# Patient Record
Sex: FEMALE | Race: WHITE | NOT HISPANIC OR LATINO | Employment: UNEMPLOYED | URBAN - METROPOLITAN AREA
[De-identification: names, ages, dates, MRNs, and addresses within clinical notes are randomized per-mention and may not be internally consistent; named-entity substitution may affect disease eponyms.]

---

## 2017-03-06 ENCOUNTER — OFFICE VISIT (OUTPATIENT)
Dept: CARDIOLOGY | Facility: CLINIC | Age: 52
End: 2017-03-06

## 2017-03-06 VITALS
HEART RATE: 54 BPM | WEIGHT: 225 LBS | HEIGHT: 66 IN | BODY MASS INDEX: 36.16 KG/M2 | SYSTOLIC BLOOD PRESSURE: 136 MMHG | DIASTOLIC BLOOD PRESSURE: 80 MMHG

## 2017-03-06 DIAGNOSIS — I25.10 CORONARY ARTERY DISEASE INVOLVING NATIVE CORONARY ARTERY OF NATIVE HEART WITHOUT ANGINA PECTORIS: ICD-10-CM

## 2017-03-06 DIAGNOSIS — I25.2 OLD MI (MYOCARDIAL INFARCTION): Primary | ICD-10-CM

## 2017-03-06 PROCEDURE — 99213 OFFICE O/P EST LOW 20 MIN: CPT | Performed by: INTERNAL MEDICINE

## 2017-03-06 RX ORDER — NITROGLYCERIN 0.4 MG/1
0.4 TABLET SUBLINGUAL
Qty: 35 TABLET | Refills: 6 | Status: SHIPPED | OUTPATIENT
Start: 2017-03-06 | End: 2022-05-24 | Stop reason: SDUPTHER

## 2017-03-06 RX ORDER — METOPROLOL SUCCINATE 50 MG/1
50 TABLET, EXTENDED RELEASE ORAL DAILY
Qty: 90 TABLET | Refills: 3 | Status: SHIPPED | OUTPATIENT
Start: 2017-03-06 | End: 2019-09-17

## 2017-03-06 RX ORDER — ROSUVASTATIN CALCIUM 10 MG/1
10 TABLET, COATED ORAL DAILY
Qty: 90 TABLET | Refills: 3 | Status: SHIPPED | OUTPATIENT
Start: 2017-03-06 | End: 2018-05-03 | Stop reason: SDUPTHER

## 2017-03-06 RX ORDER — GABAPENTIN 300 MG/1
300 CAPSULE ORAL 3 TIMES DAILY
COMMUNITY
End: 2017-10-19

## 2017-03-06 RX ORDER — CLOPIDOGREL BISULFATE 75 MG/1
75 TABLET ORAL DAILY
Qty: 90 TABLET | Refills: 3 | Status: SHIPPED | OUTPATIENT
Start: 2017-03-06 | End: 2018-05-03 | Stop reason: SDUPTHER

## 2017-03-15 PROCEDURE — 93000 ELECTROCARDIOGRAM COMPLETE: CPT | Performed by: INTERNAL MEDICINE

## 2017-03-15 NOTE — PROGRESS NOTES
Subjective:     Encounter Date:03/06/2017      Patient ID: Claudette Gramajo is a 51 y.o. female.    Chief Complaint: CAD, old MI    History of Present Illness     Dear Dr. Cho,     I had the pleasure of seeing  the patient in cardiac followup today.  As you well know, he is a william 51-year-old woman with a history of premature coronary artery disease.   She had an acute anterior myocardial infarction treated with stenting.  Her left ventricle  function returned to normal.  In 2014 she had a ramus branch lesion that was also treated with stenting for angina.   She has done well since then without any recurrent cardiac events.       Since I have last seen her she has been trying to get into shape, but she has had problems due to her orthopaedic issues.   She had a knee replacement in [place] many years ago that did not heal well.  She is looking at the other knee being replaced soon by Dr. Irvin.       She has also been treated for arthritis.  Her rheumatologist  gave her Neurontin, which she did not like very much.  She denies any symptoms of angina or heart failure.           Review of Systems   All other systems reviewed and are negative.        ECG 12 Lead  Date/Time: 3/15/2017 10:06 AM  Performed by: MONIQUE BARGER  Authorized by: MONIQUE BARGER   Comparison: compared with previous ECG   Similar to previous ECG  Rhythm: sinus rhythm  BPM: 54  Clinical impression: low voltage  Comments: [id number]               Objective:     Physical Exam   Constitutional: She is oriented to person, place, and time. She appears well-developed and well-nourished.   HENT:   Head: Normocephalic and atraumatic.   Neck: Normal range of motion. Neck supple.   Cardiovascular: Normal rate, regular rhythm and normal heart sounds.    Pulmonary/Chest: Effort normal and breath sounds normal.   Abdominal: Soft. Bowel sounds are normal.   Musculoskeletal: Normal range of motion.   Neurological: She is alert and oriented to person, place, and  time.   Skin: Skin is warm and dry.   Psychiatric: She has a normal mood and affect. Her behavior is normal. Thought content normal.   Vitals reviewed.      Lab Review:       Assessment:          Diagnosis Plan   1. Old MI (myocardial infarction)     2. Coronary artery disease involving native coronary artery of native heart without angina pectoris            Plan:        It was a pleasure to see the patient in cardiac followup today.  She is doing well from the cardiac standpoint without complaints of angina or heart failure.  Her cardiac function is good.  Her fitness level is a little below average, but I suspect this is primarily due to her orthopaedic issues. I see no problems with her proceeding with her knee surgery.  She can hold her antiplatelet therapy for five days before surgery if required.  She is to restart them post procedure, and she should ambulate as early as possible to reduce risk of venous thromboembolism.   She will see me again in six months, or sooner if symptoms warrant.       Coronary Artery Disease  Assessment  • The patient has no angina    Plan  • Lifestyle modifications discussed include adhering to a heart healthy diet, avoidance of tobacco products, maintenance of a healthy weight, medication compliance, regular exercise and regular monitoring of cholesterol and blood pressure    Subjective - Objective  • There is a history of past MI  • There has been a previous stent procedure using DONNELL  • Current antiplatelet therapy includes aspirin 81 mg and clopidogrel 75 mg

## 2017-10-19 ENCOUNTER — OFFICE VISIT (OUTPATIENT)
Dept: CARDIOLOGY | Facility: CLINIC | Age: 52
End: 2017-10-19

## 2017-10-19 VITALS
HEIGHT: 66 IN | HEART RATE: 58 BPM | WEIGHT: 218 LBS | SYSTOLIC BLOOD PRESSURE: 126 MMHG | DIASTOLIC BLOOD PRESSURE: 80 MMHG | OXYGEN SATURATION: 98 % | BODY MASS INDEX: 35.03 KG/M2

## 2017-10-19 DIAGNOSIS — I25.10 ARTERIOSCLEROSIS OF CORONARY ARTERY: Primary | ICD-10-CM

## 2017-10-19 DIAGNOSIS — R07.9 CHEST PAIN, UNSPECIFIED TYPE: ICD-10-CM

## 2017-10-19 DIAGNOSIS — Z95.5 HISTORY OF CORONARY ARTERY STENT PLACEMENT: ICD-10-CM

## 2017-10-19 PROBLEM — S83.006A DISLOCATION, PATELLA CLOSED: Status: ACTIVE | Noted: 2017-05-30

## 2017-10-19 PROCEDURE — 99214 OFFICE O/P EST MOD 30 MIN: CPT | Performed by: PHYSICIAN ASSISTANT

## 2017-10-19 PROCEDURE — 93000 ELECTROCARDIOGRAM COMPLETE: CPT | Performed by: PHYSICIAN ASSISTANT

## 2017-10-19 RX ORDER — ERGOCALCIFEROL 1.25 MG/1
50000 CAPSULE ORAL
COMMUNITY
Start: 2017-08-29 | End: 2022-02-16

## 2017-10-19 NOTE — PROGRESS NOTES
Date of Office Visit: 10/19/2017  Encounter Provider: ERIC Stone  Place of Service: [facility] CARDIOLOGY  Patient Name: Claudette Gramajo  :1965    Chief Complaint   Patient presents with   • Coronary Artery Disease     6 month follow up   :     HPI: Claudette Gramajo is a 52 y.o. female , new to me, who presents today for follow-up.  Old records have been obtained and reviewed by me.  She is a patient of Dr. Ely's with a past medical history significant for coronary artery disease.  She had an acute anterior MI that was treated with stenting, and her LV function returned to normal.  Then in  she underwent stent placement to the ramus branch for angina.  She was last in our office to see Dr. Ely on 3/6/2017.  At that visit, she was doing well from a cardiac standpoint.  She was having some orthopedic issues.  She was thinking about pursuing further surgery on her knee.  Dr. Ely felt that it was safe for her to proceed with knee replacement surgery.  He recommended that she hold her antiplatelet therapy for 5 days before her surgery if required, and then to restart them after the procedure.  Recommendations were for her to follow-up in 6 months or sooner if needed.  She is here today for 6 month follow-up.   Over the past 6 months she's been doing fairly well.  However for the past couple of months she started having chest pain.  She describes it as sometimes sharp and sometimes tight on the left side of her chest.  At times it radiates to her upper right and left arm.  It is associated with shortness of breath and sometimes nausea, as well as diaphoresis.  The episodes usually happen at rest.  They last for about 5 minutes and then go away on their own.  She does not exert herself much because of her multiple orthopedic issues.  She is able to do housework, and she does not have the pain when she is moving around the house.    Past Medical History:   Diagnosis Date   •  CAD (coronary artery disease)    • Chest pain    • Edema    • Myocardial infarct    • Palpitations    • Unstable angina        Past Surgical History:   Procedure Laterality Date   • CARDIAC CATHETERIZATION     • CORONARY ANGIOPLASTY     • CORONARY STENT PLACEMENT     • VENOUS THROMBECTOMY         Social History     Social History   • Marital status:      Spouse name: N/A   • Number of children: N/A   • Years of education: N/A     Occupational History   • Not on file.     Social History Main Topics   • Smoking status: Former Smoker   • Smokeless tobacco: Never Used      Comment: caffeine use   • Alcohol use No   • Drug use: No   • Sexual activity: Defer     Other Topics Concern   • Not on file     Social History Narrative       Family History   Problem Relation Age of Onset   • Suicidality Mother    • Heart attack Father    • Heart disease Father    • No Known Problems Maternal Grandmother    • No Known Problems Maternal Grandfather    • No Known Problems Paternal Grandmother    • No Known Problems Paternal Grandfather        Review of Systems   Constitution: Positive for diaphoresis. Negative for chills, fever, malaise/fatigue, weight gain and weight loss.   HENT: Negative for ear pain, hearing loss, nosebleeds and sore throat.    Eyes: Negative for double vision, pain and visual disturbance.   Cardiovascular: Positive for chest pain. Negative for dyspnea on exertion, irregular heartbeat, leg swelling, near-syncope, orthopnea, palpitations, paroxysmal nocturnal dyspnea and syncope.   Respiratory: Positive for shortness of breath. Negative for cough, sleep disturbances due to breathing, snoring and wheezing.    Endocrine: Negative for cold intolerance, heat intolerance and polyuria.   Skin: Negative for itching and rash.   Musculoskeletal: Negative for joint pain, joint swelling and myalgias.   Gastrointestinal: Negative for abdominal pain, diarrhea, melena, nausea and vomiting.   Genitourinary: Negative for  "frequency, hematuria and hesitancy.   Neurological: Negative for excessive daytime sleepiness, headaches, light-headedness, numbness, paresthesias and seizures.   Psychiatric/Behavioral: Negative for altered mental status and depression.   Allergic/Immunologic: Negative.    All other systems reviewed and are negative.      No Known Allergies      Current Outpatient Prescriptions:   •  aspirin 81 MG tablet, Take 81 mg by mouth Daily., Disp: , Rfl:   •  clopidogrel (PLAVIX) 75 MG tablet, Take 1 tablet by mouth Daily., Disp: 90 tablet, Rfl: 3  •  Empagliflozin (JARDIANCE) 10 MG tablet, Take 1 capsule by mouth daily., Disp: , Rfl:   •  fexofenadine-pseudoephedrine (ALLEGRA-D 24) 180-240 MG per 24 hr tablet, Take 1 tablet by mouth daily., Disp: , Rfl:   •  metFORMIN (GLUCOPHAGE) 500 MG tablet, Take 2,000 mg by mouth Daily With Breakfast., Disp: , Rfl:   •  metoprolol succinate XL (TOPROL-XL) 50 MG 24 hr tablet, Take 1 tablet by mouth Daily., Disp: 90 tablet, Rfl: 3  •  nitroglycerin (NITROSTAT) 0.4 MG SL tablet, Place 1 tablet under the tongue Every 5 (Five) Minutes As Needed for chest pain., Disp: 35 tablet, Rfl: 6  •  rosuvastatin (CRESTOR) 10 MG tablet, Take 1 tablet by mouth Daily., Disp: 90 tablet, Rfl: 3  •  vitamin D (ERGOCALCIFEROL) 81808 units capsule capsule, Take 50,000 Units by mouth Every 7 (Seven) Days., Disp: , Rfl:      Objective:     Vitals:    10/19/17 1454 10/19/17 1506   BP: 120/72 126/80   BP Location: Right arm Left arm   Pulse: 58    SpO2: 98%    Weight: 218 lb (98.9 kg)    Height: 66\" (167.6 cm)      Body mass index is 35.19 kg/(m^2).    PHYSICAL EXAM:    Physical Exam   Constitutional: She is oriented to person, place, and time. She appears well-developed and well-nourished. No distress.   HENT:   Head: Normocephalic and atraumatic.   Eyes: Pupils are equal, round, and reactive to light.   Neck: No JVD present. No thyromegaly present.   Cardiovascular: Normal rate, regular rhythm, normal heart " sounds and intact distal pulses.    No murmur heard.  Pulmonary/Chest: Effort normal and breath sounds normal. No respiratory distress.   Abdominal: Soft. Bowel sounds are normal. She exhibits no distension. There is no splenomegaly or hepatomegaly. There is no tenderness.   Musculoskeletal: Normal range of motion. She exhibits no edema.   Neurological: She is alert and oriented to person, place, and time.   Skin: Skin is warm. She is diaphoretic. No erythema.   Psychiatric: She has a normal mood and affect. Her behavior is normal. Judgment normal.         ECG 12 Lead  Date/Time: 10/19/2017 3:14 PM  Performed by: ALYSHA HECTOR.  Authorized by: ALYSHA HECTOR.   Comparison: compared with previous ECG from 3/6/2017  Similar to previous ECG  Rhythm: sinus rhythm  BPM: 58  Q waves: V1, V2 and V3  Clinical impression: abnormal ECG  Comments: Indication: Coronary artery disease.              Assessment:       Diagnosis Plan   1. Arteriosclerosis of coronary artery  ECG 12 Lead    Stress Test With Myocardial Perfusion One Day   2. History of coronary artery stent placement  ECG 12 Lead    Stress Test With Myocardial Perfusion One Day   3. Chest pain, unspecified type  Stress Test With Myocardial Perfusion One Day     Orders Placed This Encounter   Procedures   • Stress Test With Myocardial Perfusion One Day     Standing Status:   Future     Standing Expiration Date:   10/19/2018     Order Specific Question:   Rest/stress, rest only or stress only?     Answer:   Rest/Stress     Order Specific Question:   What stress agent will be used?     Answer:   Regadenoson (Lexiscan)     Order Specific Question:   Difficulty walking criteria?     Answer:   Musculoskeletal (hips, knees, feet, back, amputee)     Order Specific Question:   Reason for exam?     Answer:   Chest Pain     Order Specific Question:   Reason for exam?     Answer:   Known Coronary Artery Disease   • ECG 12 Lead     This order was created via procedure  documentation          Plan:       1.  Coronary Artery Disease  Subjective - Objective  • There is a history of past MI  • There has been a previous stent procedure using DONNELL  • Current antiplatelet therapy includes aspirin 81 mg and clopidogrel 75 mg  • This is a patient with diabetes, hyperlipidemia, and premature coronary disease.  Her last cardiac catheterization was in 2014.  She still had some mild to moderate disease in most of her coronary arteries.  Now she is having this kind of atypical chest pain.  There are some qualities of it that are concerning for many, and may be suspicious of new coronary disease.  Because of this, I am recommending a myocardial perfusion stress test.  She has significant issues with her knees, and will not be able to walk on a treadmill.  Further recommendations will be made pending the results of her testing.  She will follow-up with Dr. Ely in 6 months or sooner if needed.      As always, it has been a pleasure to participate in your patient's care.      Sincerely,         Mayelin Garnett PA-C

## 2017-11-06 ENCOUNTER — HOSPITAL ENCOUNTER (OUTPATIENT)
Dept: CARDIOLOGY | Facility: HOSPITAL | Age: 52
Discharge: HOME | End: 2017-11-06
Admitting: PHYSICIAN ASSISTANT

## 2017-11-06 ENCOUNTER — TELEPHONE (OUTPATIENT)
Dept: CARDIOLOGY | Facility: CLINIC | Age: 52
End: 2017-11-06

## 2017-11-06 DIAGNOSIS — Z95.5 HISTORY OF CORONARY ARTERY STENT PLACEMENT: ICD-10-CM

## 2017-11-06 DIAGNOSIS — I25.10 ARTERIOSCLEROSIS OF CORONARY ARTERY: ICD-10-CM

## 2017-11-06 DIAGNOSIS — R07.9 CHEST PAIN, UNSPECIFIED TYPE: ICD-10-CM

## 2017-11-06 LAB
BH CV NUCLEAR PRIOR STUDY: 2
BH CV STRESS BP STAGE 1: NORMAL
BH CV STRESS COMMENTS STAGE 1: NORMAL
BH CV STRESS DOSE REGADENOSON STAGE 1: 0.4
BH CV STRESS DURATION MIN STAGE 1: 0
BH CV STRESS DURATION SEC STAGE 1: 15
BH CV STRESS HR STAGE 1: 95
BH CV STRESS PROTOCOL 1: NORMAL
BH CV STRESS RECOVERY BP: NORMAL MMHG
BH CV STRESS RECOVERY HR: 60 BPM
BH CV STRESS STAGE 1: 1
LV EF NUC BP: 65 %
MAXIMAL PREDICTED HEART RATE: 168 BPM
PERCENT MAX PREDICTED HR: 56.55 %
STRESS BASELINE BP: NORMAL MMHG
STRESS BASELINE HR: 55 BPM
STRESS PERCENT HR: 67 %
STRESS POST EXERCISE DUR SEC: 15 SEC
STRESS POST PEAK BP: NORMAL MMHG
STRESS POST PEAK HR: 95 BPM
STRESS TARGET HR: 143 BPM

## 2017-11-06 PROCEDURE — 93016 CV STRESS TEST SUPVJ ONLY: CPT | Performed by: INTERNAL MEDICINE

## 2017-11-06 PROCEDURE — 93017 CV STRESS TEST TRACING ONLY: CPT

## 2017-11-06 PROCEDURE — 25010000002 REGADENOSON 0.4 MG/5ML SOLUTION: Performed by: PHYSICIAN ASSISTANT

## 2017-11-06 PROCEDURE — 78452 HT MUSCLE IMAGE SPECT MULT: CPT | Performed by: INTERNAL MEDICINE

## 2017-11-06 PROCEDURE — A9502 TC99M TETROFOSMIN: HCPCS | Performed by: PHYSICIAN ASSISTANT

## 2017-11-06 PROCEDURE — 25010000002 AMINOPHYLLINE PER 250 MG: Performed by: PHYSICIAN ASSISTANT

## 2017-11-06 PROCEDURE — 0 TECHNETIUM TETROFOSMIN KIT: Performed by: PHYSICIAN ASSISTANT

## 2017-11-06 PROCEDURE — 78452 HT MUSCLE IMAGE SPECT MULT: CPT

## 2017-11-06 PROCEDURE — 93018 CV STRESS TEST I&R ONLY: CPT | Performed by: INTERNAL MEDICINE

## 2017-11-06 RX ORDER — AMINOPHYLLINE DIHYDRATE 25 MG/ML
125 INJECTION, SOLUTION INTRAVENOUS ONCE
Status: COMPLETED | OUTPATIENT
Start: 2017-11-06 | End: 2017-11-06

## 2017-11-06 RX ADMIN — AMINOPHYLLINE 125 MG: 25 INJECTION, SOLUTION INTRAVENOUS at 12:39

## 2017-11-06 RX ADMIN — REGADENOSON 0.4 MG: 0.08 INJECTION, SOLUTION INTRAVENOUS at 12:39

## 2017-11-06 RX ADMIN — TETROFOSMIN 1 DOSE: 1.38 INJECTION, POWDER, LYOPHILIZED, FOR SOLUTION INTRAVENOUS at 11:35

## 2017-11-06 RX ADMIN — TETROFOSMIN 1 DOSE: 1.38 INJECTION, POWDER, LYOPHILIZED, FOR SOLUTION INTRAVENOUS at 12:39

## 2017-11-06 NOTE — TELEPHONE ENCOUNTER
I left a message asking her to call me with the results of her normal myocardial perfusion stress test.

## 2017-11-07 ENCOUNTER — TELEPHONE (OUTPATIENT)
Dept: CARDIOLOGY | Facility: CLINIC | Age: 52
End: 2017-11-07

## 2017-11-07 NOTE — TELEPHONE ENCOUNTER
The patient called and would like to know the results of her test. She can be reached at [phone]

## 2018-05-03 RX ORDER — ROSUVASTATIN CALCIUM 10 MG/1
10 TABLET, COATED ORAL DAILY
Qty: 90 TABLET | Refills: 3 | Status: SHIPPED | OUTPATIENT
Start: 2018-05-03 | End: 2019-09-17 | Stop reason: SDUPTHER

## 2018-05-03 RX ORDER — CLOPIDOGREL BISULFATE 75 MG/1
75 TABLET ORAL DAILY
Qty: 90 TABLET | Refills: 3 | Status: SHIPPED | OUTPATIENT
Start: 2018-05-03 | End: 2019-09-17 | Stop reason: SDUPTHER

## 2018-05-14 ENCOUNTER — OFFICE VISIT (OUTPATIENT)
Dept: CARDIOLOGY | Facility: CLINIC | Age: 53
End: 2018-05-14

## 2018-05-14 VITALS
BODY MASS INDEX: 33.9 KG/M2 | SYSTOLIC BLOOD PRESSURE: 112 MMHG | WEIGHT: 216 LBS | DIASTOLIC BLOOD PRESSURE: 74 MMHG | HEART RATE: 87 BPM | HEIGHT: 67 IN

## 2018-05-14 DIAGNOSIS — I25.10 CORONARY ARTERY DISEASE INVOLVING NATIVE CORONARY ARTERY OF NATIVE HEART WITHOUT ANGINA PECTORIS: Primary | ICD-10-CM

## 2018-05-14 DIAGNOSIS — I25.2 OLD MI (MYOCARDIAL INFARCTION): ICD-10-CM

## 2018-05-14 PROCEDURE — 99213 OFFICE O/P EST LOW 20 MIN: CPT | Performed by: INTERNAL MEDICINE

## 2018-05-14 PROCEDURE — 93000 ELECTROCARDIOGRAM COMPLETE: CPT | Performed by: INTERNAL MEDICINE

## 2018-05-14 RX ORDER — DULOXETIN HYDROCHLORIDE 20 MG/1
60 CAPSULE, DELAYED RELEASE ORAL DAILY
COMMUNITY
End: 2020-09-18 | Stop reason: ALTCHOICE

## 2018-05-14 NOTE — PROGRESS NOTES
Subjective:     Encounter Date:05/14/2018      Patient ID: Claudette Gramajo is a 52 y.o. female.    Chief Complaint: CAD, old MI    History of Present Illness    Dear Dr. Hernandez:    I had the pleasure to see the patient in cardiac follow-up today.  As you well know, she is a william, 52-year-old woman with history of coronary artery disease status post acute anterior myocardial infarction.  She has had stenting of her left anterior descending as well as ramus.    She saw my physician assistant six months ago, at which point she ordered a stress test.  The results were normal, both in left ventricular function and myocardial perfusion.    Despite this, the patient has multiple complaints.  She says that she does not sleep well.  She says that she is always stressed.  She says that she periodically will get some sensations of fullness in her jaw that radiates to both hands.  She gets a sensation of fullness in her wrists.  She looks quite anxious when she describes this, although she says that she does not feel anxious at that time.     Her  thinks that overall she is doing great.  He wishes that she could be a little more active.        Review of Systems   All other systems reviewed and are negative.        ECG 12 Lead  Date/Time: 5/14/2018 3:24 PM  Performed by: MONIQUE BARGER  Authorized by: MONIQUE BARGER   Comparison: compared with previous ECG   Similar to previous ECG  Rhythm: sinus rhythm  BPM: 87  Other findings: PRWP  Clinical impression: low voltage               Objective:     Physical Exam   Constitutional: She is oriented to person, place, and time. She appears well-developed and well-nourished.   HENT:   Head: Normocephalic and atraumatic.   Neck: Normal range of motion. Neck supple.   Cardiovascular: Normal rate, regular rhythm and normal heart sounds.    Pulmonary/Chest: Effort normal and breath sounds normal.   Abdominal: Soft. Bowel sounds are normal.   Musculoskeletal: Normal range of motion.    Neurological: She is alert and oriented to person, place, and time.   Skin: Skin is warm and dry.   Psychiatric: She has a normal mood and affect. Her behavior is normal. Thought content normal.   Vitals reviewed.      Lab Review:       Assessment:          Diagnosis Plan   1. Coronary artery disease involving native coronary artery of native heart without angina pectoris     2. Old MI (myocardial infarction)            Plan:       It was a pleasure to see your patient in cardiac follow-up today.  She is doing great from the cardiac standpoint.  Despite her myocardial infarction and coronary intervention, her nuclear perfusion stress test remains normal.  I have encouraged her to focus on lifestyle at this time.  She will exercise on a regular basis and try to reduce stress in her life.  I have reviewed her blood tests, which seem to be quite reasonable.  Hopefully, with some lifestyle modification, she can increase her enjoyment of life and reduce her overall cardiac risk.  She will see me again in six months.      Coronary Artery Disease  Assessment  • The patient has no angina    Plan  • Lifestyle modifications discussed include adhering to a heart healthy diet, avoidance of tobacco products, maintenance of a healthy weight, medication compliance, regular exercise and regular monitoring of cholesterol and blood pressure    Subjective - Objective  • There is a history of past MI  • There has been a previous stent procedure using DONNELL  • Current antiplatelet therapy includes aspirin 81 mg and clopidogrel 75 mg

## 2018-11-19 ENCOUNTER — OFFICE VISIT (OUTPATIENT)
Dept: CARDIOLOGY | Facility: CLINIC | Age: 53
End: 2018-11-19

## 2018-11-19 VITALS
HEIGHT: 67 IN | WEIGHT: 222 LBS | HEART RATE: 67 BPM | SYSTOLIC BLOOD PRESSURE: 142 MMHG | BODY MASS INDEX: 34.84 KG/M2 | DIASTOLIC BLOOD PRESSURE: 80 MMHG

## 2018-11-19 DIAGNOSIS — I25.10 CORONARY ARTERY DISEASE INVOLVING NATIVE CORONARY ARTERY OF NATIVE HEART WITHOUT ANGINA PECTORIS: Primary | ICD-10-CM

## 2018-11-19 DIAGNOSIS — I25.2 OLD MI (MYOCARDIAL INFARCTION): ICD-10-CM

## 2018-11-19 PROCEDURE — 99213 OFFICE O/P EST LOW 20 MIN: CPT | Performed by: INTERNAL MEDICINE

## 2018-11-19 PROCEDURE — 93000 ELECTROCARDIOGRAM COMPLETE: CPT | Performed by: INTERNAL MEDICINE

## 2018-11-19 NOTE — PROGRESS NOTES
Subjective:     Encounter Date:11/19/2018      Patient ID: Claudette Gramajo is a 53 y.o. female.    Chief Complaint: CAD, old MI    History of Present Illness    She is a william 53-year-old woman with history of coronary artery disease status post acute anterior myocardial infarction. She has had stenting of her LAD and ramus. She had a stress test last year that was normal.    She comes in today for 6-month followup. Since I have last seen her, she reports doing very well. She gets some physical limitation when she overexerts. She has very bad knees and is contemplating a very complex knee surgery in the future. She is tired all the time. She seems to have her cardiac symptoms under control.         Review of Systems   All other systems reviewed and are negative.        ECG 12 Lead  Date/Time: 11/19/2018 1:57 PM  Performed by: Raymond Ely MD  Authorized by: Raymond Ely MD   Comparison: compared with previous ECG   Similar to previous ECG  Rhythm: sinus rhythm  BPM: 67  Other findings: PRWP  Clinical impression: low voltage               Objective:     Physical Exam   Constitutional: She is oriented to person, place, and time. She appears well-developed and well-nourished.   HENT:   Head: Normocephalic and atraumatic.   Neck: Normal range of motion. Neck supple.   Cardiovascular: Normal rate, regular rhythm and normal heart sounds.   Pulmonary/Chest: Effort normal and breath sounds normal.   Abdominal: Soft. Bowel sounds are normal.   Musculoskeletal: Normal range of motion.   Neurological: She is alert and oriented to person, place, and time.   Skin: Skin is warm and dry.   Psychiatric: She has a normal mood and affect. Her behavior is normal. Thought content normal.   Vitals reviewed.      Lab Review:       Assessment:          Diagnosis Plan   1. Coronary artery disease involving native coronary artery of native heart without angina pectoris     2. Old MI (myocardial infarction)            Plan:       It was a  pleasure to see your patient in cardiac followup today. She is doing very well from the cardiac standpoint without any new cardiac complaints. I would continue her current medical regimen for the foreseeable future. I would encourage her to have knee surgery only if she has no other options. She will see me again in 6 months or sooner if symptoms warrant.        Coronary Artery Disease  Assessment  • The patient has no angina    Plan  • Lifestyle modifications discussed include adhering to a heart healthy diet, avoidance of tobacco products, maintenance of a healthy weight, medication compliance, regular exercise and regular monitoring of cholesterol and blood pressure    Subjective - Objective  • There is a history of past MI  • There has been a previous stent procedure using DONNELL  • Current antiplatelet therapy includes aspirin 81 mg and clopidogrel 75 mg

## 2019-01-31 ENCOUNTER — OFFICE VISIT CONVERTED (OUTPATIENT)
Dept: FAMILY MEDICINE CLINIC | Facility: CLINIC | Age: 54
End: 2019-01-31
Attending: NURSE PRACTITIONER

## 2019-02-18 ENCOUNTER — OFFICE VISIT CONVERTED (OUTPATIENT)
Dept: PODIATRY | Facility: CLINIC | Age: 54
End: 2019-02-18
Attending: PODIATRIST

## 2019-02-18 ENCOUNTER — CONVERSION ENCOUNTER (OUTPATIENT)
Dept: PODIATRY | Facility: CLINIC | Age: 54
End: 2019-02-18

## 2019-02-20 ENCOUNTER — HOSPITAL ENCOUNTER (OUTPATIENT)
Dept: PHYSICAL THERAPY | Facility: CLINIC | Age: 54
Setting detail: RECURRING SERIES
Discharge: HOME | End: 2019-04-11
Attending: NURSE PRACTITIONER

## 2019-03-21 ENCOUNTER — OFFICE VISIT CONVERTED (OUTPATIENT)
Dept: GASTROENTEROLOGY | Facility: CLINIC | Age: 54
End: 2019-03-21
Attending: PHYSICIAN ASSISTANT

## 2019-04-22 ENCOUNTER — HOSPITAL ENCOUNTER (OUTPATIENT)
Dept: GASTROENTEROLOGY | Facility: HOSPITAL | Age: 54
Setting detail: HOSPITAL OUTPATIENT SURGERY
Discharge: HOME | End: 2019-04-22
Attending: INTERNAL MEDICINE

## 2019-04-22 LAB — GLUCOSE BLD-MCNC: 166 MG/DL (ref 65–99)

## 2019-06-13 ENCOUNTER — OFFICE VISIT CONVERTED (OUTPATIENT)
Dept: GASTROENTEROLOGY | Facility: CLINIC | Age: 54
End: 2019-06-13
Attending: INTERNAL MEDICINE

## 2019-06-13 ENCOUNTER — CONVERSION ENCOUNTER (OUTPATIENT)
Dept: GASTROENTEROLOGY | Facility: CLINIC | Age: 54
End: 2019-06-13

## 2019-06-26 ENCOUNTER — HOSPITAL ENCOUNTER (OUTPATIENT)
Dept: ULTRASOUND IMAGING | Facility: HOSPITAL | Age: 54
Discharge: HOME | End: 2019-06-26
Attending: PHYSICIAN ASSISTANT

## 2019-06-28 ENCOUNTER — HOSPITAL ENCOUNTER (OUTPATIENT)
Dept: OTHER | Facility: HOSPITAL | Age: 54
Discharge: HOME | End: 2019-06-28
Attending: NURSE PRACTITIONER

## 2019-06-28 LAB
25(OH)D3 SERPL-MCNC: 41.9 NG/ML (ref 30–100)
ALBUMIN SERPL-MCNC: 4.6 G/DL (ref 3.5–5)
ALBUMIN/GLOB SERPL: 1.7 {RATIO} (ref 1.4–2.6)
ALP SERPL-CCNC: 72 U/L (ref 53–141)
ALT SERPL-CCNC: 42 U/L (ref 10–40)
ANION GAP SERPL CALC-SCNC: 22 MMOL/L (ref 8–19)
APPEARANCE UR: CLEAR
AST SERPL-CCNC: 23 U/L (ref 15–50)
BASOPHILS # BLD AUTO: 0.04 10*3/UL (ref 0–0.2)
BASOPHILS NFR BLD AUTO: 0.6 % (ref 0–3)
BILIRUB SERPL-MCNC: 0.63 MG/DL (ref 0.2–1.3)
BILIRUB UR QL: NEGATIVE
BUN SERPL-MCNC: 17 MG/DL (ref 5–25)
BUN/CREAT SERPL: 18 {RATIO} (ref 6–20)
CALCIUM SERPL-MCNC: 9.7 MG/DL (ref 8.7–10.4)
CHLORIDE SERPL-SCNC: 100 MMOL/L (ref 99–111)
CHOLEST SERPL-MCNC: 139 MG/DL (ref 107–200)
CHOLEST/HDLC SERPL: 3.5 {RATIO} (ref 3–6)
COLOR UR: YELLOW
CONV ABS IMM GRAN: 0.06 10*3/UL (ref 0–0.2)
CONV CO2: 23 MMOL/L (ref 22–32)
CONV COLLECTION SOURCE (UA): ABNORMAL
CONV CREATININE URINE, RANDOM: 197.1 MG/DL (ref 10–300)
CONV IMMATURE GRAN: 0.8 % (ref 0–1.8)
CONV MICROALBUM.,U,RANDOM: <12 MG/L (ref 0–20)
CONV TOTAL PROTEIN: 7.3 G/DL (ref 6.3–8.2)
CONV UROBILINOGEN IN URINE BY AUTOMATED TEST STRIP: 0.2 {EHRLICHU}/DL (ref 0.1–1)
CREAT UR-MCNC: 0.94 MG/DL (ref 0.5–0.9)
DEPRECATED RDW RBC AUTO: 39.7 FL (ref 36.4–46.3)
EOSINOPHIL # BLD AUTO: 0.19 10*3/UL (ref 0–0.7)
EOSINOPHIL # BLD AUTO: 2.6 % (ref 0–7)
ERYTHROCYTE [DISTWIDTH] IN BLOOD BY AUTOMATED COUNT: 11.9 % (ref 11.7–14.4)
EST. AVERAGE GLUCOSE BLD GHB EST-MCNC: 166 MG/DL
GFR SERPLBLD BASED ON 1.73 SQ M-ARVRAT: >60 ML/MIN/{1.73_M2}
GLOBULIN UR ELPH-MCNC: 2.7 G/DL (ref 2–3.5)
GLUCOSE SERPL-MCNC: 158 MG/DL (ref 65–99)
GLUCOSE UR QL: >=1000 MG/DL
HBA1C MFR BLD: 14.5 G/DL (ref 12–16)
HBA1C MFR BLD: 7.4 % (ref 3.5–5.7)
HCT VFR BLD AUTO: 43.4 % (ref 37–47)
HDLC SERPL-MCNC: 40 MG/DL (ref 40–60)
HGB UR QL STRIP: NEGATIVE
KETONES UR QL STRIP: ABNORMAL MG/DL
LDLC SERPL CALC-MCNC: 64 MG/DL (ref 70–100)
LEUKOCYTE ESTERASE UR QL STRIP: NEGATIVE
LYMPHOCYTES # BLD AUTO: 2.51 10*3/UL (ref 1–5)
MCH RBC QN AUTO: 30.3 PG (ref 27–31)
MCHC RBC AUTO-ENTMCNC: 33.4 G/DL (ref 33–37)
MCV RBC AUTO: 90.6 FL (ref 81–99)
MICROALBUMIN/CREAT UR: 6.1 MG/G{CRE} (ref 0–35)
MONOCYTES # BLD AUTO: 0.58 10*3/UL (ref 0.2–1.2)
MONOCYTES NFR BLD AUTO: 8 % (ref 3–10)
NEUTROPHILS # BLD AUTO: 3.87 10*3/UL (ref 2–8)
NEUTROPHILS NFR BLD AUTO: 53.4 % (ref 30–85)
NITRITE UR QL STRIP: NEGATIVE
NRBC CBCN: 0 % (ref 0–0.7)
OSMOLALITY SERPL CALC.SUM OF ELEC: 297 MOSM/KG (ref 273–304)
PH UR STRIP.AUTO: 5.5 [PH] (ref 5–8)
PLATELET # BLD AUTO: 213 10*3/UL (ref 130–400)
PMV BLD AUTO: 10.6 FL (ref 9.4–12.3)
POTASSIUM SERPL-SCNC: 3.9 MMOL/L (ref 3.5–5.3)
PROT UR QL: NEGATIVE MG/DL
RBC # BLD AUTO: 4.79 10*6/UL (ref 4.2–5.4)
SODIUM SERPL-SCNC: 141 MMOL/L (ref 135–147)
SP GR UR: 1.04 (ref 1–1.03)
T4 FREE SERPL-MCNC: 1.2 NG/DL (ref 0.9–1.8)
TRIGL SERPL-MCNC: 175 MG/DL (ref 40–150)
TSH SERPL-ACNC: 2.51 M[IU]/L (ref 0.27–4.2)
VARIANT LYMPHS NFR BLD MANUAL: 34.6 % (ref 20–45)
VLDLC SERPL-MCNC: 35 MG/DL (ref 5–37)
WBC # BLD AUTO: 7.25 10*3/UL (ref 4.8–10.8)

## 2019-07-02 ENCOUNTER — CONVERSION ENCOUNTER (OUTPATIENT)
Dept: FAMILY MEDICINE CLINIC | Facility: CLINIC | Age: 54
End: 2019-07-02

## 2019-07-02 ENCOUNTER — OFFICE VISIT CONVERTED (OUTPATIENT)
Dept: FAMILY MEDICINE CLINIC | Facility: CLINIC | Age: 54
End: 2019-07-02
Attending: NURSE PRACTITIONER

## 2019-07-02 ENCOUNTER — HOSPITAL ENCOUNTER (OUTPATIENT)
Dept: FAMILY MEDICINE CLINIC | Facility: CLINIC | Age: 54
Discharge: HOME | End: 2019-07-02
Attending: NURSE PRACTITIONER

## 2019-07-05 LAB
ALP SERPL-CCNC: 78 U/L (ref 53–141)
ASO AB SERPL-ACNC: 39 [IU]/ML (ref 0–200)
CALCIUM SERPL-MCNC: 10.7 MG/DL (ref 8.7–10.4)
CONV ANTI NUCLEAR ANTIBODY WITH REFLEX: NEGATIVE
CONV RHEUMATOID FACTOR IGM: <10 [IU]/ML (ref 0–14)
CRP SERPL-MCNC: 7.1 MG/L (ref 0–5)
ERYTHROCYTE [SEDIMENTATION RATE] IN BLOOD: 9 MM/H (ref 0–30)
PHOSPHATE SERPL-MCNC: 3.9 MG/DL (ref 2.4–4.5)
URATE SERPL-MCNC: 6 MG/DL (ref 2.5–7.5)

## 2019-07-08 LAB
CONV LAST MENSTURAL PERIOD: NORMAL
SPECIMEN SOURCE: NORMAL
SPECIMEN SOURCE: NORMAL
THIN PREP CVX: NORMAL

## 2019-07-12 ENCOUNTER — HOSPITAL ENCOUNTER (OUTPATIENT)
Dept: LAB | Facility: HOSPITAL | Age: 54
Discharge: HOME | End: 2019-07-12
Attending: NURSE PRACTITIONER

## 2019-07-12 LAB
ALBUMIN SERPL-MCNC: 4.6 G/DL (ref 3.5–5)
ALBUMIN/GLOB SERPL: 1.8 {RATIO} (ref 1.4–2.6)
ALP SERPL-CCNC: 63 U/L (ref 53–141)
ALT SERPL-CCNC: 35 U/L (ref 10–40)
ANION GAP SERPL CALC-SCNC: 23 MMOL/L (ref 8–19)
AST SERPL-CCNC: 21 U/L (ref 15–50)
BILIRUB SERPL-MCNC: 0.33 MG/DL (ref 0.2–1.3)
BUN SERPL-MCNC: 18 MG/DL (ref 5–25)
BUN/CREAT SERPL: 20 {RATIO} (ref 6–20)
CALCIUM SERPL-MCNC: 9.9 MG/DL (ref 8.7–10.4)
CHLORIDE SERPL-SCNC: 101 MMOL/L (ref 99–111)
CONV CO2: 23 MMOL/L (ref 22–32)
CONV TOTAL PROTEIN: 7.1 G/DL (ref 6.3–8.2)
CREAT UR-MCNC: 0.92 MG/DL (ref 0.5–0.9)
GFR SERPLBLD BASED ON 1.73 SQ M-ARVRAT: >60 ML/MIN/{1.73_M2}
GLOBULIN UR ELPH-MCNC: 2.5 G/DL (ref 2–3.5)
GLUCOSE SERPL-MCNC: 154 MG/DL (ref 65–99)
OSMOLALITY SERPL CALC.SUM OF ELEC: 301 MOSM/KG (ref 273–304)
POTASSIUM SERPL-SCNC: 4.3 MMOL/L (ref 3.5–5.3)
PTH-INTACT SERPL-MCNC: 52.8 PG/ML (ref 11.1–79.5)
SODIUM SERPL-SCNC: 143 MMOL/L (ref 135–147)

## 2019-07-25 ENCOUNTER — HOSPITAL ENCOUNTER (OUTPATIENT)
Dept: CT IMAGING | Facility: HOSPITAL | Age: 54
Discharge: HOME | End: 2019-07-25
Attending: PHYSICIAN ASSISTANT

## 2019-08-03 ENCOUNTER — HOSPITAL ENCOUNTER (OUTPATIENT)
Dept: OTHER | Facility: HOSPITAL | Age: 54
Discharge: HOME | End: 2019-08-03
Attending: PHYSICIAN ASSISTANT

## 2019-08-03 LAB — MONONUCLEOSIS TEST, QUAL: NEGATIVE

## 2019-08-06 ENCOUNTER — HOSPITAL ENCOUNTER (OUTPATIENT)
Dept: MAMMOGRAPHY | Facility: HOSPITAL | Age: 54
Discharge: HOME | End: 2019-08-06
Attending: NURSE PRACTITIONER

## 2019-08-08 ENCOUNTER — OFFICE VISIT CONVERTED (OUTPATIENT)
Dept: ORTHOPEDIC SURGERY | Facility: CLINIC | Age: 54
End: 2019-08-08
Attending: ORTHOPAEDIC SURGERY

## 2019-08-20 ENCOUNTER — HOSPITAL ENCOUNTER (OUTPATIENT)
Dept: GENERAL RADIOLOGY | Facility: HOSPITAL | Age: 54
Discharge: HOME | End: 2019-08-20
Attending: INTERNAL MEDICINE

## 2019-08-29 ENCOUNTER — OFFICE VISIT CONVERTED (OUTPATIENT)
Dept: ORTHOPEDIC SURGERY | Facility: CLINIC | Age: 54
End: 2019-08-29
Attending: PHYSICIAN ASSISTANT

## 2019-09-17 ENCOUNTER — OFFICE VISIT (OUTPATIENT)
Dept: CARDIOLOGY | Facility: CLINIC | Age: 54
End: 2019-09-17

## 2019-09-17 ENCOUNTER — TELEPHONE (OUTPATIENT)
Dept: CARDIOLOGY | Facility: CLINIC | Age: 54
End: 2019-09-17

## 2019-09-17 VITALS
DIASTOLIC BLOOD PRESSURE: 82 MMHG | HEART RATE: 72 BPM | WEIGHT: 214.2 LBS | SYSTOLIC BLOOD PRESSURE: 120 MMHG | BODY MASS INDEX: 33.62 KG/M2 | HEIGHT: 67 IN

## 2019-09-17 DIAGNOSIS — I25.10 ARTERIOSCLEROSIS OF CORONARY ARTERY: Primary | ICD-10-CM

## 2019-09-17 PROCEDURE — 99213 OFFICE O/P EST LOW 20 MIN: CPT | Performed by: PHYSICIAN ASSISTANT

## 2019-09-17 PROCEDURE — 93000 ELECTROCARDIOGRAM COMPLETE: CPT | Performed by: PHYSICIAN ASSISTANT

## 2019-09-17 RX ORDER — METOPROLOL SUCCINATE 25 MG/1
25 TABLET, EXTENDED RELEASE ORAL DAILY
Qty: 90 TABLET | Refills: 3 | Status: SHIPPED | OUTPATIENT
Start: 2019-09-17 | End: 2020-09-18 | Stop reason: SDUPTHER

## 2019-09-17 RX ORDER — CLOPIDOGREL BISULFATE 75 MG/1
75 TABLET ORAL DAILY
Qty: 90 TABLET | Refills: 3 | Status: SHIPPED | OUTPATIENT
Start: 2019-09-17 | End: 2020-09-18 | Stop reason: SDUPTHER

## 2019-09-17 RX ORDER — TRAMADOL HYDROCHLORIDE 50 MG/1
50 TABLET ORAL 2 TIMES DAILY
COMMUNITY
End: 2020-09-18 | Stop reason: ALTCHOICE

## 2019-09-17 RX ORDER — METOPROLOL SUCCINATE 25 MG/1
25 TABLET, EXTENDED RELEASE ORAL DAILY
COMMUNITY
End: 2019-09-17 | Stop reason: SDUPTHER

## 2019-09-17 RX ORDER — ROSUVASTATIN CALCIUM 10 MG/1
10 TABLET, COATED ORAL DAILY
Qty: 90 TABLET | Refills: 3 | Status: SHIPPED | OUTPATIENT
Start: 2019-09-17 | End: 2020-09-18 | Stop reason: SDUPTHER

## 2019-09-17 NOTE — TELEPHONE ENCOUNTER
----- Message from ERIC Medina sent at 9/17/2019  3:32 PM EDT -----  Get a copy of her labs from her primary care physician.  She sees Angel Bahena at [place]

## 2019-09-19 ENCOUNTER — OFFICE VISIT CONVERTED (OUTPATIENT)
Dept: GASTROENTEROLOGY | Facility: CLINIC | Age: 54
End: 2019-09-19
Attending: INTERNAL MEDICINE

## 2019-10-03 ENCOUNTER — HOSPITAL ENCOUNTER (OUTPATIENT)
Dept: FAMILY MEDICINE CLINIC | Facility: CLINIC | Age: 54
Discharge: HOME | End: 2019-10-03
Attending: NURSE PRACTITIONER

## 2019-10-03 ENCOUNTER — OFFICE VISIT CONVERTED (OUTPATIENT)
Dept: FAMILY MEDICINE CLINIC | Facility: CLINIC | Age: 54
End: 2019-10-03
Attending: NURSE PRACTITIONER

## 2019-10-03 LAB
25(OH)D3 SERPL-MCNC: 41.9 NG/ML (ref 30–100)
ALBUMIN SERPL-MCNC: 4.7 G/DL (ref 3.5–5)
ALBUMIN/GLOB SERPL: 1.7 {RATIO} (ref 1.4–2.6)
ALP SERPL-CCNC: 74 U/L (ref 53–141)
ALT SERPL-CCNC: 30 U/L (ref 10–40)
ANION GAP SERPL CALC-SCNC: 20 MMOL/L (ref 8–19)
APPEARANCE UR: CLEAR
AST SERPL-CCNC: 19 U/L (ref 15–50)
BASOPHILS # BLD AUTO: 0.05 10*3/UL (ref 0–0.2)
BASOPHILS NFR BLD AUTO: 0.8 % (ref 0–3)
BILIRUB SERPL-MCNC: 0.47 MG/DL (ref 0.2–1.3)
BILIRUB UR QL: NEGATIVE
BUN SERPL-MCNC: 19 MG/DL (ref 5–25)
BUN/CREAT SERPL: 19 {RATIO} (ref 6–20)
CALCIUM SERPL-MCNC: 10.2 MG/DL (ref 8.7–10.4)
CHLORIDE SERPL-SCNC: 100 MMOL/L (ref 99–111)
CHOLEST SERPL-MCNC: 147 MG/DL (ref 107–200)
CHOLEST/HDLC SERPL: 3.4 {RATIO} (ref 3–6)
COLOR UR: YELLOW
CONV ABS IMM GRAN: 0.02 10*3/UL (ref 0–0.2)
CONV CO2: 24 MMOL/L (ref 22–32)
CONV COLLECTION SOURCE (UA): ABNORMAL
CONV CREATININE URINE, RANDOM: 77 MG/DL (ref 10–300)
CONV IMMATURE GRAN: 0.3 % (ref 0–1.8)
CONV MICROALBUM.,U,RANDOM: <12 MG/L (ref 0–20)
CONV TOTAL PROTEIN: 7.4 G/DL (ref 6.3–8.2)
CONV UROBILINOGEN IN URINE BY AUTOMATED TEST STRIP: 0.2 {EHRLICHU}/DL (ref 0.1–1)
CREAT UR-MCNC: 1 MG/DL (ref 0.5–0.9)
DEPRECATED RDW RBC AUTO: 40.9 FL (ref 36.4–46.3)
EOSINOPHIL # BLD AUTO: 0.19 10*3/UL (ref 0–0.7)
EOSINOPHIL # BLD AUTO: 2.9 % (ref 0–7)
ERYTHROCYTE [DISTWIDTH] IN BLOOD BY AUTOMATED COUNT: 12.2 % (ref 11.7–14.4)
EST. AVERAGE GLUCOSE BLD GHB EST-MCNC: 166 MG/DL
GFR SERPLBLD BASED ON 1.73 SQ M-ARVRAT: >60 ML/MIN/{1.73_M2}
GLOBULIN UR ELPH-MCNC: 2.7 G/DL (ref 2–3.5)
GLUCOSE SERPL-MCNC: 216 MG/DL (ref 65–99)
GLUCOSE UR QL: >=1000 MG/DL
HBA1C MFR BLD: 7.4 % (ref 3.5–5.7)
HCT VFR BLD AUTO: 41.6 % (ref 37–47)
HDLC SERPL-MCNC: 43 MG/DL (ref 40–60)
HGB BLD-MCNC: 13.9 G/DL (ref 12–16)
HGB UR QL STRIP: NEGATIVE
KETONES UR QL STRIP: NEGATIVE MG/DL
LDLC SERPL CALC-MCNC: 65 MG/DL (ref 70–100)
LEUKOCYTE ESTERASE UR QL STRIP: NEGATIVE
LYMPHOCYTES # BLD AUTO: 2.21 10*3/UL (ref 1–5)
LYMPHOCYTES NFR BLD AUTO: 33.9 % (ref 20–45)
MCH RBC QN AUTO: 30.6 PG (ref 27–31)
MCHC RBC AUTO-ENTMCNC: 33.4 G/DL (ref 33–37)
MCV RBC AUTO: 91.6 FL (ref 81–99)
MICROALBUMIN/CREAT UR: 15.6 MG/G{CRE} (ref 0–35)
MONOCYTES # BLD AUTO: 0.46 10*3/UL (ref 0.2–1.2)
MONOCYTES NFR BLD AUTO: 7.1 % (ref 3–10)
NEUTROPHILS # BLD AUTO: 3.58 10*3/UL (ref 2–8)
NEUTROPHILS NFR BLD AUTO: 55 % (ref 30–85)
NITRITE UR QL STRIP: NEGATIVE
NRBC CBCN: 0 % (ref 0–0.7)
OSMOLALITY SERPL CALC.SUM OF ELEC: 297 MOSM/KG (ref 273–304)
PH UR STRIP.AUTO: 5 [PH] (ref 5–8)
PLATELET # BLD AUTO: 228 10*3/UL (ref 130–400)
PMV BLD AUTO: 10.9 FL (ref 9.4–12.3)
POTASSIUM SERPL-SCNC: 4.5 MMOL/L (ref 3.5–5.3)
PROT UR QL: NEGATIVE MG/DL
RBC # BLD AUTO: 4.54 10*6/UL (ref 4.2–5.4)
SODIUM SERPL-SCNC: 139 MMOL/L (ref 135–147)
SP GR UR: 1.04 (ref 1–1.03)
T4 FREE SERPL-MCNC: 1.1 NG/DL (ref 0.9–1.8)
TRIGL SERPL-MCNC: 193 MG/DL (ref 40–150)
TSH SERPL-ACNC: 0.82 M[IU]/L (ref 0.27–4.2)
VLDLC SERPL-MCNC: 39 MG/DL (ref 5–37)
WBC # BLD AUTO: 6.51 10*3/UL (ref 4.8–10.8)

## 2019-10-11 ENCOUNTER — CONVERSION ENCOUNTER (OUTPATIENT)
Dept: ORTHOPEDIC SURGERY | Facility: CLINIC | Age: 54
End: 2019-10-11

## 2019-10-11 ENCOUNTER — OFFICE VISIT CONVERTED (OUTPATIENT)
Dept: ORTHOPEDIC SURGERY | Facility: CLINIC | Age: 54
End: 2019-10-11
Attending: PHYSICIAN ASSISTANT

## 2019-10-21 ENCOUNTER — HOSPITAL ENCOUNTER (OUTPATIENT)
Dept: CT IMAGING | Facility: HOSPITAL | Age: 54
Discharge: HOME | End: 2019-10-21
Attending: PHYSICIAN ASSISTANT

## 2019-10-23 ENCOUNTER — OFFICE VISIT CONVERTED (OUTPATIENT)
Dept: FAMILY MEDICINE CLINIC | Facility: CLINIC | Age: 54
End: 2019-10-23
Attending: NURSE PRACTITIONER

## 2019-10-23 ENCOUNTER — CONVERSION ENCOUNTER (OUTPATIENT)
Dept: FAMILY MEDICINE CLINIC | Facility: CLINIC | Age: 54
End: 2019-10-23

## 2019-10-29 ENCOUNTER — OFFICE VISIT CONVERTED (OUTPATIENT)
Dept: ORTHOPEDIC SURGERY | Facility: CLINIC | Age: 54
End: 2019-10-29
Attending: PHYSICIAN ASSISTANT

## 2019-11-25 ENCOUNTER — OFFICE VISIT CONVERTED (OUTPATIENT)
Dept: NEUROLOGY | Facility: CLINIC | Age: 54
End: 2019-11-25
Attending: PSYCHIATRY & NEUROLOGY

## 2020-01-22 ENCOUNTER — HOSPITAL ENCOUNTER (OUTPATIENT)
Dept: LAB | Facility: HOSPITAL | Age: 55
Discharge: HOME | End: 2020-01-22
Attending: NURSE PRACTITIONER

## 2020-01-22 LAB
ALBUMIN SERPL-MCNC: 4.7 G/DL (ref 3.5–5)
ALBUMIN/GLOB SERPL: 1.7 {RATIO} (ref 1.4–2.6)
ALP SERPL-CCNC: 64 U/L (ref 53–141)
ALT SERPL-CCNC: 27 U/L (ref 10–40)
ANION GAP SERPL CALC-SCNC: 21 MMOL/L (ref 8–19)
APPEARANCE UR: CLEAR
AST SERPL-CCNC: 19 U/L (ref 15–50)
BASOPHILS # BLD AUTO: 0.06 10*3/UL (ref 0–0.2)
BASOPHILS NFR BLD AUTO: 0.6 % (ref 0–3)
BILIRUB SERPL-MCNC: 0.44 MG/DL (ref 0.2–1.3)
BILIRUB UR QL: NEGATIVE
BUN SERPL-MCNC: 15 MG/DL (ref 5–25)
BUN/CREAT SERPL: 15 {RATIO} (ref 6–20)
CALCIUM SERPL-MCNC: 9.6 MG/DL (ref 8.7–10.4)
CHLORIDE SERPL-SCNC: 100 MMOL/L (ref 99–111)
CHOLEST SERPL-MCNC: 147 MG/DL (ref 107–200)
CHOLEST/HDLC SERPL: 2.9 {RATIO} (ref 3–6)
COLOR UR: YELLOW
CONV ABS IMM GRAN: 0.05 10*3/UL (ref 0–0.2)
CONV BACTERIA: NEGATIVE
CONV CO2: 24 MMOL/L (ref 22–32)
CONV COLLECTION SOURCE (UA): ABNORMAL
CONV CREATININE URINE, RANDOM: 189 MG/DL (ref 10–300)
CONV HYALINE CASTS IN URINE MICRO: ABNORMAL /[LPF]
CONV IMMATURE GRAN: 0.5 % (ref 0–1.8)
CONV MICROALBUM.,U,RANDOM: 18 MG/L (ref 0–20)
CONV TOTAL PROTEIN: 7.4 G/DL (ref 6.3–8.2)
CONV UROBILINOGEN IN URINE BY AUTOMATED TEST STRIP: 1 {EHRLICHU}/DL (ref 0.1–1)
CREAT UR-MCNC: 0.98 MG/DL (ref 0.5–0.9)
DEPRECATED RDW RBC AUTO: 42.2 FL (ref 36.4–46.3)
EOSINOPHIL # BLD AUTO: 0.31 10*3/UL (ref 0–0.7)
EOSINOPHIL # BLD AUTO: 3.4 % (ref 0–7)
ERYTHROCYTE [DISTWIDTH] IN BLOOD BY AUTOMATED COUNT: 12.2 % (ref 11.7–14.4)
EST. AVERAGE GLUCOSE BLD GHB EST-MCNC: 143 MG/DL
GFR SERPLBLD BASED ON 1.73 SQ M-ARVRAT: >60 ML/MIN/{1.73_M2}
GLOBULIN UR ELPH-MCNC: 2.7 G/DL (ref 2–3.5)
GLUCOSE SERPL-MCNC: 137 MG/DL (ref 65–99)
GLUCOSE UR QL: >=1000 MG/DL
HBA1C MFR BLD: 6.6 % (ref 3.5–5.7)
HCT VFR BLD AUTO: 42 % (ref 37–47)
HDLC SERPL-MCNC: 51 MG/DL (ref 40–60)
HGB BLD-MCNC: 13.5 G/DL (ref 12–16)
HGB UR QL STRIP: NEGATIVE
KETONES UR QL STRIP: NEGATIVE MG/DL
LDLC SERPL CALC-MCNC: 75 MG/DL (ref 70–100)
LEUKOCYTE ESTERASE UR QL STRIP: ABNORMAL
LYMPHOCYTES # BLD AUTO: 3.58 10*3/UL (ref 1–5)
LYMPHOCYTES NFR BLD AUTO: 38.7 % (ref 20–45)
MCH RBC QN AUTO: 30 PG (ref 27–31)
MCHC RBC AUTO-ENTMCNC: 32.1 G/DL (ref 33–37)
MCV RBC AUTO: 93.3 FL (ref 81–99)
MICROALBUMIN/CREAT UR: 9.5 MG/G{CRE} (ref 0–35)
MONOCYTES # BLD AUTO: 0.7 10*3/UL (ref 0.2–1.2)
MONOCYTES NFR BLD AUTO: 7.6 % (ref 3–10)
NEUTROPHILS # BLD AUTO: 4.55 10*3/UL (ref 2–8)
NEUTROPHILS NFR BLD AUTO: 49.2 % (ref 30–85)
NITRITE UR QL STRIP: NEGATIVE
NRBC CBCN: 0 % (ref 0–0.7)
OSMOLALITY SERPL CALC.SUM OF ELEC: 295 MOSM/KG (ref 273–304)
PH UR STRIP.AUTO: 5.5 [PH] (ref 5–8)
PLATELET # BLD AUTO: 267 10*3/UL (ref 130–400)
PMV BLD AUTO: 10.9 FL (ref 9.4–12.3)
POTASSIUM SERPL-SCNC: 3.9 MMOL/L (ref 3.5–5.3)
PROT UR QL: NEGATIVE MG/DL
RBC # BLD AUTO: 4.5 10*6/UL (ref 4.2–5.4)
RBC #/AREA URNS HPF: ABNORMAL /[HPF]
SODIUM SERPL-SCNC: 141 MMOL/L (ref 135–147)
SP GR UR: 1.04 (ref 1–1.03)
SQUAMOUS SPT QL MICRO: ABNORMAL /[HPF]
T4 FREE SERPL-MCNC: 1.2 NG/DL (ref 0.9–1.8)
TRIGL SERPL-MCNC: 104 MG/DL (ref 40–150)
TSH SERPL-ACNC: 2.12 M[IU]/L (ref 0.27–4.2)
VLDLC SERPL-MCNC: 21 MG/DL (ref 5–37)
WBC # BLD AUTO: 9.25 10*3/UL (ref 4.8–10.8)
WBC #/AREA URNS HPF: ABNORMAL /[HPF]

## 2020-01-23 ENCOUNTER — HOSPITAL ENCOUNTER (OUTPATIENT)
Dept: FAMILY MEDICINE CLINIC | Facility: CLINIC | Age: 55
Discharge: HOME | End: 2020-01-23
Attending: NURSE PRACTITIONER

## 2020-01-23 ENCOUNTER — CONVERSION ENCOUNTER (OUTPATIENT)
Dept: FAMILY MEDICINE CLINIC | Facility: CLINIC | Age: 55
End: 2020-01-23

## 2020-01-23 ENCOUNTER — OFFICE VISIT CONVERTED (OUTPATIENT)
Dept: FAMILY MEDICINE CLINIC | Facility: CLINIC | Age: 55
End: 2020-01-23
Attending: NURSE PRACTITIONER

## 2020-01-24 LAB — BACTERIA UR CULT: NORMAL

## 2020-01-25 LAB
AMPICILLIN SUSC ISLT: <=0.25
BACTERIA SPEC AEROBE CULT: ABNORMAL
CEFOTAXIME SUSC ISLT: <=0.12
CEFTRIAXONE SUSC ISLT: <=0.12
CLINDAMYCIN SUSC ISLT: <=0.25
LEVOFLOXACIN SUSC ISLT: 1
PENICILLIN G SUSC ISLT: 0.12
TETRACYCLINE SUSC ISLT: <=0.25
TIGECYCLINE SUSC ISLT: <=0.06
VANCOMYCIN SUSC ISLT: 0.5

## 2020-02-18 ENCOUNTER — OFFICE VISIT CONVERTED (OUTPATIENT)
Dept: PODIATRY | Facility: CLINIC | Age: 55
End: 2020-02-18
Attending: PODIATRIST

## 2020-02-18 ENCOUNTER — CONVERSION ENCOUNTER (OUTPATIENT)
Dept: PODIATRY | Facility: CLINIC | Age: 55
End: 2020-02-18

## 2020-07-23 ENCOUNTER — HOSPITAL ENCOUNTER (OUTPATIENT)
Dept: FAMILY MEDICINE CLINIC | Facility: CLINIC | Age: 55
Discharge: HOME | End: 2020-07-23
Attending: NURSE PRACTITIONER

## 2020-07-23 ENCOUNTER — OFFICE VISIT CONVERTED (OUTPATIENT)
Dept: FAMILY MEDICINE CLINIC | Facility: CLINIC | Age: 55
End: 2020-07-23
Attending: NURSE PRACTITIONER

## 2020-07-23 ENCOUNTER — CONVERSION ENCOUNTER (OUTPATIENT)
Dept: FAMILY MEDICINE CLINIC | Facility: CLINIC | Age: 55
End: 2020-07-23

## 2020-07-23 LAB
ALBUMIN SERPL-MCNC: 4.7 G/DL (ref 3.5–5)
ALBUMIN/GLOB SERPL: 1.7 {RATIO} (ref 1.4–2.6)
ALP SERPL-CCNC: 71 U/L (ref 53–141)
ALT SERPL-CCNC: 50 U/L (ref 10–40)
ANION GAP SERPL CALC-SCNC: 21 MMOL/L (ref 8–19)
APPEARANCE UR: CLEAR
AST SERPL-CCNC: 31 U/L (ref 15–50)
BASOPHILS # BLD AUTO: 0.06 10*3/UL (ref 0–0.2)
BASOPHILS NFR BLD AUTO: 0.9 % (ref 0–3)
BILIRUB SERPL-MCNC: 0.43 MG/DL (ref 0.2–1.3)
BILIRUB UR QL: NEGATIVE
BUN SERPL-MCNC: 16 MG/DL (ref 5–25)
BUN/CREAT SERPL: 18 {RATIO} (ref 6–20)
CALCIUM SERPL-MCNC: 9.9 MG/DL (ref 8.7–10.4)
CHLORIDE SERPL-SCNC: 101 MMOL/L (ref 99–111)
CHOLEST SERPL-MCNC: 161 MG/DL (ref 107–200)
CHOLEST/HDLC SERPL: 3.9 {RATIO} (ref 3–6)
COLOR UR: YELLOW
CONV ABS IMM GRAN: 0.03 10*3/UL (ref 0–0.2)
CONV CO2: 22 MMOL/L (ref 22–32)
CONV COLLECTION SOURCE (UA): ABNORMAL
CONV CREATININE URINE, RANDOM: 68.6 MG/DL (ref 10–300)
CONV IMMATURE GRAN: 0.4 % (ref 0–1.8)
CONV MICROALBUM.,U,RANDOM: <12 MG/L (ref 0–20)
CONV TOTAL PROTEIN: 7.5 G/DL (ref 6.3–8.2)
CONV UROBILINOGEN IN URINE BY AUTOMATED TEST STRIP: 0.2 {EHRLICHU}/DL (ref 0.1–1)
CREAT UR-MCNC: 0.87 MG/DL (ref 0.5–0.9)
DEPRECATED RDW RBC AUTO: 40.3 FL (ref 36.4–46.3)
EOSINOPHIL # BLD AUTO: 0.16 10*3/UL (ref 0–0.7)
EOSINOPHIL # BLD AUTO: 2.3 % (ref 0–7)
ERYTHROCYTE [DISTWIDTH] IN BLOOD BY AUTOMATED COUNT: 12 % (ref 11.7–14.4)
EST. AVERAGE GLUCOSE BLD GHB EST-MCNC: 177 MG/DL
GFR SERPLBLD BASED ON 1.73 SQ M-ARVRAT: >60 ML/MIN/{1.73_M2}
GLOBULIN UR ELPH-MCNC: 2.8 G/DL (ref 2–3.5)
GLUCOSE SERPL-MCNC: 154 MG/DL (ref 65–99)
GLUCOSE UR QL: >=1000 MG/DL
HBA1C MFR BLD: 7.8 % (ref 3.5–5.7)
HCT VFR BLD AUTO: 44.1 % (ref 37–47)
HDLC SERPL-MCNC: 41 MG/DL (ref 40–60)
HGB BLD-MCNC: 14.5 G/DL (ref 12–16)
HGB UR QL STRIP: NEGATIVE
KETONES UR QL STRIP: NEGATIVE MG/DL
LDLC SERPL CALC-MCNC: 78 MG/DL (ref 70–100)
LEUKOCYTE ESTERASE UR QL STRIP: NEGATIVE
LYMPHOCYTES # BLD AUTO: 2.18 10*3/UL (ref 1–5)
LYMPHOCYTES NFR BLD AUTO: 31.3 % (ref 20–45)
MCH RBC QN AUTO: 30 PG (ref 27–31)
MCHC RBC AUTO-ENTMCNC: 32.9 G/DL (ref 33–37)
MCV RBC AUTO: 91.3 FL (ref 81–99)
MICROALBUMIN/CREAT UR: 17.5 MG/G{CRE} (ref 0–35)
MONOCYTES # BLD AUTO: 0.47 10*3/UL (ref 0.2–1.2)
MONOCYTES NFR BLD AUTO: 6.8 % (ref 3–10)
NEUTROPHILS # BLD AUTO: 4.06 10*3/UL (ref 2–8)
NEUTROPHILS NFR BLD AUTO: 58.3 % (ref 30–85)
NITRITE UR QL STRIP: NEGATIVE
NRBC CBCN: 0 % (ref 0–0.7)
OSMOLALITY SERPL CALC.SUM OF ELEC: 292 MOSM/KG (ref 273–304)
PH UR STRIP.AUTO: 5 [PH] (ref 5–8)
PLATELET # BLD AUTO: 234 10*3/UL (ref 130–400)
PMV BLD AUTO: 10.8 FL (ref 9.4–12.3)
POTASSIUM SERPL-SCNC: 4.6 MMOL/L (ref 3.5–5.3)
PROT UR QL: NEGATIVE MG/DL
RBC # BLD AUTO: 4.83 10*6/UL (ref 4.2–5.4)
SODIUM SERPL-SCNC: 139 MMOL/L (ref 135–147)
SP GR UR: 1.03 (ref 1–1.03)
TRIGL SERPL-MCNC: 208 MG/DL (ref 40–150)
VLDLC SERPL-MCNC: 42 MG/DL (ref 5–37)
WBC # BLD AUTO: 6.96 10*3/UL (ref 4.8–10.8)

## 2020-07-24 ENCOUNTER — HOSPITAL ENCOUNTER (OUTPATIENT)
Dept: GENERAL RADIOLOGY | Facility: HOSPITAL | Age: 55
Discharge: HOME | End: 2020-07-24
Attending: NURSE PRACTITIONER

## 2020-07-24 LAB — 25(OH)D3 SERPL-MCNC: 33.4 NG/ML (ref 30–100)

## 2020-08-13 ENCOUNTER — OFFICE VISIT CONVERTED (OUTPATIENT)
Dept: FAMILY MEDICINE CLINIC | Facility: CLINIC | Age: 55
End: 2020-08-13
Attending: NURSE PRACTITIONER

## 2020-08-18 ENCOUNTER — HOSPITAL ENCOUNTER (OUTPATIENT)
Dept: DIABETES SERVICES | Facility: HOSPITAL | Age: 55
Setting detail: RECURRING SERIES
Discharge: HOME | End: 2020-11-16
Attending: NURSE PRACTITIONER

## 2020-09-18 ENCOUNTER — OFFICE VISIT (OUTPATIENT)
Dept: CARDIOLOGY | Facility: CLINIC | Age: 55
End: 2020-09-18

## 2020-09-18 VITALS
HEIGHT: 67 IN | WEIGHT: 207 LBS | HEART RATE: 81 BPM | BODY MASS INDEX: 32.49 KG/M2 | DIASTOLIC BLOOD PRESSURE: 76 MMHG | SYSTOLIC BLOOD PRESSURE: 122 MMHG

## 2020-09-18 DIAGNOSIS — E11.9 TYPE 2 DIABETES MELLITUS WITHOUT COMPLICATION, WITHOUT LONG-TERM CURRENT USE OF INSULIN (HCC): Primary | ICD-10-CM

## 2020-09-18 DIAGNOSIS — I25.10 CORONARY ARTERY DISEASE INVOLVING NATIVE CORONARY ARTERY OF NATIVE HEART WITHOUT ANGINA PECTORIS: ICD-10-CM

## 2020-09-18 PROCEDURE — 93000 ELECTROCARDIOGRAM COMPLETE: CPT | Performed by: INTERNAL MEDICINE

## 2020-09-18 PROCEDURE — 99214 OFFICE O/P EST MOD 30 MIN: CPT | Performed by: INTERNAL MEDICINE

## 2020-09-18 RX ORDER — ROSUVASTATIN CALCIUM 10 MG/1
10 TABLET, COATED ORAL DAILY
Qty: 90 TABLET | Refills: 3 | Status: SHIPPED | OUTPATIENT
Start: 2020-09-18 | End: 2021-09-01

## 2020-09-18 RX ORDER — AMITRIPTYLINE HYDROCHLORIDE 25 MG/1
TABLET, FILM COATED ORAL AS NEEDED
COMMUNITY
End: 2021-01-07

## 2020-09-18 RX ORDER — METOPROLOL SUCCINATE 25 MG/1
25 TABLET, EXTENDED RELEASE ORAL DAILY
Qty: 90 TABLET | Refills: 3 | Status: SHIPPED | OUTPATIENT
Start: 2020-09-18 | End: 2021-11-16 | Stop reason: SDUPTHER

## 2020-09-18 RX ORDER — MONTELUKAST SODIUM 10 MG/1
TABLET ORAL DAILY
COMMUNITY
End: 2022-01-11 | Stop reason: SDUPTHER

## 2020-09-18 RX ORDER — DULOXETIN HYDROCHLORIDE 60 MG/1
CAPSULE, DELAYED RELEASE ORAL DAILY
COMMUNITY
Start: 2020-07-23 | End: 2021-11-16 | Stop reason: SDUPTHER

## 2020-09-18 RX ORDER — OXYCODONE HYDROCHLORIDE 5 MG/1
TABLET ORAL AS NEEDED
COMMUNITY
Start: 2020-09-10

## 2020-09-18 RX ORDER — CLOPIDOGREL BISULFATE 75 MG/1
75 TABLET ORAL DAILY
Qty: 90 TABLET | Refills: 3 | Status: SHIPPED | OUTPATIENT
Start: 2020-09-18 | End: 2022-01-25 | Stop reason: SDUPTHER

## 2020-09-18 NOTE — PROGRESS NOTES
Subjective:     Encounter Date:09/18/2020      Patient ID: Claudette Gramajo is a 55 y.o. female.    Chief Complaint: CAD  HPI:   This is a 55 year old woman previously seen by Dr. Ely. She has a history of acute anterior MI s/p stenting of her LAD and Ramus in 2014. She has DM, HLD, HTN and anxiety.   Today she has a number of complaints, none of them cardiac. She notes chest pressure which has been constant and unchanged since the time of her MI. She has had two stress tests last in  2017, which were normal. She states SLNTG helps her pain, but only after an hour after taking it. She is highly anxious and says she wake up [id number] due to fear her heart will stop. She has diabetes which sounds relatively well controlled but she perseverates on the fact that she has trouble knowing what to eat and feels she hasn't been well educated on diabetes.   She has no angina, dyspnea, palpitaitons, syncope.   She is originally from [place]. She lives in [facility]. She has a college aged son.     The following portions of the patient's history were reviewed and updated as appropriate: allergies, current medications, past family history, past medical history, past social history, past surgical history and problem list.     REVIEW OF SYSTEMS:   All systems reviewed.  Pertinent positives identified in HPI.  All other systems are negative.    Past Medical History:   Diagnosis Date   • CAD (coronary artery disease)    • Chest pain    • Edema    • Myocardial infarct (CMS/HCC)    • Palpitations    • Sleep apnea    • Unstable angina (CMS/HCC)        Family History   Problem Relation Age of Onset   • Suicidality Mother    • Heart attack Father    • Heart disease Father    • No Known Problems Maternal Grandmother    • No Known Problems Maternal Grandfather    • No Known Problems Paternal Grandmother    • No Known Problems Paternal Grandfather        Social History     Socioeconomic History   • Marital status:      Spouse name:  Not on file   • Number of children: Not on file   • Years of education: Not on file   • Highest education level: Not on file   Tobacco Use   • Smoking status: Former Smoker   • Smokeless tobacco: Never Used   • Tobacco comment: caffeine use   Substance and Sexual Activity   • Alcohol use: No   • Drug use: No   • Sexual activity: Defer       No Known Allergies    Past Surgical History:   Procedure Laterality Date   • CARDIAC CATHETERIZATION     • CORONARY ANGIOPLASTY     • CORONARY STENT PLACEMENT     • VENOUS THROMBECTOMY           ECG 12 Lead    Date/Time: 9/18/2020 5:40 PM  Performed by: Mi Becerra MD  Authorized by: Mi Becerra MD   Comparison: compared with previous ECG from 9/17/2019  Similar to previous ECG  Rhythm: sinus rhythm  Rate: normal  Conduction: conduction normal  ST Segments: ST segments normal  T Waves: T waves normal  QRS axis: normal  Other findings: low voltage    Clinical impression: abnormal EKG               Objective:         PHYSICAL EXAM:  GEN: VSS, no distress, obese  Eyes: normal sclera, normal lids and lashes  HENT: moist mucus membranes,   Respiratory: CTAB, no rales or wheezes  CV: RRR, no murmurs, , +2 DP and 2+ carotid pulses b/l  GI: NABS, soft,  Nontender, nondistended  MSK: no edema, no scoliosis or kyphosis  Skin: no rash, warm, dry  Heme/Lymph: no bruising or bleeding  Psych: organized thought, normal behavior and affect  Neuro: Cranial nerves grossly intact, Alert and Oriented x 3.         Assessment:          Diagnosis Plan   1. Type 2 diabetes mellitus without complication, without long-term current use of insulin (CMS/[facility])  Ambulatory Referral to Endocrinology   2. Coronary artery disease involving native coronary artery of native heart without angina pectoris  ECG 12 Lead          Plan:       1. CAD: No angina. Lots of non-anginal chest pain unchanged for many years and has had a number of normal stress tests. Continue ASA Plavix, Toprol, Crestor.   2. HTN:  controlled, sometimes low.   3. HLD: crestor  4. Anxiety: needs better control   5. DM: referred to endocrinology for more support and education  6. EKG read as long QT but I think it is overestimated. Her QT prolonging medications are cymbalta and elavil.       Return in 1 year.          Mi Becerra MD  09/18/20  [facility]    Outpatient Encounter Medications as of 9/18/2020   Medication Sig Dispense Refill   • amitriptyline (ELAVIL) 25 MG tablet Take  by mouth As Needed.     • aspirin 81 MG tablet Take 81 mg by mouth Daily.     • clopidogrel (PLAVIX) 75 MG tablet Take 1 tablet by mouth Daily. 90 tablet 3   • DULoxetine (CYMBALTA) 60 MG capsule Take  by mouth Daily.     • Empagliflozin (JARDIANCE) 10 MG tablet Take 1 capsule by mouth daily.     • metFORMIN (GLUCOPHAGE) 500 MG tablet Take 2,000 mg by mouth Daily With Breakfast.     • metoprolol succinate XL (TOPROL-XL) 25 MG 24 hr tablet Take 1 tablet by mouth Daily. 90 tablet 3   • montelukast (SINGULAIR) 10 MG tablet Take  by mouth Daily.     • nitroglycerin (NITROSTAT) 0.4 MG SL tablet Place 1 tablet under the tongue Every 5 (Five) Minutes As Needed for chest pain. 35 tablet 6   • oxyCODONE (ROXICODONE) 5 MG immediate release tablet Take  by mouth 2 (two) times a day.     • rosuvastatin (CRESTOR) 10 MG tablet Take 1 tablet by mouth Daily. 90 tablet 3   • SITagliptin (Januvia) 100 MG tablet Take  by mouth Daily.     • vitamin D (ERGOCALCIFEROL) 51107 units capsule capsule Take 50,000 Units by mouth Every 7 (Seven) Days.     • [DISCONTINUED] DULoxetine (CYMBALTA) 20 MG capsule Take 60 mg by mouth Daily.     • [DISCONTINUED] fexofenadine-pseudoephedrine (ALLEGRA-D 24) 180-240 MG per 24 hr tablet Take 1 tablet by mouth daily.     • [DISCONTINUED] traMADol (ULTRAM) 50 MG tablet Take 50 mg by mouth 2 (Two) Times a Day.       No facility-administered encounter medications on file as of 9/18/2020.

## 2020-09-24 ENCOUNTER — OFFICE VISIT CONVERTED (OUTPATIENT)
Dept: ORTHOPEDIC SURGERY | Facility: CLINIC | Age: 55
End: 2020-09-24
Attending: ORTHOPAEDIC SURGERY

## 2020-10-19 ENCOUNTER — HOSPITAL ENCOUNTER (OUTPATIENT)
Dept: MRI IMAGING | Facility: HOSPITAL | Age: 55
Discharge: HOME | End: 2020-10-19
Attending: ORTHOPAEDIC SURGERY

## 2020-10-22 ENCOUNTER — OFFICE VISIT CONVERTED (OUTPATIENT)
Dept: ORTHOPEDIC SURGERY | Facility: CLINIC | Age: 55
End: 2020-10-22
Attending: ORTHOPAEDIC SURGERY

## 2020-10-28 ENCOUNTER — HOSPITAL ENCOUNTER (OUTPATIENT)
Dept: MAMMOGRAPHY | Facility: HOSPITAL | Age: 55
Discharge: HOME | End: 2020-10-28
Attending: NURSE PRACTITIONER

## 2020-11-10 ENCOUNTER — HOSPITAL ENCOUNTER (OUTPATIENT)
Dept: LAB | Facility: HOSPITAL | Age: 55
Discharge: HOME | End: 2020-11-10
Attending: NURSE PRACTITIONER

## 2020-11-10 LAB
ALBUMIN SERPL-MCNC: 4.7 G/DL (ref 3.5–5)
ALBUMIN/GLOB SERPL: 1.7 {RATIO} (ref 1.4–2.6)
ALP SERPL-CCNC: 61 U/L (ref 53–141)
ALT SERPL-CCNC: 20 U/L (ref 10–40)
ANION GAP SERPL CALC-SCNC: 20 MMOL/L (ref 8–19)
APPEARANCE UR: CLEAR
AST SERPL-CCNC: 17 U/L (ref 15–50)
BASOPHILS # BLD AUTO: 0.07 10*3/UL (ref 0–0.2)
BASOPHILS NFR BLD AUTO: 0.7 % (ref 0–3)
BILIRUB SERPL-MCNC: 0.39 MG/DL (ref 0.2–1.3)
BILIRUB UR QL: NEGATIVE
BUN SERPL-MCNC: 24 MG/DL (ref 5–25)
BUN/CREAT SERPL: 26 {RATIO} (ref 6–20)
CALCIUM SERPL-MCNC: 9.8 MG/DL (ref 8.7–10.4)
CASTS URNS QL MICRO: ABNORMAL /[LPF]
CHLORIDE SERPL-SCNC: 103 MMOL/L (ref 99–111)
CHOLEST SERPL-MCNC: 136 MG/DL (ref 107–200)
CHOLEST/HDLC SERPL: 3 {RATIO} (ref 3–6)
COLOR UR: YELLOW
CONV ABS IMM GRAN: 0.04 10*3/UL (ref 0–0.2)
CONV BACTERIA: NEGATIVE
CONV CO2: 22 MMOL/L (ref 22–32)
CONV COLLECTION SOURCE (UA): ABNORMAL
CONV CREATININE URINE, RANDOM: 213.9 MG/DL (ref 10–300)
CONV HYALINE CASTS IN URINE MICRO: ABNORMAL /[LPF]
CONV IMMATURE GRAN: 0.4 % (ref 0–1.8)
CONV MICROALBUM.,U,RANDOM: 37 MG/L (ref 0–20)
CONV TOTAL PROTEIN: 7.5 G/DL (ref 6.3–8.2)
CONV UROBILINOGEN IN URINE BY AUTOMATED TEST STRIP: 1 {EHRLICHU}/DL (ref 0.1–1)
CREAT UR-MCNC: 0.92 MG/DL (ref 0.5–0.9)
DEPRECATED RDW RBC AUTO: 41.2 FL (ref 36.4–46.3)
EOSINOPHIL # BLD AUTO: 0.34 10*3/UL (ref 0–0.7)
EOSINOPHIL # BLD AUTO: 3.5 % (ref 0–7)
ERYTHROCYTE [DISTWIDTH] IN BLOOD BY AUTOMATED COUNT: 12.4 % (ref 11.7–14.4)
EST. AVERAGE GLUCOSE BLD GHB EST-MCNC: 143 MG/DL
GFR SERPLBLD BASED ON 1.73 SQ M-ARVRAT: >60 ML/MIN/{1.73_M2}
GLOBULIN UR ELPH-MCNC: 2.8 G/DL (ref 2–3.5)
GLUCOSE SERPL-MCNC: 130 MG/DL (ref 65–99)
GLUCOSE UR QL: >=1000 MG/DL
HBA1C MFR BLD: 6.6 % (ref 3.5–5.7)
HCT VFR BLD AUTO: 43.4 % (ref 37–47)
HDLC SERPL-MCNC: 45 MG/DL (ref 40–60)
HGB BLD-MCNC: 14.2 G/DL (ref 12–16)
HGB UR QL STRIP: NEGATIVE
KETONES UR QL STRIP: NEGATIVE MG/DL
LDLC SERPL CALC-MCNC: 70 MG/DL (ref 70–100)
LEUKOCYTE ESTERASE UR QL STRIP: ABNORMAL
LYMPHOCYTES # BLD AUTO: 3.37 10*3/UL (ref 1–5)
LYMPHOCYTES NFR BLD AUTO: 34.3 % (ref 20–45)
MCH RBC QN AUTO: 29.9 PG (ref 27–31)
MCHC RBC AUTO-ENTMCNC: 32.7 G/DL (ref 33–37)
MCV RBC AUTO: 91.4 FL (ref 81–99)
MICROALBUMIN/CREAT UR: 17.3 MG/G{CRE} (ref 0–35)
MONOCYTES # BLD AUTO: 0.6 10*3/UL (ref 0.2–1.2)
MONOCYTES NFR BLD AUTO: 6.1 % (ref 3–10)
NEUTROPHILS # BLD AUTO: 5.41 10*3/UL (ref 2–8)
NEUTROPHILS NFR BLD AUTO: 55 % (ref 30–85)
NITRITE UR QL STRIP: NEGATIVE
NRBC CBCN: 0 % (ref 0–0.7)
OSMOLALITY SERPL CALC.SUM OF ELEC: 298 MOSM/KG (ref 273–304)
PH UR STRIP.AUTO: 5.5 [PH] (ref 5–8)
PLATELET # BLD AUTO: 311 10*3/UL (ref 130–400)
PMV BLD AUTO: 10.7 FL (ref 9.4–12.3)
POTASSIUM SERPL-SCNC: 3.9 MMOL/L (ref 3.5–5.3)
PROT UR QL: ABNORMAL MG/DL
RBC # BLD AUTO: 4.75 10*6/UL (ref 4.2–5.4)
RBC #/AREA URNS HPF: ABNORMAL /[HPF]
SODIUM SERPL-SCNC: 141 MMOL/L (ref 135–147)
SP GR UR: 1.03 (ref 1–1.03)
SQUAMOUS SPT QL MICRO: ABNORMAL /[HPF]
T4 FREE SERPL-MCNC: 1.1 NG/DL (ref 0.9–1.8)
TRIGL SERPL-MCNC: 106 MG/DL (ref 40–150)
TSH SERPL-ACNC: 1.54 M[IU]/L (ref 0.27–4.2)
VLDLC SERPL-MCNC: 21 MG/DL (ref 5–37)
WBC # BLD AUTO: 9.83 10*3/UL (ref 4.8–10.8)
WBC #/AREA URNS HPF: ABNORMAL /[HPF]

## 2020-11-13 ENCOUNTER — CONVERSION ENCOUNTER (OUTPATIENT)
Dept: FAMILY MEDICINE CLINIC | Facility: CLINIC | Age: 55
End: 2020-11-13

## 2020-11-13 ENCOUNTER — OFFICE VISIT CONVERTED (OUTPATIENT)
Dept: FAMILY MEDICINE CLINIC | Facility: CLINIC | Age: 55
End: 2020-11-13
Attending: NURSE PRACTITIONER

## 2020-11-18 ENCOUNTER — HOSPITAL ENCOUNTER (OUTPATIENT)
Dept: DIABETES SERVICES | Facility: HOSPITAL | Age: 55
Setting detail: RECURRING SERIES
Discharge: STILL ADMITTED OR ADMINISTRATIVE | End: 2020-12-31
Attending: NURSE PRACTITIONER

## 2020-11-19 ENCOUNTER — HOSPITAL ENCOUNTER (OUTPATIENT)
Dept: ULTRASOUND IMAGING | Facility: HOSPITAL | Age: 55
Discharge: HOME | End: 2020-11-19
Attending: NURSE PRACTITIONER

## 2021-01-07 ENCOUNTER — OFFICE VISIT (OUTPATIENT)
Dept: ENDOCRINOLOGY | Age: 56
End: 2021-01-07

## 2021-01-07 VITALS
DIASTOLIC BLOOD PRESSURE: 70 MMHG | WEIGHT: 196 LBS | HEART RATE: 75 BPM | HEIGHT: 67 IN | OXYGEN SATURATION: 96 % | BODY MASS INDEX: 30.76 KG/M2 | SYSTOLIC BLOOD PRESSURE: 110 MMHG

## 2021-01-07 DIAGNOSIS — IMO0002 DIABETES MELLITUS TYPE 2, UNCONTROLLED, WITH COMPLICATIONS: Primary | ICD-10-CM

## 2021-01-07 DIAGNOSIS — E01.0 THYROMEGALY: ICD-10-CM

## 2021-01-07 PROCEDURE — 99204 OFFICE O/P NEW MOD 45 MIN: CPT | Performed by: INTERNAL MEDICINE

## 2021-01-07 NOTE — ASSESSMENT & PLAN NOTE
Patient notes hx of enlarged thyroid and thyroid nodules  Will try to get records from her PCP  Patient is currently following with ENT  Will check TFTs today

## 2021-01-07 NOTE — ASSESSMENT & PLAN NOTE
Goal Hgb A1c <7  Recent hgb A1c was 6.3 per patient  Will try to get the records from her PCP  Last creatinine 0.8  Encourage low carb diet with no more than 45 gm of cab per meal  Patient is seeing a nutritionist  I reviewed her BG meter and it shows that her BG drops in the 60s on occasion  While none of her medications shuld be causing hypoglycemia, she has had some weight loss  Will reduce meds to avoid hypoglycemia  Patient also notes a hx of pancreatits  Will increase Jardiance to 25 mg po pqd  Patient was given a two week sample of Jardiance at 25 mg po qd  Stop Januvia and Jardiance 10mg po qd  Continue metformin 1000mg po pBID

## 2021-01-07 NOTE — PROGRESS NOTES
"Chief Complaint  Diabetes  NEW PATIENT / TYPE 2 DM  Subjective          Claudette Gramajo presents to [facility] for   Diabetes:  Diagnosed after her first heart attack in 2013. Patient takes Jardiance 10 mg po qd , metformin 1000 mg po BID, Januvia 100mg po qd. Patient notes that her most recent hgb A1c was 6.3, but there is no documentation of her A!c in this system. Pt notes that she feels like she has low blood sugars with increased activity. Notes that BG drops as low as 60. Average 107 over 30 day period  [facility]  [facility] [phone]  F [phone]    Thyromegaly:  Patient notes that she had a thyroid ultrasound done for a thyroid mass. Patient states that       Objective   Vital Signs:   /70   Pulse 75   Ht 170.2 cm (67\")   Wt 88.9 kg (196 lb)   SpO2 96%   BMI 30.70 kg/m²     Physical Exam  Constitutional:       Appearance: Normal appearance.   HENT:      Head: Normocephalic and atraumatic.      Nose:      Comments: Mask in place  Cardiovascular:      Rate and Rhythm: Normal rate and regular rhythm.   Abdominal:      General: Abdomen is flat. Bowel sounds are normal.      Comments: TTP in LUQ and RUQ   Skin:     General: Skin is warm and dry.   Neurological:      General: No focal deficit present.      Mental Status: She is alert and oriented to person, place, and time.        Result Review :   The following data was reviewed by: Robb Timmons MD on 01/07/2021:  CMP    CMP 8/11/20   Glucose 136 (A)   BUN 26 (A)   Creatinine 0.8   Sodium 138   Potassium 4.5   Chloride 100   Calcium 10.0   Albumin 4.7   Total Bilirubin 0.4   Alkaline Phosphatase 63   AST (SGOT) 30   ALT (SGPT) 41 (A)   (A) Abnormal value       Comments are available for some flowsheets but are not being displayed.           CBC w/diff    CBC w/Diff 8/11/20   WBC 9.14   RBC 4.80   Hemoglobin 14.4   Hematocrit 44.9   MCV 93.5   MCH 30.0   MCHC " 32.1   RDW 12.4   Platelets 255   Neutrophil Rel % 54.9   Immature Granulocyte Rel % 0.9   Lymphocyte Rel % 33.5   Monocyte Rel % 8.0   Eosinophil Rel % 2.0   Basophil Rel % 0.7                           Data reviewed: labs reviewed          Assessment and Plan    Problem List Items Addressed This Visit        Other    Diabetes mellitus type 2, uncontrolled, with complications (CMS/[facility])    Current Assessment & Plan     Goal Hgb A1c <7  Recent hgb A1c was 6.3 per patient  Will try to get the records from her PCP  Last creatinine 0.8  Encourage low carb diet with no more than 45 gm of cab per meal  Patient is seeing a nutritionist  I reviewed her BG meter and it shows that her BG drops in the 60s on occasion  While none of her medications shuld be causing hypoglycemia, she has had some weight loss  Will reduce meds to avoid hypoglycemia  Patient also notes a hx of pancreatits  Will increase Jardiance to 25 mg po pqd  Patient was given a two week sample of Jardiance at 25 mg po qd  Stop Januvia and Jardiance 10mg po qd  Continue metformin 1000mg po pBID          Relevant Medications    Empagliflozin 25 MG tablet      Other Visit Diagnoses     Thyromegaly    -  Primary    Relevant Orders    TSH    T4, Free    T3, Free          Follow Up   Return in about 3 months (around 4/7/2021).  Patient was given instructions and counseling regarding her condition or for health maintenance advice. Please see specific information pulled into the AVS if appropriate.

## 2021-01-08 LAB
T3FREE SERPL-MCNC: 2.7 PG/ML (ref 2–4.4)
T4 FREE SERPL-MCNC: 1.09 NG/DL (ref 0.93–1.7)
TSH SERPL DL<=0.005 MIU/L-ACNC: 0.52 UIU/ML (ref 0.27–4.2)

## 2021-01-13 ENCOUNTER — TELEPHONE (OUTPATIENT)
Dept: ENDOCRINOLOGY | Age: 56
End: 2021-01-13

## 2021-01-13 NOTE — TELEPHONE ENCOUNTER
SPOKE WITH [facility]   AGAIN  THEY ARE FAXING OVER LABS  WILL REFAX REQUEST FOR THYROID US AND CT SCAN FROM [place] ----- Message from Juanis Timmons MD sent at 1/7/2021  3:51 PM EST -----  Regarding: Labs, thyroid ultrasound, and CT  Labs were done by Angel Bahena  [facility]  P [phone]  F [phone]    Can you please have them fax over her labs, her thyroid ultrasound and her most recent CT.?Thanks

## 2021-01-15 ENCOUNTER — TELEPHONE (OUTPATIENT)
Dept: ENDOCRINOLOGY | Age: 56
End: 2021-01-15

## 2021-01-15 NOTE — TELEPHONE ENCOUNTER
I had to open her chart to review her labs as the labs were not sent to my in basket.    TFTS are normal    Will need to get ultrasound [facility] since deepthi t is whre she said she had it completed. Patient notes that she is doing well with Jardiance 25 mg po qd and will pick it up from her pharmacy.    We are still working to get her labs from her PCP

## 2021-02-22 ENCOUNTER — OFFICE VISIT CONVERTED (OUTPATIENT)
Dept: PODIATRY | Facility: CLINIC | Age: 56
End: 2021-02-22
Attending: PODIATRIST

## 2021-03-23 ENCOUNTER — HOSPITAL ENCOUNTER (OUTPATIENT)
Dept: VACCINE CLINIC | Facility: HOSPITAL | Age: 56
Discharge: HOME | End: 2021-03-23
Attending: INTERNAL MEDICINE

## 2021-04-13 ENCOUNTER — HOSPITAL ENCOUNTER (OUTPATIENT)
Dept: VACCINE CLINIC | Facility: HOSPITAL | Age: 56
Discharge: HOME | End: 2021-04-13
Attending: INTERNAL MEDICINE

## 2021-05-07 ENCOUNTER — HOSPITAL ENCOUNTER (OUTPATIENT)
Dept: LAB | Facility: HOSPITAL | Age: 56
Discharge: HOME | End: 2021-05-07
Attending: NURSE PRACTITIONER

## 2021-05-07 LAB
25(OH)D3 SERPL-MCNC: 27.7 NG/ML (ref 30–100)
ALBUMIN SERPL-MCNC: 4.5 G/DL (ref 3.5–5)
ALBUMIN/GLOB SERPL: 1.8 {RATIO} (ref 1.4–2.6)
ALP SERPL-CCNC: 48 U/L (ref 53–141)
ALT SERPL-CCNC: 19 U/L (ref 10–40)
ANION GAP SERPL CALC-SCNC: 17 MMOL/L (ref 8–19)
APPEARANCE UR: CLEAR
AST SERPL-CCNC: 16 U/L (ref 15–50)
BASOPHILS # BLD AUTO: 0.05 10*3/UL (ref 0–0.2)
BASOPHILS NFR BLD AUTO: 0.7 % (ref 0–3)
BILIRUB SERPL-MCNC: 0.31 MG/DL (ref 0.2–1.3)
BILIRUB UR QL: NEGATIVE
BUN SERPL-MCNC: 25 MG/DL (ref 5–25)
BUN/CREAT SERPL: 28 {RATIO} (ref 6–20)
CALCIUM SERPL-MCNC: 9.4 MG/DL (ref 8.7–10.4)
CHLORIDE SERPL-SCNC: 102 MMOL/L (ref 99–111)
CHOLEST SERPL-MCNC: 166 MG/DL (ref 107–200)
CHOLEST/HDLC SERPL: 3.3 {RATIO} (ref 3–6)
COLOR UR: YELLOW
CONV ABS IMM GRAN: 0.02 10*3/UL (ref 0–0.2)
CONV CO2: 23 MMOL/L (ref 22–32)
CONV COLLECTION SOURCE (UA): ABNORMAL
CONV CREATININE URINE, RANDOM: 82.6 MG/DL (ref 10–300)
CONV IMMATURE GRAN: 0.3 % (ref 0–1.8)
CONV MICROALBUM.,U,RANDOM: <12 MG/L (ref 0–20)
CONV TOTAL PROTEIN: 7 G/DL (ref 6.3–8.2)
CONV UROBILINOGEN IN URINE BY AUTOMATED TEST STRIP: 0.2 {EHRLICHU}/DL (ref 0.1–1)
CREAT UR-MCNC: 0.88 MG/DL (ref 0.5–0.9)
DEPRECATED RDW RBC AUTO: 41.7 FL (ref 36.4–46.3)
EOSINOPHIL # BLD AUTO: 0.17 10*3/UL (ref 0–0.7)
EOSINOPHIL # BLD AUTO: 2.5 % (ref 0–7)
ERYTHROCYTE [DISTWIDTH] IN BLOOD BY AUTOMATED COUNT: 12.7 % (ref 11.7–14.4)
EST. AVERAGE GLUCOSE BLD GHB EST-MCNC: 126 MG/DL
GFR SERPLBLD BASED ON 1.73 SQ M-ARVRAT: >60 ML/MIN/{1.73_M2}
GLOBULIN UR ELPH-MCNC: 2.5 G/DL (ref 2–3.5)
GLUCOSE SERPL-MCNC: 115 MG/DL (ref 65–99)
GLUCOSE UR QL: >=1000 MG/DL
HBA1C MFR BLD: 6 % (ref 3.5–5.7)
HCT VFR BLD AUTO: 41.3 % (ref 37–47)
HDLC SERPL-MCNC: 51 MG/DL (ref 40–60)
HGB BLD-MCNC: 13.3 G/DL (ref 12–16)
HGB UR QL STRIP: NEGATIVE
KETONES UR QL STRIP: NEGATIVE MG/DL
LDLC SERPL CALC-MCNC: 87 MG/DL (ref 70–100)
LEUKOCYTE ESTERASE UR QL STRIP: NEGATIVE
LYMPHOCYTES # BLD AUTO: 3.04 10*3/UL (ref 1–5)
LYMPHOCYTES NFR BLD AUTO: 44.4 % (ref 20–45)
MCH RBC QN AUTO: 29.2 PG (ref 27–31)
MCHC RBC AUTO-ENTMCNC: 32.2 G/DL (ref 33–37)
MCV RBC AUTO: 90.8 FL (ref 81–99)
MICROALBUMIN/CREAT UR: 14.5 MG/G{CRE} (ref 0–35)
MONOCYTES # BLD AUTO: 0.49 10*3/UL (ref 0.2–1.2)
MONOCYTES NFR BLD AUTO: 7.2 % (ref 3–10)
NEUTROPHILS # BLD AUTO: 3.08 10*3/UL (ref 2–8)
NEUTROPHILS NFR BLD AUTO: 44.9 % (ref 30–85)
NITRITE UR QL STRIP: NEGATIVE
NRBC CBCN: 0 % (ref 0–0.7)
OSMOLALITY SERPL CALC.SUM OF ELEC: 291 MOSM/KG (ref 273–304)
PH UR STRIP.AUTO: 5.5 [PH] (ref 5–8)
PLATELET # BLD AUTO: 242 10*3/UL (ref 130–400)
PMV BLD AUTO: 10.2 FL (ref 9.4–12.3)
POTASSIUM SERPL-SCNC: 3.8 MMOL/L (ref 3.5–5.3)
PROT UR QL: NEGATIVE MG/DL
RBC # BLD AUTO: 4.55 10*6/UL (ref 4.2–5.4)
SODIUM SERPL-SCNC: 138 MMOL/L (ref 135–147)
SP GR UR: 1.03 (ref 1–1.03)
T4 FREE SERPL-MCNC: 1.1 NG/DL (ref 0.9–1.8)
TRIGL SERPL-MCNC: 138 MG/DL (ref 40–150)
TSH SERPL-ACNC: 1.33 M[IU]/L (ref 0.27–4.2)
VLDLC SERPL-MCNC: 28 MG/DL (ref 5–37)
WBC # BLD AUTO: 6.85 10*3/UL (ref 4.8–10.8)

## 2021-05-10 NOTE — H&P
History and Physical      Patient Name: Claudette Magaña   Patient ID: [id number]   Sex: Female   YOB: 1965    Primary Care Provider: Angel FAM   Referring Provider: Angel FAM    Visit Date: September 24, 2020    Provider: Sydni Guajardo MD   Location: [facility]   Location Address: [address] [place] [address]  [id number]   Location Phone: [phone]          Chief Complaint  · Right Shoulder Pain  · Left shoulder pain       History Of Present Illness  Claudette Magaña is a 55 year old /White female who presents today to [facility]. Patient is here for bilateral shoulder pain, right greater than left. Patient states she recently has been having several falls where she has caught her right shoulder feeling a pull, resulting in pain in the anterior portion of the shoulder, radiates down to the right elbow. Patient has tried steroid injection performed by the PCP that provided minimal relief of pain. Patient has tried physical therapy in the past.       Past Medical History  Ankle pain; Arthritis; Bowel Disease; CAD (coronary artery disease); Diabetes; Diabetes mellitus; Fatty liver; Fracture; Heart attack; Heart Disease; High blood pressure; High cholesterol; Hyperlipemia; Ingrowing toenail; Limb Swelling; Numbness in feet; Pancreatitis; Seasonal allergies; Shortness of Breath; Sleep apnea; Type 2 diabetes mellitus; Ulcer; Vitamin D Deficiency; Vitamin D deficiency         Past Surgical History  Appendectomy; cardiac stents; Colonoscopy; EAR Surgery; EGD; heart surgery; Hernia; Joint Surgery; Knee repair; Tonsilectomy; Tonsillectomy         Medication List  Alcohol Pads topical pads, medicated; amitriptyline 25 mg oral tablet; aspirin 81 mg oral tablet,delayed release (DR/EC); Crestor 10 mg oral tablet; duloxetine 60 mg oral capsule,delayed release(DR/EC); FreeStyle Lancets 28 gauge miscellaneous misc; FreeStyle Lite Strips  "miscellaneous strip; Januvia 100 mg oral tablet; Jardiance 10 mg oral tablet; metformin 1,000 mg oral tablet; metoprolol succinate 25 mg oral tablet extended release 24 hr; montelukast 10 mg oral tablet; oxycodone 5 mg oral capsule; Plavix 75 mg oral tablet; tramadol 50 mg oral tablet; Vitamin D2 1,250 mcg (50,000 unit) oral capsule         Allergy List  Dust Mite; Hay; Molds; NO KNOWN DRUG ALLERGIES; Pollens (trees, grasses, flowers)         Family Medical History  Heart Disease; Cancer, Unspecified; - No Family History of Colorectal Cancer; Diabetes Mellitus, Type II; Osteoporosis; Family history of Arthritis; Family history of heart disease         Social History  Alcohol (Never); Alcohol Use (Never); Homemaker; Homemaker.; lives with spouse; ; .; Recreational Drug Use (Never); Tobacco (Former)         Review of Systems  · Constitutional  o Denies  o : fever, chills, weight loss  · Cardiovascular  o Denies  o : chest pain, shortness of breath  · Gastrointestinal  o Denies  o : liver disease, heartburn, nausea, blood in stools  · Genitourinary  o Denies  o : painful urination, blood in urine  · Integument  o Denies  o : rash, itching  · Neurologic  o Denies  o : headache, weakness, loss of consciousness  · Musculoskeletal  o Denies  o : painful, swollen joints  · Psychiatric  o Denies  o : drug/alcohol addiction, anxiety, depression      Vitals  Date Time BP Position Site L\R Cuff Size HR RR TEMP (F) WT  HT  BMI kg/m2 BSA m2 O2 Sat        09/24/2020 01:42 PM         205lbs 16oz 5'  7\" 32.26 2.1           Physical Examination  · Constitutional  o Appearance  o : well developed, well-nourished, no obvious deformities present  · Head and Face  o Head  o :   § Inspection  § : normocephalic  o Face  o :   § Inspection  § : no facial lesions  · Eyes  o Conjunctivae  o : conjunctivae normal  o Sclerae  o : sclerae white  · Ears, Nose, Mouth and Throat  o Ears  o :   § External Ears  § : appearance within " normal limits  § Hearing  § : intact  o Nose  o :   § External Nose  § : appearance normal  · Neck  o Inspection/Palpation  o : normal appearance  o Range of Motion  o : full range of motion  · Respiratory  o Respiratory Effort  o : breathing unlabored  o Inspection of Chest  o : normal appearance  o Auscultation of Lungs  o : no audible wheezing or rales  · Cardiovascular  o Heart  o : regular rate  · Gastrointestinal  o Abdominal Examination  o : soft and non-tender  · Skin and Subcutaneous Tissue  o General Inspection  o : intact, no rashes  · Psychiatric  o General  o : Alert and oriented x3  o Judgement and Insight  o : judgment and insight intact  o Mood and Affect  o : mood normal, affect appropriate  · Right Shoulder  o Inspection  o : No skin discoloration, atrophy or swelling. Mild tenderness over the AC joint. Mild tenderness over subacromial bursa. She has passive range of motion 160 degrees, abduction 160, internal rotation to L4. Pain with empty can. Pain with Neers. Pain with Lorenzo. X-rays reviewed, AC joint osteoarthritis. Negative fracture or dislocation.           Assessment  · Left elbow pain     719.42/M25.522  · Bilateral shoulder pain, unspecified chronicity     719.41/M25.511      Plan  · Medications  o Medications have been Reconciled  o Transition of Care or Provider Policy  · Instructions  o Reviewed the patient's Past Medical, Social, and Family history as well as the ROS at today's visit, no changes.  o Call or return if worsening symptoms.  o This note is transcribed by Aileen ortiz/omega  o MRI of right shoulder. Follow-up after that.   · Referrals  o ID: [id number] Date: 09/23/2020 Type: Inbound  Specialty: Orthopedic Surgery            Electronically Signed by: Aileen Philippe, -Author on September 25, 2020 12:16:40 PM  Electronically Co-signed by: Mable King PA-C -Reviewer on September 28, 2020 07:56:48 AM  Electronically Co-signed by: Sydni Guajardo MD -Reviewer on September 29,  2020 07:55:40 AM

## 2021-05-13 ENCOUNTER — OFFICE VISIT CONVERTED (OUTPATIENT)
Dept: FAMILY MEDICINE CLINIC | Facility: CLINIC | Age: 56
End: 2021-05-13
Attending: NURSE PRACTITIONER

## 2021-05-13 NOTE — PROGRESS NOTES
Progress Note      Patient Name: Claudette Magaña   Patient ID: [id number]   Sex: Female   YOB: 1965    Primary Care Provider: Angel FAM   Referring Provider: Angel FAM    Visit Date: July 23, 2020    Provider: SARWAT Ca   Location: [facility]   Location Address: [address] [place] [address]  [id number]   Location Phone: [phone]          Chief Complaint  · medication refill  · routine lab  · follow up DM2, HTN, hyperlipidemia, vitamin d deficiency, and allergies      History Of Present Illness  Claudette Magaña is a 55 year old /White female who presents for evaluation and treatment of:      follow up DM2, HTN, hyperlipidemia, vitmain d deficiency, and allergies   medication refill   routine lab     last mammogram: 08/2019  colonoscopy 4.2019  pap 7.2019      pt has been being treated for pain in hand and arm.   states that her appointment were cancelled due to COVID-19. Does plan on going back to see the Hand and Arm specialists.    States that she was advised of surgery by the hand surgeon  [facility]     pt currently being treated at [facility]. dr Tyler osei     pt has appointment scheduled with rheumatology in [facility] in august  for joint pain. unknown name     pt requesting refills to be printed out      pt c/o right shoulder pain since January states she was using crutches for bilateral knee pain and felt her shoulder pop out of socket and back in   states pain radiates to hand       Past Medical History  Disease Name Date Onset Notes   Ankle pain --  --    Arthritis --  --    Bowel Disease --  --    CAD (coronary artery disease) 01/31/2019 --    Diabetes --  --    Diabetes mellitus 02/18/2019 --    Fatty liver --  --    Fracture --  --    Heart attack --  --    Heart Disease --  --    High blood pressure --  --    High cholesterol --  --    Hyperlipemia --  --     Ingrowing toenail --  --    Limb Swelling --  --    Numbness in feet --  --    Pancreatitis --  --    Seasonal allergies --  --    Shortness of Breath --  --    Sleep apnea --  --    Type 2 diabetes mellitus --  --    Ulcer --  --    Vitamin D Deficiency --  --    Vitamin D deficiency 01/31/2019 --          Past Surgical History  Procedure Name Date Notes   Appendectomy --  --    cardiac stents 2013 [id number] Dr. Raymond Ely in [facility]   Colonoscopy 2019 --    EAR Surgery --  --    EGD 2019 --    heart surgery --  --    Hernia --  --    Joint Surgery --  --    Knee repair --  --    Tonsilectomy --  --    Tonsillectomy --  --          Medication List  Name Date Started Instructions   Alcohol Pads topical pads, medicated 01/23/2020 apply to affected area by topical route daily as needed   amitriptyline 25 mg oral tablet  take 1 tablet (25 mg) by oral route 2 times per day   aspirin 81 mg oral tablet,delayed release (DR/EC)  take 1 tablet (81 mg) by oral route once daily   Crestor 10 mg oral tablet 07/23/2020 take 1 tablet (10 mg) by oral route once daily for 90 days   duloxetine 60 mg oral capsule,delayed release(DR/EC) 07/23/2020 take 1 capsule (60 mg) by oral route once daily for 90 days   FreeStyle Lancets 28 gauge miscellaneous misc 01/23/2020 use as directed to check glucose levels daily or as needed   FreeStyle Lite Strips miscellaneous strip 01/23/2020 use as directed to check glucose levels daily or as needed   Januvia 100 mg oral tablet 07/23/2020 take 1 tablet (100 mg) by oral route once daily for 90 days   Jardiance 10 mg oral tablet 07/23/2020 take 1 tablet (10 mg) by oral route once daily in the morning for 90 days   metformin 1,000 mg oral tablet 07/23/2020 take 1 tablet (1,000 mg) by oral route 2 times per day with morning and evening meals for 90 days   metoprolol succinate 25 mg oral tablet extended release 24 hr 07/23/2020 take 1 tablet (25 mg) by oral route once daily for 90 days   montelukast 10 mg  oral tablet 07/23/2020 take 1 tablet (10 mg) by oral route once daily in the evening for 90 days   oxycodone 5 mg oral capsule  take 1 capsule (5 mg) by oral route every 6 hours as needed for pain   Plavix 75 mg oral tablet 07/23/2020 take 1 tablet (75 mg) by oral route once daily for 90 days   tramadol 50 mg oral tablet  take 2 tablets by oral route once a day (at bedtime) as needed   Vitamin D2 1,250 mcg (50,000 unit) oral capsule 07/23/2020 take 1 capsule (50,000 unit) by oral route once weekly         Allergy List  Allergen Name Date Reaction Notes   Dust Mite --  --  --    Hay --  --  --    Molds --  --  --    NO KNOWN DRUG ALLERGIES --  --  --    Pollens (trees, grasses, flowers) --  --  --          Family Medical History  Disease Name Relative/Age Notes   Heart Disease Father/   Father   Cancer, Unspecified Father/   Father   - No Family History of Colorectal Cancer  --    Diabetes Mellitus, Type II  --    Osteoporosis Father/  Mother/   Mother; Father   Family history of Arthritis Father/  Mother/   Mother; Father   Family history of heart disease Father/   Father         Social History  Finding Status Start/Stop Quantity Notes   Alcohol Never --/-- --  does not drink  10/11/2017 -    Alcohol Use Never --/-- --  does not drink   Homemaker --  --/-- --  --    Homemaker. --  --/-- --  --    lives with spouse --  --/-- --  --     --  --/-- --  --    . --  --/-- --  --    Recreational Drug Use Never --/-- --  no   Tobacco Former --/-- 1 PPD former smoker, 1 packs per day, smoked 11-20 years  former smoker, smoked 11-20 years  quit several times and restarted         Immunizations  NameDate Admin Mfg Trade Name Lot Number Route Inj VIS Given VIS Publication   [id number]/03/2019 [facility] Fluzone Quadrivalent [id number] IM LD 10/03/2019    Comments: NDC [id number]         Review of Systems  · Constitutional  o Denies  o : fever, chills  · Eyes  o Denies  o : changes in vision  · HENT  o Denies  o : ear  "pain, sore throat  · Breasts  o Denies  o : Lumps, tenderness, swelling  · Cardiovascular  o Denies  o : chest Pain, palpitations, edema (swelling)  · Respiratory  o Denies  o : frequent cough, shortness of breath  · Gastrointestinal  o Denies  o : nausea, vomiting, bloody stools  · Genitourinary  o Denies  o : dysuria  · Neurologic  o Admits  o : tingling or numbness  o Denies  o : headache, dizziness  · Musculoskeletal  o Admits  o : joint pain, shoulder pain  · Psychiatric  o Admits  o : depression      Vitals  Date Time BP Position Site L\R Cuff Size HR RR TEMP (F) WT  HT  BMI kg/m2 BSA m2 O2 Sat HC       01/23/2020 11:46 /72 Sitting               02/18/2020 02:07 /73 Sitting    85 - R   208lbs 0oz 5'  7\" 32.58 2.11 99 %    07/23/2020 12:17 /83 Sitting    79 - R  97.6 205lbs 16oz    96 %          Physical Examination  · Constitutional  o Appearance  o : well-nourished, in no acute distress  · Eyes  o Conjunctivae  o : conjunctivae normal  o Sclerae  o : sclerae white  o Pupils and Irises  o : pupils equal and round  o Eyelids/Ocular Adnexae  o : eyelid appearance normal, no exudates present  · Ears, Nose, Mouth and Throat  o Ears  o :   § External Ears  § : external auditory canal appearance within normal limits, no discharge present  § Otoscopic Examination  § : tympanic membrane appearance within normal limits bilaterally, cerumen not present  o Nose  o :   § External Nose  § : appearance normal  § Intranasal Exam  § : mucosa within normal limits, vestibules normal, no intranasal lesions present, septum midline, sinuses non tender to palpation  § Nasopharynx  § : no discharge present  o Oral Cavity  o :   § Oral Mucosa  § : oral mucosa normal  § Lips  § : lip appearance normal  § Teeth  § : normal dentation for age  o Throat  o :   § Oropharynx  § : no inflammation or lesions present, tonsils within normal limits  · Neck  o Inspection/Palpation  o : normal appearance, no masses or tenderness, " trachea midline  o Thyroid  o : gland size normal, nontender  · Respiratory  o Respiratory Effort  o : breathing unlabored  o Inspection of Chest  o : normal appearance  o Auscultation of Lungs  o : normal breath sounds throughout inspiration and expiration  · Cardiovascular  o Heart  o :   § Auscultation of Heart  § : regular rate and rhythm, no murmurs  o Peripheral Vascular System  o :   § Carotid Arteries  § : no bruits present  § Pedal Pulses  § : pulses 2 bilaterally  § Extremities  § : no clubbing or edema  · Gastrointestinal  o Abdominal Examination  o : abdomen nontender to palpation, tone normal without rigidity or guarding, no masses present, bowel sounds present   · Lymphatic  o Neck  o : no lymphadenopathy present  · Musculoskeletal  o Right Upper Extremity  o : decreasd rom, pain with rom   · Skin and Subcutaneous Tissue  o General Inspection  o : pink, warm, dry   · Neurologic  o Mental Status Examination  o :   § Orientation  § : grossly oriented to person, place and time  o Gait and Station  o : normal gait, able to stand without difficulty  · Psychiatric  o Judgement and Insight  o : judgment and insight intact  o Mood and Affect  o : mood normal, affect appropriate  · IMPShoulder Injection  o Shoulder  o : Right Shoulder   o Procedure info  o : Informed consent obtained. Patient was informed of possible adverse effects including but not limited to bleeding, damage to nerve, tendon or artery, increased blood sugar, or increased blood pressure. They were instructed to call if they have any concerns or questions. Time out performed. Patient placed with shoulder passively hanging from side of body at 0 degrees. Lateral edgeof scapular spine was palpated and site was marked just inferior to this, to proceed with injection per typical posterior approach. Betadine was swabbed at injection site per typical technique. 27 ga. 1.5 inch needle injected into bursa, directed slightly medially, aspiration was  performed and then depomedrol 40 mg and 2 mL of 1% Lidocaine without Epi was slowly injected into joint space, fluid was free flowing. Needle was removed, betadine wiped off and band-aid applied to injection site.           Assessment  · Screening for depression     V79.0/Z13.89  · Visit for screening mammogram     V76.12/Z12.31  · Diabetes mellitus, type 2     250.00/E11.9  · Hyperlipidemia     272.4/E78.5  · Vitamin D deficiency     268.9/E55.9  · Shoulder pain, right     719.41/M25.511      Plan  · Orders  o Screening Mammography; Bilateral 3D (15369, 03224, ) - V76.12/Z12.31 - 07/23/2020  o Diabetes 2 Panel (Urine Microalbumin, CMP, Lipid, A1c, ) [facility] (26344, 53674, 19670, 30426) - 250.00/E11.9 - 07/23/2020  o CBC with Auto Diff [facility] (32553) - 250.00/E11.9 - 07/23/2020  o Urinalysis with Reflex Microscopy if abnormal ([facility]) (76495) - 250.00/E11.9 - 07/23/2020  o Vitamin D Level (25545) - 268.9/E55.9 - 07/23/2020  o Joint Injection; major joint or bursa (eg. shoulder, hip, knee, subacromial bursa) (31804) - 719.41/M25.511 - 07/23/2020  o ACO-39: Current medications updated and reviewed () - - 07/23/2020  o Xray shoulder right [facility] (01637-OM) - 719.41/M25.511 - 07/23/2020  o Depo-Medrol injection 80mg () - 719.41/M25.511 - 07/23/2020   LOT [id number] EXP 06/2021  · Medications  o Crestor 10 mg oral tablet   SIG: take 1 tablet (10 mg) by oral route once daily for 90 days   DISP: (90) tablet with 1 refills  Refilled on 07/23/2020     o duloxetine 60 mg oral capsule,delayed release(DR/EC)   SIG: take 1 capsule (60 mg) by oral route once daily for 90 days   DISP: (90) capsules with 1 refills  Refilled on 07/23/2020     o Januvia 100 mg oral tablet   SIG: take 1 tablet (100 mg) by oral route once daily for 90 days   DISP: (90) tablets with 1 refills  Refilled on 07/23/2020     o Jardiance 10 mg oral tablet   SIG: take 1 tablet (10 mg) by oral route once daily in the morning for 90 days   DISP:  (90) tablet with 1 refills  Refilled on 07/23/2020     o metformin 1,000 mg oral tablet   SIG: take 1 tablet (1,000 mg) by oral route 2 times per day with morning and evening meals for 90 days   DISP: (180) tablet with 1 refills  Refilled on 07/23/2020     o metoprolol succinate 25 mg oral tablet extended release 24 hr   SIG: take 1 tablet (25 mg) by oral route once daily for 90 days   DISP: (90) tablet with 1 refills  Refilled on 07/23/2020     o montelukast 10 mg oral tablet   SIG: take 1 tablet (10 mg) by oral route once daily in the evening for 90 days   DISP: (90) tablet with 1 refills  Refilled on 07/23/2020     o Plavix 75 mg oral tablet   SIG: take 1 tablet (75 mg) by oral route once daily for 90 days   DISP: (90) tablet with 1 refills  Refilled on 07/23/2020     o Vitamin D2 50,000 unit oral capsule   SIG: take 1 capsule (50,000 unit) by oral route once weekly   DISP: (13) capsule with 1 refills  Refilled on 07/23/2020     o Levaquin 500 mg oral tablet   SIG: take 1 tablet (500 mg) by oral route once daily for 10 days   DISP: (10) tablets with 0 refills  Discontinued on 07/23/2020     · Instructions  o Depression Screen completed and scanned into the EMR under the designated folder within the patient's documents.  o Today's PHQ-9 result is 15  o Continue blood sugar monitoring daily and record. Bring your log to office visits. Call the office for readings below 70 and above 250 or any complications.  o Daily foot care. Avoid walking barefoot. Annual Dilated Eye Exam.  o Discussed with patient blood pressure monitoring, hemoglobin A1C levels need to be below 7.0, and LDL (Lipid) goals below 70.  o Patient was educated/instructed on their diagnosis, treatment and medications prior to discharge from the clinic today.  · Disposition  o will contact with diagnostics results  o FOLLOW UP PENDING RESULTS            Electronically Signed by: Angel Bahena APRN -Author on July 24, 2020 01:33:28 PM

## 2021-05-13 NOTE — PROGRESS NOTES
Progress Note      Patient Name: Claudette Magaña   Patient ID: [id number]   Sex: Female   YOB: 1965    Primary Care Provider: Angel FAM   Referring Provider: Angel FAM    Visit Date: August 13, 2020    Provider: SARWAT Ca   Location: [facility]   Location Address: [address] [place] [address]  [id number]   Location Phone: [phone]          Chief Complaint  · follow up DM2,  · review labs       History Of Present Illness  Claudette Magaña is a 55 year old /White female who presents for evaluation and treatment of:      Follow up DM2  Review labs   A1C: 7.3      pt states she is not testing glucose and eating large amount of carbs   pt states that she is interested in a diabetic education class                     Past Medical History  Disease Name Date Onset Notes   Ankle pain --  --    Arthritis --  --    Bowel Disease --  --    CAD (coronary artery disease) 01/31/2019 --    Diabetes --  --    Diabetes mellitus 02/18/2019 --    Fatty liver --  --    Fracture --  --    Heart attack --  --    Heart Disease --  --    High blood pressure --  --    High cholesterol --  --    Hyperlipemia --  --    Ingrowing toenail --  --    Limb Swelling --  --    Numbness in feet --  --    Pancreatitis --  --    Seasonal allergies --  --    Shortness of Breath --  --    Sleep Apnea --  --    Type 2 diabetes mellitus --  --    Ulcer --  --    Vitamin D Deficiency --  --    Vitamin D deficiency 01/31/2019 --          Past Surgical History  Procedure Name Date Notes   Appendectomy --  --    cardiac stents 2013 and2014 Dr. Raymond lEy in [facility]   Colonoscopy 2019 --    EAR Surgery --  --    EGD 2019 --    heart surgery --  --    Hernia --  --    Joint Surgery --  --    Knee repair --  --    Tonsilectomy --  --    Tonsillectomy --  --          Medication List  Name Date Started Instructions   Alcohol Pads topical pads, medicated  01/23/2020 apply to affected area by topical route daily as needed   amitriptyline 25 mg oral tablet  take 1 tablet (25 mg) by oral route 2 times per day   aspirin 81 mg oral tablet,delayed release (DR/EC)  take 1 tablet (81 mg) by oral route once daily   Crestor 10 mg oral tablet 07/23/2020 take 1 tablet (10 mg) by oral route once daily for 90 days   duloxetine 60 mg oral capsule,delayed release(DR/EC) 07/23/2020 take 1 capsule (60 mg) by oral route once daily for 90 days   FreeStyle Lancets 28 gauge miscellaneous misc 01/23/2020 use as directed to check glucose levels daily or as needed   FreeStyle Lite Strips miscellaneous strip 01/23/2020 use as directed to check glucose levels daily or as needed   Januvia 100 mg oral tablet 07/23/2020 take 1 tablet (100 mg) by oral route once daily for 90 days   Jardiance 10 mg oral tablet 07/23/2020 take 1 tablet (10 mg) by oral route once daily in the morning for 90 days   metformin 1,000 mg oral tablet 07/23/2020 take 1 tablet (1,000 mg) by oral route 2 times per day with morning and evening meals for 90 days   metoprolol succinate 25 mg oral tablet extended release 24 hr 07/23/2020 take 1 tablet (25 mg) by oral route once daily for 90 days   montelukast 10 mg oral tablet 07/23/2020 take 1 tablet (10 mg) by oral route once daily in the evening for 90 days   oxycodone 5 mg oral capsule  take 1 capsule (5 mg) by oral route every 6 hours as needed for pain   Plavix 75 mg oral tablet 07/23/2020 take 1 tablet (75 mg) by oral route once daily for 90 days   tramadol 50 mg oral tablet  take 2 tablets by oral route once a day (at bedtime) as needed   Vitamin D2 1,250 mcg (50,000 unit) oral capsule 07/23/2020 take 1 capsule (50,000 unit) by oral route once weekly         Allergy List  Allergen Name Date Reaction Notes   Dust Mite --  --  --    Hay --  --  --    Molds --  --  --    NO KNOWN DRUG ALLERGIES --  --  --    Pollens (trees, grasses, flowers) --  --  --          Family  Medical History  Disease Name Relative/Age Notes   Heart Disease Father/   Father   Cancer, Unspecified Father/   Father   - No Family History of Colorectal Cancer  --    Diabetes Mellitus, Type II  --    Osteoporosis Father/  Mother/   Mother; Father   Family history of Arthritis Father/  Mother/   Mother; Father   Family history of heart disease Father/   Father         Social History  Finding Status Start/Stop Quantity Notes   Alcohol Never --/-- --  does not drink  10/11/2017 -    Alcohol Use Never --/-- --  does not drink   Homemaker --  --/-- --  --    Homemaker. --  --/-- --  --    lives with spouse --  --/-- --  --     --  --/-- --  --    . --  --/-- --  --    Recreational Drug Use Never --/-- --  no   Tobacco Former --/-- 1 PPD former smoker, 1 packs per day, smoked 11-20 years  former smoker, smoked 11-20 years  quit several times and restarted         Immunizations  NameDate Admin Mfg Trade Name Lot Number Route Inj VIS Given VIS Publication   [id number]/03/2019 [facility] Fluzone Quadrivalent [id number] IM LD 10/03/2019    Comments: NDC [id number]         Review of Systems  · Constitutional  o Denies  o : fatigue, fever, weight gain, weight loss, chills  · Eyes  o Denies  o : changes in vision  · HENT  o Denies  o : ear pain, sore throat  · Cardiovascular  o Denies  o : chest Pain, palpitations, edema (swelling)  · Respiratory  o Denies  o : frequent cough, shortness of breath  · Gastrointestinal  o Denies  o : nausea, vomiting, changes in bowel habits  · Genitourinary  o Denies  o : dysuria, urinary frequency, urinary urgency, polyuria  · Integument  o Denies  o : rash  · Neurologic  o Denies  o : headache, tingling or numbness, dizziness  · Musculoskeletal  o Denies  o : joint pain, myalgias  · Endocrine  o Denies  o : polydipsia, polyphagia  · Psychiatric  o Denies  o : mood changes, memory changes, SI/HI  · Heme-Lymph  o Denies  o : easy bruising, easy bleeding, lymph node enlargement or  "tenderness  · Allergic-Immunologic  o Denies  o : eczema, seasonal allergies, urticaria      Vitals  Date Time BP Position Site L\R Cuff Size HR RR TEMP (F) WT  HT  BMI kg/m2 BSA m2 O2 Sat HC       02/18/2020 02:07 /73 Sitting    85 - R   208lbs 0oz 5'  7\" 32.58 2.11 99 %    07/23/2020 12:17 /83 Sitting    79 - R  97.6 205lbs 16oz    96 %    08/13/2020 10:26 /58 Sitting    81 - R   205lbs 16oz 5'  7\" 32.26 2.1 97 %          Physical Examination  · Constitutional  o Appearance  o : well-nourished, in no acute distress  · Respiratory  o Respiratory Effort  o : breathing unlabored  o Inspection of Chest  o : normal appearance  o Auscultation of Lungs  o : normal breath sounds throughout inspiration and expiration  · Cardiovascular  o Heart  o :   § Auscultation of Heart  § : regular rate and rhythm, no murmurs  o Peripheral Vascular System  o :   § Carotid Arteries  § : no bruits present  § Pedal Pulses  § : pulses 2 bilaterally  § Extremities  § : no clubbing or edema  · Skin and Subcutaneous Tissue  o General Inspection  o : pink, warm, dry  · Neurologic  o Gait and Station  o : normal gait, able to stand without difficulty          Assessment  · Type 2 diabetes mellitus with other specified complication, without long-term current use of insulin     250.80/E11.69  decrease carb intake, increase exercise      Plan  · Orders  o Diabetes 2 Panel (Urine Microalbumin, CMP, Lipid, A1c, ) [facility] (96236, 50643, 31796, 54840) - - 11/13/2020  o CBC with Auto Diff [facility] (85862) - - 11/13/2020  o Urinalysis with Reflex Microscopy if abnormal ([facility]) (70487) - - 11/13/2020  o Thyroid Profile (THYII, 63364, 51161) - - 11/13/2020  o Diabetes Self-Management Education Consultation [facility] (10 total hours) ([facility]) - - 08/13/2020  o ACO-39: Current medications updated and reviewed () - - 08/13/2020  · Medications  o Medications have been Reconciled  o Transition of Care or Provider Policy  · Instructions  o Patient " was educated/instructed on their diagnosis, treatment and medications prior to discharge from the clinic today.  · Disposition  o Follow Up in Office in 3 months or sooner if needed  o obtain labs prior to follow up appt            Electronically Signed by: SARWAT Ca -Author on August 13, 2020 10:59:58 AM

## 2021-05-13 NOTE — PROGRESS NOTES
Progress Note      Patient Name: Claudette Magaña   Patient ID: [id number]   Sex: Female   YOB: 1965    Primary Care Provider: Angel FAM   Referring Provider: Angel FAM    Visit Date: October 22, 2020    Provider: Sydni Guajardo MD   Location: [facility]   Location Address: [address] [place] [address]  [id number]   Location Phone: [phone]          Chief Complaint  · Followup Right Shoulder MRI Results.      History Of Present Illness  Claudette Magaña is a 55 year old /White female who presents today for followup of right shoulder pain and right-sided neck pain. Patient states pain has been persistent for several years. Patient states she has radiating numbness and tingling down the right arm. Patient states she has done chiropractic care, and in the past, this has provided relief of pain. Patient states a knot in the right side of the neck. She states it increases with swelling. Patient states pain with range of motion. Patient has had a right shoulder MRI. Patient has received a steroid injection from her PCP that provided no relief of pain. Patient has tried physical therapy in the past with minimal relief of pain.       Past Medical History  Ankle pain; Arthritis; Bowel Disease; CAD (coronary artery disease); Diabetes; Diabetes mellitus; Fatty liver; Fracture; Heart attack; Heart Disease; High blood pressure; High cholesterol; Hyperlipemia; Ingrowing toenail; Limb Swelling; Numbness in feet; Pancreatitis; Seasonal allergies; Shortness of Breath; Sleep apnea; Type 2 diabetes mellitus; Ulcer; Vitamin D Deficiency; Vitamin D deficiency         Past Surgical History  Appendectomy; cardiac stents; Colonoscopy; EAR Surgery; EGD; heart surgery; Hernia; Joint Surgery; Knee repair; Tonsilectomy; Tonsillectomy         Medication List  Alcohol Pads topical pads, medicated; amitriptyline 25 mg oral tablet; aspirin 81 mg oral tablet,delayed release  "(DR/EC); Crestor 10 mg oral tablet; duloxetine 60 mg oral capsule,delayed release(DR/EC); FreeStyle Lancets 28 gauge miscellaneous misc; FreeStyle Lite Strips miscellaneous strip; Januvia 100 mg oral tablet; Jardiance 10 mg oral tablet; metformin 1,000 mg oral tablet; metoprolol succinate 25 mg oral tablet extended release 24 hr; montelukast 10 mg oral tablet; oxycodone 5 mg oral capsule; Plavix 75 mg oral tablet; tramadol 50 mg oral tablet; Vitamin D2 1,250 mcg (50,000 unit) oral capsule         Allergy List  Dust Mite; Hay; Molds; NO KNOWN DRUG ALLERGIES; Pollens (trees, grasses, flowers)         Family Medical History  Heart Disease; Cancer, Unspecified; - No Family History of Colorectal Cancer; Diabetes Mellitus, Type II; Osteoporosis; Family history of Arthritis; Family history of heart disease         Social History  Alcohol (Never); Alcohol Use (Never); Homemaker; Homemaker.; lives with spouse; ; .; Recreational Drug Use (Never); Tobacco (Former)         Review of Systems  · Constitutional  o Denies  o : fever, chills, weight loss  · Cardiovascular  o Denies  o : chest pain, shortness of breath  · Gastrointestinal  o Denies  o : liver disease, heartburn, nausea, blood in stools  · Genitourinary  o Denies  o : painful urination, blood in urine  · Integument  o Denies  o : rash, itching  · Neurologic  o Denies  o : headache, weakness, loss of consciousness  · Musculoskeletal  o Admits  o : painful, swollen joints  · Psychiatric  o Denies  o : drug/alcohol addiction, anxiety, depression      Vitals  Date Time BP Position Site L\R Cuff Size HR RR TEMP (F) WT  HT  BMI kg/m2 BSA m2 O2 Sat FR L/min FiO2        10/22/2020 01:45 PM      79 - R   204lbs 16oz 5'  7\" 32.11 2.1 97 %            Physical Examination  · Constitutional  o Appearance  o : well developed, well-nourished, no obvious deformities present  · Head and Face  o Head  o :   § Inspection  § : normocephalic  o Face  o : "   § Inspection  § : no facial lesions  · Eyes  o Conjunctivae  o : conjunctivae normal  o Sclerae  o : sclerae white  · Ears, Nose, Mouth and Throat  o Ears  o :   § External Ears  § : appearance within normal limits  § Hearing  § : intact  o Nose  o :   § External Nose  § : appearance normal  · Neck  o Inspection/Palpation  o : normal appearance  o Range of Motion  o : full range of motion  · Respiratory  o Respiratory Effort  o : breathing unlabored  o Inspection of Chest  o : normal appearance  o Auscultation of Lungs  o : no audible wheezing or rales  · Cardiovascular  o Heart  o : regular rate  · Gastrointestinal  o Abdominal Examination  o : soft and non-tender  · Skin and Subcutaneous Tissue  o General Inspection  o : intact, no rashes  · Psychiatric  o General  o : Alert and oriented x3  o Judgement and Insight  o : judgment and insight intact  o Mood and Affect  o : mood normal, affect appropriate  · Right Shoulder  o Inspection  o : No skin discoloration, atrophy, or swelling. Palpable mass around the upper trapezius/mid clavicle region. Palpable tenderness over the anterior shoulder, AC joint, subacromial bursa. Forward flexion 160. Abduction to 90. IR to L4. Pain with empty can and cross-body adduction. Pain with neck rotation. Radicular pain down to C6-C7. 2+ radial and ulnar pulses.  · Imaging  o Imaging  o : MRI of right shoulder performed at [facility] on 10/19/2020 revealed: 1) Mild supraspinatus tendinopathy with mild partial-thickness articular sided fraying anteriorly at the footplate; 2) Mild subscapularis and intra-articular long head biceps tendinopathy; 3) Thickening of the glenohumeral joint capsule, which could reflect capsulitis; 4) Type II acromion without lateral downsloping.           Assessment  · Right shoulder pain     719.41/M25.519  · Right shoulder incomplete rotator cuff tear     840.4/M75.100  · Cervicalgia     723.1/M54.2      Plan  · Instructions  o Reviewed the  patient's Past Medical, Social, and Family history as well as the ROS at today's visit, no changes.  o Call or return if worsening symptoms.  o This note was transcribed by Robyn ortiz/omega.  o Physical therapy at [facility]. Follow up in 6 weeks.             Electronically Signed by: Robyn Mckee-, Other -Author on October 23, 2020 02:45:09 PM  Electronically Co-signed by: Mable King PA-C -Reviewer on October 26, 2020 08:25:21 AM  Electronically Co-signed by: Sydni Guajardo MD -Reviewer on October 27, 2020 07:44:49 AM

## 2021-05-13 NOTE — PROGRESS NOTES
Progress Note      Patient Name: Claudette Magaña   Patient ID: [id number]   Sex: Female   YOB: 1965    Primary Care Provider: Angel FAM   Referring Provider: Angel FAM    Visit Date: November 13, 2020    Provider: SARWAT Ca   Location: [facility] [place]   Location Address: [address] [place], [place] [id number]   Location Phone: [phone]          Chief Complaint  · 3 month follow up for DM2, HTN, hyperlipidemia, vitamin d deficiency, and allergies      History Of Present Illness  Claudette Magaña is a 55 year old /White female who presents for evaluation and treatment of:      3 month follow up for DM2, HTN, hyperlipidemia, vitamin d deficiency, and allergies  medication refills (strips, alcohol, lancets), metformin  review labs 11/10    c/o-needs referral for PT for shoulder bilateral shoulder and neck pain  consult with orthopedic surgery dr oneal recommend PT, needs referral from PCP with      referral for sleep center for follow up   dx with sleep apnea     BS checked 2-3 times daily ()  2nd check after lunch- (180s-low 190s)   before bed (102-120)    eye- 2/17/2020  foot-2020 dr Keene   pap-7-2-19  mammo-10-29-20    loss of 8lbs since September with diet changes     wants flu shot in office today     pain management [facility]     pt c/o tenderness right side of neck and swelling in soft tissue with tenderness in her thyroid as well       Past Medical History  Disease Name Date Onset Notes   Ankle pain --  --    Arthritis --  --    Bowel Disease --  --    CAD (coronary artery disease) 01/31/2019 --    Diabetes --  --    Diabetes mellitus 02/18/2019 --    Fatty liver --  --    Fracture --  --    Heart attack --  --    Heart Disease --  --    High blood pressure --  --    High cholesterol --  --    Hyperlipemia --  --    Ingrowing toenail --  --    Limb Swelling --   --    Numbness in feet --  --    Pancreatitis --  --    Seasonal allergies --  --    Shortness of Breath --  --    Sleep apnea --  --    Type 2 diabetes mellitus --  --    Ulcer --  --    Vitamin D Deficiency --  --    Vitamin D deficiency 01/31/2019 --          Past Surgical History  Procedure Name Date Notes   Appendectomy --  --    cardiac stents 2013 [id number] Dr. Raymond Ely in [facility]   Colonoscopy 2019 --    EAR Surgery --  --    EGD 2019 --    heart surgery --  --    Hernia --  --    Joint Surgery --  --    Knee repair --  --    Tonsilectomy --  --    Tonsillectomy --  --          Medication List  Name Date Started Instructions   Alcohol Pads topical pads, medicated 11/13/2020 apply to affected area by topical route daily as needed   aspirin 81 mg oral tablet,delayed release (DR/EC)  take 1 tablet (81 mg) by oral route once daily   Crestor 10 mg oral tablet 11/13/2020 take 1 tablet (10 mg) by oral route once daily for 90 days   duloxetine 60 mg oral capsule,delayed release(DR/EC) 11/13/2020 take 1 capsule (60 mg) by oral route once daily for 90 days   FreeStyle Lancets 28 gauge miscellaneous misc 11/13/2020 use as directed to check glucose levels daily or as needed   FreeStyle Lite Strips miscellaneous strip 11/13/2020 use as directed to check glucose levels daily or as needed   Januvia 100 mg oral tablet 11/13/2020 take 1 tablet (100 mg) by oral route once daily for 90 days   Jardiance 10 mg oral tablet 11/13/2020 take 1 tablet (10 mg) by oral route once daily in the morning for 90 days   metformin 1,000 mg oral tablet 11/13/2020 take 1 tablet (1,000 mg) by oral route 2 times per day with morning and evening meals for 90 days   metoprolol succinate 25 mg oral tablet extended release 24 hr 11/13/2020 take 1 tablet (25 mg) by oral route once daily for 90 days   montelukast 10 mg oral tablet 11/13/2020 take 1 tablet (10 mg) by oral route once daily in the evening for 90 days   oxycodone 5 mg oral capsule  take 1  capsule (5 mg) by oral route every 6 hours as needed for pain   Plavix 75 mg oral tablet 11/13/2020 take 1 tablet (75 mg) by oral route once daily for 90 days   Vitamin D2 1,250 mcg (50,000 unit) oral capsule 11/13/2020 take 1 capsule (50,000 unit) by oral route once weekly         Allergy List  Allergen Name Date Reaction Notes   Dust Mite --  --  --    Hay --  --  --    Molds --  --  --    NO KNOWN DRUG ALLERGIES --  --  --    Pollens (trees, grasses, flowers) --  --  --          Family Medical History  Disease Name Relative/Age Notes   Heart Disease Father/   Father   Cancer, Unspecified Father/   Father   - No Family History of Colorectal Cancer  --    Diabetes Mellitus, Type II  --    Osteoporosis Father/  Mother/   Mother; Father   Family history of Arthritis Father/  Mother/   Mother; Father   Family history of heart disease Father/   Father         Social History  Finding Status Start/Stop Quantity Notes   Alcohol Never --/-- --  does not drink  10/11/2017 -    Alcohol Use Never --/-- --  does not drink   Homemaker --  --/-- --  --    Homemaker. --  --/-- --  --    lives with spouse --  --/-- --  --     --  --/-- --  --    . --  --/-- --  --    Recreational Drug Use Never --/-- --  no   Tobacco Former --/-- 1 PPD former smoker, 1 packs per day, smoked 11-20 years  former smoker, smoked 11-20 years  quit several times and restarted         Immunizations  NameDate Admin Mfg Trade Name Lot Number Route Inj VIS Given VIS Publication   [id number]/13/2020 [facility] Fluzone Quadrivalent [id number] IM LD 11/13/2020 08/15/2019   Comments: ndc [id number]         Review of Systems  · Constitutional  o Denies  o : fever, chills  · Eyes  o Denies  o : changes in vision  · HENT  o Denies  o : ear pain, sore throat  · Cardiovascular  o Denies  o : chest Pain  · Respiratory  o Denies  o : frequent cough, shortness of breath  · Gastrointestinal  o Denies  o : nausea, vomiting,  "diarrhea  · Genitourinary  o Denies  o : dysuria  · Neurologic  o Denies  o : headache  · Musculoskeletal  o Admits  o : joint pain, neck pain, myalgias  · Endocrine  o Denies  o : polydipsia, polyphagia  · Psychiatric  o Admits  o : depression, difficulty sleeping  o Denies  o : SI/HI      Vitals  Date Time BP Position Site L\R Cuff Size HR RR TEMP (F) WT  HT  BMI kg/m2 BSA m2 O2 Sat FR L/min FiO2 HC       02/18/2020 02:07 /73 Sitting    85 - R   208lbs 0oz 5'  7\" 32.58 2.11 99 %      07/23/2020 12:17 /83 Sitting    79 - R  97.6 205lbs 16oz    96 %  21%    08/13/2020 10:26 /58 Sitting    81 - R   205lbs 16oz 5'  7\" 32.26 2.1 97 %  21%    09/24/2020 01:42 PM         205lbs 16oz 5'  7\" 32.26 2.1       10/22/2020 01:45 PM      79 - R   204lbs 16oz 5'  7\" 32.11 2.1 97 %      11/13/2020 11:00 /59 Sitting    58 - R 20 98 198lbs 8oz 5'  7\" 31.09 2.06 98 %            Physical Examination  · Constitutional  o Appearance  o : well-nourished, in no acute distress  · Eyes  o Conjunctivae  o : conjunctivae normal  o Sclerae  o : sclerae white  o Pupils and Irises  o : pupils equal and round  o Eyelids/Ocular Adnexae  o : eyelid appearance normal, no exudates present  · Ears, Nose, Mouth and Throat  o Ears  o :   § External Ears  § : external auditory canal appearance within normal limits  § Otoscopic Examination  § : tympanic membrane appearance within normal limits bilaterally  o Nose  o :   § External Nose  § : appearance normal  § Intranasal Exam  § : mucosa within normal limits  o Oral Cavity  o :   § Oral Mucosa  § : oral mucosa normal  o Throat  o :   § Oropharynx  § : no inflammation or lesions present, tonsils within normal limits  · Neck  o Inspection/Palpation  o : normal appearance, trachea midline, mild swelling right lateral neck and tenderness  o Thyroid  o : gland size normal, tender to palpation   · Respiratory  o Respiratory Effort  o : breathing unlabored  o Auscultation of Lungs  o : " normal breath sounds throughout inspiration and expiration  · Cardiovascular  o Heart  o :   § Auscultation of Heart  § : regular rate and rhythm, no murmurs  o Peripheral Vascular System  o :   § Carotid Arteries  § : no bruits present  § Extremities  § : no clubbing or edema  · Gastrointestinal  o Abdominal Examination  o : abdomen nontender to palpation, tone normal without rigidity or guarding, no masses present, bowel sounds present   · Lymphatic  o Neck  o : no lymphadenopathy present  · Skin and Subcutaneous Tissue  o General Inspection  o : pink, warm, dry   · Neurologic  o Mental Status Examination  o :   § Orientation  § : grossly oriented to person, place and time  o Gait and Station  o : normal gait, able to stand without difficulty  · Psychiatric  o Judgement and Insight  o : judgment and insight intact  o Mood and Affect  o : mood normal, affect appropriate          Assessment  · Screening for depression     V79.0/Z13.89  · Need for influenza vaccination     V04.81/Z23  · Allergic rhinitis due to allergen     477.9/J30.9  currently controlled   · Cervical pain (neck)     723.1/M54.2  will refer to PT   · Depression     311/F32.9  pt declines counseling, stable at this time on Cymbalta   · Type 2 diabetes mellitus with other specified complication, without long-term current use of insulin     250.80/E11.69  controlled at this time, no change in medication, continue weight loss   · Essential hypertension     401.9/I10  currently controlled   · Hyperlipidemia     272.4/E78.5  stable on statin, continue current dose   · Insomnia, unspecified     780.52/G47.00  will refer to sleep center  · Vitamin D deficiency     268.9/E55.9  · CAD (coronary artery disease)     414.00/I25.10  history of stent placement on Plavix for clot prevention   · Shoulder pain, bilateral       Pain in right shoulder     719.41/M25.511  Pain in left shoulder     719.41/M25.512  · Sleep apnea     780.57/G47.30  · Thyroid  disorder     246.9/E07.9  · Swelling     782.3/R60.9      Plan  · Orders  o ACO-18: Positive screen for clinical depression using a standardized tool and a follow-up plan documented () - V79.0/Z13.89 - 11/13/2020  o Immunization Admin Fee (Single) ([facility]) (60654) - V04.81/Z23 - 11/13/2020  o Fluzone Quadrivalent Vaccine, age 6 months + (85097) - V04.81/Z23 - 11/13/2020   Vaccine - Influenza; Dose: 0.5; Site: Left Deltoid; Route: Intramuscular; Date: 11/13/2020 13:09:00; Exp: 06/30/2021; Lot: [id number]; Mfg: sanofi pasteur; TradeName: Fluzone Quadrivalent; Administered By: Ritu Lorenzo MA; Comment: ndc [id number]   Vaccine - Influenza; Dose: 0.5; Site: Left Deltoid; Route: Intramuscular; Date: 11/13/2020 13:09:00; Exp: 06/30/2021; Lot: [id number]; Mfg: sanofi pasteur; TradeName: Fluzone Quadrivalent; Administered By: Ritu Lorenzo MA; Comment: ndc [id number]  o Diabetes 2 Panel (Urine Microalbumin, CMP, Lipid, A1c, ) [facility] (97609, 42606, 98246, 55106) - - 05/13/2021  o CBC with Auto Diff [facility] (31462) - - 05/13/2021  o Urinalysis with Reflex Microscopy ([facility]) (61879) - - 05/13/2021  o Thyroid Profile (THYII, 56322, 16641) - - 05/13/2021  o ACO-41: Dilated Diabetic eye exam completed this year and results in chart/reviewed (2022F) - - 11/13/2020  o Sleep Study (Basic Sleep Apnea) [facility] (28121) - 780.52/G47.00 - 11/13/2020  o Vitamin D Level (65548) - 268.9/E55.9 - 05/13/2021  o ACO-39: Current medications updated and reviewed (1159F, ) - - 11/13/2020  o ACO-14: Influenza immunization administered or previously received [facility] () - - 11/13/2020  o PHYSICAL THERAPY/OCCUPATIONAL THERAPY CONSULTATION (Evaluate and Treat) (PTOTR) - 719.41/M25.511, 719.41/M25.512, 723.1/M54.2 - 11/13/2020  o Thyroid Ultrasound. (97994) - 246.9/E07.9 - 11/13/2020  o Soft Tissue Neck [facility] (66020) - 723.1/M54.2, 782.3/R60.9 - 11/13/2020  · Medications  o FreeStyle Radha 14 Day [place] miscellaneous misc   SIG: use as directed    DISP: (1) Device with 0 refills  Prescribed on 11/13/2020     o FreeStyle Radha 14 Day Sensor miscellaneous kit   SIG: use as directed   DISP: (1) Device with 0 refills  Prescribed on 11/13/2020     o Alcohol Pads topical pads, medicated   SIG: apply to affected area by topical route daily as needed   DISP: (3) Box with 0 refills  Refilled on 11/13/2020     o Crestor 10 mg oral tablet   SIG: take 1 tablet (10 mg) by oral route once daily for 90 days   DISP: (90) Tablet with 1 refills  Refilled on 11/13/2020     o duloxetine 60 mg oral capsule,delayed release(DR/EC)   SIG: take 1 capsule (60 mg) by oral route once daily for 90 days   DISP: (90) Capsule with 1 refills  Refilled on 11/13/2020     o FreeStyle Lancets 28 gauge miscellaneous misc   SIG: use as directed to check glucose levels daily or as needed   DISP: (3) Box with 1 refills  Refilled on 11/13/2020     o FreeStyle Lite Strips miscellaneous strip   SIG: use as directed to check glucose levels daily or as needed   DISP: (3) Box with 1 refills  Refilled on 11/13/2020     o Januvia 100 mg oral tablet   SIG: take 1 tablet (100 mg) by oral route once daily for 90 days   DISP: (90) Tablet with 1 refills  Refilled on 11/13/2020     o Jardiance 10 mg oral tablet   SIG: take 1 tablet (10 mg) by oral route once daily in the morning for 90 days   DISP: (90) Tablet with 1 refills  Refilled on 11/13/2020     o metformin 1,000 mg oral tablet   SIG: take 1 tablet (1,000 mg) by oral route 2 times per day with morning and evening meals for 90 days   DISP: (180) Tablet with 1 refills  Refilled on 11/13/2020     o metoprolol succinate 25 mg oral tablet extended release 24 hr   SIG: take 1 tablet (25 mg) by oral route once daily for 90 days   DISP: (90) Tablet with 1 refills  Refilled on 11/13/2020     o montelukast 10 mg oral tablet   SIG: take 1 tablet (10 mg) by oral route once daily in the evening for 90 days   DISP: (90) Tablet with 1 refills  Refilled on 11/13/2020      o Plavix 75 mg oral tablet   SIG: take 1 tablet (75 mg) by oral route once daily for 90 days   DISP: (90) Tablet with 1 refills  Refilled on 11/13/2020     o Vitamin D2 1,250 mcg (50,000 unit) oral capsule   SIG: take 1 capsule (50,000 unit) by oral route once weekly   DISP: (13) Capsule with 1 refills  Refilled on 11/13/2020     o Medications have been Reconciled  o Transition of Care or Provider Policy  · Instructions  o Depression Screen completed and scanned into the EMR under the designated folder within the patient's documents.  o Continue blood sugar monitoring daily and record. Bring your log to office visits. Call the office for readings below 70 and above 250 or any complications.  o Daily foot care. Avoid walking barefoot. Annual Dilated Eye Exam.  o Discussed with patient blood pressure monitoring, hemoglobin A1C levels need to be below 7.0, and LDL (Lipid) goals below 70.  o Patient was educated/instructed on their diagnosis, treatment and medications prior to discharge from the clinic today.  · Disposition  o Follow Up in Office in 6 months or sooner if needed  o obtain labs prior to follow up appt            Electronically Signed by: SARWAT Ca -Author on November 13, 2020 01:56:19 PM

## 2021-05-14 VITALS — OXYGEN SATURATION: 97 % | HEIGHT: 67 IN | BODY MASS INDEX: 32.18 KG/M2 | WEIGHT: 205 LBS | HEART RATE: 79 BPM

## 2021-05-14 VITALS — HEIGHT: 67 IN | WEIGHT: 206 LBS | BODY MASS INDEX: 32.33 KG/M2

## 2021-05-14 VITALS
BODY MASS INDEX: 31.16 KG/M2 | HEIGHT: 67 IN | RESPIRATION RATE: 20 BRPM | TEMPERATURE: 98 F | DIASTOLIC BLOOD PRESSURE: 59 MMHG | HEART RATE: 58 BPM | SYSTOLIC BLOOD PRESSURE: 103 MMHG | WEIGHT: 198.5 LBS | OXYGEN SATURATION: 98 %

## 2021-05-14 VITALS
HEART RATE: 85 BPM | BODY MASS INDEX: 30.76 KG/M2 | SYSTOLIC BLOOD PRESSURE: 121 MMHG | OXYGEN SATURATION: 99 % | TEMPERATURE: 96.9 F | HEIGHT: 67 IN | DIASTOLIC BLOOD PRESSURE: 72 MMHG | WEIGHT: 196 LBS

## 2021-05-14 NOTE — PROGRESS NOTES
Progress Note      Patient Name: Claudette Magaña   Patient ID: [id number]   Sex: Female   YOB: 1965    Primary Care Provider: Angel FAM   Referring Provider: Angel FAM    Visit Date: February 22, 2021    Provider: Topher Keene DPM   Location: [facility]   Location Address: [address] [place] [address]  [id number]   Location Phone: [phone]          Chief Complaint  · Diabetic Foot Evaluation      History Of Present Illness  Claudette Magaña presents to the office today as a Follow-Up for a diabetic foot evaluation.   Patient reports that she is a diabetic currently controlling diabetes with oral medication.      New, Established, New Problem:  Established   Location:  Feet bilaterally  Duration:    Onset:  Gradual  Nature:  NIDDM  Stable, worsening, improving: Stable   Aggravating factors:   None reported  Previous Treatment:  Treating with oral medication     Patient denies any fevers, chills, nausea, vomiting, nor any other constitutional signs nor symptoms.     Patient reports the following medical changes since their last visit:    - changes in DM medications  -  pt goes to pain management for issues with fibromyalgia    She states her last A1c was 6.6, again.       Past Medical History  Ankle pain; Arthritis; Bowel Disease; CAD (coronary artery disease); Diabetes; Diabetes mellitus; Fatty liver; Fracture; Heart attack; Heart Disease; High blood pressure; High cholesterol; Hyperlipemia; Ingrowing toenail; Limb Swelling; Numbness in feet; Pancreatitis; Seasonal allergies; Shortness of Breath; Sleep apnea; Type 2 diabetes mellitus; Ulcer; Vitamin D Deficiency; Vitamin D deficiency         Past Surgical History  Appendectomy; cardiac stents; Colonoscopy; EAR Surgery; EGD; heart surgery; Hernia; Joint Surgery; Knee repair; Tonsilectomy; Tonsillectomy         Medication List  Alcohol Pads topical pads, medicated; aspirin 81 mg oral  tablet,delayed release (DR/EC); Crestor 10 mg oral tablet; duloxetine 60 mg oral capsule,delayed release(DR/EC); FreeStyle Lancets 28 gauge miscellaneous misc; FreeStyle Lite Strips miscellaneous strip; Jardiance 25 mg oral tablet; metformin 1,000 mg oral tablet; metoprolol succinate 25 mg oral tablet extended release 24 hr; montelukast 10 mg oral tablet; oxycodone 5 mg oral capsule; Plavix 75 mg oral tablet; Vitamin D2 1,250 mcg (50,000 unit) oral capsule         Allergy List  Dust Mite; Hay; Molds; NO KNOWN DRUG ALLERGIES; Pollens (trees, grasses, flowers)       Allergies Reconciled  Family Medical History  Heart Disease; Cancer, Unspecified; - No Family History of Colorectal Cancer; Diabetes Mellitus, Type II; Osteoporosis; Family history of Arthritis; Family history of heart disease         Social History  Alcohol (Never); Alcohol Use (Never); Homemaker; Homemaker.; lives with spouse; ; .; Recreational Drug Use (Never); Tobacco (Former)         Immunizations  Name Date Admin   Influenza 11/13/2020   Influenza 11/13/2020   Influenza 10/03/2019         Review of Systems  · Constitutional  o Denies  o : fatigue, night sweats  · Eyes  o Denies  o : double vision, blurred vision  · HENT  o Denies  o : vertigo, recent head injury  · Cardiovascular  o Denies  o : chest pain, irregular heart beats  · Respiratory  o Denies  o : shortness of breath, productive cough  · Gastrointestinal  o Denies  o : nausea, vomiting  · Genitourinary  o Denies  o : dysuria, urinary retention  · Integument  o Denies  o : hair growth change, new skin lesions  · Neurologic  o Denies  o : altered mental status, seizures  · Musculoskeletal  o Denies  o : joint swelling, limitation of motion  · Endocrine  o Denies  o : cold intolerance, heat intolerance  · Heme-Lymph  o Denies  o : petechiae, lymph node enlargement or tenderness  · Allergic-Immunologic  o Denies  o : frequent illnesses      Vitals  Date Time BP Position Site L\R  "Cuff Size HR RR TEMP (F) WT  HT  BMI kg/m2 BSA m2 O2 Sat FR L/min FiO2 HC       02/22/2021 01:30 /72 Sitting    85 - R  96.9 195lbs 16oz 5'  7\" 30.7 2.05 99 %            Physical Examination  · Constitutional  o Appearance  o : overweight, well developed, no obvious deformities present  · Cardiovascular  o Peripheral Vascular System  o :   § Extremities  § : no edema noted  · Musculoskeletal  o Extremeties/Joint  o : Lower extremity muscle strength and range of motion is equal and symmetrical bilaterally. The knees are noted to be in normal alignment. Ankle alignment and range of motion is normal and foot structure is normal. Subtalar, metatarsal and metatarsal-phalangeal joint range of motion is noted to be within normal limits. The digits of both feet are in normal alignment. The gait is normal.  · Left DM Foot Exam  o Sensation  o : Sharp/dull sensation is within normal limits. Catron-Quinten 5.07 monofilament intact to all assessed areas.   o Visual Inspection  o : Skin is noted to have normal texture and turgor, with no excrescences noted. The toenails are noted to be without disease  o Vascular  o : palpable dorsalis pedis and posterir tibialis pulses present, normal capillary refill  · Right DM Foot Exam  o Sensation  o : Sharp/dull sensation is within normal limits. Catron-Quinten 5.07 monofilament intact to all assessed areas.   o Visual Inspection  o : Skin is noted to have normal texture and turgor, with no excrescences noted. The toenails are noted to be without disease  o Vascular  o : palpable dorsalis pedis and posterir tibialis pulses present, normal capillary refill          Assessment  · Diabetes mellitus     250.00/E11.9      Plan  · Orders  o Diabetic Foot (Motor and Sensory) Exam Completed [facility] (, , 2028F) - 250.00/E11.9 - 02/22/2021  o ACO-16: BMI above normal range and follow-up plan is documented (, , ) - - 02/22/2021  · Medications  o Medications have been " Reconciled  o Transition of Care or Provider Policy  · Instructions  o I have discussed the findings of this evaluation with the patient. The discussion included a complete verbal explanation of any changes in the examination results, diagnosis, and the current treatment plan. A schedule for future care needs was explained. If any questions should arise after returning home, I have encouraged the patient to feel free to contact Dr. Keene. The patient states understanding and agreement with this plan.   o Patient is to monitor for problems and to contact Dr. Keene for follow-up should such signs occur. Patient states understanding and agreement with this plan.   o Diabetic foot exam performed and documented this date, compliant with CQM required standards. Detail of findings as noted in physical exam.Lower extremity Neuro exam for diabetic patient performed and documented this date, compliant with PQRS required standards. Detail of findings as noted in physical exam.Advised patient importance of good routine lower extremity hygiene. Advised patient importance of evaluating for intact skin and pain free nail borders. Advised patient to use mirror to evaluate plantar/ soles of feet for better visualization. Advised patient monitor and phone office to be seen if any cracking to skin, open lesions, painful nail borders or if nails become elongated prior to next visit. Advised patient importance of daily cleansing of lower extremities, followed by good skin cream to maintain normal hydration of skin. Also advised patient importance of close daily monitoring of blood sugar. Advised to regulate diet and medications to maintain control of blood sugar in optimal range. Contact primary care provider if difficulties maintaining blood sugar levels.Advised Patient of presence of Diabetes Mellitus condition. Advised Patient risk of progression and worsening or improvement, then return of condition. Will monitor condition for  any change in future. Treat with most appropriate treatment pending status of condition.Counseled and advised patient extensively on nature and ramifications of diabetes. Standard instructions given to patient for good diabetic foot care and maintenance. Advised importance of careful monitoring to avoid break down and complications secondary to diabetes. Advised patient importance of strict maintenance of blood sugar control. Advised patient of possible ominous results from neglect of condition,i.e.: amputation/ loss of digits, feet and legs, or even death.Patient states understands counseling, will monitor closely, continue good hygiene and routine diabetic foot care. Patient will contact office is questions or problems.   o Encouraged to follow-up with Primary Care Provider for preventative care.   o Follow up in one year for Podiatric Diabetic Evaluation.   o BMI Counseling: Discussed weight loss and the need for exercise.   o Electronically Identified Patient Education Materials Provided Electronically  · Disposition  o Call or Return if symptoms worsen or persist.  · Referrals  o ID: [id number] Date: 02/04/2019 Type: Inbound  Specialty: Podiatry            Electronically Signed by: Topher Keene DPM -Author on February 22, 2021 01:49:19 PM

## 2021-05-15 VITALS — OXYGEN SATURATION: 98 % | WEIGHT: 210.25 LBS | HEIGHT: 67 IN | HEART RATE: 79 BPM | BODY MASS INDEX: 33 KG/M2

## 2021-05-15 VITALS
HEIGHT: 67 IN | BODY MASS INDEX: 34.69 KG/M2 | RESPIRATION RATE: 16 BRPM | SYSTOLIC BLOOD PRESSURE: 144 MMHG | OXYGEN SATURATION: 99 % | HEART RATE: 74 BPM | DIASTOLIC BLOOD PRESSURE: 74 MMHG | WEIGHT: 221 LBS

## 2021-05-15 VITALS
HEIGHT: 67 IN | WEIGHT: 205 LBS | HEART RATE: 74 BPM | OXYGEN SATURATION: 98 % | RESPIRATION RATE: 18 BRPM | DIASTOLIC BLOOD PRESSURE: 89 MMHG | BODY MASS INDEX: 32.18 KG/M2 | TEMPERATURE: 98.1 F | DIASTOLIC BLOOD PRESSURE: 72 MMHG | SYSTOLIC BLOOD PRESSURE: 140 MMHG | SYSTOLIC BLOOD PRESSURE: 112 MMHG

## 2021-05-15 VITALS
BODY MASS INDEX: 32.65 KG/M2 | DIASTOLIC BLOOD PRESSURE: 73 MMHG | RESPIRATION RATE: 18 BRPM | HEIGHT: 67 IN | HEART RATE: 83 BPM | OXYGEN SATURATION: 98 % | SYSTOLIC BLOOD PRESSURE: 115 MMHG | WEIGHT: 208 LBS | TEMPERATURE: 98.4 F

## 2021-05-15 VITALS
WEIGHT: 222 LBS | OXYGEN SATURATION: 100 % | RESPIRATION RATE: 16 BRPM | HEIGHT: 67 IN | BODY MASS INDEX: 34.84 KG/M2 | DIASTOLIC BLOOD PRESSURE: 71 MMHG | SYSTOLIC BLOOD PRESSURE: 125 MMHG | HEART RATE: 78 BPM

## 2021-05-15 VITALS
SYSTOLIC BLOOD PRESSURE: 119 MMHG | BODY MASS INDEX: 34.37 KG/M2 | OXYGEN SATURATION: 98 % | HEART RATE: 75 BPM | WEIGHT: 219 LBS | DIASTOLIC BLOOD PRESSURE: 67 MMHG | HEIGHT: 67 IN

## 2021-05-15 VITALS
WEIGHT: 216 LBS | HEIGHT: 67 IN | DIASTOLIC BLOOD PRESSURE: 75 MMHG | HEART RATE: 67 BPM | RESPIRATION RATE: 16 BRPM | SYSTOLIC BLOOD PRESSURE: 117 MMHG | BODY MASS INDEX: 33.9 KG/M2

## 2021-05-15 VITALS — WEIGHT: 220.5 LBS | HEART RATE: 73 BPM | HEIGHT: 67 IN | BODY MASS INDEX: 34.61 KG/M2 | OXYGEN SATURATION: 95 %

## 2021-05-15 VITALS
HEART RATE: 81 BPM | HEIGHT: 67 IN | WEIGHT: 206 LBS | DIASTOLIC BLOOD PRESSURE: 58 MMHG | OXYGEN SATURATION: 97 % | BODY MASS INDEX: 32.33 KG/M2 | SYSTOLIC BLOOD PRESSURE: 131 MMHG

## 2021-05-15 VITALS
WEIGHT: 208 LBS | SYSTOLIC BLOOD PRESSURE: 122 MMHG | DIASTOLIC BLOOD PRESSURE: 73 MMHG | HEART RATE: 85 BPM | HEIGHT: 67 IN | BODY MASS INDEX: 32.65 KG/M2 | OXYGEN SATURATION: 99 %

## 2021-05-15 VITALS
HEIGHT: 67 IN | DIASTOLIC BLOOD PRESSURE: 71 MMHG | RESPIRATION RATE: 19 BRPM | HEART RATE: 88 BPM | TEMPERATURE: 96.4 F | BODY MASS INDEX: 35 KG/M2 | SYSTOLIC BLOOD PRESSURE: 135 MMHG | WEIGHT: 223 LBS | OXYGEN SATURATION: 96 %

## 2021-05-15 VITALS — HEART RATE: 74 BPM | BODY MASS INDEX: 33.27 KG/M2 | OXYGEN SATURATION: 97 % | HEIGHT: 67 IN | WEIGHT: 212 LBS

## 2021-05-15 VITALS
BODY MASS INDEX: 34.9 KG/M2 | HEIGHT: 67 IN | OXYGEN SATURATION: 98 % | WEIGHT: 222.37 LBS | HEART RATE: 88 BPM | RESPIRATION RATE: 18 BRPM | DIASTOLIC BLOOD PRESSURE: 75 MMHG | TEMPERATURE: 97.6 F | SYSTOLIC BLOOD PRESSURE: 128 MMHG

## 2021-05-15 VITALS
SYSTOLIC BLOOD PRESSURE: 109 MMHG | TEMPERATURE: 98.3 F | HEIGHT: 67 IN | OXYGEN SATURATION: 97 % | DIASTOLIC BLOOD PRESSURE: 67 MMHG | WEIGHT: 212.5 LBS | BODY MASS INDEX: 33.35 KG/M2 | RESPIRATION RATE: 18 BRPM | HEART RATE: 78 BPM

## 2021-05-15 VITALS
BODY MASS INDEX: 32.26 KG/M2 | HEART RATE: 79 BPM | SYSTOLIC BLOOD PRESSURE: 134 MMHG | WEIGHT: 206 LBS | OXYGEN SATURATION: 96 % | DIASTOLIC BLOOD PRESSURE: 83 MMHG | TEMPERATURE: 97.6 F

## 2021-05-15 VITALS — BODY MASS INDEX: 34.37 KG/M2 | OXYGEN SATURATION: 98 % | HEART RATE: 79 BPM | HEIGHT: 67 IN | WEIGHT: 219 LBS

## 2021-06-05 NOTE — PROGRESS NOTES
Progress Note      Patient Name: Claudette Magaña   Patient ID: [id number]   Sex: Female   YOB: 1965    Primary Care Provider: Angel FAM   Referring Provider: Angel FAM    Visit Date: May 13, 2021    Provider: SARWAT Ca   Location: [facility]   Location Address: [address] [place] [address]  [id number]   Location Phone: [phone]          Chief Complaint  · 6 month follow up for DM2, HTN, hyperlipidemia, vitamin d deficiency, and allergies       History Of Present Illness  Claudette Magaña is a 55 year old /White female who presents for evaluation and treatment of:      6 month follow up for DM2, HTN, hyperlipidemia, vitamin d deficiency, CAD, depression and allergies   medication refills  lab results review      eye: 3/2021  foot: 02/2021 [facility]  pap: 7/2019  mammo: 10/2020  colonoscopy: 04/2019         Past Medical History  Disease Name Date Onset Notes   Ankle pain --  --    Arthritis --  --    Bowel Disease --  --    CAD (coronary artery disease) 01/31/2019 --    Diabetes --  --    Diabetes mellitus 02/18/2019 --    Fatty liver --  --    Fracture --  --    Heart attack --  --    Heart Disease --  --    High blood pressure --  --    High cholesterol --  --    Hyperlipemia --  --    Ingrowing toenail --  --    Limb Swelling --  --    Numbness in feet --  --    Pancreatitis --  --    Seasonal allergies --  --    Shortness of Breath --  --    Sleep apnea --  --    Type 2 diabetes mellitus --  --    Ulcer --  --    Vitamin D Deficiency --  --    Vitamin D deficiency 01/31/2019 --          Past Surgical History  Procedure Name Date Notes   Appendectomy --  --    cardiac stents 2013 and2014 Dr. Raymond Ely in [facility]   Colonoscopy 2019 --    EAR Surgery --  --    EGD 2019 --    heart surgery --  --    Hernia --  --    Joint Surgery --  --    Knee repair --  --    Tonsilectomy --  --    Tonsillectomy  --  --          Medication List  Name Date Started Instructions   Alcohol Pads topical pads, medicated 05/13/2021 apply to affected area by topical route daily as needed   aspirin 81 mg oral tablet,delayed release (DR/EC)  take 1 tablet (81 mg) by oral route once daily   Crestor 10 mg oral tablet 05/13/2021 take 1 tablet (10 mg) by oral route once daily for 90 days   duloxetine 60 mg oral capsule,delayed release(DR/EC) 05/13/2021 take 1 capsule (60 mg) by oral route once daily for 90 days   FreeStyle Lancets 28 gauge miscellaneous misc 05/13/2021 use as directed to check glucose levels daily or as needed   FreeStyle Lite Strips miscellaneous strip 05/13/2021 use as directed to check glucose levels daily or as needed   Jardiance 25 mg oral tablet  take 1 tablet (25 mg) by oral route once daily in the morning   metformin 1,000 mg oral tablet 05/13/2021 take 1 tablet (1,000 mg) by oral route 2 times per day with morning and evening meals for 90 days   metoprolol succinate 25 mg oral tablet extended release 24 hr 05/13/2021 take 1 tablet (25 mg) by oral route once daily for 90 days   montelukast 10 mg oral tablet 05/13/2021 take 1 tablet (10 mg) by oral route once daily in the evening for 90 days   oxycodone 5 mg oral capsule  take 1 capsule (5 mg) by oral route every 6 hours as needed for pain   Plavix 75 mg oral tablet 05/13/2021 take 1 tablet (75 mg) by oral route once daily for 90 days   rosuvastatin 10 mg oral tablet 05/13/2021 take 1 tablet (20 mg) by oral route once daily   Vitamin D3 50 mcg (2,000 unit) Oral capsule 05/13/2021 take 1 capsule by oral route daily for 90 days         Allergy List  Allergen Name Date Reaction Notes   Dust Mite --  --  --    Hay --  --  --    Molds --  --  --    NO KNOWN DRUG ALLERGIES --  --  --    Pollens (trees, grasses, flowers) --  --  --        Allergies Reconciled  Family Medical History  Disease Name Relative/Age Notes   Heart Disease Father/   Father   Cancer, Unspecified  Father/   Father   - No Family History of Colorectal Cancer  --    Diabetes Mellitus, Type II  --    Osteoporosis Father/  Mother/   Mother; Father   Family history of Arthritis Father/  Mother/   Mother; Father   Family history of heart disease Father/   Father         Social History  Finding Status Start/Stop Quantity Notes   Alcohol Never --/-- --  05/13/2021 - does not drink  10/11/2017 -    Alcohol Use Never --/-- --  does not drink   Homemaker --  --/-- --  --    Homemaker. --  --/-- --  --    lives with spouse --  --/-- --  --     --  --/-- --  --    . --  --/-- --  --    Recreational Drug Use Never --/-- --  no   Tobacco Former --/-- 1 PPD former smoker, 1 packs per day, smoked 11-20 years  former smoker, smoked 11-20 years  quit several times and restarted         Immunizations  NameDate Admin Mfg Trade Name Lot Number Route Inj VIS Given VIS Publication   [id number]/13/2020 [facility] Fluzone Quadrivalent [id number] IM LD 11/13/2020 08/15/2019   Comments: ndc [id number]         Review of Systems  · Constitutional  o Denies  o : fatigue, fever, weight gain, weight loss, chills  · Eyes  o Denies  o : changes in vision  · HENT  o Denies  o : ear pain, sore throat  · Cardiovascular  o Denies  o : chest Pain, palpitations, edema (swelling)  · Respiratory  o Denies  o : frequent cough, shortness of breath  · Gastrointestinal  o Denies  o : nausea, vomiting, changes in bowel habits  · Genitourinary  o Denies  o : dysuria, urinary frequency, urinary urgency, polyuria  · Integument  o Denies  o : rash  · Neurologic  o Denies  o : headache, tingling or numbness, dizziness  · Musculoskeletal  o Denies  o : joint pain, myalgias  · Endocrine  o Denies  o : polydipsia, polyphagia  · Psychiatric  o Denies  o : mood changes, memory changes, SI/HI  · Heme-Lymph  o Denies  o : easy bruising, easy bleeding, lymph node enlargement or tenderness  · Allergic-Immunologic  o Denies  o : eczema, seasonal allergies,  "urticaria      Vitals  Date Time BP Position Site L\R Cuff Size HR RR TEMP (F) WT  HT  BMI kg/m2 BSA m2 O2 Sat FR L/min FiO2 HC       11/13/2020 11:00 /59 Sitting    58 - R 20 98 198lbs 8oz 5'  7\" 31.09 2.06 98 %      02/22/2021 01:30 /72 Sitting    85 - R  96.9 195lbs 16oz 5'  7\" 30.7 2.05 99 %      05/13/2021 10:14 /64 Sitting    69 - R  96.7 201lbs 8oz 5'  7\" 31.56 2.08 98 %  21%          Physical Examination  · Constitutional  o Appearance  o : well-nourished, in no acute distress  · Eyes  o Conjunctivae  o : conjunctivae normal  o Sclerae  o : sclerae white  o Pupils and Irises  o : pupils equal and round  o Eyelids/Ocular Adnexae  o : eyelid appearance normal  · Ears, Nose, Mouth and Throat  o Ears  o :   § External Ears  § : external auditory canal appearance within normal limits  § Otoscopic Examination  § : tympanic membrane appearance within normal limits bilaterally  o Nose  o :   § External Nose  § : appearance normal  § Intranasal Exam  § : mucosa within normal limits  § Nasopharynx  § : no discharge present  o Throat  o :   § Oropharynx  § : no inflammation or lesions present  · Neck  o Inspection/Palpation  o : normal appearance, trachea midline  o Thyroid  o : gland size normal, nontender  · Respiratory  o Respiratory Effort  o : breathing unlabored  o Auscultation of Lungs  o : normal breath sounds throughout inspiration and expiration  · Cardiovascular  o Heart  o :   § Auscultation of Heart  § : regular rate and rhythm, no murmurs  o Peripheral Vascular System  o :   § Carotid Arteries  § : no bruits present  § Extremities  § : no clubbing or edema  · Gastrointestinal  o Abdominal Examination  o : abdomen nontender to palpation, bowel sounds present   · Lymphatic  o Neck  o : no lymphadenopathy present  · Skin and Subcutaneous Tissue  o General Inspection  o : pink, warm, dry   · Neurologic  o Mental Status Examination  o :   § Orientation  § : grossly oriented   o Gait and " Station  o : normal gait, able to stand without difficulty  · Psychiatric  o Judgement and Insight  o : judgment and insight intact  o Mood and Affect  o : mood normal, affect appropriate              Assessment  · Allergic rhinitis due to allergen     477.9/J30.9  · Depression     311/F32.9  stable on Cymbalta   · Type 2 diabetes mellitus with other specified complication, without long-term current use of insulin     250.80/E11.69  good control  · Essential hypertension     401.9/I10  currently controlled   · Hyperlipidemia     272.4/E78.5  stable on statin, increase exercise goal daily 30 minutes   · Vitamin D deficiency     268.9/E55.9  recommend daily supplementation   · CAD (coronary artery disease)     414.00/I25.10  with stent placement, Plavix for clot prevention       Plan  · Orders  o Diabetes 2 Panel (Urine Microalbumin, CMP, Lipid, A1c, ) [facility] (01125, 82101, 37184, 85740) - - 05/13/2021  o CBC with Auto Diff [facility] (58911) - - 05/13/2021  o Urinalysis with Reflex Microscopy ([facility]) (39897) - - 05/13/2021  o Thyroid Profile (04183, THYII, 64835) - - 05/13/2021  o ACO-19: Colorectal cancer screening results documented and reviewed (3017F) - - 05/13/2021  o ACO-39: Current medications updated and reviewed (1159F, ) - - 05/13/2021  · Medications  o FreeStyle Lancets 28 gauge miscellaneous misc   SIG: use as directed to check glucose levels daily or as needed   DISP: (4) Box with 1 refills  Adjusted on 05/13/2021     o FreeStyle Lite Strips miscellaneous strip   SIG: use as directed to check glucose levels daily or as needed   DISP: (4) Box with 1 refills  Adjusted on 05/13/2021     o Vitamin D3 2,000 unit Oral capsule   SIG: take 1 capsule by oral route daily for 90 days   DISP: (90) Capsule with 1 refills  Adjusted on 05/13/2021     o Alcohol Pads topical pads, medicated   SIG: apply to affected area by topical route daily as needed   DISP: (3) Box with 0 refills  Refilled on 05/13/2021     o Crestor 10 mg  oral tablet   SIG: take 1 tablet (10 mg) by oral route once daily for 90 days   DISP: (90) Tablet with 1 refills  Refilled on 05/13/2021     o duloxetine 60 mg oral capsule,delayed release(DR/EC)   SIG: take 1 capsule (60 mg) by oral route once daily for 90 days   DISP: (90) Capsule with 1 refills  Refilled on 05/13/2021     o metformin 1,000 mg oral tablet   SIG: take 1 tablet (1,000 mg) by oral route 2 times per day with morning and evening meals for 90 days   DISP: (180) Tablet with 1 refills  Refilled on 05/13/2021     o metoprolol succinate 25 mg oral tablet extended release 24 hr   SIG: take 1 tablet (25 mg) by oral route once daily for 90 days   DISP: (90) Tablet with 1 refills  Refilled on 05/13/2021     o montelukast 10 mg oral tablet   SIG: take 1 tablet (10 mg) by oral route once daily in the evening for 90 days   DISP: (90) Tablet with 1 refills  Refilled on 05/13/2021     o Plavix 75 mg oral tablet   SIG: take 1 tablet (75 mg) by oral route once daily for 90 days   DISP: (90) Tablet with 1 refills  Refilled on 05/13/2021     o rosuvastatin 10 mg oral tablet   SIG: take 1 tablet (20 mg) by oral route once daily   DISP: (90) Tablet with 1 refills  Refilled on 05/13/2021     o Medications have been Reconciled  o Transition of Care or Provider Policy  · Instructions  o Advised that cheeses and other sources of dairy fats, animal fats, fast food, and the extras (candy, pastries, pies, doughnuts and cookies) all contain LDL raising nutrients. Advised to increase fruits, vegetables, whole grains, and to monitor portion sizes.   o Patient was educated/instructed on their diagnosis, treatment and medications prior to discharge from the clinic today.            Electronically Signed by: SARWAT Ca -Author on May 14, 2021 01:56:08 PM

## 2021-06-22 ENCOUNTER — TELEPHONE (OUTPATIENT)
Dept: ENDOCRINOLOGY | Age: 56
End: 2021-06-22

## 2021-06-22 NOTE — TELEPHONE ENCOUNTER
Patient called and stated that she needed lab orders sent to [phone]... so that they will be done in time for her appt with chadwick

## 2021-06-23 NOTE — TELEPHONE ENCOUNTER
Patient called and stated that she needed lab orders sent to [phone]... so that they will be done in time for her appt with chadwick    Next visit 7/6/21 yamileth

## 2021-07-06 ENCOUNTER — OFFICE VISIT (OUTPATIENT)
Dept: ENDOCRINOLOGY | Age: 56
End: 2021-07-06

## 2021-07-06 ENCOUNTER — MEDICATION THERAPY MANAGEMENT (OUTPATIENT)
Dept: PHARMACY | Facility: HOSPITAL | Age: 56
End: 2021-07-06

## 2021-07-06 VITALS
DIASTOLIC BLOOD PRESSURE: 62 MMHG | SYSTOLIC BLOOD PRESSURE: 112 MMHG | HEIGHT: 67 IN | WEIGHT: 201.4 LBS | BODY MASS INDEX: 31.61 KG/M2

## 2021-07-06 DIAGNOSIS — E11.9 TYPE 2 DIABETES MELLITUS WITHOUT COMPLICATION, WITHOUT LONG-TERM CURRENT USE OF INSULIN (HCC): Primary | ICD-10-CM

## 2021-07-06 DIAGNOSIS — E78.2 MIXED HYPERLIPIDEMIA: ICD-10-CM

## 2021-07-06 PROCEDURE — 99214 OFFICE O/P EST MOD 30 MIN: CPT | Performed by: NURSE PRACTITIONER

## 2021-07-06 RX ORDER — BLOOD-GLUCOSE METER
KIT MISCELLANEOUS
COMMUNITY
Start: 2021-05-13

## 2021-07-06 RX ORDER — FLASH GLUCOSE SENSOR
1 KIT MISCELLANEOUS
Qty: 2 EACH | Refills: 3 | Status: SHIPPED | OUTPATIENT
Start: 2021-07-06 | End: 2021-11-22 | Stop reason: SDUPTHER

## 2021-07-06 RX ORDER — LANCETS 28 GAUGE
EACH MISCELLANEOUS
COMMUNITY
Start: 2021-05-13

## 2021-07-06 NOTE — PROGRESS NOTES
MTM-DSM Pharmacy Visit [facility] Endocrinology    Claudette Gramajo is a 56 y.o. female seen by Endocrinology and assessed by the Medication Management Clinic Pharmacist at [facility].  This was an initial MTM visit for Claudette Gramajo.    Claudette Gramajo was introduced to the [facility] and her allergies, PMH, and medications were reviewed.       Past Medical History:   Diagnosis Date   • CAD (coronary artery disease)    • Chest pain    • Edema    • Myocardial infarct (CMS/HCC)    • Palpitations    • Sleep apnea    • Unstable angina (CMS/HCC)      Social History     Socioeconomic History   • Marital status:      Spouse name: Not on file   • Number of children: Not on file   • Years of education: Not on file   • Highest education level: Not on file   Tobacco Use   • Smoking status: Former Smoker   • Smokeless tobacco: Never Used   • Tobacco comment: caffeine use   Substance and Sexual Activity   • Alcohol use: No   • Drug use: No   • Sexual activity: Defer       Medication Allergies:  Dust mite extract, Molds & smuts, and Pollen extract        Current Outpatient Medications:   •  aspirin 81 MG tablet, Take 81 mg by mouth Daily., Disp: , Rfl:   •  clopidogrel (PLAVIX) 75 MG tablet, Take 1 tablet by mouth Daily., Disp: 90 tablet, Rfl: 3  •  Continuous Blood Gluc Sensor (FreeStyle Radha Sensor System), 1 Device Every 14 (Fourteen) Days., Disp: 2 each, Rfl: 3  •  DULoxetine (CYMBALTA) 60 MG capsule, Take  by mouth Daily., Disp: , Rfl:   •  Empagliflozin 25 MG tablet, Take 25 mg by mouth Daily., Disp: 30 tablet, Rfl: 12  •  FREESTYLE LITE test strip, , Disp: , Rfl:   •  Lancets (freestyle) lancets, , Disp: , Rfl:   •  metFORMIN (GLUCOPHAGE) 1000 MG tablet, Take 1,000 mg by mouth 2 (Two) Times a Day With Meals., Disp: , Rfl:   •  metoprolol succinate XL (TOPROL-XL) 25 MG 24 hr tablet, Take 1 tablet by mouth Daily., Disp: 90 tablet, Rfl: 3  •  montelukast  (SINGULAIR) 10 MG tablet, Take  by mouth Daily., Disp: , Rfl:   •  nitroglycerin (NITROSTAT) 0.4 MG SL tablet, Place 1 tablet under the tongue Every 5 (Five) Minutes As Needed for chest pain., Disp: 35 tablet, Rfl: 6  •  oxyCODONE (ROXICODONE) 5 MG immediate release tablet, Take  by mouth 2 (two) times a day., Disp: , Rfl:   •  rosuvastatin (CRESTOR) 10 MG tablet, Take 1 tablet by mouth Daily., Disp: 90 tablet, Rfl: 3  •  vitamin D (ERGOCALCIFEROL) 00273 units capsule capsule, Take 50,000 Units by mouth Every 7 (Seven) Days., Disp: , Rfl:       Labs:  There were no vitals filed for this visit.  There were no vitals filed for this visit.  Lab Results   Component Value Date    HGBA1C 6.0 (H) 05/07/2021     Lab Results   Component Value Date    GLUCOSE 100 (H) 10/01/2014    CALCIUM 9.4 05/07/2021     05/07/2021    K 3.8 05/07/2021    CO2 23 05/07/2021     05/07/2021    BUN 25 05/07/2021    CREATININE 0.88 05/07/2021    BCR 28 (H) 05/07/2021    ANIONGAP 17 05/07/2021     Lab Results   Component Value Date    CHLPL 166 05/07/2021    TRIG 138 05/07/2021    HDL 51 05/07/2021    LDL 87 05/07/2021         Drug-Drug Interactions:   No significant DDIs were noted.        Medication Assessment:   1. Aspirin - Currently Taking   2. Statin - Currently Taking  3. ACEi/ARB - Not currently taking      Medication Education on Specialty Medication:  Jardiance  · Discussed the medication's MOA and that it may cause more frequent urination particularly upon starting the medication  · Take one tablet in the morning with or without food    · Encouraged to drink plenty of water as to not get dehydrated especially in warmer weather or with activity  · Also discussed there may be an increased incidence of UTIs or yeast infections and to monitor for signs/symptoms        Assessment / Plan:  1. The patient was provided medication education via verbal information. Patient expressed understanding and had no further questions at this  time.  2. Consider adding an ACEi or ARB in future.  3. Discussed the [facility] pharmacy services that are available to her, including monitoring of refills, prior authorizations, and copay coupon cards, as applicable, as well as curb-side pick-up or mail-order as needed.  o They currently receive their medications at [facility] for no copay.  4. Contact information for the pharmacy team was provided to the patient.        Yary Pedersen, Josiah  7/6/2021  15:54 EDT

## 2021-07-06 NOTE — PROGRESS NOTES
"Chief Complaint  Diabetes (type 2 diabetes)    Subjective          Claudette I Avila presents to [facility] ENDOCRINOLOGY  History of Present Illness     Patient also notes a hx of pancreatits    Type 2 dm    Diagnosed about unknown years ago but diagnosed with 2013  Today in clinic pt reports being on Jardiance 25mg daily, metformin 2000mg daily   Checks BG - denies, became overwhelmed   Dm retinopathy - denies ,Last eye exam - 2/2021  Dm nephropathy - denies   Dm neuropathy - yes  CAD - MI 2013, sees cardiology  CVA - denies   Episodes of hypoglycemia - no  Pt is not very physically active. weight has been stable.   Pt has improved her DM diet for most part.     Lab Results   Component Value Date    HGBA1C 6.0 (H) 05/07/2021         HLP  Rosuvastatin 10mg daily   Tolerating well  Lab Results   Component Value Date    CHLPL 166 05/07/2021    TRIG 138 05/07/2021    HDL 51 05/07/2021    LDL 87 05/07/2021         Objective   Vital Signs:   /62   Ht 170.2 cm (67\")   Wt 91.4 kg (201 lb 6.4 oz)   BMI 31.54 kg/m²     Physical Exam  Vitals reviewed.   Constitutional:       General: She is not in acute distress.  HENT:      Head: Normocephalic and atraumatic.   Cardiovascular:      Rate and Rhythm: Normal rate and regular rhythm.   Pulmonary:      Effort: Pulmonary effort is normal. No respiratory distress.   Musculoskeletal:         General: No signs of injury. Normal range of motion.      Cervical back: Normal range of motion and neck supple.   Skin:     General: Skin is warm and dry.   Neurological:      Mental Status: She is alert and oriented to person, place, and time. Mental status is at baseline.   Psychiatric:         Mood and Affect: Mood normal.         Behavior: Behavior normal.         Thought Content: Thought content normal.         Judgment: Judgment normal.          Result Review :   The following data was reviewed by: SARWAT Yang on 07/06/2021:  Common labs    Common Labsle " Forms filled out by Dr Welch and faxed to forms completion     8/11/20 8/11/20 11/10/20 11/10/20 5/7/21 5/7/21    1104 1104 0858 0858 1324 1324   Glucose 136 (A)   130 (A)  115 (A)   BUN 26 (A)   24  25   Creatinine 0.8   0.92 (A)  0.88   Sodium 138   141  138   Potassium 4.5   3.9  3.8   Chloride 100   103  102   Calcium 10.0   9.8  9.4   Albumin 4.7   4.7  4.5   Total Bilirubin 0.4   0.39  0.31   Alkaline Phosphatase 63   61  48 (A)   AST (SGOT) 30   17  16   ALT (SGPT) 41 (A)   20  19   WBC  9.14 9.83  6.85    Hemoglobin  14.4 14.2  13.3    Hematocrit  44.9 43.4  41.3    Platelets  255 311  242    Total Cholesterol    136  166   Triglycerides    106  138   HDL Cholesterol    45  51   LDL Cholesterol     70  87   Hemoglobin A1C    6.6 (A)  6.0 (A)   (A) Abnormal value       Comments are available for some flowsheets but are not being displayed.                     Assessment and Plan    Diagnoses and all orders for this visit:    1. Type 2 diabetes mellitus without complication, without long-term current use of insulin (CMS/[facility]) (Primary)  -     Hemoglobin A1c; Future  -     Comprehensive Metabolic Panel; Future  -     Lipid Panel; Future  -     Microalbumin / Creatinine Urine Ratio - Urine, Clean Catch; Future    2. Mixed hyperlipidemia    Other orders  -     Continuous Blood Gluc Sensor (FreeStyle Radha Sensor System); 1 Device Every 14 (Fourteen) Days.  Dispense: 2 each; Refill: 3        Follow Up   Return in about 4 months (around 11/6/2021).     Telephone visit next visit, labs before visit    Hold on BGM- getting over whelmed, a1c has been stable, she has been working hard on diet and physical activity   Goal a1c <6.5%  Continue Jardiance 25mg daily, metformin 2000mg daily, will need to add additional regimen/increase testing if a1c worsening   Ensure Diabetic diet  Physical activity has been hard but she is trying   On statin  Consider acei/ARB     Patient was given instructions and counseling regarding her condition or for health maintenance advice. Please see  specific information pulled into the AVS if appropriate.     SARWAT Yang

## 2021-07-15 VITALS
TEMPERATURE: 96.7 F | BODY MASS INDEX: 31.63 KG/M2 | OXYGEN SATURATION: 98 % | DIASTOLIC BLOOD PRESSURE: 64 MMHG | SYSTOLIC BLOOD PRESSURE: 105 MMHG | WEIGHT: 201.5 LBS | HEIGHT: 67 IN | HEART RATE: 69 BPM

## 2021-08-31 PROBLEM — E78.5 HYPERLIPIDEMIA LDL GOAL <70: Status: ACTIVE | Noted: 2021-08-31

## 2021-08-31 PROBLEM — I10 ESSENTIAL HYPERTENSION: Status: ACTIVE | Noted: 2021-08-31

## 2021-09-01 ENCOUNTER — LAB (OUTPATIENT)
Dept: LAB | Facility: HOSPITAL | Age: 56
End: 2021-09-01

## 2021-09-01 ENCOUNTER — OFFICE VISIT (OUTPATIENT)
Dept: CARDIOLOGY | Facility: CLINIC | Age: 56
End: 2021-09-01

## 2021-09-01 VITALS
DIASTOLIC BLOOD PRESSURE: 88 MMHG | HEART RATE: 84 BPM | BODY MASS INDEX: 32.33 KG/M2 | HEIGHT: 67 IN | WEIGHT: 206 LBS | SYSTOLIC BLOOD PRESSURE: 130 MMHG

## 2021-09-01 DIAGNOSIS — I25.10 CAD S/P PERCUTANEOUS CORONARY ANGIOPLASTY: ICD-10-CM

## 2021-09-01 DIAGNOSIS — R06.09 DYSPNEA ON EXERTION: Primary | ICD-10-CM

## 2021-09-01 DIAGNOSIS — E78.5 HYPERLIPIDEMIA LDL GOAL <70: ICD-10-CM

## 2021-09-01 DIAGNOSIS — I10 ESSENTIAL HYPERTENSION: ICD-10-CM

## 2021-09-01 DIAGNOSIS — Z98.61 CAD S/P PERCUTANEOUS CORONARY ANGIOPLASTY: ICD-10-CM

## 2021-09-01 PROBLEM — G47.33 OSA ON CPAP: Status: ACTIVE | Noted: 2021-09-01

## 2021-09-01 PROBLEM — Z99.89 OSA ON CPAP: Status: ACTIVE | Noted: 2021-09-01

## 2021-09-01 LAB — NT-PROBNP SERPL-MCNC: 216.7 PG/ML (ref 0–900)

## 2021-09-01 PROCEDURE — 99204 OFFICE O/P NEW MOD 45 MIN: CPT | Performed by: INTERNAL MEDICINE

## 2021-09-01 PROCEDURE — 83880 ASSAY OF NATRIURETIC PEPTIDE: CPT

## 2021-09-01 PROCEDURE — 93000 ELECTROCARDIOGRAM COMPLETE: CPT | Performed by: INTERNAL MEDICINE

## 2021-09-01 PROCEDURE — 36415 COLL VENOUS BLD VENIPUNCTURE: CPT

## 2021-09-01 RX ORDER — ROSUVASTATIN CALCIUM 20 MG/1
20 TABLET, COATED ORAL DAILY
Qty: 90 TABLET | Refills: 3 | Status: SHIPPED | OUTPATIENT
Start: 2021-09-01 | End: 2021-10-26

## 2021-09-01 RX ORDER — MELATONIN
DAILY
COMMUNITY
Start: 2021-08-27 | End: 2022-02-16 | Stop reason: SDUPTHER

## 2021-09-01 NOTE — ASSESSMENT & PLAN NOTE
Patient with worsening dyspnea on exertion symptoms and PND problems concerning for possible CHF versus atypical anginal symptoms will recommend echocardiogram, nuclear stress test, and proBNP level for further assessment

## 2021-09-01 NOTE — ASSESSMENT & PLAN NOTE
Continue with dual antiplatelet therapy we'll await above-stated work-up for ischemic heart issues

## 2021-09-23 ENCOUNTER — HOSPITAL ENCOUNTER (OUTPATIENT)
Dept: NUCLEAR MEDICINE | Facility: HOSPITAL | Age: 56
Discharge: HOME | End: 2021-09-23

## 2021-09-23 DIAGNOSIS — Z98.61 CAD S/P PERCUTANEOUS CORONARY ANGIOPLASTY: ICD-10-CM

## 2021-09-23 DIAGNOSIS — I25.10 CAD S/P PERCUTANEOUS CORONARY ANGIOPLASTY: ICD-10-CM

## 2021-09-23 PROCEDURE — 78452 HT MUSCLE IMAGE SPECT MULT: CPT | Performed by: INTERNAL MEDICINE

## 2021-09-23 PROCEDURE — 25010000002 REGADENOSON 0.4 MG/5ML SOLUTION

## 2021-09-23 PROCEDURE — 93016 CV STRESS TEST SUPVJ ONLY: CPT | Performed by: NURSE PRACTITIONER

## 2021-09-23 PROCEDURE — 78452 HT MUSCLE IMAGE SPECT MULT: CPT

## 2021-09-23 PROCEDURE — 0 TECHNETIUM TETROFOSMIN KIT: Performed by: INTERNAL MEDICINE

## 2021-09-23 PROCEDURE — A9502 TC99M TETROFOSMIN: HCPCS | Performed by: INTERNAL MEDICINE

## 2021-09-23 PROCEDURE — 93018 CV STRESS TEST I&R ONLY: CPT | Performed by: INTERNAL MEDICINE

## 2021-09-23 PROCEDURE — 93017 CV STRESS TEST TRACING ONLY: CPT

## 2021-09-23 RX ADMIN — REGADENOSON 0.4 MG: 0.08 INJECTION, SOLUTION INTRAVENOUS at 10:04

## 2021-09-23 RX ADMIN — TETROFOSMIN 1 DOSE: 1.38 INJECTION, POWDER, LYOPHILIZED, FOR SOLUTION INTRAVENOUS at 10:06

## 2021-09-23 RX ADMIN — TETROFOSMIN 1 DOSE: 1.38 INJECTION, POWDER, LYOPHILIZED, FOR SOLUTION INTRAVENOUS at 08:45

## 2021-09-27 LAB
BH CV IMMEDIATE POST RECOVERY TECH DATA SYMPTOMS: NORMAL
BH CV IMMEDIATE POST TECH DATA HEART RATE: 103 BPM
BH CV IMMEDIATE POST TECH DATA OXYGEN SATS: 99 %
BH CV REST NUCLEAR ISOTOPE DOSE: 9.6 MCI
BH CV SIX MINUTE RECOVERY TECH DATA BLOOD PRESSURE: NORMAL
BH CV SIX MINUTE RECOVERY TECH DATA HEART RATE: 96 BPM
BH CV SIX MINUTE RECOVERY TECH DATA OXYGEN SATURATION: 98 %
BH CV STRESS BP STAGE 1: NORMAL
BH CV STRESS COMMENTS STAGE 1: NORMAL
BH CV STRESS DOSE REGADENOSON STAGE 1: 0.4
BH CV STRESS DURATION MIN STAGE 1: 0
BH CV STRESS DURATION SEC STAGE 1: 10
BH CV STRESS HR STAGE 1: 74
BH CV STRESS NUCLEAR ISOTOPE DOSE: 37 MCI
BH CV STRESS O2 STAGE 1: 98
BH CV STRESS PROTOCOL 1: NORMAL
BH CV STRESS RECOVERY BP: NORMAL MMHG
BH CV STRESS RECOVERY HR: 102 BPM
BH CV STRESS RECOVERY O2: 99 %
BH CV STRESS STAGE 1: 1
BH CV THREE MINUTE POST TECH DATA BLOOD PRESSURE: NORMAL MMHG
BH CV THREE MINUTE POST TECH DATA HEART RATE: 101 BPM
BH CV THREE MINUTE POST TECH DATA OXYGEN SATURATION: 99 %
LV EF NUC BP: 68 %
MAXIMAL PREDICTED HEART RATE: 164 BPM
PERCENT MAX PREDICTED HR: 64.02 %
STRESS BASELINE BP: NORMAL MMHG
STRESS BASELINE HR: 73 BPM
STRESS O2 SAT REST: 98 %
STRESS PERCENT HR: 75 %
STRESS POST O2 SAT PEAK: 98 %
STRESS POST PEAK BP: NORMAL MMHG
STRESS POST PEAK HR: 105 BPM
STRESS TARGET HR: 139 BPM

## 2021-09-30 ENCOUNTER — PATIENT MESSAGE (OUTPATIENT)
Dept: CARDIOLOGY | Facility: CLINIC | Age: 56
End: 2021-09-30

## 2021-10-06 ENCOUNTER — TELEPHONE (OUTPATIENT)
Dept: CARDIOLOGY | Facility: CLINIC | Age: 56
End: 2021-10-06

## 2021-10-06 DIAGNOSIS — I50.32 CHRONIC DIASTOLIC CONGESTIVE HEART FAILURE (HCC): Primary | ICD-10-CM

## 2021-10-06 RX ORDER — FUROSEMIDE 20 MG/1
20 TABLET ORAL DAILY
Qty: 90 TABLET | Refills: 3 | Status: SHIPPED | OUTPATIENT
Start: 2021-10-06 | End: 2022-05-24 | Stop reason: SDUPTHER

## 2021-10-06 NOTE — TELEPHONE ENCOUNTER
----- Message from SARWAT Baptiste sent at 10/6/2021 10:24 AM EDT -----  Notify pt echo result: Normal left ventricular systolic function, EF 60-65%. Diastolic CHF noted (mild), most likely causing her shortness of breath. No significant valvular disease noted. Start Lasix 20mg daily, check BMP in 2 weeks to evaluate renal function

## 2021-10-07 NOTE — TELEPHONE ENCOUNTER
Patient is aware.  Advised to keep follow up appointment and to call for any issues or questions before then.  Advised prescription has been sent to  [facility] and lab order in system

## 2021-10-22 ENCOUNTER — LAB (OUTPATIENT)
Dept: LAB | Facility: HOSPITAL | Age: 56
End: 2021-10-22

## 2021-10-22 DIAGNOSIS — E11.9 TYPE 2 DIABETES MELLITUS WITHOUT COMPLICATION, WITHOUT LONG-TERM CURRENT USE OF INSULIN (HCC): ICD-10-CM

## 2021-10-22 DIAGNOSIS — I50.32 CHRONIC DIASTOLIC CONGESTIVE HEART FAILURE (HCC): ICD-10-CM

## 2021-10-22 PROCEDURE — 82570 ASSAY OF URINE CREATININE: CPT

## 2021-10-22 PROCEDURE — 80053 COMPREHEN METABOLIC PANEL: CPT

## 2021-10-22 PROCEDURE — 36415 COLL VENOUS BLD VENIPUNCTURE: CPT

## 2021-10-22 PROCEDURE — 83036 HEMOGLOBIN GLYCOSYLATED A1C: CPT

## 2021-10-22 PROCEDURE — 82043 UR ALBUMIN QUANTITATIVE: CPT

## 2021-10-22 PROCEDURE — 80061 LIPID PANEL: CPT

## 2021-10-23 LAB
ALBUMIN SERPL-MCNC: 4.5 G/DL (ref 3.8–4.9)
ALBUMIN/CREAT UR: 20 MG/G CREAT (ref 0–29)
ALBUMIN/GLOB SERPL: 1.9 {RATIO} (ref 1.2–2.2)
ALP SERPL-CCNC: 76 IU/L (ref 44–121)
ALT SERPL-CCNC: 28 IU/L (ref 0–32)
AST SERPL-CCNC: 22 IU/L (ref 0–40)
BILIRUB SERPL-MCNC: 0.5 MG/DL (ref 0–1.2)
BUN SERPL-MCNC: 18 MG/DL (ref 6–24)
BUN/CREAT SERPL: 17 (ref 9–23)
CALCIUM SERPL-MCNC: 9.5 MG/DL (ref 8.7–10.2)
CHLORIDE SERPL-SCNC: 98 MMOL/L (ref 96–106)
CHOLEST SERPL-MCNC: 197 MG/DL (ref 100–199)
CO2 SERPL-SCNC: 17 MMOL/L (ref 20–29)
CREAT SERPL-MCNC: 1.05 MG/DL (ref 0.57–1)
CREAT UR-MCNC: 145.4 MG/DL
GLOBULIN SER CALC-MCNC: 2.4 G/DL (ref 1.5–4.5)
GLUCOSE SERPL-MCNC: 220 MG/DL (ref 65–99)
HBA1C MFR BLD: 8.3 % (ref 4.8–5.6)
HDLC SERPL-MCNC: 51 MG/DL
LDLC SERPL CALC-MCNC: 105 MG/DL (ref 0–99)
MICROALBUMIN UR-MCNC: 29.6 UG/ML
POTASSIUM SERPL-SCNC: 4 MMOL/L (ref 3.5–5.2)
PROT SERPL-MCNC: 6.9 G/DL (ref 6–8.5)
SODIUM SERPL-SCNC: 139 MMOL/L (ref 134–144)
TRIGL SERPL-MCNC: 238 MG/DL (ref 0–149)
VLDLC SERPL CALC-MCNC: 41 MG/DL (ref 5–40)

## 2021-10-26 ENCOUNTER — TELEPHONE (OUTPATIENT)
Dept: CARDIOLOGY | Facility: CLINIC | Age: 56
End: 2021-10-26

## 2021-10-26 RX ORDER — ROSUVASTATIN CALCIUM 40 MG/1
40 TABLET, COATED ORAL DAILY
Qty: 90 TABLET | Refills: 3 | Status: SHIPPED | OUTPATIENT
Start: 2021-10-26 | End: 2021-11-16 | Stop reason: SDUPTHER

## 2021-10-26 NOTE — TELEPHONE ENCOUNTER
----- Message from SARWAT Baptiste sent at 10/25/2021  8:38 AM EDT -----  Increase crestor to 40mg nightly due to LDL being elevated

## 2021-11-11 ENCOUNTER — OFFICE VISIT (OUTPATIENT)
Dept: ENDOCRINOLOGY | Age: 56
End: 2021-11-11

## 2021-11-11 VITALS — WEIGHT: 205 LBS | BODY MASS INDEX: 32.18 KG/M2 | HEIGHT: 67 IN

## 2021-11-11 DIAGNOSIS — E78.2 MIXED HYPERLIPIDEMIA: ICD-10-CM

## 2021-11-11 DIAGNOSIS — E11.65 TYPE 2 DIABETES MELLITUS WITH HYPERGLYCEMIA, WITHOUT LONG-TERM CURRENT USE OF INSULIN (HCC): Primary | ICD-10-CM

## 2021-11-11 PROCEDURE — 99214 OFFICE O/P EST MOD 30 MIN: CPT | Performed by: NURSE PRACTITIONER

## 2021-11-11 RX ORDER — GLIPIZIDE 5 MG/1
5 TABLET, FILM COATED, EXTENDED RELEASE ORAL DAILY
Qty: 30 TABLET | Refills: 5 | Status: SHIPPED | OUTPATIENT
Start: 2021-11-11 | End: 2022-02-16 | Stop reason: SDUPTHER

## 2021-11-11 NOTE — PROGRESS NOTES
"Chief Complaint  Diabetes Mellitus (type 2)     You have chosen to receive care through a telephone visit. Do you consent to use a telephone visit for your medical care today? Yes      Subjective          Claudette Gramajo presents to [facility] ENDOCRINOLOGY  History of Present Illness     I have reviewed PMH, allergies and medications UTD at this visit     Got her covid booster and isnt feeling that well    Got overwhelmed with checking her blood sugars and eating healthy so she just stopped       Type 2 dm    Diagnosed about unknown years ago but diagnosed with 2013  Today in clinic pt reports being on Jardiance 25mg daily, metformin 2000mg daily   H/o pancreatitis as a kid  Checks BG - needs more strips  Dm retinopathy - denies ,Last eye exam - 2/2021  Dm nephropathy - denies   Dm neuropathy - yes  CAD - MI 2013, sees cardiology, +SCHF  CVA - denies   Episodes of hypoglycemia - no  Pt is not very physically active. weight has been stable.   Pt has improved her DM diet for most part.      HLP  Rosuvastatin 10mg daily   Tolerating well    Objective   Vital Signs:   Ht 170.2 cm (67\")   Wt 93 kg (205 lb)   BMI 32.11 kg/m²     Physical Exam   Alert and oriented   No apparent distress    Result Review :   The following data was reviewed by: SARWAT Yang on 11/11/2021:  Common labs    Common Labsle 5/7/21 5/7/21 10/22/21 10/22/21 10/22/21 10/22/21    1324 1324 1103 1103 1103 1103   Glucose  115 (A)  220 (A)     BUN  25  18     Creatinine  0.88  1.05 (A)     eGFR Non  Am    60     eGFR African Am    69     Sodium  138  139     Potassium  3.8  4.0     Chloride  102  98     Calcium  9.4  9.5     Total Protein    6.9     Albumin  4.5  4.5     Total Bilirubin  0.31  0.5     Alkaline Phosphatase  48 (A)  76     AST (SGOT)  16  22     ALT (SGPT)  19  28     WBC 6.85        Hemoglobin 13.3        Hematocrit 41.3        Platelets 242        Total Cholesterol  166   197    Triglycerides  138   " 238 (A)    HDL Cholesterol  51   51    LDL Cholesterol   87   105 (A)    Hemoglobin A1C  6.0 (A) 8.3 (A)      Microalbumin, Urine      29.6   (A) Abnormal value       Comments are available for some flowsheets but are not being displayed.                     Assessment and Plan    Diagnoses and all orders for this visit:    1. Type 2 diabetes mellitus with hyperglycemia, without long-term current use of insulin (HCC) (Primary)    2. Mixed hyperlipidemia    Other orders  -     glipizide (GLUCOTROL XL) 5 MG ER tablet; Take 1 tablet by mouth Daily.  Dispense: 30 tablet; Refill: 5        Follow Up   No follow-ups on file.   Add glipizide xr 5mg daily  a1c worsening  Nutrition counseling  Diabetes support  Getting back on track  Continue jardiance and metformin  Hold glp-1 with childhood h/o pancreatitis but may discuss further if patient is wanting to give it a try knowing risk  High risk of complications     Patient was given instructions and counseling regarding her condition or for health maintenance advice. Please see specific information pulled into the AVS if appropriate.     37 minute phone call     SARWAT Yang

## 2021-11-16 ENCOUNTER — OFFICE VISIT (OUTPATIENT)
Dept: FAMILY MEDICINE CLINIC | Facility: CLINIC | Age: 56
End: 2021-11-16

## 2021-11-16 VITALS
SYSTOLIC BLOOD PRESSURE: 121 MMHG | WEIGHT: 206 LBS | HEART RATE: 78 BPM | BODY MASS INDEX: 32.33 KG/M2 | TEMPERATURE: 97.6 F | OXYGEN SATURATION: 98 % | DIASTOLIC BLOOD PRESSURE: 70 MMHG | HEIGHT: 67 IN

## 2021-11-16 DIAGNOSIS — Z98.61 CAD S/P PERCUTANEOUS CORONARY ANGIOPLASTY: ICD-10-CM

## 2021-11-16 DIAGNOSIS — E78.5 HYPERLIPIDEMIA LDL GOAL <70: ICD-10-CM

## 2021-11-16 DIAGNOSIS — E66.9 OBESITY (BMI 30.0-34.9): ICD-10-CM

## 2021-11-16 DIAGNOSIS — IMO0002 DIABETES MELLITUS TYPE 2, UNCONTROLLED, WITH COMPLICATIONS: Primary | ICD-10-CM

## 2021-11-16 DIAGNOSIS — I10 ESSENTIAL HYPERTENSION: ICD-10-CM

## 2021-11-16 DIAGNOSIS — I25.10 CAD S/P PERCUTANEOUS CORONARY ANGIOPLASTY: ICD-10-CM

## 2021-11-16 DIAGNOSIS — Z12.31 SCREENING MAMMOGRAM, ENCOUNTER FOR: ICD-10-CM

## 2021-11-16 DIAGNOSIS — E55.9 VITAMIN D DEFICIENCY: ICD-10-CM

## 2021-11-16 PROBLEM — K76.0 FATTY LIVER: Status: ACTIVE | Noted: 2021-11-16

## 2021-11-16 PROBLEM — M19.90 ARTHRITIS: Status: ACTIVE | Noted: 2021-11-16

## 2021-11-16 PROBLEM — M25.50 MULTIPLE JOINT PAIN: Status: ACTIVE | Noted: 2019-07-16

## 2021-11-16 PROBLEM — M25.579 ANKLE PAIN: Status: ACTIVE | Noted: 2021-11-16

## 2021-11-16 PROBLEM — M17.9 OSTEOARTHRITIS OF KNEE: Status: ACTIVE | Noted: 2019-07-16

## 2021-11-16 PROBLEM — E66.811 OBESITY (BMI 30.0-34.9): Status: ACTIVE | Noted: 2021-11-16

## 2021-11-16 PROBLEM — M47.812 CERVICAL SPONDYLOSIS: Status: ACTIVE | Noted: 2019-07-16

## 2021-11-16 LAB
ALBUMIN SERPL-MCNC: 4.6 G/DL (ref 3.5–5.2)
ALBUMIN/GLOB SERPL: 1.8 G/DL
ALP SERPL-CCNC: 68 U/L (ref 39–117)
ALT SERPL W P-5'-P-CCNC: 32 U/L (ref 1–33)
ANION GAP SERPL CALCULATED.3IONS-SCNC: 13.3 MMOL/L (ref 5–15)
AST SERPL-CCNC: 19 U/L (ref 1–32)
BILIRUB SERPL-MCNC: 0.4 MG/DL (ref 0–1.2)
BUN SERPL-MCNC: 20 MG/DL (ref 6–20)
BUN/CREAT SERPL: 18.3 (ref 7–25)
CALCIUM SPEC-SCNC: 9.7 MG/DL (ref 8.6–10.5)
CHLORIDE SERPL-SCNC: 98 MMOL/L (ref 98–107)
CHOLEST SERPL-MCNC: 136 MG/DL (ref 0–200)
CO2 SERPL-SCNC: 23.7 MMOL/L (ref 22–29)
CREAT SERPL-MCNC: 1.09 MG/DL (ref 0.57–1)
GFR SERPL CREATININE-BSD FRML MDRD: 52 ML/MIN/1.73
GLOBULIN UR ELPH-MCNC: 2.5 GM/DL
GLUCOSE SERPL-MCNC: 344 MG/DL (ref 65–99)
HDLC SERPL-MCNC: 42 MG/DL (ref 40–60)
LDLC SERPL CALC-MCNC: 66 MG/DL (ref 0–100)
LDLC/HDLC SERPL: 1.44 {RATIO}
POTASSIUM SERPL-SCNC: 4 MMOL/L (ref 3.5–5.2)
PROT SERPL-MCNC: 7.1 G/DL (ref 6–8.5)
SODIUM SERPL-SCNC: 135 MMOL/L (ref 136–145)
TRIGL SERPL-MCNC: 167 MG/DL (ref 0–150)
VLDLC SERPL-MCNC: 28 MG/DL (ref 5–40)

## 2021-11-16 PROCEDURE — 36415 COLL VENOUS BLD VENIPUNCTURE: CPT | Performed by: NURSE PRACTITIONER

## 2021-11-16 PROCEDURE — 99214 OFFICE O/P EST MOD 30 MIN: CPT | Performed by: NURSE PRACTITIONER

## 2021-11-16 PROCEDURE — 80053 COMPREHEN METABOLIC PANEL: CPT | Performed by: NURSE PRACTITIONER

## 2021-11-16 PROCEDURE — 80061 LIPID PANEL: CPT | Performed by: NURSE PRACTITIONER

## 2021-11-16 RX ORDER — ISOPROPYL ALCOHOL 70 ML/100ML
SWAB TOPICAL
COMMUNITY
Start: 2021-11-12

## 2021-11-16 RX ORDER — ROSUVASTATIN CALCIUM 40 MG/1
40 TABLET, COATED ORAL DAILY
Qty: 90 TABLET | Refills: 3 | Status: SHIPPED | OUTPATIENT
Start: 2021-11-16 | End: 2022-05-24 | Stop reason: SDUPTHER

## 2021-11-16 RX ORDER — METOPROLOL SUCCINATE 25 MG/1
25 TABLET, EXTENDED RELEASE ORAL DAILY
Qty: 90 TABLET | Refills: 3 | Status: SHIPPED | OUTPATIENT
Start: 2021-11-16 | End: 2022-05-24 | Stop reason: SDUPTHER

## 2021-11-16 RX ORDER — DULOXETIN HYDROCHLORIDE 60 MG/1
60 CAPSULE, DELAYED RELEASE ORAL DAILY
Qty: 90 CAPSULE | Refills: 0 | Status: SHIPPED | OUTPATIENT
Start: 2021-11-16 | End: 2022-05-24 | Stop reason: SDUPTHER

## 2021-11-16 NOTE — PROGRESS NOTES
Follow Up Office Visit      Patient Name: Claudette Gramajo  : 1965   MRN: [id number]     Chief Complaint:    Chief Complaint   Patient presents with   • Diabetes   • Hypertension   • Hyperlipidemia   • Vitamin D Deficiency       History of Present Illness: Claudette Gramajo is a 56 y.o. female who is here today to follow up for DM2 hypertension hyperlipidemia coronary artery disease  Reviewed labs results     Cardiology increase crestor to 40 mg nightly about 30 days prior     Requests a consult to bariatric surgery, states she has always battled with her weight, now with her chronic joint pain, currently attending [facility]     BS-not testing   Eye exam-  Foot exam dr anthony-  Pap-2019  Mammogram   Colon-    Subjective      Review of Systems:   Review of Systems   Constitutional: Negative for fever.   HENT: Negative for ear pain, sinus pain and sore throat.    Respiratory: Negative for cough.    Cardiovascular: Negative for chest pain.   Gastrointestinal: Negative for abdominal pain, diarrhea, nausea and vomiting.   Genitourinary: Negative for dysuria.   Musculoskeletal: Positive for arthralgias.   Skin: Negative for rash.   Neurological: Negative for dizziness and headaches.        Past Medical History:   Past Medical History:   Diagnosis Date   • Abnormal ECG    • Arrhythmia    • CAD (coronary artery disease)    • Chest pain    • Diabetes mellitus (HCC)    • Edema    • Hyperlipidemia LDL goal <70 2021   • Myocardial infarct (HCC)    • FARNAZ on CPAP 2021   • Palpitations    • Sleep apnea    • Unstable angina (HCC)        Past Surgical History:   Past Surgical History:   Procedure Laterality Date   • CARDIAC CATHETERIZATION     • CORONARY ANGIOPLASTY     • CORONARY STENT PLACEMENT     • VENOUS THROMBECTOMY         Family History:   Family History   Problem Relation Age of Onset   • Suicidality Mother    • Heart attack Father    • Heart disease Father    • No  Known Problems Maternal Grandmother    • No Known Problems Maternal Grandfather    • Heart disease Paternal Grandmother    • Cancer Paternal Grandmother    • Heart disease Paternal Grandfather    • Cancer Paternal Grandfather    • Thyroid disease Paternal Grandfather        Social History:   Social History     Socioeconomic History   • Marital status:    Tobacco Use   • Smoking status: Former Smoker     Packs/day: 1.00     Types: Cigarettes     Start date:      Quit date:      Years since quittin.8   • Smokeless tobacco: Never Used   • Tobacco comment: caffeine use   Vaping Use   • Vaping Use: Never used   Substance and Sexual Activity   • Alcohol use: No   • Drug use: No   • Sexual activity: Defer       Medications:     Current Outpatient Medications:   •  Alcohol Swabs (Easy Touch Alcohol Prep Medium) 70 % pads, , Disp: , Rfl:   •  aspirin 81 MG tablet, Take 81 mg by mouth Daily., Disp: , Rfl:   •  cholecalciferol (VITAMIN D3) 25 MCG (1000 UT) tablet, Daily., Disp: , Rfl:   •  clopidogrel (PLAVIX) 75 MG tablet, Take 1 tablet by mouth Daily., Disp: 90 tablet, Rfl: 3  •  Continuous Blood Gluc Sensor (FreeStyle Radha Sensor System), 1 Device Every 14 (Fourteen) Days., Disp: 2 each, Rfl: 3  •  DULoxetine (CYMBALTA) 60 MG capsule, Take 1 capsule by mouth Daily., Disp: 90 capsule, Rfl: 0  •  Empagliflozin 25 MG tablet, Take 25 mg by mouth Daily., Disp: 30 tablet, Rfl: 12  •  FREESTYLE LITE test strip, , Disp: , Rfl:   •  furosemide (LASIX) 20 MG tablet, Take 1 tablet by mouth Daily., Disp: 90 tablet, Rfl: 3  •  glipizide (GLUCOTROL XL) 5 MG ER tablet, Take 1 tablet by mouth Daily., Disp: 30 tablet, Rfl: 5  •  Lancets (freestyle) lancets, , Disp: , Rfl:   •  metFORMIN (GLUCOPHAGE) 1000 MG tablet, Take 1 tablet by mouth 2 (Two) Times a Day With Meals., Disp: 180 tablet, Rfl: 0  •  metoprolol succinate XL (TOPROL-XL) 25 MG 24 hr tablet, Take 1 tablet by mouth Daily., Disp: 90 tablet, Rfl: 3  •   "montelukast (SINGULAIR) 10 MG tablet, Take  by mouth Daily., Disp: , Rfl:   •  nitroglycerin (NITROSTAT) 0.4 MG SL tablet, Place 1 tablet under the tongue Every 5 (Five) Minutes As Needed for chest pain., Disp: 35 tablet, Rfl: 6  •  oxyCODONE (ROXICODONE) 5 MG immediate release tablet, Take  by mouth As Needed., Disp: , Rfl:   •  rosuvastatin (CRESTOR) 40 MG tablet, Take 1 tablet by mouth Daily., Disp: 90 tablet, Rfl: 3  •  vitamin D (ERGOCALCIFEROL) 35315 units capsule capsule, Take 50,000 Units by mouth Every 7 (Seven) Days., Disp: , Rfl:     Allergies:   Allergies   Allergen Reactions   • Other Unknown - Low Severity     Hay    Sneezing watery eyes cough   • Dust Mite Extract Other (See Comments)   • Molds & Smuts Cough   • Pollen Extract Cough         PHQ-2 Total Score: 0   PHQ-9 Total Score: 0     Objective     Physical Exam:  Vital Signs:   Vitals:    11/16/21 1104   BP: 121/70   Pulse: 78   Temp: 97.6 °F (36.4 °C)   SpO2: 98%   Weight: 93.4 kg (206 lb)   Height: 170.2 cm (67\")     Body mass index is 32.26 kg/m².     Physical Exam  HENT:      Right Ear: Tympanic membrane normal.      Left Ear: Tympanic membrane normal.      Nose: Nose normal.      Mouth/Throat:      Mouth: Mucous membranes are moist.   Eyes:      Conjunctiva/sclera: Conjunctivae normal.      Pupils: Pupils are equal, round, and reactive to light.   Neck:      Vascular: No carotid bruit.   Cardiovascular:      Rate and Rhythm: Normal rate and regular rhythm.      Heart sounds: Normal heart sounds. No murmur heard.      Pulmonary:      Effort: Pulmonary effort is normal.      Breath sounds: Normal breath sounds.   Abdominal:      General: Bowel sounds are normal.      Palpations: Abdomen is soft.   Musculoskeletal:      Right lower leg: No edema.      Left lower leg: No edema.   Skin:     General: Skin is warm and dry.   Neurological:      Mental Status: She is alert.   Psychiatric:         Mood and Affect: Mood normal.         Behavior: " Behavior normal.             Assessment / Plan      Assessment/Plan:   Diagnoses and all orders for this visit:    1. Diabetes mellitus type 2, uncontrolled, with complications (HCC) (Primary)  -     CBC Auto Differential; Future  -     Comprehensive Metabolic Panel; Future  -     Hemoglobin A1c; Future  -     Microalbumin / Creatinine Urine Ratio - Urine, Clean Catch; Future  -     Urinalysis With Culture If Indicated -; Future  -     TSH; Future  -     Ambulatory Referral to Bariatric Surgery    2. Essential hypertension  -     Ambulatory Referral to Bariatric Surgery    3. Hyperlipidemia LDL goal <70  -     Comprehensive Metabolic Panel; Future  -     Lipid Panel; Future  -     Comprehensive Metabolic Panel  -     Lipid Panel  -     Ambulatory Referral to Bariatric Surgery    4. CAD S/P percutaneous coronary angioplasty  -     Ambulatory Referral to Bariatric Surgery    5. Vitamin D deficiency    6. Screening mammogram, encounter for  -     Mammo Screening Digital Tomosynthesis Bilateral With CAD; Future    7. Obesity (BMI 30.0-34.9)  -     Ambulatory Referral to Bariatric Surgery    Other orders  -     DULoxetine (CYMBALTA) 60 MG capsule; Take 1 capsule by mouth Daily.  Dispense: 90 capsule; Refill: 0  -     metFORMIN (GLUCOPHAGE) 1000 MG tablet; Take 1 tablet by mouth 2 (Two) Times a Day With Meals.  Dispense: 180 tablet; Refill: 0  -     metoprolol succinate XL (TOPROL-XL) 25 MG 24 hr tablet; Take 1 tablet by mouth Daily.  Dispense: 90 tablet; Refill: 3  -     rosuvastatin (CRESTOR) 40 MG tablet; Take 1 tablet by mouth Daily.  Dispense: 90 tablet; Refill: 3       Diabetes mellitus type 2 uncontrolled hemoglobin A1c greater than 8 Glucotrol added continue Metformin and Jardiance discussed the start of Trulicity if hemoglobin A1c remains greater than 8 risk and benefits discussed patient reports poor diet in the past 90 days also requests referral to bariatric surgery to discuss option of gastric sleeve we will  "recheck hemoglobin A1c in 90 days  Hypertension currently controlled at this time  Hyperlipidemia obtain lipid panel and CMP to monitor current statin dose Crestor 40 mg at nighttime which was recently increased  Coronary artery disease continue follow-up with cardiology  Vitamin D deficiency recommend 1000- 2000 units OTC daily  Obesity recommend exercise 30 minutes daily patient reports she is limited due to chronic joint pain currently attending [facility] recommend referral to diabetic education patient declined at this time      Follow Up:   Return in about 3 months (around 2/16/2022).    Portia Bahena, APRN    \"Please note that portions of this note were completed with a voice recognition program.\"    "

## 2021-11-17 ENCOUNTER — TELEPHONE (OUTPATIENT)
Dept: FAMILY MEDICINE CLINIC | Facility: CLINIC | Age: 56
End: 2021-11-17

## 2021-11-17 NOTE — TELEPHONE ENCOUNTER
Hub staff attempted to follow warm transfer process and was unsuccessful     Caller: Claudette Gramajo    Relationship to patient: Self    Best call back number: [phone]    Patient is needing: THE PATIENT HAS RETURNED PHONE CALL TO [facility] REGARDING LAB RESULTS. PLEASE CALL AND ADVISE.

## 2021-11-18 NOTE — TELEPHONE ENCOUNTER
PT CALLED AGAIN CHECKING ON THIS, PLEASE CALL BACK AND ADVISE, THANK YOU.    UNABLE TO WARM TRANSFER

## 2021-11-22 DIAGNOSIS — E11.65 TYPE 2 DIABETES MELLITUS WITH HYPERGLYCEMIA, WITHOUT LONG-TERM CURRENT USE OF INSULIN (HCC): ICD-10-CM

## 2021-11-22 DIAGNOSIS — E11.65 TYPE 2 DIABETES MELLITUS WITH HYPERGLYCEMIA, WITHOUT LONG-TERM CURRENT USE OF INSULIN (HCC): Primary | ICD-10-CM

## 2021-11-22 RX ORDER — FLASH GLUCOSE SENSOR
1 KIT MISCELLANEOUS
Qty: 6 EACH | Refills: 3 | Status: SHIPPED | OUTPATIENT
Start: 2021-11-22 | End: 2021-11-22 | Stop reason: SDUPTHER

## 2021-11-22 RX ORDER — FLASH GLUCOSE SENSOR
1 KIT MISCELLANEOUS
Qty: 6 EACH | Refills: 3 | Status: SHIPPED | OUTPATIENT
Start: 2021-11-22 | End: 2021-11-24 | Stop reason: SDUPTHER

## 2021-11-24 DIAGNOSIS — E11.65 TYPE 2 DIABETES MELLITUS WITH HYPERGLYCEMIA, WITHOUT LONG-TERM CURRENT USE OF INSULIN (HCC): ICD-10-CM

## 2021-11-24 RX ORDER — FLASH GLUCOSE SENSOR
1 KIT MISCELLANEOUS
Qty: 6 EACH | Refills: 3 | Status: SHIPPED | OUTPATIENT
Start: 2021-11-24 | End: 2022-06-23

## 2022-01-11 DIAGNOSIS — R06.09 DYSPNEA ON EXERTION: Primary | ICD-10-CM

## 2022-01-11 RX ORDER — MONTELUKAST SODIUM 10 MG/1
10 TABLET ORAL NIGHTLY
Qty: 30 TABLET | Refills: 3 | Status: SHIPPED | OUTPATIENT
Start: 2022-01-11 | End: 2022-02-16 | Stop reason: SDUPTHER

## 2022-01-11 NOTE — TELEPHONE ENCOUNTER
Caller: Davis Gramajo    Relationship: Emergency Contact    Best call back number: [phone]    Requested Prescriptions:   montelukast (SINGULAIR) 10 MG tablet    Pharmacy where request should be sent:    [facility] [place] [facility] [place] - [address] - [phone]  - [phone]   [phone]    Additional details provided by patient: PATIENT IS CURRENTLY OUT OF THIS MEDICATION.     Does the patient have less than a 3 day supply:  [x] Yes  [] No    Mar Minor Rep   01/11/22 10:23 EST

## 2022-01-25 RX ORDER — CLOPIDOGREL BISULFATE 75 MG/1
75 TABLET ORAL DAILY
Qty: 90 TABLET | Refills: 3 | Status: SHIPPED | OUTPATIENT
Start: 2022-01-25 | End: 2022-05-24 | Stop reason: SDUPTHER

## 2022-01-25 NOTE — TELEPHONE ENCOUNTER
Caller: Claudette Gramajo    Relationship: Self    Best call back number: [phone]    Requested Prescriptions:   Requested Prescriptions     Pending Prescriptions Disp Refills   • clopidogrel (PLAVIX) 75 MG tablet 90 tablet 3     Sig: Take 1 tablet by mouth Daily.        Pharmacy where request should be sent:[facility] [address]   [address] [place] [address]  [phone]        Additional details provided by patient:PATIENT WOULD LIKE FOR THIS TO BE 90 DAYS     Does the patient have less than a 3 day supply:  [x] Yes  [] No    Mar CONNELL Rep   01/25/22 08:50 EST

## 2022-01-27 ENCOUNTER — HOSPITAL ENCOUNTER (OUTPATIENT)
Dept: MAMMOGRAPHY | Facility: HOSPITAL | Age: 57
Discharge: HOME | End: 2022-01-27
Admitting: NURSE PRACTITIONER

## 2022-01-27 DIAGNOSIS — Z12.31 SCREENING MAMMOGRAM, ENCOUNTER FOR: ICD-10-CM

## 2022-01-27 PROCEDURE — 77067 SCR MAMMO BI INCL CAD: CPT

## 2022-01-27 PROCEDURE — 77063 BREAST TOMOSYNTHESIS BI: CPT

## 2022-02-14 ENCOUNTER — LAB (OUTPATIENT)
Dept: LAB | Facility: HOSPITAL | Age: 57
End: 2022-02-14

## 2022-02-14 DIAGNOSIS — E78.5 HYPERLIPIDEMIA LDL GOAL <70: ICD-10-CM

## 2022-02-14 DIAGNOSIS — IMO0002 DIABETES MELLITUS TYPE 2, UNCONTROLLED, WITH COMPLICATIONS: ICD-10-CM

## 2022-02-14 LAB
ALBUMIN SERPL-MCNC: 5.1 G/DL (ref 3.5–5.2)
ALBUMIN UR-MCNC: 1.4 MG/DL
ALBUMIN/GLOB SERPL: 2 G/DL
ALP SERPL-CCNC: 78 U/L (ref 39–117)
ALT SERPL W P-5'-P-CCNC: 31 U/L (ref 1–33)
ANION GAP SERPL CALCULATED.3IONS-SCNC: 15.1 MMOL/L (ref 5–15)
AST SERPL-CCNC: 20 U/L (ref 1–32)
BASOPHILS # BLD AUTO: 0.04 10*3/MM3 (ref 0–0.2)
BASOPHILS NFR BLD AUTO: 0.6 % (ref 0–1.5)
BILIRUB SERPL-MCNC: 0.5 MG/DL (ref 0–1.2)
BILIRUB UR QL STRIP: NEGATIVE
BUN SERPL-MCNC: 17 MG/DL (ref 6–20)
BUN/CREAT SERPL: 16.2 (ref 7–25)
CALCIUM SPEC-SCNC: 10.2 MG/DL (ref 8.6–10.5)
CHLORIDE SERPL-SCNC: 99 MMOL/L (ref 98–107)
CHOLEST SERPL-MCNC: 149 MG/DL (ref 0–200)
CLARITY UR: CLEAR
CO2 SERPL-SCNC: 24.9 MMOL/L (ref 22–29)
COLOR UR: YELLOW
CREAT SERPL-MCNC: 1.05 MG/DL (ref 0.57–1)
CREAT UR-MCNC: 74 MG/DL
DEPRECATED RDW RBC AUTO: 40.2 FL (ref 37–54)
EOSINOPHIL # BLD AUTO: 0.13 10*3/MM3 (ref 0–0.4)
EOSINOPHIL NFR BLD AUTO: 1.9 % (ref 0.3–6.2)
ERYTHROCYTE [DISTWIDTH] IN BLOOD BY AUTOMATED COUNT: 12.1 % (ref 12.3–15.4)
GFR SERPL CREATININE-BSD FRML MDRD: 54 ML/MIN/1.73
GLOBULIN UR ELPH-MCNC: 2.5 GM/DL
GLUCOSE SERPL-MCNC: 209 MG/DL (ref 65–99)
GLUCOSE UR STRIP-MCNC: ABNORMAL MG/DL
HBA1C MFR BLD: 8.2 % (ref 4.8–5.6)
HCT VFR BLD AUTO: 43.2 % (ref 34–46.6)
HDLC SERPL-MCNC: 44 MG/DL (ref 40–60)
HGB BLD-MCNC: 14.7 G/DL (ref 12–15.9)
HGB UR QL STRIP.AUTO: NEGATIVE
IMM GRANULOCYTES # BLD AUTO: 0.04 10*3/MM3 (ref 0–0.05)
IMM GRANULOCYTES NFR BLD AUTO: 0.6 % (ref 0–0.5)
KETONES UR QL STRIP: NEGATIVE
LDLC SERPL CALC-MCNC: 76 MG/DL (ref 0–100)
LDLC/HDLC SERPL: 1.6 {RATIO}
LEUKOCYTE ESTERASE UR QL STRIP.AUTO: NEGATIVE
LYMPHOCYTES # BLD AUTO: 2.07 10*3/MM3 (ref 0.7–3.1)
LYMPHOCYTES NFR BLD AUTO: 29.6 % (ref 19.6–45.3)
MCH RBC QN AUTO: 30.9 PG (ref 26.6–33)
MCHC RBC AUTO-ENTMCNC: 34 G/DL (ref 31.5–35.7)
MCV RBC AUTO: 90.8 FL (ref 79–97)
MICROALBUMIN/CREAT UR: 18.9 MG/G
MONOCYTES # BLD AUTO: 0.53 10*3/MM3 (ref 0.1–0.9)
MONOCYTES NFR BLD AUTO: 7.6 % (ref 5–12)
NEUTROPHILS NFR BLD AUTO: 4.18 10*3/MM3 (ref 1.7–7)
NEUTROPHILS NFR BLD AUTO: 59.7 % (ref 42.7–76)
NITRITE UR QL STRIP: NEGATIVE
NRBC BLD AUTO-RTO: 0 /100 WBC (ref 0–0.2)
PH UR STRIP.AUTO: 6 [PH] (ref 5–8)
PLATELET # BLD AUTO: 267 10*3/MM3 (ref 140–450)
PMV BLD AUTO: 10.5 FL (ref 6–12)
POTASSIUM SERPL-SCNC: 4 MMOL/L (ref 3.5–5.2)
PROT SERPL-MCNC: 7.6 G/DL (ref 6–8.5)
PROT UR QL STRIP: NEGATIVE
RBC # BLD AUTO: 4.76 10*6/MM3 (ref 3.77–5.28)
SODIUM SERPL-SCNC: 139 MMOL/L (ref 136–145)
SP GR UR STRIP: >=1.03 (ref 1–1.03)
TRIGL SERPL-MCNC: 173 MG/DL (ref 0–150)
TSH SERPL DL<=0.05 MIU/L-ACNC: 0.69 UIU/ML (ref 0.27–4.2)
UROBILINOGEN UR QL STRIP: ABNORMAL
VLDLC SERPL-MCNC: 29 MG/DL (ref 5–40)
WBC NRBC COR # BLD: 6.99 10*3/MM3 (ref 3.4–10.8)

## 2022-02-14 PROCEDURE — 84443 ASSAY THYROID STIM HORMONE: CPT

## 2022-02-14 PROCEDURE — 83036 HEMOGLOBIN GLYCOSYLATED A1C: CPT

## 2022-02-14 PROCEDURE — 85025 COMPLETE CBC W/AUTO DIFF WBC: CPT

## 2022-02-14 PROCEDURE — 80061 LIPID PANEL: CPT

## 2022-02-14 PROCEDURE — 80053 COMPREHEN METABOLIC PANEL: CPT

## 2022-02-14 PROCEDURE — 81003 URINALYSIS AUTO W/O SCOPE: CPT

## 2022-02-14 PROCEDURE — 82570 ASSAY OF URINE CREATININE: CPT

## 2022-02-14 PROCEDURE — 82043 UR ALBUMIN QUANTITATIVE: CPT

## 2022-02-16 ENCOUNTER — OFFICE VISIT (OUTPATIENT)
Dept: FAMILY MEDICINE CLINIC | Facility: CLINIC | Age: 57
End: 2022-02-16

## 2022-02-16 VITALS
HEART RATE: 78 BPM | TEMPERATURE: 98.3 F | DIASTOLIC BLOOD PRESSURE: 69 MMHG | OXYGEN SATURATION: 97 % | WEIGHT: 212 LBS | BODY MASS INDEX: 33.27 KG/M2 | SYSTOLIC BLOOD PRESSURE: 107 MMHG | HEIGHT: 67 IN

## 2022-02-16 VITALS
HEART RATE: 83 BPM | OXYGEN SATURATION: 97 % | BODY MASS INDEX: 33.27 KG/M2 | HEIGHT: 67 IN | WEIGHT: 212 LBS | TEMPERATURE: 98.3 F | SYSTOLIC BLOOD PRESSURE: 107 MMHG | DIASTOLIC BLOOD PRESSURE: 69 MMHG

## 2022-02-16 DIAGNOSIS — E11.649 TYPE 2 DIABETES MELLITUS WITH HYPOGLYCEMIA WITHOUT COMA, WITH LONG-TERM CURRENT USE OF INSULIN: Primary | ICD-10-CM

## 2022-02-16 DIAGNOSIS — E55.9 VITAMIN D DEFICIENCY: ICD-10-CM

## 2022-02-16 DIAGNOSIS — K63.5 POLYP OF COLON, UNSPECIFIED PART OF COLON, UNSPECIFIED TYPE: ICD-10-CM

## 2022-02-16 DIAGNOSIS — Z01.419 WELL WOMAN EXAM WITH ROUTINE GYNECOLOGICAL EXAM: Primary | ICD-10-CM

## 2022-02-16 DIAGNOSIS — Z79.4 TYPE 2 DIABETES MELLITUS WITH HYPOGLYCEMIA WITHOUT COMA, WITH LONG-TERM CURRENT USE OF INSULIN: Primary | ICD-10-CM

## 2022-02-16 DIAGNOSIS — I10 ESSENTIAL HYPERTENSION: ICD-10-CM

## 2022-02-16 DIAGNOSIS — K21.9 GASTROESOPHAGEAL REFLUX DISEASE WITHOUT ESOPHAGITIS: ICD-10-CM

## 2022-02-16 DIAGNOSIS — J30.9 ALLERGIC RHINITIS, UNSPECIFIED SEASONALITY, UNSPECIFIED TRIGGER: ICD-10-CM

## 2022-02-16 DIAGNOSIS — Z12.4 SCREENING FOR CERVICAL CANCER: ICD-10-CM

## 2022-02-16 DIAGNOSIS — R06.09 DYSPNEA ON EXERTION: ICD-10-CM

## 2022-02-16 DIAGNOSIS — E78.5 HYPERLIPIDEMIA LDL GOAL <70: ICD-10-CM

## 2022-02-16 PROBLEM — I50.32 CHRONIC DIASTOLIC CONGESTIVE HEART FAILURE: Status: ACTIVE | Noted: 2022-02-16

## 2022-02-16 LAB
CANDIDA SPECIES: NEGATIVE
GARDNERELLA VAGINALIS: NEGATIVE
T VAGINALIS DNA VAG QL PROBE+SIG AMP: NEGATIVE

## 2022-02-16 PROCEDURE — 99396 PREV VISIT EST AGE 40-64: CPT | Performed by: NURSE PRACTITIONER

## 2022-02-16 PROCEDURE — 87480 CANDIDA DNA DIR PROBE: CPT | Performed by: NURSE PRACTITIONER

## 2022-02-16 PROCEDURE — 87660 TRICHOMONAS VAGIN DIR PROBE: CPT | Performed by: NURSE PRACTITIONER

## 2022-02-16 PROCEDURE — 99214 OFFICE O/P EST MOD 30 MIN: CPT | Performed by: NURSE PRACTITIONER

## 2022-02-16 PROCEDURE — 87510 GARDNER VAG DNA DIR PROBE: CPT | Performed by: NURSE PRACTITIONER

## 2022-02-16 PROCEDURE — G0123 SCREEN CERV/VAG THIN LAYER: HCPCS | Performed by: NURSE PRACTITIONER

## 2022-02-16 RX ORDER — MONTELUKAST SODIUM 10 MG/1
10 TABLET ORAL NIGHTLY
Qty: 90 TABLET | Refills: 1 | Status: SHIPPED | OUTPATIENT
Start: 2022-02-16 | End: 2022-05-24 | Stop reason: SDUPTHER

## 2022-02-16 RX ORDER — GLIPIZIDE 5 MG/1
5 TABLET, FILM COATED, EXTENDED RELEASE ORAL DAILY
Qty: 90 TABLET | Refills: 1 | Status: SHIPPED | OUTPATIENT
Start: 2022-02-16 | End: 2022-05-24 | Stop reason: SDUPTHER

## 2022-02-16 RX ORDER — FAMOTIDINE 40 MG/1
40 TABLET, FILM COATED ORAL 2 TIMES DAILY PRN
Qty: 90 TABLET | Refills: 1 | Status: SHIPPED | OUTPATIENT
Start: 2022-02-16 | End: 2022-05-24 | Stop reason: SDUPTHER

## 2022-02-16 RX ORDER — MELATONIN
1000 DAILY
Qty: 90 TABLET | Refills: 1 | Status: SHIPPED | OUTPATIENT
Start: 2022-02-16 | End: 2022-05-24 | Stop reason: SDUPTHER

## 2022-02-16 NOTE — PATIENT INSTRUCTIONS
"https://www.uspreventiveservicestaskforce.org/uspstf/recommendation/lung-cancer-screening\">   Cancer Screening for Women  A cancer screening is a test or exam that checks for cancer. Your health care provider will recommend specific cancer screenings based on your age, medical history (including risk factors), and family history of cancer. Work with your health care provider to create a cancer screening schedule that protects your health.  Who should have screening?  All women should be considered for screening of certain cancers, including breast cancer, cervical cancer, colorectal cancer, and skin cancer. Your health care provider may recommend screenings for other types of cancer if:  · You had cancer before.  · You have a family member with cancer.  · You have abnormal genes that could increase the risk of cancer.  · You have risk factors for certain cancers, such as current or past use of tobacco products, or being overweight.  When you should be screened for cancer depends on:  · Your age.  · Your medical history and your family's medical history.  · Certain lifestyle factors, such as smoking or other use of tobacco products.  · Environmental exposure, such as to asbestos.  How is screening done?  Breast cancer  Breast cancer screening is done with a test that takes images of breast tissue (mammogram). Here are some screening guidelines for women at average risk:  · When you are age 40-44, you will be given the choice to start having mammograms.  · When you are age 45-54, you should have a mammogram every year.  · You may start having mammograms before age 45 if you have risk factors for breast cancer, such as having an immediate family member with breast cancer.  · At age 55 or older, you should have a mammogram every 1-2 years for as long as you are in good health and have a life expectancy of 10 years or longer.  · It is important to know what your breasts look and feel like so you can report any changes to " your health care provider.    Cervical cancer  · All women at average risk should be considered for screening for cervical cancer starting no later than age 25 and continuing until age 65. Screening should not begin earlier than age 21. You will have tests every 3-5 years, depending on your results and the type of screening test. Talk with your health care provider about which screening test is right for you and how often you should be screened.  ? Cervical cancer screening is done with an HPV (human papillomavirus) test to identify the virus that causes cervical cancer. To perform the test, a health care provider takes a swab of cells from yourcervix during a pelvic exam. This test may be performed along with a Pap test. This testchecks for abnormalities in the lowest part of the uterus (cervix).  ? If you have had the HPV vaccine, you will still be screened for cervical cancer and follow normal screening recommendations.  You do not need to be screened for cervical cancer if any of the following apply to you:  · You are older than age 65 and you have had normal screening tests in the past 10 years with no serious cervical precancer or cancer in the last 25 years.  · Your cervix and uterus have been removed, and you have never had cervical cancer or abnormal cells that could become cancer (precancerous cells).  Colorectal cancer  All adults at average risk should be considered for screening for colorectal cancer starting at age 50 and continuing until age 75. Your health care provider may recommend screening at age 45 if you are at higher risk due to family history of colon or rectal cancer or other risk factors. You will have tests every 1-10 years, depending on your results and the type of screening test. Talk with your health care provider about which screening test is right for you and how often you should be screened.  Colorectal cancer screening looks for cancer or for growths called polyps that often form  before cancer starts. Tests to look for cancer or polyps include:  · Colonoscopy or flexible sigmoidoscopy. For these procedures, a flexible tube with a small camera is inserted into the rectum.  · CT colonography. This test uses X-rays and a contrast dye to check the colon for polyps. If a polyp is found, you may need to have a colonoscopy so the polyp can be located and removed.  Tests to look for cancer in the stool (feces) include:  · Guaiac-based fecal occult blood test (FOBT). This test can find blood in stool. It can be done at home with a kit.  · Fecal immunochemical test (FIT). This test can find blood in stool. For this test, you will need to collect stool samples at home.  · Stool DNA test. This test looks for blood in stool and any changes in DNA that can lead to colon cancer. For this test, you will need to collect a stool sample at home and send it to a lab.  Endometrial cancer  There is no standard screening test for endometrial cancer, and women at average risk do not routinely need to have this screening. Talk with your health care provider about whether screening is right for you. If it is, this screening is performed through:  · Endometrial tissue biopsy. This tests a sample of tissue taken from the lining of the uterus.  · Vaginal ultrasound.  If you are at increased risk for endometrial cancer, you may need to have these tests more often than normal. You are at increased risk if:  · You have a family history of ovarian, uterine, or colon cancer.  · You are taking tamoxifen, a medicine used to treat breast cancer.  · You have certain types of colon cancer.  If you have reached menopause, it is especially important to talk with your health care provider about any vaginal bleeding or spotting. Screening for endometrial cancer is not recommended for women who do not have symptoms of the cancer, such as vaginal bleeding.  Lung cancer  Lung cancer screening is done with a CT scan that looks for  abnormal changes in the lungs. Discuss lung cancer screening with your health care provider if you are 50-80 years old and if any of the following apply to you:  · You currently smoke.  · You used to smoke heavily.  · You have a smoking history of 1 pack of cigarettes a day for 20 years or 2 packs a day for 10 years.  · You have quit smoking within the past 15 years.  You may need to be screened every year if you smoke heavily or if you used to smoke.  Skin cancer  Skin cancer screening is done by checking the skin for unusual moles or spots and any changes in existing moles. Your health care provider should check your skin for signs of skin cancer at every physical exam. You should check your skin every month and tell your health care provider right away if anything looks unusual. Women with a higher-than-normal risk for skin cancer may want to see a skin specialist (dermatologist) for an annual body check.  What are the benefits of screening?  Cancer screening is done to look for cancer in the very early stages, before it spreads and becomes harder to treat and before you would start to notice symptoms. Finding cancer early improves the chances of successful treatment. It may save your life.  Where to find more information  · American Cancer Society: www.cancer.org  · Centers for Disease Control and Prevention: www.cdc.gov  · National Cancer [place]: www.cancer.gov  · [place] Department of Health and Human Services: www.womenshealth.gov  Contact a health care provider if:  You have concerns about any signs or symptoms of cancer. These may include:  · Skin problems. You may have:  ? Moles of an unusual shape or color.  ? Changes in existing moles.  ? A sore on your skin that does not heal.  · Tiredness (fatigue) that does not go away.  · Losing weight without trying.  · Blood in your urine or stool.  · Problems with coughing or breathing. These may include:  ? Coughing or trouble breathing that does not go  away.  ? Coughing up blood.  · Lumps or other changes in your breasts.  · Vaginal bleeding, spotting, or changes in your periods.  · Frequent pain or cramping in your abdomen.  Summary  · Your health care provider will recommend specific cancer screenings based on your age, medical history, and family history of cancer.  · Work with your health care provider to create a cancer screening schedule that protects your health.  · Finding cancer early improves the chances of successful treatment. It may save your life.  · Contact a health care provider if you have concerns about any signs or symptoms of cancer.  This information is not intended to replace advice given to you by your health care provider. Make sure you discuss any questions you have with your health care provider.  Document Revised: 05/10/2021 Document Reviewed: 11/13/2020  Elsevier Patient Education © 2021 [employer]

## 2022-02-16 NOTE — PATIENT INSTRUCTIONS
Diabetes Mellitus and Nutrition, Adult  When you have diabetes, or diabetes mellitus, it is very important to have healthy eating habits because your blood sugar (glucose) levels are greatly affected by what you eat and drink. Eating healthy foods in the right amounts, at about the same times every day, can help you:  · Control your blood glucose.  · Lower your risk of heart disease.  · Improve your blood pressure.  · Reach or maintain a healthy weight.  What can affect my meal plan?  Every person with diabetes is different, and each person has different needs for a meal plan. Your health care provider may recommend that you work with a dietitian to make a meal plan that is best for you. Your meal plan may vary depending on factors such as:  · The calories you need.  · The medicines you take.  · Your weight.  · Your blood glucose, blood pressure, and cholesterol levels.  · Your activity level.  · Other health conditions you have, such as heart or kidney disease.  How do carbohydrates affect me?  Carbohydrates, also called carbs, affect your blood glucose level more than any other type of food. Eating carbs naturally raises the amount of glucose in your blood. Carb counting is a method for keeping track of how many carbs you eat. Counting carbs is important to keep your blood glucose at a healthy level, especially if you use insulin or take certain oral diabetes medicines.  It is important to know how many carbs you can safely have in each meal. This is different for every person. Your dietitian can help you calculate how many carbs you should have at each meal and for each snack.  How does alcohol affect me?  Alcohol can cause a sudden decrease in blood glucose (hypoglycemia), especially if you use insulin or take certain oral diabetes medicines. Hypoglycemia can be a life-threatening condition. Symptoms of hypoglycemia, such as sleepiness, dizziness, and confusion, are similar to symptoms of having too much  "alcohol.  · Do not drink alcohol if:  ? Your health care provider tells you not to drink.  ? You are pregnant, may be pregnant, or are planning to become pregnant.  · If you drink alcohol:  ? Do not drink on an empty stomach.  ? Limit how much you use to:  § 0-1 drink a day for women.  § 0-2 drinks a day for men.  ? Be aware of how much alcohol is in your drink. In the [place] one drink equals one 12 oz bottle of beer (355 mL), one 5 oz glass of wine (148 mL), or one 1½ oz glass of hard liquor (44 mL).  ? Keep yourself hydrated with water, diet soda, or unsweetened iced tea.  § Keep in mind that regular soda, juice, and other mixers may contain a lot of sugar and must be counted as carbs.  What are tips for following this plan?    Reading food labels  · Start by checking the serving size on the \"Nutrition Facts\" label of packaged foods and drinks. The amount of calories, carbs, fats, and other nutrients listed on the label is based on one serving of the item. Many items contain more than one serving per package.  · Check the total grams (g) of carbs in one serving. You can calculate the number of servings of carbs in one serving by dividing the total carbs by 15. For example, if a food has 30 g of total carbs per serving, it would be equal to 2 servings of carbs.  · Check the number of grams (g) of saturated fats and trans fats in one serving. Choose foods that have a low amount or none of these fats.  · Check the number of milligrams (mg) of salt (sodium) in one serving. Most people should limit total sodium intake to less than 2,300 mg per day.  · Always check the nutrition information of foods labeled as \"low-fat\" or \"nonfat.\" These foods may be higher in added sugar or refined carbs and should be avoided.  · Talk to your dietitian to identify your daily goals for nutrients listed on the label.  Shopping  · Avoid buying canned, pre-made, or processed foods. These foods tend to be high in fat, sodium, and added " sugar.  · Shop around the outside edge of the grocery store. This is where you will most often find fresh fruits and vegetables, bulk grains, fresh meats, and fresh dairy.  Cooking  · Use low-heat cooking methods, such as baking, instead of high-heat cooking methods like deep frying.  · Cook using healthy oils, such as olive, canola, or sunflower oil.  · Avoid cooking with butter, cream, or high-fat meats.  Meal planning  · Eat meals and snacks regularly, preferably at the same times every day. Avoid going long periods of time without eating.  · Eat foods that are high in fiber, such as fresh fruits, vegetables, beans, and whole grains. Talk with your dietitian about how many servings of carbs you can eat at each meal.  · Eat 4-6 oz (112-168 g) of lean protein each day, such as lean meat, chicken, fish, eggs, or tofu. One ounce (oz) of lean protein is equal to:  ? 1 oz (28 g) of meat, chicken, or fish.  ? 1 egg.  ? ¼ cup (62 g) of tofu.  · Eat some foods each day that contain healthy fats, such as avocado, nuts, seeds, and fish.  What foods should I eat?  Fruits  Berries. Apples. Oranges. Peaches. Apricots. Plums. Grapes. [place]. Papaya. Pomegranate. Kiwi. Cherries.  Vegetables  Lettuce. Spinach. Leafy greens, including kale, chard, kirby greens, and mustard greens. Beets. Cauliflower. Cabbage. Broccoli. Carrots. Green beans. Tomatoes. Peppers. Onions. Cucumbers. [place] sprouts.  Grains  Whole grains, such as whole-wheat or whole-grain bread, crackers, tortillas, cereal, and pasta. Unsweetened oatmeal. Quinoa. Brown or wild rice.  Meats and other proteins  Seafood. Poultry without skin. Lean cuts of poultry and beef. Tofu. Nuts. Seeds.  Dairy  Low-fat or fat-free dairy products such as milk, yogurt, and cheese.  The items listed above may not be a complete list of foods and beverages you can eat. Contact a dietitian for more information.  What foods should I avoid?  Fruits  Fruits canned with  syrup.  Vegetables  Canned vegetables. Frozen vegetables with butter or cream sauce.  Grains  Refined white flour and flour products such as bread, pasta, snack foods, and cereals. Avoid all processed foods.  Meats and other proteins  Fatty cuts of meat. Poultry with skin. Breaded or fried meats. Processed meat. Avoid saturated fats.  Dairy  Full-fat yogurt, cheese, or milk.  Beverages  Sweetened drinks, such as soda or iced tea.  The items listed above may not be a complete list of foods and beverages you should avoid. Contact a dietitian for more information.  Questions to ask a health care provider  · Do I need to meet with a diabetes educator?  · Do I need to meet with a dietitian?  · What number can I call if I have questions?  · When are the best times to check my blood glucose?  Where to find more information:  · American Diabetes Association: diabetes.org  · Academy of Nutrition and Dietetics: www.eatright.org  · National [place] of Diabetes and Digestive and Kidney Diseases: www.niddk.nih.gov  · Association of Diabetes Care and Education Specialists: www.diabeteseducator.org  Summary  · It is important to have healthy eating habits because your blood sugar (glucose) levels are greatly affected by what you eat and drink.  · A healthy meal plan will help you control your blood glucose and maintain a healthy lifestyle.  · Your health care provider may recommend that you work with a dietitian to make a meal plan that is best for you.  · Keep in mind that carbohydrates (carbs) and alcohol have immediate effects on your blood glucose levels. It is important to count carbs and to use alcohol carefully.  This information is not intended to replace advice given to you by your health care provider. Make sure you discuss any questions you have with your health care provider.  Document Revised: 11/24/2020 Document Reviewed: 11/24/2020  Elsevier Patient Education © 2021 [employer]

## 2022-02-16 NOTE — PROGRESS NOTES
Follow Up Office Visit      Patient Name: Claudette Gramajo  : 1965   MRN: [id number]     Chief Complaint:    Chief Complaint   Patient presents with   • Follow-up   • Diabetes   • Hypertension   • Hyperlipidemia   • Vitamin D Deficiency       History of Present Illness: Claudette Gramajo is a 56 y.o. female who is here today to follow up for 6 Follow  Up  Patient has a hx DM2 , HTN, Hyperlipidemia, vitamin d deficiency, gerd, allergic rhinitis     Patient checks her BS when she doesn't feel well, states she needed time off during the holidays    Cardiology increased crestor from 20 mg to 40 mg     Pt c/o abdominal pain after taking medications at night, heartburn     Pt did not  the glipizide 5 mg in 2021 and eating more through the holidays       Patient went through menopause [id number]  Eye exam  next appt 2022 dr ward   Foot exam Dr Rangel appt 2022  Colonoscopy  dr de la torre colon polyp repeat in 3 years   Mammogram 2022      Subjective      Review of Systems:   Review of Systems   Constitutional: Negative for chills and fever.   HENT: Negative for ear pain, sinus pain and sore throat.    Eyes: Negative for visual disturbance.   Respiratory: Negative for cough.    Cardiovascular: Negative for chest pain.   Gastrointestinal: Positive for abdominal pain. Negative for diarrhea, nausea and vomiting.   Genitourinary: Negative for dysuria.   Musculoskeletal: Negative for myalgias.   Neurological: Negative for headaches.        Past Medical History:   Past Medical History:   Diagnosis Date   • Abnormal ECG    • Arrhythmia    • CAD (coronary artery disease)    • Chest pain    • Diabetes mellitus (HCC)    • Edema    • Hyperlipidemia LDL goal <70 2021   • Myocardial infarct (HCC)    • FARNAZ on CPAP 2021   • Palpitations    • Sleep apnea    • Unstable angina (HCC)        Past Surgical History:   Past Surgical History:   Procedure Laterality Date   • CARDIAC CATHETERIZATION      • CORONARY ANGIOPLASTY     • CORONARY STENT PLACEMENT     • VENOUS THROMBECTOMY         Family History:   Family History   Problem Relation Age of Onset   • Suicidality Mother    • Heart attack Father    • Heart disease Father    • No Known Problems Maternal Grandmother    • No Known Problems Maternal Grandfather    • Heart disease Paternal Grandmother    • Cancer Paternal Grandmother    • Heart disease Paternal Grandfather    • Cancer Paternal Grandfather    • Thyroid disease Paternal Grandfather        Social History:   Social History     Socioeconomic History   • Marital status:    Tobacco Use   • Smoking status: Former Smoker     Packs/day: 1.00     Types: Cigarettes     Start date: 1986     Quit date: 2012     Years since quitting: 10.1   • Smokeless tobacco: Never Used   • Tobacco comment: caffeine use   Vaping Use   • Vaping Use: Never used   Substance and Sexual Activity   • Alcohol use: No   • Drug use: No   • Sexual activity: Defer       Medications:     Current Outpatient Medications:   •  Alcohol Swabs (Easy Touch Alcohol Prep Medium) 70 % pads, , Disp: , Rfl:   •  aspirin 81 MG tablet, Take 81 mg by mouth Daily., Disp: , Rfl:   •  clopidogrel (PLAVIX) 75 MG tablet, Take 1 tablet by mouth Daily., Disp: 90 tablet, Rfl: 3  •  Continuous Blood Gluc Sensor (FreeStyle Radha Sensor System), 1 Device Every 14 (Fourteen) Days., Disp: 6 each, Rfl: 3  •  DULoxetine (CYMBALTA) 60 MG capsule, Take 1 capsule by mouth Daily., Disp: 90 capsule, Rfl: 0  •  FREESTYLE LITE test strip, , Disp: , Rfl:   •  furosemide (LASIX) 20 MG tablet, Take 1 tablet by mouth Daily., Disp: 90 tablet, Rfl: 3  •  glipizide (GLUCOTROL XL) 5 MG ER tablet, Take 1 tablet by mouth Daily for 180 days., Disp: 90 tablet, Rfl: 1  •  Lancets (freestyle) lancets, , Disp: , Rfl:   •  metoprolol succinate XL (TOPROL-XL) 25 MG 24 hr tablet, Take 1 tablet by mouth Daily., Disp: 90 tablet, Rfl: 3  •  montelukast (SINGULAIR) 10 MG tablet, Take 1  "tablet by mouth Every Night., Disp: 90 tablet, Rfl: 1  •  nitroglycerin (NITROSTAT) 0.4 MG SL tablet, Place 1 tablet under the tongue Every 5 (Five) Minutes As Needed for chest pain., Disp: 35 tablet, Rfl: 6  •  oxyCODONE (ROXICODONE) 5 MG immediate release tablet, Take  by mouth As Needed., Disp: , Rfl:   •  rosuvastatin (CRESTOR) 40 MG tablet, Take 1 tablet by mouth Daily., Disp: 90 tablet, Rfl: 3  •  Cetirizine HCl 10 MG capsule, Take 10 mg by mouth every night at bedtime., Disp: 90 capsule, Rfl: 1  •  cholecalciferol (VITAMIN D3) 25 MCG (1000 UT) tablet, Take 1 tablet by mouth Daily., Disp: 90 tablet, Rfl: 1  •  empagliflozin (JARDIANCE) 25 MG tablet tablet, Take 1 tablet by mouth Daily., Disp: 30 tablet, Rfl: 12  •  famotidine (Pepcid) 40 MG tablet, Take 1 tablet by mouth 2 (Two) Times a Day As Needed for Heartburn., Disp: 90 tablet, Rfl: 1  •  fluticasone (FLONASE) 50 MCG/ACT nasal spray, 2 sprays into the nostril(s) as directed by provider Daily. 2 sprays each nostril daily, Disp: 3 mL, Rfl: 1  •  metFORMIN (GLUCOPHAGE) 1000 MG tablet, Take 1 tablet by mouth 2 (Two) Times a Day With Meals., Disp: 180 tablet, Rfl: 0    Allergies:   Allergies   Allergen Reactions   • Other Unknown - Low Severity     Hay    Sneezing watery eyes cough   • Dust Mite Extract Other (See Comments)   • Molds & Smuts Cough   • Pollen Extract Cough           Objective     Physical Exam:  Vital Signs:   Vitals:    02/16/22 1516   BP: 107/69   Pulse: 83   Temp: 98.3 °F (36.8 °C)   SpO2: 97%   Weight: 96.2 kg (212 lb)   Height: 170.2 cm (67\")     Body mass index is 33.2 kg/m².     Physical Exam  HENT:      Right Ear: Tympanic membrane normal.      Left Ear: Tympanic membrane normal.      Nose: Nose normal.      Mouth/Throat:      Mouth: Mucous membranes are moist.   Eyes:      Conjunctiva/sclera: Conjunctivae normal.      Pupils: Pupils are equal, round, and reactive to light.   Cardiovascular:      Rate and Rhythm: Normal rate and regular " rhythm.      Heart sounds: Normal heart sounds. No murmur heard.      Pulmonary:      Effort: Pulmonary effort is normal.      Breath sounds: Normal breath sounds.   Abdominal:      General: Bowel sounds are normal.      Palpations: Abdomen is soft.   Musculoskeletal:      Right lower leg: No edema.      Left lower leg: No edema.   Skin:     General: Skin is warm and dry.   Neurological:      Mental Status: She is alert.   Psychiatric:         Mood and Affect: Mood normal.         Behavior: Behavior normal.             Assessment / Plan      Assessment/Plan:   Diagnoses and all orders for this visit:    1. Type 2 diabetes mellitus with hypoglycemia without coma, with long-term current use of insulin (HCC) (Primary)  -     empagliflozin (JARDIANCE) 25 MG tablet tablet; Take 1 tablet by mouth Daily.  Dispense: 30 tablet; Refill: 12  -     metFORMIN (GLUCOPHAGE) 1000 MG tablet; Take 1 tablet by mouth 2 (Two) Times a Day With Meals.  Dispense: 180 tablet; Refill: 0  -     Comprehensive Metabolic Panel; Future  -     CBC Auto Differential; Future  -     Hemoglobin A1c; Future  -     Microalbumin / Creatinine Urine Ratio - Urine, Clean Catch; Future  -     TSH; Future  -     Urinalysis With Culture If Indicated -; Future  -     Lipid Panel; Future    2. Essential hypertension    3. Hyperlipidemia LDL goal <70    4. Vitamin D deficiency  -     cholecalciferol (VITAMIN D3) 25 MCG (1000 UT) tablet; Take 1 tablet by mouth Daily.  Dispense: 90 tablet; Refill: 1    5. Dyspnea on exertion  -     montelukast (SINGULAIR) 10 MG tablet; Take 1 tablet by mouth Every Night.  Dispense: 90 tablet; Refill: 1    6. Polyp of colon, unspecified part of colon, unspecified type  -     Ambulatory Referral to Gastroenterology    7. Gastroesophageal reflux disease without esophagitis  -     Ambulatory Referral to Gastroenterology    8. Allergic rhinitis, unspecified seasonality, unspecified trigger    Other orders  -     famotidine (Pepcid) 40  "MG tablet; Take 1 tablet by mouth 2 (Two) Times a Day As Needed for Heartburn.  Dispense: 90 tablet; Refill: 1  -     glipizide (GLUCOTROL XL) 5 MG ER tablet; Take 1 tablet by mouth Daily for 180 days.  Dispense: 90 tablet; Refill: 1  -     fluticasone (FLONASE) 50 MCG/ACT nasal spray; 2 sprays into the nostril(s) as directed by provider Daily. 2 sprays each nostril daily  Dispense: 3 mL; Refill: 1  -     Cetirizine HCl 10 MG capsule; Take 10 mg by mouth every night at bedtime.  Dispense: 90 capsule; Refill: 1       Obese mitis type II reviewed hemoglobin A1c greater than eight patient reports she never started glipizide we will begin glipizide at this time at 5 mg recheck A1c in 90 days recommend exercise 30 minutes daily recommend education with diabetic educator patient Lyons  Hypertension currently controlled at this time Toprol XL patient is also taking Lasix for heart failure no edema no wheezing at this time  Hyperlipidemia LDL above goal cardiology increase Crestor from 20 mg to 40 mg recheck CMP and lipid panel in 30 days to monitor that increase statin dose  Vitamin D deficiency recommend 2000 units daily  Shortness of breath allergic rhinitis stable on Singulair Zyrtec Flonase will provide refills  Reflux uncontrolled we will add Pepcid twice daily as needed for heartburn will refer to gastroenterology for history of colon polyp and reflux        Follow Up:   Return in about 3 months (around 5/16/2022).    Portia Bahena, SARWAT    \"Please note that portions of this note were completed with a voice recognition program.\"    "

## 2022-02-16 NOTE — PROGRESS NOTES
Female Physical / PAP Note      Patient Name: Claudette Gramajo  : 1965   MRN: [id number]     Chief Complaint:    Chief Complaint   Patient presents with   • Gynecologic Exam       History of Present Illness: Claudette Gramajo is a 56 y.o. female who is here today for her annual health maintenance and physical.     C/O no issue to discuss    Patient went through menopause [id number]  Eye exam  next appt 2022  Foot exam Dr Rangel appt 2022  Colonoscopy 2019  Mammogram 2021  Pap 2022      Subjective      Review of Systems:   Review of Systems   Breast - No tenderness, lumps, discharge, or blood from nipples.      Past Medical History, Social History, Family History and Care Team were all reviewed with patient and updated as appropriate.     Medications:     Current Outpatient Medications:   •  Alcohol Swabs (Easy Touch Alcohol Prep Medium) 70 % pads, , Disp: , Rfl:   •  aspirin 81 MG tablet, Take 81 mg by mouth Daily., Disp: , Rfl:   •  cholecalciferol (VITAMIN D3) 25 MCG (1000 UT) tablet, Take 1 tablet by mouth Daily., Disp: 90 tablet, Rfl: 1  •  clopidogrel (PLAVIX) 75 MG tablet, Take 1 tablet by mouth Daily., Disp: 90 tablet, Rfl: 3  •  Continuous Blood Gluc Sensor (FreeStyle Radha Sensor System), 1 Device Every 14 (Fourteen) Days., Disp: 6 each, Rfl: 3  •  DULoxetine (CYMBALTA) 60 MG capsule, Take 1 capsule by mouth Daily., Disp: 90 capsule, Rfl: 0  •  empagliflozin (JARDIANCE) 25 MG tablet tablet, Take 1 tablet by mouth Daily., Disp: 30 tablet, Rfl: 12  •  famotidine (Pepcid) 40 MG tablet, Take 1 tablet by mouth 2 (Two) Times a Day As Needed for Heartburn., Disp: 90 tablet, Rfl: 1  •  FREESTYLE LITE test strip, , Disp: , Rfl:   •  furosemide (LASIX) 20 MG tablet, Take 1 tablet by mouth Daily., Disp: 90 tablet, Rfl: 3  •  glipizide (GLUCOTROL XL) 5 MG ER tablet, Take 1 tablet by mouth Daily for 180 days., Disp: 90 tablet, Rfl: 1  •  Lancets (freestyle) lancets, , Disp: , Rfl:   •   "metFORMIN (GLUCOPHAGE) 1000 MG tablet, Take 1 tablet by mouth 2 (Two) Times a Day With Meals., Disp: 180 tablet, Rfl: 0  •  metoprolol succinate XL (TOPROL-XL) 25 MG 24 hr tablet, Take 1 tablet by mouth Daily., Disp: 90 tablet, Rfl: 3  •  montelukast (SINGULAIR) 10 MG tablet, Take 1 tablet by mouth Every Night., Disp: 90 tablet, Rfl: 1  •  nitroglycerin (NITROSTAT) 0.4 MG SL tablet, Place 1 tablet under the tongue Every 5 (Five) Minutes As Needed for chest pain., Disp: 35 tablet, Rfl: 6  •  oxyCODONE (ROXICODONE) 5 MG immediate release tablet, Take  by mouth As Needed., Disp: , Rfl:   •  rosuvastatin (CRESTOR) 40 MG tablet, Take 1 tablet by mouth Daily., Disp: 90 tablet, Rfl: 3    Allergies:   Allergies   Allergen Reactions   • Other Unknown - Low Severity     Hay    Sneezing watery eyes cough   • Dust Mite Extract Other (See Comments)   • Molds & Smuts Cough   • Pollen Extract Cough         Objective     Physical Exam:  Vital Signs:   Vitals:    02/16/22 1521   BP: 107/69   Pulse: 78   Temp: 98.3 °F (36.8 °C)   SpO2: 97%   Weight: 96.2 kg (212 lb)   Height: 170.2 cm (67\")     Body mass index is 33.2 kg/m².     Physical Exam  Constitutional:       Appearance: She is obese.   HENT:      Right Ear: Tympanic membrane normal.      Left Ear: Tympanic membrane normal.      Nose: Nose normal.      Mouth/Throat:      Mouth: Mucous membranes are moist.   Eyes:      Conjunctiva/sclera: Conjunctivae normal.      Pupils: Pupils are equal, round, and reactive to light.   Neck:      Vascular: No carotid bruit.   Cardiovascular:      Rate and Rhythm: Normal rate and regular rhythm.      Heart sounds: Normal heart sounds. No murmur heard.      Pulmonary:      Effort: Pulmonary effort is normal.      Breath sounds: Normal breath sounds.   Abdominal:      General: Bowel sounds are normal.      Palpations: Abdomen is soft.   Genitourinary:     General: Normal vulva.      Vagina: No vaginal discharge.      Cervix: Normal.      Uterus: " "Normal.       Adnexa: Right adnexa normal and left adnexa normal.      Rectum: Normal.   Musculoskeletal:      Right lower leg: No edema.      Left lower leg: No edema.   Skin:     General: Skin is warm and dry.   Neurological:      Mental Status: She is alert.   Psychiatric:         Mood and Affect: Mood normal.         Behavior: Behavior normal.             Assessment / Plan      Assessment/Plan:   Diagnoses and all orders for this visit:    1. Well woman exam with routine gynecological exam (Primary)  -     Gardnerella vaginalis, Trichomonas vaginalis, Candida albicans, DNA - Swab, Vagina    2. Screening for cervical cancer  -     PAP, Liquid Based ([facility] Only)    Other orders  -     Cancel: Hepatic Function Panel  -     Cancel: Lipid Panel               Follow Up:   Return in about 1 year (around 2/16/2023).    Healthcare Maintenance:   Counseling provided on screening mammogram, screening colonoscopy, bone density, diet and exercise   Claudette I Avila voices understanding and acceptance of this advice and will call back with any further questions or concerns. AVS with preventive healthcare tips printed for patient.     SARWAT Munoz    \"Please note that portions of this note were completed with a voice recognition program.\"    "

## 2022-02-17 PROBLEM — J30.9 ALLERGIC RHINITIS: Status: ACTIVE | Noted: 2022-02-17

## 2022-02-17 PROBLEM — K21.9 GASTROESOPHAGEAL REFLUX DISEASE WITHOUT ESOPHAGITIS: Status: ACTIVE | Noted: 2022-02-17

## 2022-02-17 PROBLEM — K63.5 POLYP OF COLON: Status: ACTIVE | Noted: 2022-02-17

## 2022-02-17 RX ORDER — FLUTICASONE PROPIONATE 50 MCG
2 SPRAY, SUSPENSION (ML) NASAL DAILY
Qty: 3 ML | Refills: 1 | Status: SHIPPED | OUTPATIENT
Start: 2022-02-17 | End: 2022-05-24 | Stop reason: SDUPTHER

## 2022-02-21 LAB
CYTOLOGIST CVX/VAG CYTO: NORMAL
CYTOLOGY CVX/VAG DOC CYTO: NORMAL
DX ICD CODE: NORMAL
HIV 1 & 2 AB SER-IMP: NORMAL
OTHER STN SPEC: NORMAL
STAT OF ADQ CVX/VAG CYTO-IMP: NORMAL

## 2022-02-23 ENCOUNTER — OFFICE VISIT (OUTPATIENT)
Dept: PODIATRY | Facility: CLINIC | Age: 57
End: 2022-02-23

## 2022-02-23 VITALS
OXYGEN SATURATION: 98 % | HEART RATE: 83 BPM | SYSTOLIC BLOOD PRESSURE: 112 MMHG | WEIGHT: 214 LBS | TEMPERATURE: 96.9 F | HEIGHT: 67 IN | BODY MASS INDEX: 33.59 KG/M2 | DIASTOLIC BLOOD PRESSURE: 57 MMHG

## 2022-02-23 DIAGNOSIS — E11.8 DIABETIC FOOT: Primary | ICD-10-CM

## 2022-02-23 DIAGNOSIS — E11.9 NON-INSULIN DEPENDENT TYPE 2 DIABETES MELLITUS: ICD-10-CM

## 2022-02-23 PROCEDURE — G8404 LOW EXTEMITY NEUR EXAM DOCUM: HCPCS | Performed by: PODIATRIST

## 2022-02-23 PROCEDURE — 99213 OFFICE O/P EST LOW 20 MIN: CPT | Performed by: PODIATRIST

## 2022-02-23 NOTE — PROGRESS NOTES
[facility] - PODIATRY    Today's Date: 02/23/22    Patient Name: Claudette Gramajo  MRN: [id number]  CSN: [id number]  PCP: Angel Bahena APRN, Last PCP Visit:  16 Feb 2022  Referring Provider: Angel Bahena*    SUBJECTIVE     Chief Complaint   Patient presents with   • Left Foot - Annual Exam, Diabetes   • Right Foot - Annual Exam, Diabetes     HPI: Claudette Gramajo, a 56 y.o.female, presents to clinic for a diabetic foot evaluation.    New, Established, New Problem:  est    Onset:  insidious    Nature:  NIDDM    Stable, worsening, improving:  stable    Patient controlling diabetes via:  Oral meds    Patient states their most recent blood glucose reading was 120.    Patient denies any fevers, chills, nausea, vomiting, shortness of breath, nor any other constitutional signs nor symptoms.    No other pedal complaints at this time.    Past Medical History:   Diagnosis Date   • Abnormal ECG    • Arrhythmia    • CAD (coronary artery disease)    • Chest pain    • Diabetes mellitus (HCC)    • Edema    • Hyperlipidemia LDL goal <70 8/31/2021   • Myocardial infarct (HCC)    • FARNAZ on CPAP 9/1/2021   • Palpitations    • Sleep apnea    • Unstable angina (HCC)      Past Surgical History:   Procedure Laterality Date   • CARDIAC CATHETERIZATION     • CORONARY ANGIOPLASTY     • CORONARY STENT PLACEMENT     • VENOUS THROMBECTOMY       Family History   Problem Relation Age of Onset   • Suicidality Mother    • Heart attack Father    • Heart disease Father    • No Known Problems Maternal Grandmother    • No Known Problems Maternal Grandfather    • Heart disease Paternal Grandmother    • Cancer Paternal Grandmother    • Heart disease Paternal Grandfather    • Cancer Paternal Grandfather    • Thyroid disease Paternal Grandfather      Social History     Socioeconomic History   • Marital status:    Tobacco Use   • Smoking status: Former Smoker     Packs/day: 1.00     Types: Cigarettes     Start  date: 1986     Quit date: 2012     Years since quitting: 10.1   • Smokeless tobacco: Never Used   • Tobacco comment: caffeine use   Vaping Use   • Vaping Use: Never used   Substance and Sexual Activity   • Alcohol use: No   • Drug use: No   • Sexual activity: Defer     Allergies   Allergen Reactions   • Other Unknown - Low Severity     Hay    Sneezing watery eyes cough   • Dust Mite Extract Other (See Comments)   • Molds & Smuts Cough   • Pollen Extract Cough     Current Outpatient Medications   Medication Sig Dispense Refill   • Alcohol Swabs (Easy Touch Alcohol Prep Medium) 70 % pads      • aspirin 81 MG tablet Take 81 mg by mouth Daily.     • Cetirizine HCl 10 MG capsule Take 10 mg by mouth every night at bedtime. 90 capsule 1   • cholecalciferol (VITAMIN D3) 25 MCG (1000 UT) tablet Take 1 tablet by mouth Daily. 90 tablet 1   • clopidogrel (PLAVIX) 75 MG tablet Take 1 tablet by mouth Daily. 90 tablet 3   • Continuous Blood Gluc Sensor (FreeStyle Radha Sensor System) 1 Device Every 14 (Fourteen) Days. 6 each 3   • DULoxetine (CYMBALTA) 60 MG capsule Take 1 capsule by mouth Daily. 90 capsule 0   • empagliflozin (JARDIANCE) 25 MG tablet tablet Take 1 tablet by mouth Daily. 30 tablet 12   • famotidine (Pepcid) 40 MG tablet Take 1 tablet by mouth 2 (Two) Times a Day As Needed for Heartburn. 90 tablet 1   • fluticasone (FLONASE) 50 MCG/ACT nasal spray 2 sprays into the nostril(s) as directed by provider Daily. 2 sprays each nostril daily 3 mL 1   • FREESTYLE LITE test strip      • furosemide (LASIX) 20 MG tablet Take 1 tablet by mouth Daily. 90 tablet 3   • glipizide (GLUCOTROL XL) 5 MG ER tablet Take 1 tablet by mouth Daily for 180 days. 90 tablet 1   • Lancets (freestyle) lancets      • metFORMIN (GLUCOPHAGE) 1000 MG tablet Take 1 tablet by mouth 2 (Two) Times a Day With Meals. 180 tablet 0   • metoprolol succinate XL (TOPROL-XL) 25 MG 24 hr tablet Take 1 tablet by mouth Daily. 90 tablet 3   • montelukast (SINGULAIR)  10 MG tablet Take 1 tablet by mouth Every Night. 90 tablet 1   • nitroglycerin (NITROSTAT) 0.4 MG SL tablet Place 1 tablet under the tongue Every 5 (Five) Minutes As Needed for chest pain. 35 tablet 6   • oxyCODONE (ROXICODONE) 5 MG immediate release tablet Take  by mouth As Needed.     • rosuvastatin (CRESTOR) 40 MG tablet Take 1 tablet by mouth Daily. 90 tablet 3     No current facility-administered medications for this visit.     Review of Systems   Constitutional: Negative.    All other systems reviewed and are negative.      OBJECTIVE     Vitals:    02/23/22 1336   BP: 112/57   Pulse: 83   Temp: 96.9 °F (36.1 °C)   SpO2: 98%       Body mass index is 33.52 kg/m².    Lab Results   Component Value Date    HGBA1C 8.20 (H) 02/14/2022       Lab Results   Component Value Date    GLUCOSE 209 (H) 02/14/2022    CALCIUM 10.2 02/14/2022     02/14/2022    K 4.0 02/14/2022    CO2 24.9 02/14/2022    CL 99 02/14/2022    BUN 17 02/14/2022    CREATININE 1.05 (H) 02/14/2022    EGFRIFAFRI 69 10/22/2021    EGFRIFNONA 54 (L) 02/14/2022    BCR 16.2 02/14/2022    ANIONGAP 15.1 (H) 02/14/2022       Patient seen in no apparent distress.      PHYSICAL EXAM:     Foot/Ankle Exam:       General:   Diabetic Foot Exam Performed    Appearance: obesity    Orientation: AAOx3    Affect: appropriate    Gait: unimpaired    Shoe Gear:  Casual shoes    VASCULAR      Right Foot Vascularity   Normal vascular exam    Dorsalis pedis:  2+  Posterior tibial:  2+  Skin Temperature: warm    Edema Grading:  None  CFT:  < 3 seconds  Pedal Hair Growth:  Present  Varicosities: none       Left Foot Vascularity   Normal vascular exam    Dorsalis pedis:  2+  Posterior tibial:  2+  Skin Temperature: warm    Edema Grading:  None  CFT:  < 3 seconds  Pedal Hair Growth:  Present  Varicosities: none        NEUROLOGIC     Right Foot Neurologic   Normal sensation    Light touch sensation:  Normal  Vibratory sensation:  Normal  Hot/Cold sensation: normal    Protective  Sensation using [place] Monofilament:  10     Left Foot Neurologic   Normal sensation    Light touch sensation:  Normal  Vibratory sensation:  Normal  Hot/cold sensation: normal    Protective Sensation using [place] Monofilament:  10     MUSCLE STRENGTH     Right Foot Muscle Strength   Normal strength    Foot dorsiflexion:  5  Foot plantar flexion:  5  Foot inversion:  5  Foot eversion:  5     Left Foot Muscle Strength   Foot dorsiflexion:  5  Foot plantar flexion:  5  Foot inversion:  5  Foot eversion:  5     RANGE OF MOTION      Right Foot Range of Motion   Foot and ankle ROM within normal limits       Left Foot Range of Motion   Foot and ankle ROM within normal limits       DERMATOLOGIC     Right Foot Dermatologic   Skin: skin intact    Nails: normal       Left Foot Dermatologic   Skin: skin intact    Nails: normal        Diabetic Foot Exam Performed      ASSESSMENT/PLAN     Diagnoses and all orders for this visit:    1. Diabetic foot (HCC) (Primary)    2. Non-insulin dependent type 2 diabetes mellitus (HCC)        Comprehensive lower extremity examination and evaluation was performed.    Discussed findings and treatment plan including risks, benefits, and treatment options with patient in detail. Patient agreed with treatment plan.    Medications and allergies reviewed.  Reviewed available blood glucose and HgB A1C lab values along with other pertinent labs.  These were discussed with the patient as to their importance of diabetic maintenance.    Diabetic foot exam performed and documented this date, compliant with CQM required standards. Detail of findings as noted in physical exam.  Lower extremity Neurologic exam for diabetic patient performed and documented this date, compliant with PQRS required standards. Detail of findings as noted in physical exam.  Advised patient importance of good routine lower extremity hygiene. Advised patient importance of evaluating for intact skin and pain free  nail borders.  Advised patient to use mirror to evaluate plantar/ soles of feet for better visualization. Advised patient monitor and phone office to be seen if any cracking to skin, open lesions, painful nail borders or if nails become elongated prior to next visit. Advised patient importance of daily cleansing of lower extremities, followed by good skin cream to maintain normal hydration of skin. Also advised patient importance of close daily monitoring of blood sugar. Advised to regulate diet and medications to maintain control of blood sugar in optimal range. Contact primary care provider if difficulties maintaining blood sugar levels.  Advised Patient of presence of Diabetes Mellitus condition.  Advised Patient risk of progression and worsening or improvement, then return of condition.  Will monitor condition for any change in future. Treat with most appropriate treatment pending status of condition.  Counseled and advised patient extensively on nature and ramifications of diabetes. Standard instructions given to patient for good diabetic foot care and maintenance. Advised importance of careful monitoring to avoid break down and complications secondary to diabetes. Advised patient importance of strict maintenance of blood sugar control. Advised patient of possible ominous results from neglect of condition, i.e.: amputation/ loss of digits, feet and legs, or even death.  Patient states understands counseling, will monitor closely, continue good hygiene and routine diabetic foot care. Patient will contact office is questions or problems.      An After Visit Summary was printed and given to the patient at discharge, including (if requested) any available informative/educational handouts regarding diagnosis, treatment, or medications. All questions were answered to patient/family satisfaction. Should symptoms fail to improve or worsen they agree to call or return to clinic or to go to the Emergency Department.  Discussed the importance of following up with any needed screening tests/labs/specialist appointments and any requested follow-up recommended by me today. Importance of maintaining follow-up discussed and patient accepts that missed appointments can delay diagnosis and potentially lead to worsening of conditions.    Return in about 1 year (around 2/23/2023) for Podiatry Diabetic Foot Exam., or sooner if acute issues arise.    This document has been electronically signed by Topher Keene DPM on February 23, 2022 14:08 EST

## 2022-02-28 ENCOUNTER — TELEPHONE (OUTPATIENT)
Dept: CARDIOLOGY | Facility: CLINIC | Age: 57
End: 2022-02-28

## 2022-02-28 ENCOUNTER — LAB (OUTPATIENT)
Dept: LAB | Facility: HOSPITAL | Age: 57
End: 2022-02-28

## 2022-02-28 DIAGNOSIS — E78.5 HYPERLIPIDEMIA LDL GOAL <70: ICD-10-CM

## 2022-02-28 DIAGNOSIS — E78.5 HYPERLIPIDEMIA LDL GOAL <70: Primary | ICD-10-CM

## 2022-02-28 LAB
ALBUMIN SERPL-MCNC: 4.7 G/DL (ref 3.5–5.2)
ALP SERPL-CCNC: 66 U/L (ref 39–117)
ALT SERPL W P-5'-P-CCNC: 30 U/L (ref 1–33)
AST SERPL-CCNC: 20 U/L (ref 1–32)
BILIRUB CONJ SERPL-MCNC: <0.2 MG/DL (ref 0–0.3)
BILIRUB INDIRECT SERPL-MCNC: NORMAL MG/DL
BILIRUB SERPL-MCNC: 0.3 MG/DL (ref 0–1.2)
CHOLEST SERPL-MCNC: 135 MG/DL (ref 0–200)
HDLC SERPL-MCNC: 46 MG/DL (ref 40–60)
LDLC SERPL CALC-MCNC: 67 MG/DL (ref 0–100)
LDLC/HDLC SERPL: 1.39 {RATIO}
PROT SERPL-MCNC: 7.5 G/DL (ref 6–8.5)
TRIGL SERPL-MCNC: 126 MG/DL (ref 0–150)
VLDLC SERPL-MCNC: 22 MG/DL (ref 5–40)

## 2022-02-28 PROCEDURE — 80061 LIPID PANEL: CPT

## 2022-02-28 PROCEDURE — 80076 HEPATIC FUNCTION PANEL: CPT

## 2022-02-28 PROCEDURE — 36415 COLL VENOUS BLD VENIPUNCTURE: CPT

## 2022-02-28 NOTE — TELEPHONE ENCOUNTER
Received VM from patient. Patient was to have Lipid panel and Hepatic panel however order was used already. Orders placed

## 2022-03-03 ENCOUNTER — OFFICE VISIT (OUTPATIENT)
Dept: CARDIOLOGY | Facility: CLINIC | Age: 57
End: 2022-03-03

## 2022-03-03 VITALS
HEART RATE: 78 BPM | HEIGHT: 67 IN | WEIGHT: 208 LBS | SYSTOLIC BLOOD PRESSURE: 135 MMHG | DIASTOLIC BLOOD PRESSURE: 76 MMHG | BODY MASS INDEX: 32.65 KG/M2

## 2022-03-03 DIAGNOSIS — Z98.61 CAD S/P PERCUTANEOUS CORONARY ANGIOPLASTY: Primary | ICD-10-CM

## 2022-03-03 DIAGNOSIS — I25.10 CAD S/P PERCUTANEOUS CORONARY ANGIOPLASTY: Primary | ICD-10-CM

## 2022-03-03 DIAGNOSIS — I10 ESSENTIAL HYPERTENSION: ICD-10-CM

## 2022-03-03 DIAGNOSIS — E78.5 HYPERLIPIDEMIA LDL GOAL <70: ICD-10-CM

## 2022-03-03 PROCEDURE — 99214 OFFICE O/P EST MOD 30 MIN: CPT | Performed by: INTERNAL MEDICINE

## 2022-03-03 NOTE — PROGRESS NOTES
Chief Complaint  Coronary Artery Disease and stomach issues (thinks it's from the ASA)    Subjective    Patient has been doing well denies any ongoing chest discomfort or myalgias symptoms    Past Medical History:   Diagnosis Date   • Abnormal ECG    • Arrhythmia    • CAD (coronary artery disease)    • Chest pain    • CHF (congestive heart failure) (HCC)    • Diabetes mellitus (HCC)    • Edema    • Hyperlipidemia LDL goal <70 8/31/2021   • Myocardial infarct (HCC)    • FARNAZ on CPAP 9/1/2021   • Palpitations    • Sleep apnea    • Unstable angina (HCC)          Current Outpatient Medications:   •  Alcohol Swabs (Easy Touch Alcohol Prep Medium) 70 % pads, , Disp: , Rfl:   •  aspirin 81 MG tablet, Take 81 mg by mouth Daily., Disp: , Rfl:   •  Cetirizine HCl 10 MG capsule, Take 10 mg by mouth every night at bedtime., Disp: 90 capsule, Rfl: 1  •  cholecalciferol (VITAMIN D3) 25 MCG (1000 UT) tablet, Take 1 tablet by mouth Daily., Disp: 90 tablet, Rfl: 1  •  clopidogrel (PLAVIX) 75 MG tablet, Take 1 tablet by mouth Daily., Disp: 90 tablet, Rfl: 3  •  Continuous Blood Gluc Sensor (FreeStyle Radha Sensor System), 1 Device Every 14 (Fourteen) Days., Disp: 6 each, Rfl: 3  •  DULoxetine (CYMBALTA) 60 MG capsule, Take 1 capsule by mouth Daily., Disp: 90 capsule, Rfl: 0  •  empagliflozin (JARDIANCE) 25 MG tablet tablet, Take 1 tablet by mouth Daily., Disp: 30 tablet, Rfl: 12  •  famotidine (Pepcid) 40 MG tablet, Take 1 tablet by mouth 2 (Two) Times a Day As Needed for Heartburn., Disp: 90 tablet, Rfl: 1  •  fluticasone (FLONASE) 50 MCG/ACT nasal spray, 2 sprays into the nostril(s) as directed by provider Daily. 2 sprays each nostril daily (Patient taking differently: 2 sprays into the nostril(s) as directed by provider As Needed. 2 sprays each nostril daily), Disp: 3 mL, Rfl: 1  •  FREESTYLE LITE test strip, , Disp: , Rfl:   •  furosemide (LASIX) 20 MG tablet, Take 1 tablet by mouth Daily., Disp: 90 tablet, Rfl: 3  •  glipizide  "(GLUCOTROL XL) 5 MG ER tablet, Take 1 tablet by mouth Daily for 180 days., Disp: 90 tablet, Rfl: 1  •  Lancets (freestyle) lancets, , Disp: , Rfl:   •  metFORMIN (GLUCOPHAGE) 1000 MG tablet, Take 1 tablet by mouth 2 (Two) Times a Day With Meals., Disp: 180 tablet, Rfl: 0  •  metoprolol succinate XL (TOPROL-XL) 25 MG 24 hr tablet, Take 1 tablet by mouth Daily., Disp: 90 tablet, Rfl: 3  •  montelukast (SINGULAIR) 10 MG tablet, Take 1 tablet by mouth Every Night., Disp: 90 tablet, Rfl: 1  •  nitroglycerin (NITROSTAT) 0.4 MG SL tablet, Place 1 tablet under the tongue Every 5 (Five) Minutes As Needed for chest pain., Disp: 35 tablet, Rfl: 6  •  oxyCODONE (ROXICODONE) 5 MG immediate release tablet, Take  by mouth As Needed., Disp: , Rfl:   •  rosuvastatin (CRESTOR) 40 MG tablet, Take 1 tablet by mouth Daily., Disp: 90 tablet, Rfl: 3    There are no discontinued medications.  Allergies   Allergen Reactions   • Other Unknown - Low Severity     Hay    Sneezing watery eyes cough   • Dust Mite Extract Other (See Comments)   • Molds & Smuts Cough   • Pollen Extract Cough        Social History     Tobacco Use   • Smoking status: Former Smoker     Packs/day: 1.00     Types: Cigarettes     Start date: 1986     Quit date: 2012     Years since quitting: 10.1   • Smokeless tobacco: Never Used   • Tobacco comment: caffeine use   Vaping Use   • Vaping Use: Never used   Substance Use Topics   • Alcohol use: No   • Drug use: No       Family History   Problem Relation Age of Onset   • Suicidality Mother    • Heart attack Father    • Heart disease Father    • No Known Problems Maternal Grandmother    • No Known Problems Maternal Grandfather    • Heart disease Paternal Grandmother    • Cancer Paternal Grandmother    • Heart disease Paternal Grandfather    • Cancer Paternal Grandfather    • Thyroid disease Paternal Grandfather         Objective     /76   Pulse 78   Ht 170.2 cm (67\")   Wt 94.3 kg (208 lb)   BMI 32.58 kg/m²   "     Physical Exam    General Appearance:   · no acute distress  · Alert and oriented x3  HENT:   · lips not cyanotic  · Atraumatic  Neck:  · No jvd   · supple  Respiratory:  · no respiratory distress  · normal breath sounds  · no rales  Cardiovascular:  · Regular rate and rhythm  · no S3, no S4   · no murmur  · no rub  Extremities  · No cyanosis  · lower extremity edema: none    Skin:   · warm, dry  · No rashes      Result Review : Regular rate and    proBNP   Date Value Ref Range Status   09/01/2021 216.7 0.0 - 900.0 pg/mL Final     CMP    CMP 11/16/21 2/14/22 2/28/22   Glucose 344 (A) 209 (A)    BUN 20 17    Creatinine 1.09 (A) 1.05 (A)    eGFR Non African Am 52 (A) 54 (A)    Sodium 135 (A) 139    Potassium 4.0 4.0    Chloride 98 99    Calcium 9.7 10.2    Albumin 4.60 5.10 4.70   Total Bilirubin 0.4 0.5 0.3   Alkaline Phosphatase 68 78 66   AST (SGOT) 19 20 20   ALT (SGPT) 32 31 30   (A) Abnormal value            CBC w/diff    CBC w/Diff 5/7/21 2/14/22   WBC 6.85 6.99   RBC 4.55 4.76   Hemoglobin 13.3 14.7   Hematocrit 41.3 43.2   MCV 90.8 90.8   MCH 29.2 30.9   MCHC 32.2 (A) 34.0   RDW 12.7 12.1 (A)   Platelets 242 267   Neutrophil Rel % 44.9 59.7   Immature Granulocyte Rel %  0.6 (A)   Lymphocyte Rel % 44.4 29.6   Monocyte Rel % 7.2 7.6   Eosinophil Rel % 2.5 1.9   Basophil Rel % 0.7 0.6   (A) Abnormal value             Lab Results   Component Value Date    TSH 0.687 02/14/2022      Lab Results   Component Value Date    FREET4 1.1 05/07/2021      No results found for: DDIMERQUANT  No results found for: MG   No results found for: DIGOXIN   Lab Results   Component Value Date    TROPONINT <0.01 09/29/2014           Lipid Panel    Lipid Panel 11/16/21 2/14/22 2/28/22   Total Cholesterol 136 149 135   Triglycerides 167 (A) 173 (A) 126   HDL Cholesterol 42 44 46   VLDL Cholesterol 28 29 22   LDL Cholesterol  66 76 67   LDL/HDL Ratio 1.44 1.60 1.39   (A) Abnormal value            No results found for:  POCTROP    Results for orders placed in visit on 10/05/21    Adult Transthoracic Echo Complete W/ Cont if Necessary Per Protocol    Interpretation Summary  Normal left ventricular systolic function with an estimated ejection fraction of 60-65%.  No regional wall motion abnormalities were observed.  Left ventricular diastolic function is consistent with impaired relaxation (grade I diastolic dysfunction).  No significant valvular heart disease.                 Diagnoses and all orders for this visit:    1. CAD S/P percutaneous coronary angioplasty (Primary)  Assessment & Plan:  Patient is doing well no ongoing angina continue with chronic aspirin 81 mg daily and Plavix 75 daily      2. Essential hypertension  Assessment & Plan:  Continue with Toprol 25 mg once a day encourage low-sodium diet      3. Hyperlipidemia LDL goal <70  Assessment & Plan:  Increase Crestor to 40 mg nightly repeat lipids LFTs on follow-up visit LDL goal                Return in about 6 months (around 9/3/2022) for EKG with F/U, Follow with Berna Harry.          Patient was given instructions and counseling regarding her condition or for health maintenance advice. Please see specific information pulled into the AVS if appropriate.

## 2022-03-03 NOTE — ASSESSMENT & PLAN NOTE
Patient is doing well no ongoing angina continue with chronic aspirin 81 mg daily and Plavix 75 daily

## 2022-03-17 ENCOUNTER — OFFICE VISIT (OUTPATIENT)
Dept: ENDOCRINOLOGY | Age: 57
End: 2022-03-17

## 2022-03-17 VITALS
BODY MASS INDEX: 33.12 KG/M2 | WEIGHT: 211 LBS | DIASTOLIC BLOOD PRESSURE: 70 MMHG | HEIGHT: 67 IN | HEART RATE: 69 BPM | SYSTOLIC BLOOD PRESSURE: 118 MMHG | OXYGEN SATURATION: 100 %

## 2022-03-17 DIAGNOSIS — E11.65 TYPE 2 DIABETES MELLITUS WITH HYPERGLYCEMIA, WITHOUT LONG-TERM CURRENT USE OF INSULIN: Primary | ICD-10-CM

## 2022-03-17 DIAGNOSIS — E11.69 HYPERLIPIDEMIA ASSOCIATED WITH TYPE 2 DIABETES MELLITUS: ICD-10-CM

## 2022-03-17 DIAGNOSIS — E78.5 HYPERLIPIDEMIA ASSOCIATED WITH TYPE 2 DIABETES MELLITUS: ICD-10-CM

## 2022-03-17 DIAGNOSIS — E66.9 CLASS 1 OBESITY WITHOUT SERIOUS COMORBIDITY WITH BODY MASS INDEX (BMI) OF 33.0 TO 33.9 IN ADULT, UNSPECIFIED OBESITY TYPE: ICD-10-CM

## 2022-03-17 PROCEDURE — 99214 OFFICE O/P EST MOD 30 MIN: CPT | Performed by: INTERNAL MEDICINE

## 2022-03-17 NOTE — PROGRESS NOTES
"Chief Complaint  Chief Complaint   Patient presents with   • Diabetes     FOLLOW UP/ TYPE 2 DM  Subjective          History of Present Illness    Claudette Gramajo 56 y.o. presents with Type 2 dm as a F/u patient.      Patient used to see Dr. Timmons in the past, transferred the care to me today.       Type 2 dm - Diagnosed about 5 - 8 years ago.   Today in clinic pt reports being on metformin 1000 mg po bid, Glipizide ER - 5 mg po daily with BF, jardiance 25 mg po daily.   FBG - 100 - 120  Pre meals - 120 - 130  Checks BG - 1 - 2 times x day  Sensor - x  Dm retinopathy - x,Last eye exam - 2/2021  Dm nephropathy - x  Dm neuropathy - yes,Dm neuropathy meds -   CAD - yes  CVA -x  Episodes of hypoglycemia - x  Pt is physically active. weight has been stable.   Pt tries to follow DM diet for most part.       Hyerlipidemia - on crestor 10 mg po daily.     Reviewed primary care physician's/consulting physician documentation and lab results         I have reviewed the patient's allergies, medicines, past medical hx, family hx and social hx in detail.    Objective   Vital Signs:   /70   Pulse 69   Ht 170.2 cm (67\")   Wt 95.7 kg (211 lb)   SpO2 100%   BMI 33.05 kg/m²   Physical Exam   General appearance - no distress  Eyes- anicteric sclera  Ear nose and throat-external ears and nose normal.    Respiratory-normal chest on inspection.  No respiratory distress noted.  Skin-no rashes.  Neuro-alert and oriented x3            Result Review :   The following data was reviewed by: Areli Mckay MD on 03/17/2022:  Lab on 02/28/2022   Component Date Value Ref Range Status   • Total Cholesterol 02/28/2022 135  0 - 200 mg/dL Final   • Triglycerides 02/28/2022 126  0 - 150 mg/dL Final   • HDL Cholesterol 02/28/2022 46  40 - 60 mg/dL Final   • LDL Cholesterol  02/28/2022 67  0 - 100 mg/dL Final   • VLDL Cholesterol 02/28/2022 22  5 - 40 mg/dL Final   • LDL/HDL Ratio 02/28/2022 1.39   Final   • Total Protein 02/28/2022 7.5  " 6.0 - 8.5 g/dL Final   • Albumin 02/28/2022 4.70  3.50 - 5.20 g/dL Final   • ALT (SGPT) 02/28/2022 30  1 - 33 U/L Final   • AST (SGOT) 02/28/2022 20  1 - 32 U/L Final   • Alkaline Phosphatase 02/28/2022 66  39 - 117 U/L Final   • Total Bilirubin 02/28/2022 0.3  0.0 - 1.2 mg/dL Final   • Bilirubin, Direct 02/28/2022 <0.2  0.0 - 0.3 mg/dL Final   • Bilirubin, Indirect 02/28/2022    Final    Unable to calculate     Data reviewed: PCP notes       Results Review:    I reviewed the patient's new clinical results.     Assessment and Plan    Problem List Items Addressed This Visit    None     Visit Diagnoses     Type 2 diabetes mellitus with hyperglycemia, without long-term current use of insulin (HCC)    -  Primary    Relevant Orders    Basic Metabolic Panel    Hemoglobin A1c    Lipid Panel    TSH    T4, Free    Vitamin D 25 Hydroxy    Vitamin B12 & Folate    Hyperlipidemia associated with type 2 diabetes mellitus (HCC)        Relevant Orders    Basic Metabolic Panel    Hemoglobin A1c    Lipid Panel    TSH    T4, Free    Vitamin D 25 Hydroxy    Vitamin B12 & Folate    Class 1 obesity without serious comorbidity with body mass index (BMI) of 33.0 to 33.9 in adult, unspecified obesity type        Relevant Orders    Basic Metabolic Panel    Hemoglobin A1c    Lipid Panel    TSH    T4, Free    Vitamin D 25 Hydroxy    Vitamin B12 & Folate        Type 2 dm - uncontrolled - hyperglycemia   Continue the above medication for now  Didn't want me to add any new dm medication as she is working on diet and wants to see how that will improve her bg.       Hyperlipidemia -   Crestor 40 mg po daily.     # hx of pancreatitis she is not a candidate for GLP 1 analogue.       Interpreted the blood work-up/imaging results performed by the primary care/consulting physician -    Refills sent to pharmacy    Follow Up     Patient was given instructions and counseling regarding her condition or for health maintenance advice. Please see specific  "information pulled into the AVS if appropriate.       Thank you for asking me to see your patient, Claudette I Avila in consultation.         Areli Mckay MD  03/17/22      EM[facility] Dragon / transcription disclaimer:     \"Dictated utilizing Dragon dictation\".         "

## 2022-05-16 ENCOUNTER — LAB (OUTPATIENT)
Dept: LAB | Facility: HOSPITAL | Age: 57
End: 2022-05-16

## 2022-05-16 DIAGNOSIS — E11.649 TYPE 2 DIABETES MELLITUS WITH HYPOGLYCEMIA WITHOUT COMA, WITH LONG-TERM CURRENT USE OF INSULIN: ICD-10-CM

## 2022-05-16 DIAGNOSIS — E11.69 HYPERLIPIDEMIA ASSOCIATED WITH TYPE 2 DIABETES MELLITUS: ICD-10-CM

## 2022-05-16 DIAGNOSIS — Z79.4 TYPE 2 DIABETES MELLITUS WITH HYPOGLYCEMIA WITHOUT COMA, WITH LONG-TERM CURRENT USE OF INSULIN: ICD-10-CM

## 2022-05-16 DIAGNOSIS — E78.5 HYPERLIPIDEMIA ASSOCIATED WITH TYPE 2 DIABETES MELLITUS: ICD-10-CM

## 2022-05-16 DIAGNOSIS — E11.65 TYPE 2 DIABETES MELLITUS WITH HYPERGLYCEMIA, WITHOUT LONG-TERM CURRENT USE OF INSULIN: ICD-10-CM

## 2022-05-16 DIAGNOSIS — E66.9 CLASS 1 OBESITY WITHOUT SERIOUS COMORBIDITY WITH BODY MASS INDEX (BMI) OF 33.0 TO 33.9 IN ADULT, UNSPECIFIED OBESITY TYPE: ICD-10-CM

## 2022-05-16 LAB
ALBUMIN SERPL-MCNC: 4.8 G/DL (ref 3.5–5.2)
ALBUMIN UR-MCNC: <1.2 MG/DL
ALBUMIN/GLOB SERPL: 1.8 G/DL
ALP SERPL-CCNC: 68 U/L (ref 39–117)
ALT SERPL W P-5'-P-CCNC: 24 U/L (ref 1–33)
ANION GAP SERPL CALCULATED.3IONS-SCNC: 15.5 MMOL/L (ref 5–15)
AST SERPL-CCNC: 19 U/L (ref 1–32)
BASOPHILS # BLD AUTO: 0.06 10*3/MM3 (ref 0–0.2)
BASOPHILS NFR BLD AUTO: 0.7 % (ref 0–1.5)
BILIRUB SERPL-MCNC: 0.3 MG/DL (ref 0–1.2)
BILIRUB UR QL STRIP: NEGATIVE
BUN SERPL-MCNC: 20 MG/DL (ref 6–20)
BUN/CREAT SERPL: 19.6 (ref 7–25)
CALCIUM SPEC-SCNC: 10.4 MG/DL (ref 8.6–10.5)
CHLORIDE SERPL-SCNC: 100 MMOL/L (ref 98–107)
CHOLEST SERPL-MCNC: 129 MG/DL (ref 0–200)
CLARITY UR: CLEAR
CO2 SERPL-SCNC: 24.5 MMOL/L (ref 22–29)
COLOR UR: YELLOW
CREAT SERPL-MCNC: 1.02 MG/DL (ref 0.57–1)
CREAT UR-MCNC: 113.3 MG/DL
DEPRECATED RDW RBC AUTO: 38.9 FL (ref 37–54)
EGFRCR SERPLBLD CKD-EPI 2021: 64.7 ML/MIN/1.73
EOSINOPHIL # BLD AUTO: 0.13 10*3/MM3 (ref 0–0.4)
EOSINOPHIL NFR BLD AUTO: 1.6 % (ref 0.3–6.2)
ERYTHROCYTE [DISTWIDTH] IN BLOOD BY AUTOMATED COUNT: 12 % (ref 12.3–15.4)
GLOBULIN UR ELPH-MCNC: 2.6 GM/DL
GLUCOSE SERPL-MCNC: 150 MG/DL (ref 65–99)
GLUCOSE UR STRIP-MCNC: ABNORMAL MG/DL
HBA1C MFR BLD: 6.8 % (ref 4.8–5.6)
HCT VFR BLD AUTO: 41.6 % (ref 34–46.6)
HDLC SERPL-MCNC: 46 MG/DL (ref 40–60)
HGB BLD-MCNC: 14.1 G/DL (ref 12–15.9)
HGB UR QL STRIP.AUTO: NEGATIVE
IMM GRANULOCYTES # BLD AUTO: 0.02 10*3/MM3 (ref 0–0.05)
IMM GRANULOCYTES NFR BLD AUTO: 0.2 % (ref 0–0.5)
KETONES UR QL STRIP: NEGATIVE
LDLC SERPL CALC-MCNC: 57 MG/DL (ref 0–100)
LDLC/HDLC SERPL: 1.15 {RATIO}
LEUKOCYTE ESTERASE UR QL STRIP.AUTO: NEGATIVE
LYMPHOCYTES # BLD AUTO: 2.84 10*3/MM3 (ref 0.7–3.1)
LYMPHOCYTES NFR BLD AUTO: 34.2 % (ref 19.6–45.3)
MCH RBC QN AUTO: 30 PG (ref 26.6–33)
MCHC RBC AUTO-ENTMCNC: 33.9 G/DL (ref 31.5–35.7)
MCV RBC AUTO: 88.5 FL (ref 79–97)
MICROALBUMIN/CREAT UR: NORMAL MG/G{CREAT}
MONOCYTES # BLD AUTO: 0.59 10*3/MM3 (ref 0.1–0.9)
MONOCYTES NFR BLD AUTO: 7.1 % (ref 5–12)
NEUTROPHILS NFR BLD AUTO: 4.66 10*3/MM3 (ref 1.7–7)
NEUTROPHILS NFR BLD AUTO: 56.2 % (ref 42.7–76)
NITRITE UR QL STRIP: NEGATIVE
NRBC BLD AUTO-RTO: 0 /100 WBC (ref 0–0.2)
PH UR STRIP.AUTO: 6 [PH] (ref 5–8)
PLATELET # BLD AUTO: 252 10*3/MM3 (ref 140–450)
PMV BLD AUTO: 10.5 FL (ref 6–12)
POTASSIUM SERPL-SCNC: 4.4 MMOL/L (ref 3.5–5.2)
PROT SERPL-MCNC: 7.4 G/DL (ref 6–8.5)
PROT UR QL STRIP: NEGATIVE
RBC # BLD AUTO: 4.7 10*6/MM3 (ref 3.77–5.28)
SODIUM SERPL-SCNC: 140 MMOL/L (ref 136–145)
SP GR UR STRIP: >=1.03 (ref 1–1.03)
TRIGL SERPL-MCNC: 150 MG/DL (ref 0–150)
TSH SERPL DL<=0.05 MIU/L-ACNC: 0.83 UIU/ML (ref 0.27–4.2)
UROBILINOGEN UR QL STRIP: ABNORMAL
VLDLC SERPL-MCNC: 26 MG/DL (ref 5–40)
WBC NRBC COR # BLD: 8.3 10*3/MM3 (ref 3.4–10.8)

## 2022-05-16 PROCEDURE — 80053 COMPREHEN METABOLIC PANEL: CPT

## 2022-05-16 PROCEDURE — 82607 VITAMIN B-12: CPT

## 2022-05-16 PROCEDURE — 80061 LIPID PANEL: CPT

## 2022-05-16 PROCEDURE — 85025 COMPLETE CBC W/AUTO DIFF WBC: CPT

## 2022-05-16 PROCEDURE — 81003 URINALYSIS AUTO W/O SCOPE: CPT

## 2022-05-16 PROCEDURE — 82746 ASSAY OF FOLIC ACID SERUM: CPT

## 2022-05-16 PROCEDURE — 83036 HEMOGLOBIN GLYCOSYLATED A1C: CPT

## 2022-05-16 PROCEDURE — 84443 ASSAY THYROID STIM HORMONE: CPT

## 2022-05-16 PROCEDURE — 82570 ASSAY OF URINE CREATININE: CPT

## 2022-05-16 PROCEDURE — 82043 UR ALBUMIN QUANTITATIVE: CPT

## 2022-05-16 PROCEDURE — 36415 COLL VENOUS BLD VENIPUNCTURE: CPT

## 2022-05-18 LAB
FOLATE SERPL-MCNC: 14.6 NG/ML
VIT B12 SERPL-MCNC: 416 PG/ML (ref 232–1245)

## 2022-05-24 ENCOUNTER — OFFICE VISIT (OUTPATIENT)
Dept: FAMILY MEDICINE CLINIC | Facility: CLINIC | Age: 57
End: 2022-05-24

## 2022-05-24 VITALS
HEIGHT: 67 IN | DIASTOLIC BLOOD PRESSURE: 73 MMHG | OXYGEN SATURATION: 97 % | SYSTOLIC BLOOD PRESSURE: 117 MMHG | HEART RATE: 78 BPM | WEIGHT: 210 LBS | TEMPERATURE: 97.6 F | BODY MASS INDEX: 32.96 KG/M2

## 2022-05-24 DIAGNOSIS — Z11.59 NEED FOR HEPATITIS C SCREENING TEST: ICD-10-CM

## 2022-05-24 DIAGNOSIS — Z79.4 TYPE 2 DIABETES MELLITUS WITH HYPOGLYCEMIA WITHOUT COMA, WITH LONG-TERM CURRENT USE OF INSULIN: ICD-10-CM

## 2022-05-24 DIAGNOSIS — E55.9 VITAMIN D DEFICIENCY: ICD-10-CM

## 2022-05-24 DIAGNOSIS — L40.9 PSORIASIS OF SCALP: ICD-10-CM

## 2022-05-24 DIAGNOSIS — Z23 ENCOUNTER FOR ADMINISTRATION OF VACCINE: ICD-10-CM

## 2022-05-24 DIAGNOSIS — K21.9 GASTROESOPHAGEAL REFLUX DISEASE WITHOUT ESOPHAGITIS: ICD-10-CM

## 2022-05-24 DIAGNOSIS — E66.9 OBESITY (BMI 30.0-34.9): ICD-10-CM

## 2022-05-24 DIAGNOSIS — Z98.61 CAD S/P PERCUTANEOUS CORONARY ANGIOPLASTY: ICD-10-CM

## 2022-05-24 DIAGNOSIS — E78.5 HYPERLIPIDEMIA LDL GOAL <70: ICD-10-CM

## 2022-05-24 DIAGNOSIS — K76.0 FATTY LIVER: ICD-10-CM

## 2022-05-24 DIAGNOSIS — I25.10 CAD S/P PERCUTANEOUS CORONARY ANGIOPLASTY: ICD-10-CM

## 2022-05-24 DIAGNOSIS — I10 ESSENTIAL HYPERTENSION: ICD-10-CM

## 2022-05-24 DIAGNOSIS — I50.32 CHRONIC DIASTOLIC CONGESTIVE HEART FAILURE: ICD-10-CM

## 2022-05-24 DIAGNOSIS — J30.9 ALLERGIC RHINITIS, UNSPECIFIED SEASONALITY, UNSPECIFIED TRIGGER: ICD-10-CM

## 2022-05-24 DIAGNOSIS — E11.649 TYPE 2 DIABETES MELLITUS WITH HYPOGLYCEMIA WITHOUT COMA, WITH LONG-TERM CURRENT USE OF INSULIN: ICD-10-CM

## 2022-05-24 PROBLEM — IMO0002 DIABETES MELLITUS TYPE 2, UNCONTROLLED, WITH COMPLICATIONS: Status: RESOLVED | Noted: 2021-01-07 | Resolved: 2022-05-24

## 2022-05-24 PROBLEM — E11.65 TYPE 2 DIABETES MELLITUS WITH HYPERGLYCEMIA, WITHOUT LONG-TERM CURRENT USE OF INSULIN (HCC): Status: ACTIVE | Noted: 2022-05-24

## 2022-05-24 PROCEDURE — 90746 HEPB VACCINE 3 DOSE ADULT IM: CPT | Performed by: NURSE PRACTITIONER

## 2022-05-24 PROCEDURE — 99214 OFFICE O/P EST MOD 30 MIN: CPT | Performed by: NURSE PRACTITIONER

## 2022-05-24 PROCEDURE — 90472 IMMUNIZATION ADMIN EACH ADD: CPT | Performed by: NURSE PRACTITIONER

## 2022-05-24 PROCEDURE — 90471 IMMUNIZATION ADMIN: CPT | Performed by: NURSE PRACTITIONER

## 2022-05-24 PROCEDURE — 90677 PCV20 VACCINE IM: CPT | Performed by: NURSE PRACTITIONER

## 2022-05-24 RX ORDER — MELATONIN
1000 DAILY
Qty: 90 TABLET | Refills: 1 | Status: SHIPPED | OUTPATIENT
Start: 2022-05-24 | End: 2022-11-22 | Stop reason: SDUPTHER

## 2022-05-24 RX ORDER — CETIRIZINE HYDROCHLORIDE 10 MG/1
10 TABLET ORAL DAILY
COMMUNITY
Start: 2022-04-06 | End: 2022-09-15 | Stop reason: SDUPTHER

## 2022-05-24 RX ORDER — MONTELUKAST SODIUM 10 MG/1
10 TABLET ORAL NIGHTLY
Qty: 90 TABLET | Refills: 1 | Status: SHIPPED | OUTPATIENT
Start: 2022-05-24 | End: 2022-11-22 | Stop reason: SDUPTHER

## 2022-05-24 RX ORDER — FLUTICASONE PROPIONATE 50 MCG
2 SPRAY, SUSPENSION (ML) NASAL DAILY
Qty: 3 ML | Refills: 1 | Status: SHIPPED | OUTPATIENT
Start: 2022-05-24

## 2022-05-24 RX ORDER — ROSUVASTATIN CALCIUM 40 MG/1
40 TABLET, COATED ORAL NIGHTLY
Qty: 90 TABLET | Refills: 3 | Status: SHIPPED | OUTPATIENT
Start: 2022-05-24 | End: 2022-09-15 | Stop reason: SDUPTHER

## 2022-05-24 RX ORDER — FUROSEMIDE 20 MG/1
20 TABLET ORAL DAILY
Qty: 90 TABLET | Refills: 1 | Status: SHIPPED | OUTPATIENT
Start: 2022-05-24 | End: 2022-09-15 | Stop reason: SDUPTHER

## 2022-05-24 RX ORDER — CLOPIDOGREL BISULFATE 75 MG/1
75 TABLET ORAL DAILY
Qty: 90 TABLET | Refills: 1 | Status: SHIPPED | OUTPATIENT
Start: 2022-05-24 | End: 2022-11-22 | Stop reason: SDUPTHER

## 2022-05-24 RX ORDER — DULOXETIN HYDROCHLORIDE 60 MG/1
60 CAPSULE, DELAYED RELEASE ORAL DAILY
Qty: 90 CAPSULE | Refills: 1 | Status: SHIPPED | OUTPATIENT
Start: 2022-05-24 | End: 2022-11-22 | Stop reason: SDUPTHER

## 2022-05-24 RX ORDER — NITROGLYCERIN 0.4 MG/1
0.4 TABLET SUBLINGUAL
Qty: 35 TABLET | Refills: 6 | Status: SHIPPED | OUTPATIENT
Start: 2022-05-24

## 2022-05-24 RX ORDER — CALCIPOTRIENE 0.05 MG/ML
1 SOLUTION TOPICAL 2 TIMES DAILY
Qty: 60 ML | Refills: 1 | Status: SHIPPED | OUTPATIENT
Start: 2022-05-24

## 2022-05-24 RX ORDER — FAMOTIDINE 20 MG/1
TABLET, FILM COATED ORAL
COMMUNITY
Start: 2022-03-28 | End: 2022-05-24

## 2022-05-24 RX ORDER — FAMOTIDINE 40 MG/1
40 TABLET, FILM COATED ORAL 2 TIMES DAILY PRN
Qty: 90 TABLET | Refills: 1 | Status: SHIPPED | OUTPATIENT
Start: 2022-05-24 | End: 2022-11-22 | Stop reason: SDUPTHER

## 2022-05-24 RX ORDER — METOPROLOL SUCCINATE 25 MG/1
25 TABLET, EXTENDED RELEASE ORAL DAILY
Qty: 90 TABLET | Refills: 1 | Status: SHIPPED | OUTPATIENT
Start: 2022-05-24 | End: 2022-09-15 | Stop reason: SDUPTHER

## 2022-05-24 RX ORDER — GLIPIZIDE 5 MG/1
5 TABLET, FILM COATED, EXTENDED RELEASE ORAL DAILY
Qty: 90 TABLET | Refills: 1 | Status: SHIPPED | OUTPATIENT
Start: 2022-05-24 | End: 2022-11-22 | Stop reason: SDUPTHER

## 2022-05-24 NOTE — PROGRESS NOTES
Follow Up Office Visit      Patient Name: Claudette Gramajo  : 1965   MRN: [id number]     Chief Complaint:    Chief Complaint   Patient presents with   • Diabetes   • Hypertension   • Hyperlipidemia   • Allergic Rhinitis   • Coronary Artery Disease       History of Present Illness: Claudette Gramajo is a 56 y.o. female who is here today to follow up for 3 month for DM2 , hypertension, hyperlipidemia, gerd, CAD and allergic rhinitis  Review labs    BS check every other day average 100 fasting    C/O rash on her left side of head, she states it dries up after she washes her hair, rash is scaly she reports she has tried multiple over-the-counter shampoos for dandruff and antifungal shampoos without improvement    Colonoscopy 2019 appt on 2022 to schedule an appt  mammogram 2022  Eye exam Dr ward   Foot exam Dr anthony       Subjective      Review of Systems:   Review of Systems   Constitutional: Negative for fever.   HENT: Negative for ear pain, sinus pain and sore throat.    Eyes: Negative for visual disturbance.   Respiratory: Negative for cough.    Cardiovascular: Negative for chest pain.   Gastrointestinal: Negative for abdominal pain, constipation, diarrhea and vomiting.   Genitourinary: Negative for dysuria.   Musculoskeletal: Negative for myalgias.   Skin: Positive for rash.   Neurological: Negative for headaches.        Past Medical History:   Past Medical History:   Diagnosis Date   • Abnormal ECG    • Arrhythmia    • CAD (coronary artery disease)    • Chest pain    • CHF (congestive heart failure) (HCC)    • Diabetes mellitus (HCC)    • Edema    • Hyperlipidemia LDL goal <70 2021   • Myocardial infarct (HCC)    • FARNAZ on CPAP 2021   • Palpitations    • Sleep apnea    • Unstable angina (HCC)        Past Surgical History:   Past Surgical History:   Procedure Laterality Date   • CARDIAC CATHETERIZATION     • CORONARY ANGIOPLASTY     • CORONARY STENT PLACEMENT     • VENOUS  THROMBECTOMY         Family History:   Family History   Problem Relation Age of Onset   • Suicidality Mother    • Heart attack Father    • Heart disease Father    • No Known Problems Maternal Grandmother    • No Known Problems Maternal Grandfather    • Heart disease Paternal Grandmother    • Cancer Paternal Grandmother    • Heart disease Paternal Grandfather    • Cancer Paternal Grandfather    • Thyroid disease Paternal Grandfather        Social History:   Social History     Socioeconomic History   • Marital status:    Tobacco Use   • Smoking status: Former Smoker     Packs/day: 1.00     Types: Cigarettes     Start date: 1986     Quit date: 2012     Years since quitting: 10.4   • Smokeless tobacco: Never Used   • Tobacco comment: caffeine use   Vaping Use   • Vaping Use: Never used   Substance and Sexual Activity   • Alcohol use: No   • Drug use: No   • Sexual activity: Defer       Medications:     Current Outpatient Medications:   •  Alcohol Swabs (Easy Touch Alcohol Prep Medium) 70 % pads, , Disp: , Rfl:   •  aspirin 81 MG tablet, Take 81 mg by mouth Daily., Disp: , Rfl:   •  cholecalciferol (VITAMIN D3) 25 MCG (1000 UT) tablet, Take 1 tablet by mouth Daily., Disp: 90 tablet, Rfl: 1  •  clopidogrel (PLAVIX) 75 MG tablet, Take 1 tablet by mouth Daily., Disp: 90 tablet, Rfl: 1  •  Continuous Blood Gluc Sensor (FreeStyle Radha Sensor System), 1 Device Every 14 (Fourteen) Days., Disp: 6 each, Rfl: 3  •  DULoxetine (CYMBALTA) 60 MG capsule, Take 1 capsule by mouth Daily., Disp: 90 capsule, Rfl: 1  •  empagliflozin (JARDIANCE) 25 MG tablet tablet, Take 1 tablet by mouth Daily., Disp: 90 tablet, Rfl: 1  •  famotidine (Pepcid) 40 MG tablet, Take 1 tablet by mouth 2 (Two) Times a Day As Needed for Heartburn., Disp: 90 tablet, Rfl: 1  •  fluticasone (FLONASE) 50 MCG/ACT nasal spray, 2 sprays into the nostril(s) as directed by provider Daily. 2 sprays each nostril daily, Disp: 3 mL, Rfl: 1  •  FREESTYLE LITE test  "strip, , Disp: , Rfl:   •  furosemide (LASIX) 20 MG tablet, Take 1 tablet by mouth Daily., Disp: 90 tablet, Rfl: 1  •  glipizide (GLUCOTROL XL) 5 MG ER tablet, Take 1 tablet by mouth Daily for 180 days., Disp: 90 tablet, Rfl: 1  •  Lancets (freestyle) lancets, , Disp: , Rfl:   •  metFORMIN (GLUCOPHAGE) 1000 MG tablet, Take 1 tablet by mouth 2 (Two) Times a Day With Meals., Disp: 180 tablet, Rfl: 1  •  metoprolol succinate XL (TOPROL-XL) 25 MG 24 hr tablet, Take 1 tablet by mouth Daily., Disp: 90 tablet, Rfl: 1  •  montelukast (SINGULAIR) 10 MG tablet, Take 1 tablet by mouth Every Night., Disp: 90 tablet, Rfl: 1  •  nitroglycerin (NITROSTAT) 0.4 MG SL tablet, Place 1 tablet under the tongue Every 5 (Five) Minutes As Needed for Chest Pain., Disp: 35 tablet, Rfl: 6  •  rosuvastatin (CRESTOR) 40 MG tablet, Take 1 tablet by mouth Every Night., Disp: 90 tablet, Rfl: 3  •  Calcipotriene 0.005 % solution, Apply 1 application topically to the appropriate area as directed 2 (Two) Times a Day., Disp: 60 mL, Rfl: 1  •  cetirizine (zyrTEC) 10 MG tablet, , Disp: , Rfl:   •  oxyCODONE (ROXICODONE) 5 MG immediate release tablet, Take  by mouth As Needed., Disp: , Rfl:     Allergies:   Allergies   Allergen Reactions   • Other Unknown - Low Severity     Hay    Sneezing watery eyes cough   • Dust Mite Extract Other (See Comments)   • Molds & Smuts Cough   • Pollen Extract Cough           Objective     Physical Exam:  Vital Signs:   Vitals:    05/24/22 1431   BP: 117/73   Pulse: 78   Temp: 97.6 °F (36.4 °C)   SpO2: 97%   Weight: 95.3 kg (210 lb)   Height: 170.2 cm (67\")     Body mass index is 32.89 kg/m².     Physical Exam  HENT:      Right Ear: Tympanic membrane normal.      Left Ear: Tympanic membrane normal.      Nose: Nose normal.      Mouth/Throat:      Mouth: Mucous membranes are moist.   Eyes:      Conjunctiva/sclera: Conjunctivae normal.   Cardiovascular:      Rate and Rhythm: Normal rate and regular rhythm.      Heart sounds: " Normal heart sounds. No murmur heard.  Pulmonary:      Effort: Pulmonary effort is normal.      Breath sounds: Normal breath sounds.   Abdominal:      General: Bowel sounds are normal.      Palpations: Abdomen is soft.   Skin:     General: Skin is warm and dry.      Comments: Erythematous plaques scattered on scalp   Neurological:      Mental Status: She is alert.             Assessment / Plan      Assessment/Plan:   Diagnoses and all orders for this visit:    1. Type 2 diabetes mellitus with hypoglycemia without coma, with long-term current use of insulin (HCC)  -     metFORMIN (GLUCOPHAGE) 1000 MG tablet; Take 1 tablet by mouth 2 (Two) Times a Day With Meals.  Dispense: 180 tablet; Refill: 1  -     empagliflozin (JARDIANCE) 25 MG tablet tablet; Take 1 tablet by mouth Daily.  Dispense: 90 tablet; Refill: 1  -     CBC Auto Differential; Future  -     Comprehensive Metabolic Panel; Future  -     Hemoglobin A1c; Future  -     Urinalysis With Culture If Indicated -; Future  -     TSH; Future  -     Microalbumin / Creatinine Urine Ratio - Urine, Clean Catch; Future    2. Essential hypertension    3. Hyperlipidemia LDL goal <70  -     Comprehensive Metabolic Panel; Future  -     Lipid Panel; Future    4. Allergic rhinitis, unspecified seasonality, unspecified trigger  -     montelukast (SINGULAIR) 10 MG tablet; Take 1 tablet by mouth Every Night.  Dispense: 90 tablet; Refill: 1    5. Gastroesophageal reflux disease without esophagitis    6. Fatty liver    7. Obesity (BMI 30.0-34.9)    8. Chronic diastolic congestive heart failure (HCC)    9. CAD S/P percutaneous coronary angioplasty    10. Vitamin D deficiency  -     cholecalciferol (VITAMIN D3) 25 MCG (1000 UT) tablet; Take 1 tablet by mouth Daily.  Dispense: 90 tablet; Refill: 1    11. Psoriasis of scalp    12. Need for hepatitis C screening test  -     Hepatitis C Antibody; Future    13. Encounter for administration of vaccine  -     Pneumococcal Conjugate Vaccine  20-Valent (PCV20)  -     Hepatitis B Vaccine Adult IM (ENERGIX/RECOMBIVAX)    Other orders  -     nitroglycerin (NITROSTAT) 0.4 MG SL tablet; Place 1 tablet under the tongue Every 5 (Five) Minutes As Needed for Chest Pain.  Dispense: 35 tablet; Refill: 6  -     metoprolol succinate XL (TOPROL-XL) 25 MG 24 hr tablet; Take 1 tablet by mouth Daily.  Dispense: 90 tablet; Refill: 1  -     glipizide (GLUCOTROL XL) 5 MG ER tablet; Take 1 tablet by mouth Daily for 180 days.  Dispense: 90 tablet; Refill: 1  -     famotidine (Pepcid) 40 MG tablet; Take 1 tablet by mouth 2 (Two) Times a Day As Needed for Heartburn.  Dispense: 90 tablet; Refill: 1  -     DULoxetine (CYMBALTA) 60 MG capsule; Take 1 capsule by mouth Daily.  Dispense: 90 capsule; Refill: 1  -     clopidogrel (PLAVIX) 75 MG tablet; Take 1 tablet by mouth Daily.  Dispense: 90 tablet; Refill: 1  -     Discontinue: Cetirizine HCl 10 MG capsule; Take 10 mg by mouth every night at bedtime.  Dispense: 90 capsule; Refill: 1  -     fluticasone (FLONASE) 50 MCG/ACT nasal spray; 2 sprays into the nostril(s) as directed by provider Daily. 2 sprays each nostril daily  Dispense: 3 mL; Refill: 1  -     furosemide (LASIX) 20 MG tablet; Take 1 tablet by mouth Daily.  Dispense: 90 tablet; Refill: 1  -     rosuvastatin (CRESTOR) 40 MG tablet; Take 1 tablet by mouth Every Night.  Dispense: 90 tablet; Refill: 3  -     Calcipotriene 0.005 % solution; Apply 1 application topically to the appropriate area as directed 2 (Two) Times a Day.  Dispense: 60 mL; Refill: 1       Diabetes mellitus type 2 currently controlled hemoglobin A1c below 7 we will continue glipizide Januvia and Jardiance at this time continue exercise 30 minutes daily decrease carb intake  Hypertension currently controlled at this time on meta propanol  Hyperlipidemia stable on Crestor 40 mg nightly liver enzymes normal denies myalgias  Coronary artery disease status post coronary angioplasty on aspirin and Plavix per  "cardiology keep follow-up appointment as scheduled  Congestive heart failure stable at this time taking Lasix no swelling at this time and denies shortness of breath  Allergic rhinitis stable on Singulair Flonase and Zyrtec will provide refills  Psoriasis of the scalp will treat with calcipotriene solution  Fatty liver liver enzymes normal continue exercise and weight loss keep follow-up appoint with gastroenterology as scheduled          Follow Up:   Return in about 6 months (around 11/24/2022).    Portia Bahena, APRN    \"Please note that portions of this note were completed with a voice recognition program.\"    "

## 2022-05-26 NOTE — PROGRESS NOTES
Chief Complaint   Abdominal pain, and gastroesophageal reflux disease     History of Present Illness       Claudette Gramajo is a 56 y.o. female who presents to [facility] for follow-up with a history of colon polyps, reflux, left lower quadrant abdominal pain, family history of colon cancer and esophageal cancer.  Patient reports continued left lower quadrant pain that has been present for 2 to 3 years now has progressively worsened and become more consistent.  She reports the pain will radiate to the umbilicus but she is able to pinpoint in the left lower quadrant exactly where the pain occurs.  She has a personal history of colon polyps and family history of colon cancer and esophageal cancer in her grandfather in his early 60s.  She reports reflux that is fairly well controlled on Pepcid twice daily she feels her reflux is worse at night.  She reports if she bends over she does feel shortness of breath and pressure in her chest related to her hiatal hernia.  Bowel movements are described as daily and regular occasionally they will range from formed to loose.  Patient denies fever, nausea, vomiting, weight loss, night sweats, melena, hematochezia, hematemesis.    Labs performed on 05/16/2022    No recent abdominal imaging.     Endoscopy: Review of the patient's most recent EGD and colonoscopy performed by Dr. Ortiz on 04/22/2019 medium size hiatal hernia, normal mucosa in the whole esophagus normal stomach and normal duodenum. 8 mm polyp removed from the descending colon and ulcer seen at 35 cm measuring up to 3 cm.  Pathology consistent with tubular adenoma..    Results       Result Review :       CMP    CMP 2/14/22 2/28/22 5/16/22   Glucose 209 (A)  150 (A)   BUN 17  20   Creatinine 1.05 (A)  1.02 (A)   eGFR Non African Am 54 (A)     Sodium 139  140   Potassium 4.0  4.4   Chloride 99  100   Calcium 10.2  10.4   Albumin 5.10 4.70 4.80   Total Bilirubin 0.5 0.3 0.3   Alkaline  Phosphatase 78 66 68   AST (SGOT) 20 20 19   ALT (SGPT) 31 30 24   (A) Abnormal value            CBC    CBC 2/14/22 5/16/22   WBC 6.99 8.30   RBC 4.76 4.70   Hemoglobin 14.7 14.1   Hematocrit 43.2 41.6   MCV 90.8 88.5   MCH 30.9 30.0   MCHC 34.0 33.9   RDW 12.1 (A) 12.0 (A)   Platelets 267 252   (A) Abnormal value                          Past Medical History       Past Medical History:   Diagnosis Date   • Abnormal ECG    • Arrhythmia    • CAD (coronary artery disease)    • Chest pain    • CHF (congestive heart failure) (HCC)    • Diabetes mellitus (HCC)    • Edema    • Hyperlipidemia LDL goal <70 8/31/2021   • Myocardial infarct (HCC)    • FARNAZ on CPAP 9/1/2021   • Palpitations    • Sleep apnea    • Unstable angina (HCC)        Past Surgical History:   Procedure Laterality Date   • CARDIAC CATHETERIZATION     • COLONOSCOPY     • CORONARY ANGIOPLASTY     • CORONARY STENT PLACEMENT     • UPPER GASTROINTESTINAL ENDOSCOPY     • VENOUS THROMBECTOMY           Current Outpatient Medications:   •  Alcohol Swabs (Easy Touch Alcohol Prep Medium) 70 % pads, , Disp: , Rfl:   •  aspirin 81 MG tablet, Take 81 mg by mouth Daily., Disp: , Rfl:   •  Calcipotriene 0.005 % solution, Apply 1 application topically to the appropriate area as directed 2 (Two) Times a Day., Disp: 60 mL, Rfl: 1  •  cetirizine (zyrTEC) 10 MG tablet, , Disp: , Rfl:   •  cholecalciferol (VITAMIN D3) 25 MCG (1000 UT) tablet, Take 1 tablet by mouth Daily., Disp: 90 tablet, Rfl: 1  •  clopidogrel (PLAVIX) 75 MG tablet, Take 1 tablet by mouth Daily., Disp: 90 tablet, Rfl: 1  •  Continuous Blood Gluc Sensor (FreeStyle Radha Sensor System), 1 Device Every 14 (Fourteen) Days., Disp: 6 each, Rfl: 3  •  DULoxetine (CYMBALTA) 60 MG capsule, Take 1 capsule by mouth Daily., Disp: 90 capsule, Rfl: 1  •  empagliflozin (JARDIANCE) 25 MG tablet tablet, Take 1 tablet by mouth Daily., Disp: 90 tablet, Rfl: 1  •  famotidine (Pepcid) 40 MG tablet, Take 1 tablet by mouth 2 (Two)  Times a Day As Needed for Heartburn., Disp: 90 tablet, Rfl: 1  •  fluticasone (FLONASE) 50 MCG/ACT nasal spray, 2 sprays into the nostril(s) as directed by provider Daily. 2 sprays each nostril daily, Disp: 3 mL, Rfl: 1  •  FREESTYLE LITE test strip, , Disp: , Rfl:   •  furosemide (LASIX) 20 MG tablet, Take 1 tablet by mouth Daily., Disp: 90 tablet, Rfl: 1  •  glipizide (GLUCOTROL XL) 5 MG ER tablet, Take 1 tablet by mouth Daily for 180 days., Disp: 90 tablet, Rfl: 1  •  Lancets (freestyle) lancets, , Disp: , Rfl:   •  metFORMIN (GLUCOPHAGE) 1000 MG tablet, Take 1 tablet by mouth 2 (Two) Times a Day With Meals., Disp: 180 tablet, Rfl: 1  •  metoprolol succinate XL (TOPROL-XL) 25 MG 24 hr tablet, Take 1 tablet by mouth Daily., Disp: 90 tablet, Rfl: 1  •  montelukast (SINGULAIR) 10 MG tablet, Take 1 tablet by mouth Every Night., Disp: 90 tablet, Rfl: 1  •  nitroglycerin (NITROSTAT) 0.4 MG SL tablet, Place 1 tablet under the tongue Every 5 (Five) Minutes As Needed for Chest Pain., Disp: 35 tablet, Rfl: 6  •  oxyCODONE (ROXICODONE) 5 MG immediate release tablet, Take  by mouth As Needed., Disp: , Rfl:   •  rosuvastatin (CRESTOR) 40 MG tablet, Take 1 tablet by mouth Every Night., Disp: 90 tablet, Rfl: 3  •  PEG-KCl-NaCl-NaSulf-Na Asc-C (MoviPrep) 100 g reconstituted solution powder, Take 1,000 mL by mouth Every 12 (Twelve) Hours., Disp: 1 each, Rfl: 0     Allergies   Allergen Reactions   • Other Unknown - Low Severity     Hay    Sneezing watery eyes cough   • Adhesive Tape Other (See Comments)     BLISTERS   • Dust Mite Extract Other (See Comments)   • Molds & Smuts Cough   • Pollen Extract Cough       Family History   Problem Relation Age of Onset   • Suicidality Mother    • Heart attack Father    • Heart disease Father    • No Known Problems Maternal Grandmother    • No Known Problems Maternal Grandfather    • Heart disease Paternal Grandmother    • Cancer Paternal Grandmother    • Heart disease Paternal Grandfather   "  • Cancer Paternal Grandfather    • Thyroid disease Paternal Grandfather    • Pancreatic cancer Paternal Grandfather    • Esophageal cancer Paternal Grandfather    • Stomach cancer Paternal Grandfather    • Colon cancer Neg Hx         Social History     Social History Narrative   • Not on file       Objective       Vital Signs:   /73 (BP Location: Left arm, Patient Position: Sitting, Cuff Size: Adult)   Pulse 76   Ht 170.2 cm (67\")   Wt 95.6 kg (210 lb 12.8 oz)   SpO2 100%   BMI 33.02 kg/m²       Physical Exam      Assessment & Plan          Assessment and Plan    Diagnoses and all orders for this visit:    1. Polyp of colon, unspecified part of colon, unspecified type (Primary)  -     CT Abdomen Pelvis With Contrast; Future    2. Gastroesophageal reflux disease without esophagitis  -     CT Abdomen Pelvis With Contrast; Future    3. Left lower quadrant abdominal pain  -     CT Abdomen Pelvis With Contrast; Future    4. Family history of colon cancer  -     CT Abdomen Pelvis With Contrast; Future    5. Family history of esophageal cancer  -     CT Abdomen Pelvis With Contrast; Future    6. Irritable bowel syndrome with both constipation and diarrhea    7. Fatty liver    Other orders  -     PEG-KCl-NaCl-NaSulf-Na Asc-C (MoviPrep) 100 g reconstituted solution powder; Take 1,000 mL by mouth Every 12 (Twelve) Hours.  Dispense: 1 each; Refill: 0      56-year-old female presenting the office today  for follow-up with a history of colon polyps, reflux, left lower quadrant abdominal pain, fatty liver, IBS mixed, family history of colon cancer and esophageal cancer. I have recommended that the patient undergo further evaluation with an EGD and colonoscopy.  I have discussed this procedure in detail with the patient.  I have discussed the risks, benefits and alternatives.  I have discussed the risk of anesthesia, bleeding and perforation.  Patient understands these risks, benefits and alternatives and wishes to " proceed.  I will schedule her at her earliest convenience.  With the patient's continued left lower quadrant abdominal pain I will schedule patient for CT scan of the abdomen pelvis with contrast.  We will follow-up in the office after endoscopy.  Patient agreeable to this plan will call with any questions or concerns.            Follow Up       Follow Up   Return for Follow up after endoscopy in office.  Patient was given instructions and counseling regarding her condition or for health maintenance advice. Please see specific information pulled into the AVS if appropriate.

## 2022-05-27 ENCOUNTER — OFFICE VISIT (OUTPATIENT)
Dept: GASTROENTEROLOGY | Facility: CLINIC | Age: 57
End: 2022-05-27

## 2022-05-27 ENCOUNTER — PREP FOR SURGERY (OUTPATIENT)
Dept: OTHER | Facility: HOSPITAL | Age: 57
End: 2022-05-27

## 2022-05-27 VITALS
SYSTOLIC BLOOD PRESSURE: 125 MMHG | OXYGEN SATURATION: 100 % | HEIGHT: 67 IN | HEART RATE: 76 BPM | WEIGHT: 210.8 LBS | BODY MASS INDEX: 33.09 KG/M2 | DIASTOLIC BLOOD PRESSURE: 73 MMHG

## 2022-05-27 DIAGNOSIS — K21.9 GASTROESOPHAGEAL REFLUX DISEASE WITHOUT ESOPHAGITIS: ICD-10-CM

## 2022-05-27 DIAGNOSIS — K63.5 POLYP OF COLON, UNSPECIFIED PART OF COLON, UNSPECIFIED TYPE: Primary | ICD-10-CM

## 2022-05-27 DIAGNOSIS — R10.32 LEFT LOWER QUADRANT ABDOMINAL PAIN: ICD-10-CM

## 2022-05-27 DIAGNOSIS — K76.0 FATTY LIVER: ICD-10-CM

## 2022-05-27 DIAGNOSIS — Z80.0 FAMILY HISTORY OF COLON CANCER: ICD-10-CM

## 2022-05-27 DIAGNOSIS — K58.2 IRRITABLE BOWEL SYNDROME WITH BOTH CONSTIPATION AND DIARRHEA: ICD-10-CM

## 2022-05-27 DIAGNOSIS — Z80.0 FAMILY HISTORY OF ESOPHAGEAL CANCER: ICD-10-CM

## 2022-05-27 PROCEDURE — 99214 OFFICE O/P EST MOD 30 MIN: CPT | Performed by: NURSE PRACTITIONER

## 2022-05-27 RX ORDER — PEG-3350, SODIUM SULFATE, SODIUM CHLORIDE, POTASSIUM CHLORIDE, SODIUM ASCORBATE AND ASCORBIC ACID 7.5-2.691G
1000 KIT ORAL EVERY 12 HOURS
Qty: 1 EACH | Refills: 0 | Status: ON HOLD | OUTPATIENT
Start: 2022-05-27 | End: 2022-08-26

## 2022-06-23 ENCOUNTER — TELEMEDICINE (OUTPATIENT)
Dept: ENDOCRINOLOGY | Age: 57
End: 2022-06-23

## 2022-06-23 DIAGNOSIS — E11.69 HYPERLIPIDEMIA ASSOCIATED WITH TYPE 2 DIABETES MELLITUS: ICD-10-CM

## 2022-06-23 DIAGNOSIS — E78.5 HYPERLIPIDEMIA ASSOCIATED WITH TYPE 2 DIABETES MELLITUS: ICD-10-CM

## 2022-06-23 DIAGNOSIS — E11.65 TYPE 2 DIABETES MELLITUS WITH HYPERGLYCEMIA, WITHOUT LONG-TERM CURRENT USE OF INSULIN: Primary | ICD-10-CM

## 2022-06-23 PROCEDURE — 99214 OFFICE O/P EST MOD 30 MIN: CPT | Performed by: NURSE PRACTITIONER

## 2022-06-23 NOTE — PROGRESS NOTES
"Chief Complaint  Diabetes     Consent for video visit obtained through [employer] login       Subjective        Claudette Gramajo presents to [facility]  History of Present Illness     Lab Results   Component Value Date    HGBA1C 6.80 (H) 05/16/2022       Type 2 dm    Diagnosed about 5 - 8 years ago.   Today in clinic pt reports being on metformin 1000 mg po bid, Glipizide ER - 5 mg po daily with BF, jardiance 25 mg po daily.   H/o pancreatitis   Checks BG - 1 - 2 times x day  Dm retinopathy - x,Last eye exam - UTD 2022  Dm nephropathy - x  Dm neuropathy - yes  CAD - yes  CVA -x  Episodes of hypoglycemia - denies  On statin         Objective   Vital Signs:  There were no vitals taken for this visit.  Estimated body mass index is 33.02 kg/m² as calculated from the following:    Height as of 5/27/22: 170.2 cm (67\").    Weight as of 5/27/22: 95.6 kg (210 lb 12.8 oz).          Physical Exam  Vitals reviewed.   Constitutional:       General: She is not in acute distress.  HENT:      Head: Normocephalic and atraumatic.   Cardiovascular:      Rate and Rhythm: Normal rate and regular rhythm.   Pulmonary:      Effort: Pulmonary effort is normal. No respiratory distress.   Musculoskeletal:         General: No signs of injury. Normal range of motion.      Cervical back: Normal range of motion and neck supple.   Skin:     General: Skin is warm and dry.   Neurological:      Mental Status: She is alert and oriented to person, place, and time. Mental status is at baseline.   Psychiatric:         Mood and Affect: Mood normal.         Behavior: Behavior normal.         Thought Content: Thought content normal.         Judgment: Judgment normal.        Result Review :  The following data was reviewed by: SARWAT Yang on 06/23/2022:  Common labs    Common Labsle 2/14/22 2/14/22 2/14/22 2/14/22 2/14/22 2/28/22 2/28/22 5/16/22 5/16/22 5/16/22 5/16/22 5/16/22    1237 1237 1237 1237 1328 0949 0949 1046 " 1046 1046 1046 1046   Glucose   209 (A)        150 (A)    BUN   17        20    Creatinine   1.05 (A)        1.02 (A)    eGFR Non  Am   54 (A)            Sodium   139        140    Potassium   4.0        4.4    Chloride   99        100    Calcium   10.2        10.4    Albumin   5.10    4.70    4.80    Total Bilirubin   0.5    0.3    0.3    Alkaline Phosphatase   78    66    68    AST (SGOT)   20    20    19    ALT (SGPT)   31    30    24    WBC 6.99       8.30       Hemoglobin 14.7       14.1       Hematocrit 43.2       41.6       Platelets 267       252       Total Cholesterol    149  135      129   Triglycerides    173 (A)  126      150   HDL Cholesterol    44  46      46   LDL Cholesterol     76  67      57   Hemoglobin A1C  8.20 (A)       6.80 (A)      Microalbumin, Urine     1.4     <1.2     (A) Abnormal value                      Assessment and Plan   Diagnoses and all orders for this visit:    1. Type 2 diabetes mellitus with hyperglycemia, without long-term current use of insulin (HCC) (Primary)  -     Hemoglobin A1c; Future  -     Comprehensive Metabolic Panel; Future  -     Lipid Panel; Future  -     Microalbumin / Creatinine Urine Ratio - Urine, Clean Catch; Future  -     Vitamin D 25 Hydroxy; Future  -     TSH; Future  -     T4, Free; Future    2. Hyperlipidemia associated with type 2 diabetes mellitus (HCC)  -     Hemoglobin A1c; Future  -     Comprehensive Metabolic Panel; Future  -     Lipid Panel; Future  -     Microalbumin / Creatinine Urine Ratio - Urine, Clean Catch; Future  -     Vitamin D 25 Hydroxy; Future  -     TSH; Future  -     T4, Free; Future    Other orders  -     empagliflozin (Jardiance) 10 MG tablet tablet; Take 1 tablet by mouth Daily. By mouth take one tab daily in AM.  Dispense: 90 tablet; Refill: 1           Follow Up   No follow-ups on file.     +dizziness and hypotension reported for patient: decrease jardiance to 10mg daily   a1c at goal, continue glipizide and metformin    LDL at goal, continue statin  Wants to consider Penity ( will need to do more research on this medication)     Patient was given instructions and counseling regarding her condition or for health maintenance advice. Please see specific information pulled into the AVS if appropriate.     SARWAT Yang

## 2022-06-24 ENCOUNTER — HOSPITAL ENCOUNTER (OUTPATIENT)
Dept: CT IMAGING | Facility: HOSPITAL | Age: 57
Discharge: HOME | End: 2022-06-24
Admitting: NURSE PRACTITIONER

## 2022-06-24 DIAGNOSIS — Z80.0 FAMILY HISTORY OF ESOPHAGEAL CANCER: ICD-10-CM

## 2022-06-24 DIAGNOSIS — K21.9 GASTROESOPHAGEAL REFLUX DISEASE WITHOUT ESOPHAGITIS: ICD-10-CM

## 2022-06-24 DIAGNOSIS — K63.5 POLYP OF COLON, UNSPECIFIED PART OF COLON, UNSPECIFIED TYPE: ICD-10-CM

## 2022-06-24 DIAGNOSIS — Z80.0 FAMILY HISTORY OF COLON CANCER: ICD-10-CM

## 2022-06-24 DIAGNOSIS — R10.32 LEFT LOWER QUADRANT ABDOMINAL PAIN: ICD-10-CM

## 2022-06-24 LAB
CREAT BLDA-MCNC: 0.9 MG/DL
EGFRCR SERPLBLD CKD-EPI 2021: 74.7 ML/MIN/1.73

## 2022-06-24 PROCEDURE — 0 IOPAMIDOL PER 1 ML: Performed by: NURSE PRACTITIONER

## 2022-06-24 PROCEDURE — 82565 ASSAY OF CREATININE: CPT

## 2022-06-24 PROCEDURE — 74177 CT ABD & PELVIS W/CONTRAST: CPT

## 2022-06-24 RX ADMIN — IOPAMIDOL 100 ML: 755 INJECTION, SOLUTION INTRAVENOUS at 08:41

## 2022-07-05 ENCOUNTER — TELEPHONE (OUTPATIENT)
Dept: GASTROENTEROLOGY | Facility: CLINIC | Age: 57
End: 2022-07-05

## 2022-07-05 NOTE — TELEPHONE ENCOUNTER
Blood Thinner/Cardiac Clearance                                                                                                                 [facility]    7/5/2022    Dear,     Patient: Claudette Gramajo   YOB: 1965        This patient is waiting to have a Colonoscopy and/or Esophagogastroduodenoscopy which I will perform at [facility] on 08.26.2022.     Our records indicate this patient is currently taking Plavix. This procedure requires the patient to suspend their anticoagulant medication prior to surgery.     Please respond to this request noting your recommendations. You may contact our office at [phone] Option 1 with any questions. I appreciate your prompt response in this matter.     Please return this form to our office as soon as possible to fax [phone]    ____ I approve my patient to stop taking their Plavix 7 days prior to the scheduled procedure.    ____ I do NOT approve my patient to stop taking their plavix at this time.    ____ I approve my patient from a cardiac standpoint.    ____ I do NOT approve my patient from a cardiac standpoint at this time.    Approving physician name (please print):     _____________________________________________    Approving physician signature:     ________________________________    Date:________________      Sincerely,  [facility]   Gastroenterology -

## 2022-07-12 NOTE — TELEPHONE ENCOUNTER
Procedure: Colonoscopy and/or EGD    Med Directive: Plavix    PMH: CAD with stent 2014, MI,  HTN, hyperlipidemia    Last Seen: 3/3/22

## 2022-08-26 ENCOUNTER — HOSPITAL ENCOUNTER (OUTPATIENT)
Facility: HOSPITAL | Age: 57
Setting detail: HOSPITAL OUTPATIENT SURGERY
Discharge: HOME | End: 2022-08-26
Attending: INTERNAL MEDICINE | Admitting: INTERNAL MEDICINE

## 2022-08-26 ENCOUNTER — ANESTHESIA EVENT (OUTPATIENT)
Dept: GASTROENTEROLOGY | Facility: HOSPITAL | Age: 57
End: 2022-08-26

## 2022-08-26 ENCOUNTER — ANESTHESIA (OUTPATIENT)
Dept: GASTROENTEROLOGY | Facility: HOSPITAL | Age: 57
End: 2022-08-26

## 2022-08-26 VITALS
HEART RATE: 78 BPM | HEIGHT: 68 IN | BODY MASS INDEX: 32.08 KG/M2 | RESPIRATION RATE: 16 BRPM | WEIGHT: 211.64 LBS | SYSTOLIC BLOOD PRESSURE: 94 MMHG | OXYGEN SATURATION: 95 % | DIASTOLIC BLOOD PRESSURE: 72 MMHG | TEMPERATURE: 98 F

## 2022-08-26 DIAGNOSIS — Z80.0 FAMILY HISTORY OF ESOPHAGEAL CANCER: ICD-10-CM

## 2022-08-26 DIAGNOSIS — K21.9 GASTROESOPHAGEAL REFLUX DISEASE WITHOUT ESOPHAGITIS: ICD-10-CM

## 2022-08-26 DIAGNOSIS — K63.5 POLYP OF COLON, UNSPECIFIED PART OF COLON, UNSPECIFIED TYPE: ICD-10-CM

## 2022-08-26 DIAGNOSIS — Z80.0 FAMILY HISTORY OF COLON CANCER: ICD-10-CM

## 2022-08-26 DIAGNOSIS — R10.32 LEFT LOWER QUADRANT ABDOMINAL PAIN: ICD-10-CM

## 2022-08-26 LAB — GLUCOSE BLDC GLUCOMTR-MCNC: 170 MG/DL (ref 70–99)

## 2022-08-26 PROCEDURE — 45385 COLONOSCOPY W/LESION REMOVAL: CPT | Performed by: INTERNAL MEDICINE

## 2022-08-26 PROCEDURE — 43239 EGD BIOPSY SINGLE/MULTIPLE: CPT | Performed by: INTERNAL MEDICINE

## 2022-08-26 PROCEDURE — 88305 TISSUE EXAM BY PATHOLOGIST: CPT | Performed by: INTERNAL MEDICINE

## 2022-08-26 PROCEDURE — 25010000002 PROPOFOL 10 MG/ML EMULSION: Performed by: NURSE ANESTHETIST, CERTIFIED REGISTERED

## 2022-08-26 PROCEDURE — 82962 GLUCOSE BLOOD TEST: CPT

## 2022-08-26 RX ORDER — SODIUM CHLORIDE, SODIUM LACTATE, POTASSIUM CHLORIDE, CALCIUM CHLORIDE 600; 310; 30; 20 MG/100ML; MG/100ML; MG/100ML; MG/100ML
30 INJECTION, SOLUTION INTRAVENOUS CONTINUOUS
Status: DISCONTINUED | OUTPATIENT
Start: 2022-08-26 | End: 2022-08-26 | Stop reason: HOSPADM

## 2022-08-26 RX ORDER — LIDOCAINE HYDROCHLORIDE 20 MG/ML
INJECTION, SOLUTION EPIDURAL; INFILTRATION; INTRACAUDAL; PERINEURAL AS NEEDED
Status: DISCONTINUED | OUTPATIENT
Start: 2022-08-26 | End: 2022-08-26 | Stop reason: SURG

## 2022-08-26 RX ADMIN — PROPOFOL 250 MCG/KG/MIN: 10 INJECTION, EMULSION INTRAVENOUS at 08:55

## 2022-08-26 RX ADMIN — LIDOCAINE HYDROCHLORIDE 40 MG: 20 INJECTION, SOLUTION EPIDURAL; INFILTRATION; INTRACAUDAL; PERINEURAL at 08:55

## 2022-08-26 RX ADMIN — SODIUM CHLORIDE, POTASSIUM CHLORIDE, SODIUM LACTATE AND CALCIUM CHLORIDE 30 ML/HR: 600; 310; 30; 20 INJECTION, SOLUTION INTRAVENOUS at 08:31

## 2022-08-26 NOTE — ANESTHESIA PREPROCEDURE EVALUATION
Anesthesia Evaluation     Patient summary reviewed and Nursing notes reviewed   no history of anesthetic complications:  NPO Solid Status: > 8 hours  NPO Liquid Status: > 2 hours           Airway   Mallampati: II  TM distance: >3 FB  Neck ROM: full  No difficulty expected  Dental      Pulmonary - normal exam    breath sounds clear to auscultation  (+) shortness of breath, sleep apnea,   Cardiovascular - normal exam  Exercise tolerance: good (4-7 METS)    Rhythm: regular  Rate: normal    (+) hypertension well controlled, past MI , CAD, CHF , hyperlipidemia,       Neuro/Psych- negative ROS  GI/Hepatic/Renal/Endo    (+)  GERD well controlled,  liver disease, diabetes mellitus type 2, thyroid problem     Musculoskeletal (-) negative ROS    Abdominal    Substance History - negative use     OB/GYN negative ob/gyn ROS         Other - negative ROS                       Anesthesia Plan    ASA 3     general     (Total IV Anesthesia    Patient understands anesthesia not responsible for dental damage.  )  intravenous induction     Anesthetic plan, risks, benefits, and alternatives have been provided, discussed and informed consent has been obtained with: patient.    Plan discussed with CRNA.        CODE STATUS:

## 2022-08-26 NOTE — ANESTHESIA POSTPROCEDURE EVALUATION
Patient: Claudette Gramajo    Procedure Summary     Date: 08/26/22 Room / Location: [facility] ENDOSCOPY 2 / [facility] ENDOSCOPY    Anesthesia Start: 0852 Anesthesia Stop: 0939    Procedures:       ESOPHAGOGASTRODUODENOSCOPY WITH BX (N/A )      COLONOSCOPY WITH POLYPECTOMY (N/A ) Diagnosis:       Polyp of colon, unspecified part of colon, unspecified type      Gastroesophageal reflux disease without esophagitis      Family history of colon cancer      Left lower quadrant abdominal pain      Family history of esophageal cancer      (Polyp of colon, unspecified part of colon, unspecified type [K63.5])      (Gastroesophageal reflux disease without esophagitis [K21.9])      (Family history of colon cancer [Z80.0])      (Left lower quadrant abdominal pain [R10.32])      (Family history of esophageal cancer [Z80.0])    Surgeons: Sloan Ortiz MD Provider: Luis Jarrett MD    Anesthesia Type: general ASA Status: 3          Anesthesia Type: general    Vitals  Vitals Value Taken Time   BP 94/72 08/26/22 0953   Temp 36.7 °C (98 °F) 08/26/22 0938   Pulse 73 08/26/22 0954   Resp 16 08/26/22 0952   SpO2 96 % 08/26/22 0954   Vitals shown include unvalidated device data.        Post Anesthesia Care and Evaluation    Patient location during evaluation: bedside  Patient participation: complete - patient participated  Level of consciousness: awake and alert  Pain management: adequate    Airway patency: patent  Anesthetic complications: No anesthetic complications  PONV Status: none  Cardiovascular status: acceptable  Respiratory status: acceptable  Hydration status: acceptable    Comments: An Anesthesiologist personally participated in the most demanding procedures (including induction and emergence if applicable) in the anesthesia plan, monitored the course of anesthesia administration at frequent intervals and remained physically present and available for immediate diagnosis and treatment of emergencies.

## 2022-08-29 LAB
CYTO UR: NORMAL
LAB AP CASE REPORT: NORMAL
LAB AP CLINICAL INFORMATION: NORMAL
PATH REPORT.FINAL DX SPEC: NORMAL
PATH REPORT.GROSS SPEC: NORMAL

## 2022-09-06 ENCOUNTER — OFFICE VISIT (OUTPATIENT)
Dept: CARDIOLOGY | Facility: CLINIC | Age: 57
End: 2022-09-06

## 2022-09-06 VITALS
BODY MASS INDEX: 32.15 KG/M2 | HEART RATE: 81 BPM | SYSTOLIC BLOOD PRESSURE: 116 MMHG | WEIGHT: 212.13 LBS | DIASTOLIC BLOOD PRESSURE: 68 MMHG | HEIGHT: 68 IN

## 2022-09-06 DIAGNOSIS — Z98.61 CAD S/P PERCUTANEOUS CORONARY ANGIOPLASTY: Primary | ICD-10-CM

## 2022-09-06 DIAGNOSIS — E78.5 HYPERLIPIDEMIA LDL GOAL <70: ICD-10-CM

## 2022-09-06 DIAGNOSIS — I10 ESSENTIAL HYPERTENSION: ICD-10-CM

## 2022-09-06 DIAGNOSIS — I25.10 CAD S/P PERCUTANEOUS CORONARY ANGIOPLASTY: Primary | ICD-10-CM

## 2022-09-06 PROBLEM — M19.90 ARTHRITIS: Status: RESOLVED | Noted: 2021-11-16 | Resolved: 2022-09-06

## 2022-09-06 PROBLEM — I50.32 CHRONIC DIASTOLIC CONGESTIVE HEART FAILURE (HCC): Status: RESOLVED | Noted: 2022-02-16 | Resolved: 2022-09-06

## 2022-09-06 PROBLEM — M25.579 ANKLE PAIN: Status: RESOLVED | Noted: 2021-11-16 | Resolved: 2022-09-06

## 2022-09-06 PROBLEM — R06.09 DYSPNEA ON EXERTION: Status: RESOLVED | Noted: 2021-09-01 | Resolved: 2022-09-06

## 2022-09-06 PROCEDURE — 99214 OFFICE O/P EST MOD 30 MIN: CPT | Performed by: NURSE PRACTITIONER

## 2022-09-06 NOTE — PROGRESS NOTES
Chief Complaint  Coronary Artery Disease, Hypertension, Hyperlipidemia, and Congestive Heart Failure    Subjective            History of Present Illness  Claudette Gramajo is a 57-year-old white/ female patient who presents to the office today for follow-up.  She has CAD status post stenting, hypertension, and hyperlipidemia.  She reports compliance with all of her medications.  She denies any chest pain, shortness of breath, lightheadedness/dizziness, palpitations, or edema.    PMH  Past Medical History:   Diagnosis Date   • Abnormal ECG    • Anesthesia complication     woke up during surgery   • Arrhythmia    • CAD (coronary artery disease)    • Chest pain    • CHF (congestive heart failure) ([facility])    • Connective tissue anomaly    • Diabetes mellitus ([facility])    • Edema    • Hyperlipidemia LDL goal <70 08/31/2021   • Myocardial infarct ([facility])     2013   • FARNAZ on CPAP 09/01/2021   • Palpitations    • Sleep apnea          ALLERGY  Allergies   Allergen Reactions   • Other Unknown - Low Severity     Hay    Sneezing watery eyes cough   • Adhesive Tape Other (See Comments)     BLISTERS   • Dust Mite Extract Other (See Comments)   • Molds & Smuts Cough   • Pollen Extract Cough          SURGICALHX  Past Surgical History:   Procedure Laterality Date   • CARDIAC CATHETERIZATION     • COLONOSCOPY     • COLONOSCOPY N/A 8/26/2022    Procedure: COLONOSCOPY WITH POLYPECTOMY;  Surgeon: Sloan Ortiz MD;  Location: [facility] ENDOSCOPY;  Service: Gastroenterology;  Laterality: N/A;  COLON POLYP   • CORONARY ANGIOPLASTY     • CORONARY STENT PLACEMENT     • ENDOSCOPY N/A 8/26/2022    Procedure: ESOPHAGOGASTRODUODENOSCOPY WITH BX;  Surgeon: Sloan Ortiz MD;  Location: [facility] ENDOSCOPY;  Service: Gastroenterology;  Laterality: N/A;  HIATAL HERNIA   • UPPER GASTROINTESTINAL ENDOSCOPY     • VENOUS THROMBECTOMY            SOC  Social History     Socioeconomic History   • Marital status:    Tobacco Use   • Smoking  status: Former Smoker     Packs/day: 1.00     Types: Cigarettes, Cigarettes     Start date: 1/1/1986     Quit date: 1/1/2012     Years since quitting: 10.6   • Smokeless tobacco: Never Used   • Tobacco comment: caffeine use   Vaping Use   • Vaping Use: Never used   Substance and Sexual Activity   • Alcohol use: No   • Drug use: No   • Sexual activity: Defer         FAMHX  Family History   Problem Relation Age of Onset   • Suicidality Mother    • Heart attack Father    • Heart disease Father    • No Known Problems Maternal Grandmother    • No Known Problems Maternal Grandfather    • Heart disease Paternal Grandmother    • Cancer Paternal Grandmother    • Heart disease Paternal Grandfather    • Cancer Paternal Grandfather    • Thyroid disease Paternal Grandfather    • Pancreatic cancer Paternal Grandfather    • Esophageal cancer Paternal Grandfather    • Stomach cancer Paternal Grandfather    • Colon cancer Neg Hx    • Malig Hyperthermia Neg Hx           MEDSIGONLY  Current Outpatient Medications on File Prior to Visit   Medication Sig   • Alcohol Swabs (Easy Touch Alcohol Prep Medium) 70 % pads    • aspirin 81 MG tablet Take 81 mg by mouth Daily.   • Calcipotriene 0.005 % solution Apply 1 application topically to the appropriate area as directed 2 (Two) Times a Day.   • cetirizine (zyrTEC) 10 MG tablet Take 10 mg by mouth Daily.   • cholecalciferol (VITAMIN D3) 25 MCG (1000 UT) tablet Take 1 tablet by mouth Daily. (Patient taking differently: Take 1,000 Units by mouth. Does not take daily only occasionally)   • clopidogrel (PLAVIX) 75 MG tablet Take 1 tablet by mouth Daily.   • DULoxetine (CYMBALTA) 60 MG capsule Take 1 capsule by mouth Daily.   • empagliflozin (JARDIANCE) 10 MG tablet tablet Take 5 mg by mouth Daily. 1/2 tab   • famotidine (Pepcid) 40 MG tablet Take 1 tablet by mouth 2 (Two) Times a Day As Needed for Heartburn.   • fluticasone (FLONASE) 50 MCG/ACT nasal spray 2 sprays into the nostril(s) as directed  "by provider Daily. 2 sprays each nostril daily   • FREESTYLE LITE test strip    • furosemide (LASIX) 20 MG tablet Take 1 tablet by mouth Daily.   • glipizide (GLUCOTROL XL) 5 MG ER tablet Take 1 tablet by mouth Daily for 180 days.   • Lancets (freestyle) lancets    • metFORMIN (GLUCOPHAGE) 1000 MG tablet Take 1 tablet by mouth 2 (Two) Times a Day With Meals.   • metoprolol succinate XL (TOPROL-XL) 25 MG 24 hr tablet Take 1 tablet by mouth Daily.   • montelukast (SINGULAIR) 10 MG tablet Take 1 tablet by mouth Every Night.   • nitroglycerin (NITROSTAT) 0.4 MG SL tablet Place 1 tablet under the tongue Every 5 (Five) Minutes As Needed for Chest Pain.   • oxyCODONE (ROXICODONE) 5 MG immediate release tablet Take  by mouth As Needed.   • rosuvastatin (CRESTOR) 40 MG tablet Take 1 tablet by mouth Every Night.     No current facility-administered medications on file prior to visit.         Objective   /68   Pulse 81   Ht 172.7 cm (67.99\")   Wt 96.2 kg (212 lb 2 oz)   BMI 32.26 kg/m²     Physical Exam  Constitutional:       Appearance: She is obese.   HENT:      Head: Normocephalic.   Neck:      Vascular: No carotid bruit.   Cardiovascular:      Rate and Rhythm: Normal rate and regular rhythm.      Pulses: Normal pulses.      Heart sounds: Normal heart sounds. No murmur heard.  Pulmonary:      Effort: Pulmonary effort is normal.      Breath sounds: Normal breath sounds.   Musculoskeletal:      Cervical back: Neck supple.      Right lower leg: No edema.      Left lower leg: No edema.   Skin:     General: Skin is dry.      Capillary Refill: Capillary refill takes less than 2 seconds.   Neurological:      Mental Status: She is alert and oriented to person, place, and time.   Psychiatric:         Behavior: Behavior normal.       ECG 12 Lead    Date/Time: 9/8/2022 2:47 PM  Performed by: Berna Harry APRN  Authorized by: Berna Harry APRN   Comparison: compared with previous ECG   Rhythm: sinus " rhythm  BPM: 78  Conduction: conduction normal  ST Segments: ST segments normal  T Waves: T waves normal  QRS axis: left  Other: no other findings    Clinical impression: normal ECG          Result Review :   The following data was reviewed by: SARWAT Harding on 09/06/2022:  proBNP   Date Value Ref Range Status   09/01/2021 216.7 0.0 - 900.0 pg/mL Final     CMP    CMP 5/16/22 6/24/22   Glucose 150 (A)    BUN 20    Creatinine 1.02 (A) 0.90   Sodium 140    Potassium 4.4    Chloride 100    Calcium 10.4    Albumin 4.80    Total Bilirubin 0.3    Alkaline Phosphatase 68    AST (SGOT) 19    ALT (SGPT) 24    (A) Abnormal value       Comments are available for some flowsheets but are not being displayed.           CBC w/diff    CBC w/Diff 5/16/22   WBC 8.30   RBC 4.70   Hemoglobin 14.1   Hematocrit 41.6   MCV 88.5   MCH 30.0   MCHC 33.9   RDW 12.0 (A)   Platelets 252   Neutrophil Rel % 56.2   Immature Granulocyte Rel % 0.2   Lymphocyte Rel % 34.2   Monocyte Rel % 7.1   Eosinophil Rel % 1.6   Basophil Rel % 0.7   (A) Abnormal value             Lab Results   Component Value Date    TSH 0.828 05/16/2022      Lab Results   Component Value Date    FREET4 1.1 05/07/2021      No results found for: DDIMERQUANT  No results found for: MG   No results found for: DIGOXIN   Lab Results   Component Value Date    TROPONINT <0.01 09/29/2014           Lipid Panel    Lipid Panel 5/16/22   Total Cholesterol 129   Triglycerides 150   HDL Cholesterol 46   VLDL Cholesterol 26   LDL Cholesterol  57   LDL/HDL Ratio 1.15   (A) Abnormal value              Results for orders placed in visit on 10/05/21    Adult Transthoracic Echo Complete W/ Cont if Necessary Per Protocol    Interpretation Summary  Normal left ventricular systolic function with an estimated ejection fraction of 60-65%.  No regional wall motion abnormalities were observed.  Left ventricular diastolic function is consistent with impaired relaxation (grade I diastolic  dysfunction).  No significant valvular heart disease.         Assessment and Plan    Diagnoses and all orders for this visit:    1. CAD S/P percutaneous coronary angioplasty (Primary)  She he currently denies any anginal symptoms, continue aspirin 81 mg daily and Plavix 75 mg daily.    2. Essential hypertension  Currently controlled and without adverse effect from medication, continue Lasix 20 mg daily and metoprolol 25 mg daily.  Low-sodium diet discussed.    3. Hyperlipidemia LDL goal <70  Last lipid panel was 5/16/2022 with LDL 57 which is within her goal range, continue rosuvastatin 40 mg nightly.    Other orders  -     ECG 12 Lead            Follow Up   Return in about 6 months (around 3/6/2023) for Follow up with Dr Parker.    Patient was given instructions and counseling regarding her condition or for health maintenance advice. Please see specific information pulled into the AVS if appropriate.     Claudette Gramajo  reports that she quit smoking about 10 years ago. Her smoking use included cigarettes and cigarettes. She started smoking about 36 years ago. She smoked 1.00 pack per day. She has never used smokeless tobacco.         Berna Harry, SARWAT  09/06/22  14:47 EDT    Dictated Utilizing Dragon Dictation

## 2022-09-08 PROCEDURE — 93000 ELECTROCARDIOGRAM COMPLETE: CPT | Performed by: NURSE PRACTITIONER

## 2022-09-15 RX ORDER — CETIRIZINE HYDROCHLORIDE 10 MG/1
10 TABLET ORAL DAILY
Qty: 90 TABLET | Refills: 0 | Status: SHIPPED | OUTPATIENT
Start: 2022-09-15 | End: 2022-11-22 | Stop reason: SDUPTHER

## 2022-09-15 RX ORDER — METOPROLOL SUCCINATE 25 MG/1
25 TABLET, EXTENDED RELEASE ORAL DAILY
Qty: 90 TABLET | Refills: 0 | Status: SHIPPED | OUTPATIENT
Start: 2022-09-15 | End: 2022-11-22 | Stop reason: SDUPTHER

## 2022-09-15 RX ORDER — ROSUVASTATIN CALCIUM 40 MG/1
40 TABLET, COATED ORAL NIGHTLY
Qty: 90 TABLET | Refills: 0 | Status: SHIPPED | OUTPATIENT
Start: 2022-09-15 | End: 2022-11-22 | Stop reason: SDUPTHER

## 2022-09-15 RX ORDER — FUROSEMIDE 20 MG/1
20 TABLET ORAL DAILY
Qty: 90 TABLET | Refills: 0 | Status: SHIPPED | OUTPATIENT
Start: 2022-09-15 | End: 2022-11-22 | Stop reason: SDUPTHER

## 2022-11-17 ENCOUNTER — LAB (OUTPATIENT)
Dept: LAB | Facility: HOSPITAL | Age: 57
End: 2022-11-17

## 2022-11-17 DIAGNOSIS — E78.5 HYPERLIPIDEMIA LDL GOAL <70: ICD-10-CM

## 2022-11-17 DIAGNOSIS — E11.649 TYPE 2 DIABETES MELLITUS WITH HYPOGLYCEMIA WITHOUT COMA, WITH LONG-TERM CURRENT USE OF INSULIN: ICD-10-CM

## 2022-11-17 DIAGNOSIS — Z79.4 TYPE 2 DIABETES MELLITUS WITH HYPOGLYCEMIA WITHOUT COMA, WITH LONG-TERM CURRENT USE OF INSULIN: ICD-10-CM

## 2022-11-17 DIAGNOSIS — Z11.59 NEED FOR HEPATITIS C SCREENING TEST: ICD-10-CM

## 2022-11-17 LAB
ALBUMIN SERPL-MCNC: 4.6 G/DL (ref 3.5–5.2)
ALBUMIN/GLOB SERPL: 1.6 G/DL
ALP SERPL-CCNC: 81 U/L (ref 39–117)
ALT SERPL W P-5'-P-CCNC: 18 U/L (ref 1–33)
ANION GAP SERPL CALCULATED.3IONS-SCNC: 13.7 MMOL/L (ref 5–15)
AST SERPL-CCNC: 20 U/L (ref 1–32)
BACTERIA UR QL AUTO: ABNORMAL /HPF
BASOPHILS # BLD AUTO: 0.03 10*3/MM3 (ref 0–0.2)
BASOPHILS NFR BLD AUTO: 0.5 % (ref 0–1.5)
BILIRUB SERPL-MCNC: 0.6 MG/DL (ref 0–1.2)
BILIRUB UR QL STRIP: NEGATIVE
BUN SERPL-MCNC: 17 MG/DL (ref 6–20)
BUN/CREAT SERPL: 15.2 (ref 7–25)
CALCIUM SPEC-SCNC: 10 MG/DL (ref 8.6–10.5)
CHLORIDE SERPL-SCNC: 100 MMOL/L (ref 98–107)
CHOLEST SERPL-MCNC: 130 MG/DL (ref 0–200)
CLARITY UR: CLEAR
CO2 SERPL-SCNC: 26.3 MMOL/L (ref 22–29)
COLOR UR: YELLOW
CREAT SERPL-MCNC: 1.12 MG/DL (ref 0.57–1)
DEPRECATED RDW RBC AUTO: 41.3 FL (ref 37–54)
EGFRCR SERPLBLD CKD-EPI 2021: 57.5 ML/MIN/1.73
EOSINOPHIL # BLD AUTO: 0.12 10*3/MM3 (ref 0–0.4)
EOSINOPHIL NFR BLD AUTO: 1.8 % (ref 0.3–6.2)
ERYTHROCYTE [DISTWIDTH] IN BLOOD BY AUTOMATED COUNT: 12.4 % (ref 12.3–15.4)
GLOBULIN UR ELPH-MCNC: 2.8 GM/DL
GLUCOSE SERPL-MCNC: 177 MG/DL (ref 65–99)
GLUCOSE UR STRIP-MCNC: ABNORMAL MG/DL
HBA1C MFR BLD: 7.1 % (ref 4.8–5.6)
HCT VFR BLD AUTO: 43.9 % (ref 34–46.6)
HCV AB SER DONR QL: NORMAL
HDLC SERPL-MCNC: 45 MG/DL (ref 40–60)
HGB BLD-MCNC: 14 G/DL (ref 12–15.9)
HGB UR QL STRIP.AUTO: NEGATIVE
HYALINE CASTS UR QL AUTO: ABNORMAL /LPF
IMM GRANULOCYTES # BLD AUTO: 0.02 10*3/MM3 (ref 0–0.05)
IMM GRANULOCYTES NFR BLD AUTO: 0.3 % (ref 0–0.5)
KETONES UR QL STRIP: NEGATIVE
LDLC SERPL CALC-MCNC: 59 MG/DL (ref 0–100)
LDLC/HDLC SERPL: 1.21 {RATIO}
LEUKOCYTE ESTERASE UR QL STRIP.AUTO: NEGATIVE
LYMPHOCYTES # BLD AUTO: 2.14 10*3/MM3 (ref 0.7–3.1)
LYMPHOCYTES NFR BLD AUTO: 32.3 % (ref 19.6–45.3)
MCH RBC QN AUTO: 29.2 PG (ref 26.6–33)
MCHC RBC AUTO-ENTMCNC: 31.9 G/DL (ref 31.5–35.7)
MCV RBC AUTO: 91.5 FL (ref 79–97)
MONOCYTES # BLD AUTO: 0.62 10*3/MM3 (ref 0.1–0.9)
MONOCYTES NFR BLD AUTO: 9.4 % (ref 5–12)
NEUTROPHILS NFR BLD AUTO: 3.69 10*3/MM3 (ref 1.7–7)
NEUTROPHILS NFR BLD AUTO: 55.7 % (ref 42.7–76)
NITRITE UR QL STRIP: NEGATIVE
NRBC BLD AUTO-RTO: 0 /100 WBC (ref 0–0.2)
PH UR STRIP.AUTO: 6 [PH] (ref 5–8)
PLATELET # BLD AUTO: 262 10*3/MM3 (ref 140–450)
PMV BLD AUTO: 10.2 FL (ref 6–12)
POTASSIUM SERPL-SCNC: 4.6 MMOL/L (ref 3.5–5.2)
PROT SERPL-MCNC: 7.4 G/DL (ref 6–8.5)
PROT UR QL STRIP: ABNORMAL
RBC # BLD AUTO: 4.8 10*6/MM3 (ref 3.77–5.28)
RBC # UR STRIP: ABNORMAL /HPF
REF LAB TEST METHOD: ABNORMAL
SODIUM SERPL-SCNC: 140 MMOL/L (ref 136–145)
SP GR UR STRIP: >=1.03 (ref 1–1.03)
SQUAMOUS #/AREA URNS HPF: ABNORMAL /HPF
TRIGL SERPL-MCNC: 152 MG/DL (ref 0–150)
TSH SERPL DL<=0.05 MIU/L-ACNC: 0.7 UIU/ML (ref 0.27–4.2)
UROBILINOGEN UR QL STRIP: ABNORMAL
VLDLC SERPL-MCNC: 26 MG/DL (ref 5–40)
WBC # UR STRIP: ABNORMAL /HPF
WBC NRBC COR # BLD: 6.62 10*3/MM3 (ref 3.4–10.8)

## 2022-11-17 PROCEDURE — 82043 UR ALBUMIN QUANTITATIVE: CPT

## 2022-11-17 PROCEDURE — 80053 COMPREHEN METABOLIC PANEL: CPT

## 2022-11-17 PROCEDURE — 84443 ASSAY THYROID STIM HORMONE: CPT

## 2022-11-17 PROCEDURE — 82570 ASSAY OF URINE CREATININE: CPT

## 2022-11-17 PROCEDURE — 86803 HEPATITIS C AB TEST: CPT

## 2022-11-17 PROCEDURE — 80061 LIPID PANEL: CPT

## 2022-11-17 PROCEDURE — 81001 URINALYSIS AUTO W/SCOPE: CPT

## 2022-11-17 PROCEDURE — 85025 COMPLETE CBC W/AUTO DIFF WBC: CPT

## 2022-11-17 PROCEDURE — 83036 HEMOGLOBIN GLYCOSYLATED A1C: CPT

## 2022-11-18 LAB
ALBUMIN UR-MCNC: 7.5 MG/DL
CREAT UR-MCNC: 102.3 MG/DL
MICROALBUMIN/CREAT UR: 73.3 MG/G

## 2022-11-22 ENCOUNTER — OFFICE VISIT (OUTPATIENT)
Dept: FAMILY MEDICINE CLINIC | Facility: CLINIC | Age: 57
End: 2022-11-22

## 2022-11-22 VITALS
BODY MASS INDEX: 31.98 KG/M2 | SYSTOLIC BLOOD PRESSURE: 100 MMHG | HEART RATE: 72 BPM | WEIGHT: 211 LBS | HEIGHT: 68 IN | OXYGEN SATURATION: 98 % | TEMPERATURE: 98.1 F | DIASTOLIC BLOOD PRESSURE: 61 MMHG

## 2022-11-22 DIAGNOSIS — Z99.89 OSA ON CPAP: ICD-10-CM

## 2022-11-22 DIAGNOSIS — J30.9 ALLERGIC RHINITIS, UNSPECIFIED SEASONALITY, UNSPECIFIED TRIGGER: ICD-10-CM

## 2022-11-22 DIAGNOSIS — Z98.61 CAD S/P PERCUTANEOUS CORONARY ANGIOPLASTY: ICD-10-CM

## 2022-11-22 DIAGNOSIS — Z87.891 HISTORY OF SMOKING 25-50 PACK YEARS: ICD-10-CM

## 2022-11-22 DIAGNOSIS — Z12.31 SCREENING MAMMOGRAM FOR BREAST CANCER: ICD-10-CM

## 2022-11-22 DIAGNOSIS — K04.7 TOOTH ABSCESS: ICD-10-CM

## 2022-11-22 DIAGNOSIS — I10 ESSENTIAL HYPERTENSION: ICD-10-CM

## 2022-11-22 DIAGNOSIS — E78.5 HYPERLIPIDEMIA LDL GOAL <70: ICD-10-CM

## 2022-11-22 DIAGNOSIS — Z23 NEED FOR COVID-19 VACCINE: ICD-10-CM

## 2022-11-22 DIAGNOSIS — I25.10 CAD S/P PERCUTANEOUS CORONARY ANGIOPLASTY: ICD-10-CM

## 2022-11-22 DIAGNOSIS — E55.9 VITAMIN D DEFICIENCY: ICD-10-CM

## 2022-11-22 DIAGNOSIS — E11.65 TYPE 2 DIABETES MELLITUS WITH HYPERGLYCEMIA, WITHOUT LONG-TERM CURRENT USE OF INSULIN: ICD-10-CM

## 2022-11-22 DIAGNOSIS — Z23 NEED FOR INFLUENZA VACCINATION: ICD-10-CM

## 2022-11-22 DIAGNOSIS — G47.33 OSA ON CPAP: ICD-10-CM

## 2022-11-22 DIAGNOSIS — K76.0 FATTY LIVER: ICD-10-CM

## 2022-11-22 PROCEDURE — 99214 OFFICE O/P EST MOD 30 MIN: CPT | Performed by: NURSE PRACTITIONER

## 2022-11-22 PROCEDURE — 91312 COVID-19 (PFIZER) BIVALENT BOOSTER 12+YRS: CPT | Performed by: NURSE PRACTITIONER

## 2022-11-22 PROCEDURE — 0124A COVID-19 (PFIZER) BIVALENT BOOSTER 12+YRS: CPT | Performed by: NURSE PRACTITIONER

## 2022-11-22 PROCEDURE — 90686 IIV4 VACC NO PRSV 0.5 ML IM: CPT | Performed by: NURSE PRACTITIONER

## 2022-11-22 PROCEDURE — 90471 IMMUNIZATION ADMIN: CPT | Performed by: NURSE PRACTITIONER

## 2022-11-22 RX ORDER — METOPROLOL SUCCINATE 25 MG/1
25 TABLET, EXTENDED RELEASE ORAL DAILY
Qty: 90 TABLET | Refills: 0 | Status: SHIPPED | OUTPATIENT
Start: 2022-11-22

## 2022-11-22 RX ORDER — CLOPIDOGREL BISULFATE 75 MG/1
75 TABLET ORAL DAILY
Qty: 90 TABLET | Refills: 1 | Status: CANCELLED | OUTPATIENT
Start: 2022-11-22

## 2022-11-22 RX ORDER — NALOXONE HYDROCHLORIDE 4 MG/.1ML
SPRAY NASAL
COMMUNITY
Start: 2022-09-14

## 2022-11-22 RX ORDER — CLOPIDOGREL BISULFATE 75 MG/1
75 TABLET ORAL DAILY
Qty: 90 TABLET | Refills: 1 | Status: SHIPPED | OUTPATIENT
Start: 2022-11-22 | End: 2023-03-22 | Stop reason: SDUPTHER

## 2022-11-22 RX ORDER — AMOXICILLIN 500 MG/1
500 TABLET, FILM COATED ORAL 3 TIMES DAILY
Qty: 30 TABLET | Refills: 0 | Status: SHIPPED | OUTPATIENT
Start: 2022-11-22 | End: 2022-12-02

## 2022-11-22 RX ORDER — MONTELUKAST SODIUM 10 MG/1
10 TABLET ORAL NIGHTLY
Qty: 90 TABLET | Refills: 1 | Status: SHIPPED | OUTPATIENT
Start: 2022-11-22

## 2022-11-22 RX ORDER — MELATONIN
1000 DAILY
Qty: 90 TABLET | Refills: 1 | Status: SHIPPED | OUTPATIENT
Start: 2022-11-22 | End: 2023-01-09 | Stop reason: SDUPTHER

## 2022-11-22 RX ORDER — CETIRIZINE HYDROCHLORIDE 10 MG/1
10 TABLET ORAL DAILY
Qty: 90 TABLET | Refills: 0 | Status: SHIPPED | OUTPATIENT
Start: 2022-11-22

## 2022-11-22 RX ORDER — FAMOTIDINE 40 MG/1
40 TABLET, FILM COATED ORAL 2 TIMES DAILY PRN
Qty: 90 TABLET | Refills: 1 | Status: SHIPPED | OUTPATIENT
Start: 2022-11-22

## 2022-11-22 RX ORDER — DULOXETIN HYDROCHLORIDE 60 MG/1
60 CAPSULE, DELAYED RELEASE ORAL DAILY
Qty: 90 CAPSULE | Refills: 1 | Status: SHIPPED | OUTPATIENT
Start: 2022-11-22 | End: 2023-01-09 | Stop reason: SDUPTHER

## 2022-11-22 RX ORDER — FUROSEMIDE 20 MG/1
20 TABLET ORAL DAILY
Qty: 90 TABLET | Refills: 0 | Status: SHIPPED | OUTPATIENT
Start: 2022-11-22

## 2022-11-22 RX ORDER — GLIPIZIDE 5 MG/1
5 TABLET, FILM COATED, EXTENDED RELEASE ORAL DAILY
Qty: 90 TABLET | Refills: 1 | Status: SHIPPED | OUTPATIENT
Start: 2022-11-22 | End: 2023-01-09 | Stop reason: SDUPTHER

## 2022-11-22 RX ORDER — ROSUVASTATIN CALCIUM 40 MG/1
40 TABLET, COATED ORAL NIGHTLY
Qty: 90 TABLET | Refills: 0 | Status: SHIPPED | OUTPATIENT
Start: 2022-11-22

## 2022-11-22 NOTE — PROGRESS NOTES
Follow Up Office Visit      Patient Name: Claudette Gramajo  : 1965   MRN: [id number]     Chief Complaint:    Chief Complaint   Patient presents with   • Diabetes   • Hypertension   • Hyperlipidemia   • Allergic Rhinitis   • Coronary Artery Disease       History of Present Illness: Claudette Gramajo is a 57 y.o. female who is here today to follow up for DM2, HTN, hyperlipidemia, allergic rhinitis, CAD  Review lab results       Eye exam- - [facility] 2023 Dr Garner  Foot exam- - Dr Rangel  mammogram-2022  Pap-2022  Colonoscopy-2022    C/o Patient broke her tooth last week and she says it is infected around the area.   Patient has not tried to see a dentist.    Patient wants flu vaccine and covid booster    reports that she quit smoking about 10 years ago. Her smoking use included cigarettes   She started smoking about 36 years ago 1.00 pack per day.     Subjective      Review of Systems:   Review of Systems   Constitutional: Negative for fever.   HENT: Positive for dental problem. Negative for ear pain and sore throat.    Eyes: Negative for visual disturbance.   Respiratory: Negative for cough.    Cardiovascular: Negative for chest pain.   Gastrointestinal: Negative for abdominal pain, diarrhea, nausea and vomiting.   Endocrine: Negative for polydipsia and polyuria.   Genitourinary: Negative for dysuria.   Musculoskeletal: Negative for myalgias.   Skin: Negative for rash.   Neurological: Negative for dizziness and headaches.        Past Medical History:   Past Medical History:   Diagnosis Date   • Abnormal ECG    • Anesthesia complication     woke up during surgery   • Arrhythmia    • CAD (coronary artery disease)    • Chest pain    • CHF (congestive heart failure) ([facility])    • Connective tissue anomaly    • Diabetes mellitus (HCC)    • Edema    • Hyperlipidemia LDL goal <70 2021   • Myocardial infarct (HCC)        • FARNAZ on CPAP 2021   • Palpitations    • Sleep  apnea        Past Surgical History:   Past Surgical History:   Procedure Laterality Date   • CARDIAC CATHETERIZATION     • COLONOSCOPY     • COLONOSCOPY N/A 8/26/2022    Procedure: COLONOSCOPY WITH POLYPECTOMY;  Surgeon: Sloan Ortiz MD;  Location: [facility] ENDOSCOPY;  Service: Gastroenterology;  Laterality: N/A;  COLON POLYP   • CORONARY ANGIOPLASTY     • CORONARY STENT PLACEMENT     • ENDOSCOPY N/A 8/26/2022    Procedure: ESOPHAGOGASTRODUODENOSCOPY WITH BX;  Surgeon: Sloan Ortiz MD;  Location: [facility] ENDOSCOPY;  Service: Gastroenterology;  Laterality: N/A;  HIATAL HERNIA   • UPPER GASTROINTESTINAL ENDOSCOPY     • VENOUS THROMBECTOMY         Family History:   Family History   Problem Relation Age of Onset   • Suicidality Mother    • Heart attack Father    • Heart disease Father    • No Known Problems Maternal Grandmother    • No Known Problems Maternal Grandfather    • Heart disease Paternal Grandmother    • Cancer Paternal Grandmother    • Heart disease Paternal Grandfather    • Cancer Paternal Grandfather    • Thyroid disease Paternal Grandfather    • Pancreatic cancer Paternal Grandfather    • Esophageal cancer Paternal Grandfather    • Stomach cancer Paternal Grandfather    • Colon cancer Neg Hx    • Malig Hyperthermia Neg Hx        Social History:   Social History     Socioeconomic History   • Marital status:    Tobacco Use   • Smoking status: Former     Packs/day: 1.00     Types: Cigarettes     Start date: 1/1/1986     Quit date: 1/1/2012     Years since quitting: 10.9   • Smokeless tobacco: Never   • Tobacco comments:     caffeine use   Vaping Use   • Vaping Use: Never used   Substance and Sexual Activity   • Alcohol use: No   • Drug use: No   • Sexual activity: Defer       Medications:     Current Outpatient Medications:   •  Alcohol Swabs (Easy Touch Alcohol Prep Medium) 70 % pads, , Disp: , Rfl:   •  aspirin 81 MG tablet, Take 81 mg by mouth Daily., Disp: , Rfl:   •   Calcipotriene 0.005 % solution, Apply 1 application topically to the appropriate area as directed 2 (Two) Times a Day., Disp: 60 mL, Rfl: 1  •  cetirizine (zyrTEC) 10 MG tablet, Take 1 tablet by mouth Daily., Disp: 90 tablet, Rfl: 0  •  cholecalciferol (VITAMIN D3) 25 MCG (1000 UT) tablet, Take 1 tablet by mouth Daily. Does not take daily only occasionally, Disp: 90 tablet, Rfl: 1  •  clopidogrel (PLAVIX) 75 MG tablet, Take 1 tablet by mouth Daily., Disp: 90 tablet, Rfl: 1  •  DULoxetine (CYMBALTA) 60 MG capsule, Take 1 capsule by mouth Daily., Disp: 90 capsule, Rfl: 1  •  empagliflozin (JARDIANCE) 10 MG tablet tablet, Take 0.5 tablets by mouth Daily. 1/2 tab, Disp: 30 tablet, Rfl: 1  •  famotidine (Pepcid) 40 MG tablet, Take 1 tablet by mouth 2 (Two) Times a Day As Needed for Heartburn., Disp: 90 tablet, Rfl: 1  •  fluticasone (FLONASE) 50 MCG/ACT nasal spray, 2 sprays into the nostril(s) as directed by provider Daily. 2 sprays each nostril daily, Disp: 3 mL, Rfl: 1  •  FREESTYLE LITE test strip, , Disp: , Rfl:   •  furosemide (LASIX) 20 MG tablet, Take 1 tablet by mouth Daily., Disp: 90 tablet, Rfl: 0  •  glipizide (GLUCOTROL XL) 5 MG ER tablet, Take 1 tablet by mouth Daily for 180 days., Disp: 90 tablet, Rfl: 1  •  Lancets (freestyle) lancets, , Disp: , Rfl:   •  metFORMIN (GLUCOPHAGE) 1000 MG tablet, Take 1 tablet by mouth 2 (Two) Times a Day With Meals., Disp: 180 tablet, Rfl: 1  •  metoprolol succinate XL (TOPROL-XL) 25 MG 24 hr tablet, Take 1 tablet by mouth Daily., Disp: 90 tablet, Rfl: 0  •  montelukast (SINGULAIR) 10 MG tablet, Take 1 tablet by mouth Every Night., Disp: 90 tablet, Rfl: 1  •  Narcan 4 MG/0.1ML nasal spray, , Disp: , Rfl:   •  nitroglycerin (NITROSTAT) 0.4 MG SL tablet, Place 1 tablet under the tongue Every 5 (Five) Minutes As Needed for Chest Pain., Disp: 35 tablet, Rfl: 6  •  oxyCODONE (ROXICODONE) 5 MG immediate release tablet, Take  by mouth As Needed., Disp: , Rfl:   •  rosuvastatin  "(CRESTOR) 40 MG tablet, Take 1 tablet by mouth Every Night., Disp: 90 tablet, Rfl: 0  •  amoxicillin (AMOXIL) 500 MG tablet, Take 1 tablet by mouth 3 (Three) Times a Day for 10 days., Disp: 30 tablet, Rfl: 0    Allergies:   Allergies   Allergen Reactions   • Other Unknown - Low Severity     Hay    Sneezing watery eyes cough   • Adhesive Tape Other (See Comments)     BLISTERS   • Dust Mite Extract Other (See Comments)   • Molds & Smuts Cough   • Pollen Extract Cough           Objective     Physical Exam:  Vital Signs:   Vitals:    11/22/22 1149   BP: 100/61   Pulse: 72   Temp: 98.1 °F (36.7 °C)   SpO2: 98%   Weight: 95.7 kg (211 lb)   Height: 172.7 cm (67.99\")     Body mass index is 32.09 kg/m².     Physical Exam  HENT:      Right Ear: Tympanic membrane normal.      Left Ear: Tympanic membrane normal.      Nose: Nose normal.      Mouth/Throat:      Mouth: Mucous membranes are moist.      Dentition: Dental tenderness, dental caries and dental abscesses present.   Eyes:      Conjunctiva/sclera: Conjunctivae normal.   Neck:      Vascular: No carotid bruit.   Cardiovascular:      Rate and Rhythm: Normal rate and regular rhythm.      Heart sounds: Normal heart sounds. No murmur heard.  Pulmonary:      Effort: Pulmonary effort is normal.      Breath sounds: Normal breath sounds.   Abdominal:      General: Bowel sounds are normal.      Palpations: Abdomen is soft.   Musculoskeletal:      Right lower leg: No edema.      Left lower leg: No edema.   Lymphadenopathy:      Cervical: No cervical adenopathy.   Skin:     General: Skin is warm and dry.   Neurological:      Mental Status: She is alert.   Psychiatric:         Mood and Affect: Mood normal.         Behavior: Behavior normal.             Assessment / Plan      Assessment/Plan:   Diagnoses and all orders for this visit:    1. Type 2 diabetes mellitus with hyperglycemia, without long-term current use of insulin (HCC)  -     metFORMIN (GLUCOPHAGE) 1000 MG tablet; Take 1 " tablet by mouth 2 (Two) Times a Day With Meals.  Dispense: 180 tablet; Refill: 1  -     CBC Auto Differential; Future  -     Comprehensive Metabolic Panel; Future  -     Microalbumin / Creatinine Urine Ratio - Urine, Clean Catch; Future  -     Hemoglobin A1c; Future  -     TSH; Future  -     Urinalysis With Culture If Indicated -; Future    2. Essential hypertension    3. Hyperlipidemia LDL goal <70  -     Comprehensive Metabolic Panel; Future  -     Lipid Panel; Future    4. CAD S/P percutaneous coronary angioplasty    5. FARNAZ on CPAP    6. Allergic rhinitis, unspecified seasonality, unspecified trigger  -     montelukast (SINGULAIR) 10 MG tablet; Take 1 tablet by mouth Every Night.  Dispense: 90 tablet; Refill: 1    7. Vitamin D deficiency  -     cholecalciferol (VITAMIN D3) 25 MCG (1000 UT) tablet; Take 1 tablet by mouth Daily. Does not take daily only occasionally  Dispense: 90 tablet; Refill: 1    8. Fatty liver    9. Tooth abscess    10. Screening mammogram for breast cancer  -     Mammo Screening Digital Tomosynthesis Bilateral With CAD; Future    11. Need for COVID-19 vaccine  -     COVID-19 Bivalent Booster (Pfizer) 12+yrs    12. Need for influenza vaccination  -     FluLaval/Fluzone >6 mos ([id number])    13. History of smoking 25-50 pack years  -     CT Chest Low Dose Wo; Future    Other orders  -     cetirizine (zyrTEC) 10 MG tablet; Take 1 tablet by mouth Daily.  Dispense: 90 tablet; Refill: 0  -     furosemide (LASIX) 20 MG tablet; Take 1 tablet by mouth Daily.  Dispense: 90 tablet; Refill: 0  -     metoprolol succinate XL (TOPROL-XL) 25 MG 24 hr tablet; Take 1 tablet by mouth Daily.  Dispense: 90 tablet; Refill: 0  -     famotidine (Pepcid) 40 MG tablet; Take 1 tablet by mouth 2 (Two) Times a Day As Needed for Heartburn.  Dispense: 90 tablet; Refill: 1  -     rosuvastatin (CRESTOR) 40 MG tablet; Take 1 tablet by mouth Every Night.  Dispense: 90 tablet; Refill: 0  -     empagliflozin (JARDIANCE) 10 MG  "tablet tablet; Take 0.5 tablets by mouth Daily. 1/2 tab  Dispense: 30 tablet; Refill: 1  -     glipizide (GLUCOTROL XL) 5 MG ER tablet; Take 1 tablet by mouth Daily for 180 days.  Dispense: 90 tablet; Refill: 1  -     DULoxetine (CYMBALTA) 60 MG capsule; Take 1 capsule by mouth Daily.  Dispense: 90 capsule; Refill: 1  -     clopidogrel (PLAVIX) 75 MG tablet; Take 1 tablet by mouth Daily.  Dispense: 90 tablet; Refill: 1  -     amoxicillin (AMOXIL) 500 MG tablet; Take 1 tablet by mouth 3 (Three) Times a Day for 10 days.  Dispense: 30 tablet; Refill: 0       Diabetes mellitus type 2 hemoglobin A1c below 8 we will continue current medications  Hyperlipidemia LDL below goal of 70 we will continue Crestor 40 mg at nighttime  Hypertension currently controlled with metoprolol and Lasix  Coronary artery disease currently taking Plavix aspirin and statin keep follow-up with cardiology as scheduled  Obstructive sleep apnea continue use of CPAP machine nightly as directed  Seasonal allergies currently controlled with Zyrtec Singulair Flonase will provide refills  Vitamin D deficiency recommend 1000 units supplementation daily  Fatty liver recommend exercise 30 minutes daily weight loss CMP reviewed liver enzymes normal at this time  Tooth abscess we will treat amoxicillin inform patient she needs to schedule appointment with dentist as soon as possible  We will provide order for screening mammogram denies lumps mass tenderness blood or discharge from nipple  History of smoking greater than 25 pack years will obtain CT low-dose of the chest patient reports she has not smoked in 10 years      Follow Up:   Return in about 6 months (around 5/22/2023).    Portia Bahena, SARWAT    \"Please note that portions of this note were completed with a voice recognition program.\"    "

## 2023-01-09 ENCOUNTER — TELEPHONE (OUTPATIENT)
Dept: FAMILY MEDICINE CLINIC | Facility: CLINIC | Age: 58
End: 2023-01-09
Payer: OTHER GOVERNMENT

## 2023-01-09 DIAGNOSIS — E55.9 VITAMIN D DEFICIENCY: ICD-10-CM

## 2023-01-09 DIAGNOSIS — E11.65 TYPE 2 DIABETES MELLITUS WITH HYPERGLYCEMIA, WITHOUT LONG-TERM CURRENT USE OF INSULIN: ICD-10-CM

## 2023-01-09 RX ORDER — DULOXETIN HYDROCHLORIDE 60 MG/1
60 CAPSULE, DELAYED RELEASE ORAL DAILY
Qty: 90 CAPSULE | Refills: 1 | Status: SHIPPED | OUTPATIENT
Start: 2023-01-09 | End: 2023-01-10 | Stop reason: SDUPTHER

## 2023-01-09 RX ORDER — MELATONIN
1000 DAILY
Qty: 90 TABLET | Refills: 1 | Status: SHIPPED | OUTPATIENT
Start: 2023-01-09 | End: 2023-01-10 | Stop reason: SDUPTHER

## 2023-01-09 RX ORDER — GLIPIZIDE 5 MG/1
5 TABLET, FILM COATED, EXTENDED RELEASE ORAL DAILY
Qty: 90 TABLET | Refills: 1 | Status: SHIPPED | OUTPATIENT
Start: 2023-01-09 | End: 2023-01-10 | Stop reason: SDUPTHER

## 2023-01-09 NOTE — TELEPHONE ENCOUNTER
Caller: Davis Gramajo    Relationship: Emergency Contact    Best call back number: [phone]    Requested Prescriptions:   Requested Prescriptions     Pending Prescriptions Disp Refills   • cholecalciferol (VITAMIN D3) 25 MCG (1000 UT) tablet 90 tablet 1     Sig: Take 1 tablet by mouth Daily. Does not take daily only occasionally   • metFORMIN (GLUCOPHAGE) 1000 MG tablet 180 tablet 1     Sig: Take 1 tablet by mouth 2 (Two) Times a Day With Meals.   • glipizide (GLUCOTROL XL) 5 MG ER tablet 90 tablet 1     Sig: Take 1 tablet by mouth Daily for 180 days.        Pharmacy where request should be sent: [facility] [place] [facility] -  [place], [place] - [address] [phone] [facility] [phone] FX     Does the patient have less than a 3 day supply:  [] Yes  [x] No    Would you like a call back once the refill request has been completed: [] Yes [x] No    If the office needs to give you a call back, can they leave a voicemail: [x] Yes [x] No    Mar Martinez Rep   01/09/23 12:28 EST

## 2023-01-10 DIAGNOSIS — E11.65 TYPE 2 DIABETES MELLITUS WITH HYPERGLYCEMIA, WITHOUT LONG-TERM CURRENT USE OF INSULIN: ICD-10-CM

## 2023-01-10 DIAGNOSIS — E55.9 VITAMIN D DEFICIENCY: ICD-10-CM

## 2023-01-10 RX ORDER — DULOXETIN HYDROCHLORIDE 60 MG/1
60 CAPSULE, DELAYED RELEASE ORAL DAILY
Qty: 90 CAPSULE | Refills: 1 | Status: SHIPPED | OUTPATIENT
Start: 2023-01-10

## 2023-01-10 RX ORDER — MELATONIN
1000 DAILY
Qty: 90 TABLET | Refills: 1 | Status: SHIPPED | OUTPATIENT
Start: 2023-01-10

## 2023-01-10 RX ORDER — GLIPIZIDE 5 MG/1
5 TABLET, FILM COATED, EXTENDED RELEASE ORAL DAILY
Qty: 90 TABLET | Refills: 1 | Status: SHIPPED | OUTPATIENT
Start: 2023-01-10 | End: 2023-07-09

## 2023-02-22 ENCOUNTER — HOSPITAL ENCOUNTER (OUTPATIENT)
Dept: MAMMOGRAPHY | Facility: HOSPITAL | Age: 58
Discharge: HOME | End: 2023-02-22
Payer: OTHER GOVERNMENT

## 2023-02-22 ENCOUNTER — HOSPITAL ENCOUNTER (OUTPATIENT)
Dept: CT IMAGING | Facility: HOSPITAL | Age: 58
Discharge: HOME | End: 2023-02-22
Payer: OTHER GOVERNMENT

## 2023-02-22 DIAGNOSIS — Z12.31 SCREENING MAMMOGRAM FOR BREAST CANCER: ICD-10-CM

## 2023-02-22 DIAGNOSIS — Z87.891 HISTORY OF SMOKING 25-50 PACK YEARS: ICD-10-CM

## 2023-02-22 PROCEDURE — 71271 CT THORAX LUNG CANCER SCR C-: CPT

## 2023-02-22 PROCEDURE — 77063 BREAST TOMOSYNTHESIS BI: CPT

## 2023-02-22 PROCEDURE — 77067 SCR MAMMO BI INCL CAD: CPT

## 2023-02-28 ENCOUNTER — OFFICE VISIT (OUTPATIENT)
Dept: PODIATRY | Facility: CLINIC | Age: 58
End: 2023-02-28
Payer: OTHER GOVERNMENT

## 2023-02-28 VITALS
HEIGHT: 68 IN | SYSTOLIC BLOOD PRESSURE: 130 MMHG | HEART RATE: 88 BPM | WEIGHT: 220 LBS | TEMPERATURE: 98 F | OXYGEN SATURATION: 98 % | DIASTOLIC BLOOD PRESSURE: 73 MMHG | BODY MASS INDEX: 33.34 KG/M2

## 2023-02-28 DIAGNOSIS — E11.9 NON-INSULIN DEPENDENT TYPE 2 DIABETES MELLITUS: ICD-10-CM

## 2023-02-28 DIAGNOSIS — E11.8 DIABETIC FOOT: Primary | ICD-10-CM

## 2023-02-28 PROCEDURE — 99213 OFFICE O/P EST LOW 20 MIN: CPT | Performed by: PODIATRIST

## 2023-02-28 PROCEDURE — G8404 LOW EXTEMITY NEUR EXAM DOCUM: HCPCS | Performed by: PODIATRIST

## 2023-02-28 RX ORDER — FAMOTIDINE 20 MG/1
TABLET, FILM COATED ORAL
COMMUNITY
Start: 2023-02-17 | End: 2023-02-28 | Stop reason: SDUPTHER

## 2023-02-28 NOTE — PROGRESS NOTES
[facility] - PODIATRY    Today's Date: 02/28/23    Patient Name: Claudette Gramajo  MRN: [id number]  CSN: [id number]  PCP: Angel Bahena APRN, Last PCP Visit:  11/22/2022  Referring Provider: No ref. provider found    SUBJECTIVE     Chief Complaint   Patient presents with   • Left Foot - Follow-up, Diabetes   • Right Foot - Follow-up, Diabetes     HPI: Claudette Gramajo, a 57 y.o.female, presents to clinic for a diabetic foot evaluation.    New, Established, New Problem:  est    Onset:  insidious    Nature:  NIDDM    Stable, worsening, improving:  stable    Patient controlling diabetes via:  Oral meds    Patient states their most recent HgB A1C was 7.1.    Patient denies any fevers, chills, nausea, vomiting, shortness of breath, nor any other constitutional signs nor symptoms.    Medical changes:  none.    Past Medical History:   Diagnosis Date   • Abnormal ECG    • Anesthesia complication     woke up during surgery   • Arrhythmia    • CAD (coronary artery disease)    • Chest pain    • CHF (congestive heart failure) (HCC)    • Connective tissue anomaly    • Diabetes mellitus (HCC)    • Edema    • Hyperlipidemia LDL goal <70 08/31/2021   • Myocardial infarct (HCC)     2013   • FARNAZ on CPAP 09/01/2021   • Palpitations    • Sleep apnea      Past Surgical History:   Procedure Laterality Date   • CARDIAC CATHETERIZATION     • COLONOSCOPY     • COLONOSCOPY N/A 8/26/2022    Procedure: COLONOSCOPY WITH POLYPECTOMY;  Surgeon: Sloan Ortiz MD;  Location: [facility] ENDOSCOPY;  Service: Gastroenterology;  Laterality: N/A;  COLON POLYP   • CORONARY ANGIOPLASTY     • CORONARY STENT PLACEMENT     • ENDOSCOPY N/A 8/26/2022    Procedure: ESOPHAGOGASTRODUODENOSCOPY WITH BX;  Surgeon: Sloan Ortiz MD;  Location: [facility] ENDOSCOPY;  Service: Gastroenterology;  Laterality: N/A;  HIATAL HERNIA   • UPPER GASTROINTESTINAL ENDOSCOPY     • VENOUS THROMBECTOMY       Family History   Problem Relation Age  of Onset   • Suicidality Mother    • Heart attack Father    • Heart disease Father    • Cancer Father    • No Known Problems Maternal Grandmother    • No Known Problems Maternal Grandfather    • Heart disease Paternal Grandmother    • Cancer Paternal Grandmother    • Heart disease Paternal Grandfather    • Cancer Paternal Grandfather    • Thyroid disease Paternal Grandfather    • Pancreatic cancer Paternal Grandfather    • Esophageal cancer Paternal Grandfather    • Stomach cancer Paternal Grandfather    • Colon cancer Neg Hx    • Malig Hyperthermia Neg Hx      Social History     Socioeconomic History   • Marital status:    Tobacco Use   • Smoking status: Former     Packs/day: 1.00     Years: 26.00     Pack years: 26.00     Types: Cigarettes     Start date: 1986     Quit date: 2012     Years since quittin.1   • Smokeless tobacco: Never   • Tobacco comments:     caffeine use   Vaping Use   • Vaping Use: Never used   Substance and Sexual Activity   • Alcohol use: No   • Drug use: No   • Sexual activity: Yes     Partners: Male     Birth control/protection: Same-sex partner     Allergies   Allergen Reactions   • Other Unknown - Low Severity     Hay    Sneezing watery eyes cough   • Adhesive Tape Other (See Comments)     BLISTERS   • Dust Mite Extract Other (See Comments)   • Molds & Smuts Cough   • Pollen Extract Cough     Current Outpatient Medications   Medication Sig Dispense Refill   • Alcohol Swabs (Easy Touch Alcohol Prep Medium) 70 % pads      • aspirin 81 MG tablet Take 1 tablet by mouth Daily.     • Calcipotriene 0.005 % solution Apply 1 application topically to the appropriate area as directed 2 (Two) Times a Day. 60 mL 1   • cetirizine (zyrTEC) 10 MG tablet Take 1 tablet by mouth Daily. 90 tablet 0   • cholecalciferol (VITAMIN D3) 25 MCG (1000 UT) tablet Take 1 tablet by mouth Daily. Does not take daily only occasionally 90 tablet 1   • clopidogrel (PLAVIX) 75 MG tablet Take 1 tablet by  mouth Daily. 90 tablet 1   • DULoxetine (CYMBALTA) 60 MG capsule Take 1 capsule by mouth Daily. 90 capsule 1   • empagliflozin (JARDIANCE) 10 MG tablet tablet Take 0.5 tablets by mouth Daily. 1/2 tab 30 tablet 1   • famotidine (Pepcid) 40 MG tablet Take 1 tablet by mouth 2 (Two) Times a Day As Needed for Heartburn. 90 tablet 1   • fluticasone (FLONASE) 50 MCG/ACT nasal spray 2 sprays into the nostril(s) as directed by provider Daily. 2 sprays each nostril daily 3 mL 1   • FREESTYLE LITE test strip      • furosemide (LASIX) 20 MG tablet Take 1 tablet by mouth Daily. 90 tablet 0   • glipizide (GLUCOTROL XL) 5 MG ER tablet Take 1 tablet by mouth Daily for 180 days. 90 tablet 1   • Lancets (freestyle) lancets      • metFORMIN (GLUCOPHAGE) 1000 MG tablet Take 1 tablet by mouth 2 (Two) Times a Day With Meals. 180 tablet 1   • metoprolol succinate XL (TOPROL-XL) 25 MG 24 hr tablet Take 1 tablet by mouth Daily. 90 tablet 0   • montelukast (SINGULAIR) 10 MG tablet Take 1 tablet by mouth Every Night. 90 tablet 1   • Narcan 4 MG/0.1ML nasal spray      • nitroglycerin (NITROSTAT) 0.4 MG SL tablet Place 1 tablet under the tongue Every 5 (Five) Minutes As Needed for Chest Pain. 35 tablet 6   • oxyCODONE (ROXICODONE) 5 MG immediate release tablet Take  by mouth As Needed.     • rosuvastatin (CRESTOR) 40 MG tablet Take 1 tablet by mouth Every Night. 90 tablet 0     No current facility-administered medications for this visit.     Review of Systems   Constitutional: Negative.    All other systems reviewed and are negative.      OBJECTIVE     Vitals:    02/28/23 1457   BP: 130/73   Pulse: 88   Temp: 98 °F (36.7 °C)   SpO2: 98%       Body mass index is 33.46 kg/m².    Lab Results   Component Value Date    HGBA1C 7.10 (H) 11/17/2022       Lab Results   Component Value Date    GLUCOSE 177 (H) 11/17/2022    CALCIUM 10.0 11/17/2022     11/17/2022    K 4.6 11/17/2022    CO2 26.3 11/17/2022     11/17/2022    BUN 17 11/17/2022     CREATININE 1.12 (H) 11/17/2022    EGFRIFAFRI 69 10/22/2021    EGFRIFNONA 54 (L) 02/14/2022    BCR 15.2 11/17/2022    ANIONGAP 13.7 11/17/2022       Patient seen in no apparent distress.      PHYSICAL EXAM:     Foot/Ankle Exam:       General:   Diabetic Foot Exam Performed    Appearance: obesity    Orientation: AAOx3    Affect: appropriate    Gait: unimpaired    Shoe Gear:  Casual shoes    VASCULAR      Right Foot Vascularity   Normal vascular exam    Dorsalis pedis:  2+  Posterior tibial:  2+  Skin Temperature: warm    Edema Grading:  None  CFT:  < 3 seconds  Pedal Hair Growth:  Present  Varicosities: none       Left Foot Vascularity   Normal vascular exam    Dorsalis pedis:  2+  Posterior tibial:  2+  Skin Temperature: warm    Edema Grading:  None  CFT:  < 3 seconds  Pedal Hair Growth:  Present  Varicosities: none        NEUROLOGIC     Right Foot Neurologic   Normal sensation    Light touch sensation:  Normal  Vibratory sensation:  Normal  Hot/Cold sensation: normal    Protective Sensation using [place] Monofilament:  10     Left Foot Neurologic   Normal sensation    Light touch sensation:  Normal  Vibratory sensation:  Normal  Hot/cold sensation: normal    Protective Sensation using [place] Monofilament:  10     MUSCLE STRENGTH     Right Foot Muscle Strength   Normal strength    Foot dorsiflexion:  5  Foot plantar flexion:  5  Foot inversion:  5  Foot eversion:  5     Left Foot Muscle Strength   Foot dorsiflexion:  5  Foot plantar flexion:  5  Foot inversion:  5  Foot eversion:  5     RANGE OF MOTION      Right Foot Range of Motion   Foot and ankle ROM within normal limits       Left Foot Range of Motion   Foot and ankle ROM within normal limits       DERMATOLOGIC     Right Foot Dermatologic   Skin: skin intact    Nails: normal       Left Foot Dermatologic   Skin: skin intact    Nails: normal        Diabetic Foot Exam Performed      ASSESSMENT/PLAN     Diagnoses and all orders for this  visit:    1. Diabetic foot (HCC) (Primary)    2. Non-insulin dependent type 2 diabetes mellitus (HCC)        Comprehensive lower extremity examination and evaluation was performed.    Discussed findings and treatment plan including risks, benefits, and treatment options with patient in detail. Patient agreed with treatment plan.    Medications and allergies reviewed.  Reviewed available blood glucose and HgB A1C lab values along with other pertinent labs.  These were discussed with the patient as to their importance of diabetic maintenance.    Diabetic foot exam performed and documented this date, compliant with CQM required standards. Detail of findings as noted in physical exam.  Lower extremity Neurologic exam for diabetic patient performed and documented this date, compliant with PQRS required standards. Detail of findings as noted in physical exam.  Advised patient importance of good routine lower extremity hygiene. Advised patient importance of evaluating for intact skin and pain free nail borders.  Advised patient to use mirror to evaluate plantar/ soles of feet for better visualization. Advised patient monitor and phone office to be seen if any cracking to skin, open lesions, painful nail borders or if nails become elongated prior to next visit. Advised patient importance of daily cleansing of lower extremities, followed by good skin cream to maintain normal hydration of skin. Also advised patient importance of close daily monitoring of blood sugar. Advised to regulate diet and medications to maintain control of blood sugar in optimal range. Contact primary care provider if difficulties maintaining blood sugar levels.  Advised Patient of presence of Diabetes Mellitus condition.  Advised Patient risk of progression and worsening or improvement, then return of condition.  Will monitor condition for any change in future. Treat with most appropriate treatment pending status of condition.  Counseled and advised  patient extensively on nature and ramifications of diabetes. Standard instructions given to patient for good diabetic foot care and maintenance. Advised importance of careful monitoring to avoid break down and complications secondary to diabetes. Advised patient importance of strict maintenance of blood sugar control. Advised patient of possible ominous results from neglect of condition, i.e.: amputation/ loss of digits, feet and legs, or even death.  Patient states understands counseling, will monitor closely, continue good hygiene and routine diabetic foot care. Patient will contact office is questions or problems.      An After Visit Summary was printed and given to the patient at discharge, including (if requested) any available informative/educational handouts regarding diagnosis, treatment, or medications. All questions were answered to patient/family satisfaction. Should symptoms fail to improve or worsen they agree to call or return to clinic or to go to the Emergency Department. Discussed the importance of following up with any needed screening tests/labs/specialist appointments and any requested follow-up recommended by me today. Importance of maintaining follow-up discussed and patient accepts that missed appointments can delay diagnosis and potentially lead to worsening of conditions.    Return in about 1 year (around 2/28/2024) for Podiatry Diabetic Foot Exam., or sooner if acute issues arise.    This document has been electronically signed by Topher Keene DPM on February 28, 2023 15:16 EST

## 2023-03-20 DIAGNOSIS — I10 ESSENTIAL HYPERTENSION: Primary | ICD-10-CM

## 2023-03-20 DIAGNOSIS — E78.5 HYPERLIPIDEMIA LDL GOAL <70: ICD-10-CM

## 2023-03-22 ENCOUNTER — OFFICE VISIT (OUTPATIENT)
Dept: CARDIOLOGY | Facility: CLINIC | Age: 58
End: 2023-03-22
Payer: OTHER GOVERNMENT

## 2023-03-22 VITALS
SYSTOLIC BLOOD PRESSURE: 111 MMHG | HEIGHT: 68 IN | DIASTOLIC BLOOD PRESSURE: 71 MMHG | HEART RATE: 78 BPM | WEIGHT: 218 LBS | BODY MASS INDEX: 33.04 KG/M2

## 2023-03-22 DIAGNOSIS — I25.10 CAD S/P PERCUTANEOUS CORONARY ANGIOPLASTY: Primary | ICD-10-CM

## 2023-03-22 DIAGNOSIS — E78.5 HYPERLIPIDEMIA LDL GOAL <70: ICD-10-CM

## 2023-03-22 DIAGNOSIS — Z98.61 CAD S/P PERCUTANEOUS CORONARY ANGIOPLASTY: Primary | ICD-10-CM

## 2023-03-22 DIAGNOSIS — I10 ESSENTIAL HYPERTENSION: ICD-10-CM

## 2023-03-22 PROBLEM — J43.8 OTHER EMPHYSEMA (HCC): Status: ACTIVE | Noted: 2023-03-22

## 2023-03-22 PROCEDURE — 99214 OFFICE O/P EST MOD 30 MIN: CPT | Performed by: INTERNAL MEDICINE

## 2023-03-22 RX ORDER — CLOPIDOGREL BISULFATE 75 MG/1
75 TABLET ORAL DAILY
Qty: 90 TABLET | Refills: 1 | Status: SHIPPED | OUTPATIENT
Start: 2023-03-22

## 2023-03-22 RX ORDER — ASPIRIN 81 MG/1
81 TABLET ORAL DAILY
Qty: 90 TABLET | Refills: 3
Start: 2023-03-22

## 2023-03-22 NOTE — PROGRESS NOTES
Chief Complaint  Follow-up, Coronary Artery Disease, Hypertension, and Hyperlipidemia     Subjective    Patient has been doing well symptomatically denies any chest pain or change in breathing capacity    Past Medical History:   Diagnosis Date   • Abnormal ECG    • Anesthesia complication     woke up during surgery   • Arrhythmia    • CAD (coronary artery disease)    • Chest pain    • CHF (congestive heart failure) ([facility])    • Connective tissue anomaly    • Diabetes mellitus ([facility])    • Edema    • Hyperlipidemia LDL goal <70 08/31/2021   • Myocardial infarct ([facility])     2013   • FARNAZ on CPAP 09/01/2021   • Palpitations    • Sleep apnea          Current Outpatient Medications:   •  Alcohol Swabs (Easy Touch Alcohol Prep Medium) 70 % pads, , Disp: , Rfl:   •  Calcipotriene 0.005 % solution, Apply 1 application topically to the appropriate area as directed 2 (Two) Times a Day., Disp: 60 mL, Rfl: 1  •  cetirizine (zyrTEC) 10 MG tablet, Take 1 tablet by mouth Daily., Disp: 90 tablet, Rfl: 0  •  cholecalciferol (VITAMIN D3) 25 MCG (1000 UT) tablet, Take 1 tablet by mouth Daily. Does not take daily only occasionally, Disp: 90 tablet, Rfl: 1  •  clopidogrel (PLAVIX) 75 MG tablet, Take 1 tablet by mouth Daily., Disp: 90 tablet, Rfl: 1  •  DULoxetine (CYMBALTA) 60 MG capsule, Take 1 capsule by mouth Daily., Disp: 90 capsule, Rfl: 1  •  empagliflozin (JARDIANCE) 10 MG tablet tablet, Take 0.5 tablets by mouth Daily. 1/2 tab, Disp: 30 tablet, Rfl: 1  •  famotidine (Pepcid) 40 MG tablet, Take 1 tablet by mouth 2 (Two) Times a Day As Needed for Heartburn., Disp: 90 tablet, Rfl: 1  •  fluticasone (FLONASE) 50 MCG/ACT nasal spray, 2 sprays into the nostril(s) as directed by provider Daily. 2 sprays each nostril daily, Disp: 3 mL, Rfl: 1  •  FREESTYLE LITE test strip, , Disp: , Rfl:   •  furosemide (LASIX) 20 MG tablet, Take 1 tablet by mouth Daily., Disp: 90 tablet, Rfl: 0  •  glipizide (GLUCOTROL XL) 5 MG ER tablet, Take 1 tablet by  mouth Daily for 180 days., Disp: 90 tablet, Rfl: 1  •  Lancets (freestyle) lancets, , Disp: , Rfl:   •  metFORMIN (GLUCOPHAGE) 1000 MG tablet, Take 1 tablet by mouth 2 (Two) Times a Day With Meals., Disp: 180 tablet, Rfl: 1  •  metoprolol succinate XL (TOPROL-XL) 25 MG 24 hr tablet, Take 1 tablet by mouth Daily., Disp: 90 tablet, Rfl: 0  •  montelukast (SINGULAIR) 10 MG tablet, Take 1 tablet by mouth Every Night., Disp: 90 tablet, Rfl: 1  •  Narcan 4 MG/0.1ML nasal spray, , Disp: , Rfl:   •  nitroglycerin (NITROSTAT) 0.4 MG SL tablet, Place 1 tablet under the tongue Every 5 (Five) Minutes As Needed for Chest Pain., Disp: 35 tablet, Rfl: 6  •  oxyCODONE (ROXICODONE) 5 MG immediate release tablet, Take  by mouth As Needed., Disp: , Rfl:   •  rosuvastatin (CRESTOR) 40 MG tablet, Take 1 tablet by mouth Every Night., Disp: 90 tablet, Rfl: 0  •  aspirin 81 MG EC tablet, Take 1 tablet by mouth Daily., Disp: 90 tablet, Rfl: 3    Medications Discontinued During This Encounter   Medication Reason   • clopidogrel (PLAVIX) 75 MG tablet Reorder   • aspirin 81 MG tablet      Allergies   Allergen Reactions   • Other Unknown - Low Severity     Hay    Sneezing watery eyes cough   • Adhesive Tape Other (See Comments)     BLISTERS   • Dust Mite Extract Other (See Comments)   • Molds & Smuts Cough   • Pollen Extract Cough        Social History     Tobacco Use   • Smoking status: Former     Packs/day: 1.00     Years: 26.00     Pack years: 26.00     Types: Cigarettes     Start date: 1986     Quit date: 2012     Years since quittin.2   • Smokeless tobacco: Never   • Tobacco comments:     caffeine use   Vaping Use   • Vaping Use: Never used   Substance Use Topics   • Alcohol use: No   • Drug use: No       Family History   Problem Relation Age of Onset   • Suicidality Mother    • Heart attack Father    • Heart disease Father    • Cancer Father    • No Known Problems Maternal Grandmother    • No Known Problems Maternal  "Grandfather    • Heart disease Paternal Grandmother    • Cancer Paternal Grandmother    • Heart disease Paternal Grandfather    • Cancer Paternal Grandfather    • Thyroid disease Paternal Grandfather    • Pancreatic cancer Paternal Grandfather    • Esophageal cancer Paternal Grandfather    • Stomach cancer Paternal Grandfather    • Colon cancer Neg Hx    • Malig Hyperthermia Neg Hx         Objective     /71   Pulse 78   Ht 172.7 cm (67.99\")   Wt 98.9 kg (218 lb)   BMI 33.16 kg/m²       Physical Exam    General Appearance:   · no acute distress  · Alert and oriented x3  HENT:   · lips not cyanotic  · Atraumatic  Neck:  · No jvd   · supple  Respiratory:  · no respiratory distress  · normal breath sounds  · no rales  Cardiovascular:  · Regular rate and rhythm  · no S3, no S4   · no murmur  · no rub  Extremities  · No cyanosis  · lower extremity edema: none    Skin:   · warm, dry  · No rashes      Result Review :     proBNP   Date Value Ref Range Status   09/01/2021 216.7 0.0 - 900.0 pg/mL Final     CMP    CMP 5/16/22 6/24/22 11/17/22   Glucose 150 (A)  177 (A)   BUN 20  17   Creatinine 1.02 (A) 0.90 1.12 (A)   eGFR 64.7 74.7 57.5 (A)   Sodium 140  140   Potassium 4.4  4.6   Chloride 100  100   Calcium 10.4  10.0   Total Protein 7.4  7.4   Albumin 4.80  4.60   Globulin 2.6  2.8   Total Bilirubin 0.3  0.6   Alkaline Phosphatase 68  81   AST (SGOT) 19  20   ALT (SGPT) 24  18   Albumin/Globulin Ratio 1.8  1.6   BUN/Creatinine Ratio 19.6  15.2   Anion Gap 15.5 (A)  13.7   (A) Abnormal value       Comments are available for some flowsheets but are not being displayed.           CBC w/diff    CBC w/Diff 5/16/22 11/17/22   WBC 8.30 6.62   RBC 4.70 4.80   Hemoglobin 14.1 14.0   Hematocrit 41.6 43.9   MCV 88.5 91.5   MCH 30.0 29.2   MCHC 33.9 31.9   RDW 12.0 (A) 12.4   Platelets 252 262   Neutrophil Rel % 56.2 55.7   Immature Granulocyte Rel % 0.2 0.3   Lymphocyte Rel % 34.2 32.3   Monocyte Rel % 7.1 9.4   Eosinophil " Rel % 1.6 1.8   Basophil Rel % 0.7 0.5   (A) Abnormal value             Lab Results   Component Value Date    TSH 0.701 11/17/2022      Lab Results   Component Value Date    FREET4 1.1 05/07/2021      No results found for: DDIMERQUANT  No results found for: MG   No results found for: DIGOXIN   Lab Results   Component Value Date    TROPONINT <0.01 09/29/2014           Lipid Panel    Lipid Panel 5/16/22 11/17/22   Total Cholesterol 129 130   Triglycerides 150 152 (A)   HDL Cholesterol 46 45   VLDL Cholesterol 26 26   LDL Cholesterol  57 59   LDL/HDL Ratio 1.15 1.21   (A) Abnormal value            No results found for: POCTROP    Results for orders placed in visit on 10/05/21    Adult Transthoracic Echo Complete W/ Cont if Necessary Per Protocol    Interpretation Summary  Normal left ventricular systolic function with an estimated ejection fraction of 60-65%.  No regional wall motion abnormalities were observed.  Left ventricular diastolic function is consistent with impaired relaxation (grade I diastolic dysfunction).  No significant valvular heart disease.                 Diagnoses and all orders for this visit:    1. CAD S/P percutaneous coronary angioplasty (Primary)  -     clopidogrel (PLAVIX) 75 MG tablet; Take 1 tablet by mouth Daily.  Dispense: 90 tablet; Refill: 1  -     aspirin 81 MG EC tablet; Take 1 tablet by mouth Daily.  Dispense: 90 tablet; Refill: 3    2. Essential hypertension    3. Hyperlipidemia LDL goal <70  Assessment & Plan:  Continue with Crestor 40 nightly LDL just mildly above 70 previously had no been under we will repeat lipids LFTs in next visit    Orders:  -     Lipid Panel; Future  -     Hepatic Function Panel; Future          Follow Up     Return in about 6 months (around 9/22/2023) for Follow with Berna Harry.          Patient was given instructions and counseling regarding her condition or for health maintenance advice. Please see specific information pulled into the AVS if  appropriate.

## 2023-03-22 NOTE — ASSESSMENT & PLAN NOTE
Continue with Crestor 40 nightly LDL just mildly above 70 previously had no been under we will repeat lipids LFTs in next visit

## 2023-05-19 ENCOUNTER — LAB (OUTPATIENT)
Dept: LAB | Facility: HOSPITAL | Age: 58
End: 2023-05-19
Payer: OTHER GOVERNMENT

## 2023-05-19 DIAGNOSIS — E11.65 TYPE 2 DIABETES MELLITUS WITH HYPERGLYCEMIA, WITHOUT LONG-TERM CURRENT USE OF INSULIN: ICD-10-CM

## 2023-05-19 DIAGNOSIS — E78.5 HYPERLIPIDEMIA LDL GOAL <70: ICD-10-CM

## 2023-05-19 LAB
ALBUMIN SERPL-MCNC: 4.7 G/DL (ref 3.5–5.2)
ALBUMIN UR-MCNC: 4.1 MG/DL
ALBUMIN/GLOB SERPL: 1.7 G/DL
ALP SERPL-CCNC: 77 U/L (ref 39–117)
ALT SERPL W P-5'-P-CCNC: 38 U/L (ref 1–33)
ANION GAP SERPL CALCULATED.3IONS-SCNC: 18.4 MMOL/L (ref 5–15)
AST SERPL-CCNC: 25 U/L (ref 1–32)
BASOPHILS # BLD AUTO: 0.05 10*3/MM3 (ref 0–0.2)
BASOPHILS NFR BLD AUTO: 0.7 % (ref 0–1.5)
BILIRUB SERPL-MCNC: 0.7 MG/DL (ref 0–1.2)
BILIRUB UR QL STRIP: NEGATIVE
BUN SERPL-MCNC: 20 MG/DL (ref 6–20)
BUN/CREAT SERPL: 18 (ref 7–25)
CALCIUM SPEC-SCNC: 10.1 MG/DL (ref 8.6–10.5)
CHLORIDE SERPL-SCNC: 104 MMOL/L (ref 98–107)
CHOLEST SERPL-MCNC: 170 MG/DL (ref 0–200)
CLARITY UR: CLEAR
CO2 SERPL-SCNC: 21.6 MMOL/L (ref 22–29)
COLOR UR: YELLOW
CREAT SERPL-MCNC: 1.11 MG/DL (ref 0.57–1)
CREAT UR-MCNC: 104 MG/DL
DEPRECATED RDW RBC AUTO: 42.5 FL (ref 37–54)
EGFRCR SERPLBLD CKD-EPI 2021: 58.1 ML/MIN/1.73
EOSINOPHIL # BLD AUTO: 0.17 10*3/MM3 (ref 0–0.4)
EOSINOPHIL NFR BLD AUTO: 2.5 % (ref 0.3–6.2)
ERYTHROCYTE [DISTWIDTH] IN BLOOD BY AUTOMATED COUNT: 13.1 % (ref 12.3–15.4)
GLOBULIN UR ELPH-MCNC: 2.7 GM/DL
GLUCOSE SERPL-MCNC: 273 MG/DL (ref 65–99)
GLUCOSE UR STRIP-MCNC: ABNORMAL MG/DL
HBA1C MFR BLD: 8.1 % (ref 4.8–5.6)
HCT VFR BLD AUTO: 44.2 % (ref 34–46.6)
HDLC SERPL-MCNC: 40 MG/DL (ref 40–60)
HGB BLD-MCNC: 14.5 G/DL (ref 12–15.9)
HGB UR QL STRIP.AUTO: NEGATIVE
IMM GRANULOCYTES # BLD AUTO: 0.03 10*3/MM3 (ref 0–0.05)
IMM GRANULOCYTES NFR BLD AUTO: 0.4 % (ref 0–0.5)
KETONES UR QL STRIP: NEGATIVE
LDLC SERPL CALC-MCNC: 88 MG/DL (ref 0–100)
LDLC/HDLC SERPL: 2 {RATIO}
LEUKOCYTE ESTERASE UR QL STRIP.AUTO: NEGATIVE
LYMPHOCYTES # BLD AUTO: 2.01 10*3/MM3 (ref 0.7–3.1)
LYMPHOCYTES NFR BLD AUTO: 29.1 % (ref 19.6–45.3)
MCH RBC QN AUTO: 29.7 PG (ref 26.6–33)
MCHC RBC AUTO-ENTMCNC: 32.8 G/DL (ref 31.5–35.7)
MCV RBC AUTO: 90.4 FL (ref 79–97)
MICROALBUMIN/CREAT UR: 39.4 MG/G
MONOCYTES # BLD AUTO: 0.43 10*3/MM3 (ref 0.1–0.9)
MONOCYTES NFR BLD AUTO: 6.2 % (ref 5–12)
NEUTROPHILS NFR BLD AUTO: 4.22 10*3/MM3 (ref 1.7–7)
NEUTROPHILS NFR BLD AUTO: 61.1 % (ref 42.7–76)
NITRITE UR QL STRIP: NEGATIVE
NRBC BLD AUTO-RTO: 0.1 /100 WBC (ref 0–0.2)
PH UR STRIP.AUTO: 6.5 [PH] (ref 5–8)
PLATELET # BLD AUTO: 228 10*3/MM3 (ref 140–450)
PMV BLD AUTO: 10.1 FL (ref 6–12)
POTASSIUM SERPL-SCNC: 4.2 MMOL/L (ref 3.5–5.2)
PROT SERPL-MCNC: 7.4 G/DL (ref 6–8.5)
PROT UR QL STRIP: ABNORMAL
RBC # BLD AUTO: 4.89 10*6/MM3 (ref 3.77–5.28)
SODIUM SERPL-SCNC: 144 MMOL/L (ref 136–145)
SP GR UR STRIP: >=1.03 (ref 1–1.03)
TRIGL SERPL-MCNC: 251 MG/DL (ref 0–150)
TSH SERPL DL<=0.05 MIU/L-ACNC: 1.43 UIU/ML (ref 0.27–4.2)
UROBILINOGEN UR QL STRIP: ABNORMAL
VLDLC SERPL-MCNC: 42 MG/DL (ref 5–40)
WBC NRBC COR # BLD: 6.91 10*3/MM3 (ref 3.4–10.8)

## 2023-05-19 PROCEDURE — 82043 UR ALBUMIN QUANTITATIVE: CPT

## 2023-05-19 PROCEDURE — 80061 LIPID PANEL: CPT

## 2023-05-19 PROCEDURE — 80053 COMPREHEN METABOLIC PANEL: CPT

## 2023-05-19 PROCEDURE — 82570 ASSAY OF URINE CREATININE: CPT

## 2023-05-19 PROCEDURE — 83036 HEMOGLOBIN GLYCOSYLATED A1C: CPT

## 2023-05-19 PROCEDURE — 84443 ASSAY THYROID STIM HORMONE: CPT

## 2023-05-19 PROCEDURE — 85025 COMPLETE CBC W/AUTO DIFF WBC: CPT

## 2023-05-19 PROCEDURE — 81003 URINALYSIS AUTO W/O SCOPE: CPT

## 2023-05-23 ENCOUNTER — OFFICE VISIT (OUTPATIENT)
Dept: FAMILY MEDICINE CLINIC | Facility: CLINIC | Age: 58
End: 2023-05-23
Payer: OTHER GOVERNMENT

## 2023-05-23 VITALS
HEIGHT: 68 IN | TEMPERATURE: 97.8 F | OXYGEN SATURATION: 98 % | BODY MASS INDEX: 33.34 KG/M2 | DIASTOLIC BLOOD PRESSURE: 79 MMHG | SYSTOLIC BLOOD PRESSURE: 122 MMHG | HEART RATE: 78 BPM | WEIGHT: 220 LBS

## 2023-05-23 DIAGNOSIS — L30.9 DERMATITIS: ICD-10-CM

## 2023-05-23 DIAGNOSIS — Z98.61 CAD S/P PERCUTANEOUS CORONARY ANGIOPLASTY: ICD-10-CM

## 2023-05-23 DIAGNOSIS — E78.5 HYPERLIPIDEMIA LDL GOAL <70: ICD-10-CM

## 2023-05-23 DIAGNOSIS — M25.50 MULTIPLE JOINT PAIN: ICD-10-CM

## 2023-05-23 DIAGNOSIS — N18.30 STAGE 3 CHRONIC KIDNEY DISEASE, UNSPECIFIED WHETHER STAGE 3A OR 3B CKD: ICD-10-CM

## 2023-05-23 DIAGNOSIS — E55.9 VITAMIN D DEFICIENCY: ICD-10-CM

## 2023-05-23 DIAGNOSIS — K21.9 GASTROESOPHAGEAL REFLUX DISEASE WITHOUT ESOPHAGITIS: ICD-10-CM

## 2023-05-23 DIAGNOSIS — Z99.89 OSA ON CPAP: ICD-10-CM

## 2023-05-23 DIAGNOSIS — G47.33 OSA ON CPAP: ICD-10-CM

## 2023-05-23 DIAGNOSIS — I25.10 CAD S/P PERCUTANEOUS CORONARY ANGIOPLASTY: ICD-10-CM

## 2023-05-23 DIAGNOSIS — J30.9 ALLERGIC RHINITIS, UNSPECIFIED SEASONALITY, UNSPECIFIED TRIGGER: ICD-10-CM

## 2023-05-23 DIAGNOSIS — I10 ESSENTIAL HYPERTENSION: ICD-10-CM

## 2023-05-23 DIAGNOSIS — E11.65 TYPE 2 DIABETES MELLITUS WITH HYPERGLYCEMIA, WITHOUT LONG-TERM CURRENT USE OF INSULIN: Primary | ICD-10-CM

## 2023-05-23 DIAGNOSIS — J43.9 PULMONARY EMPHYSEMA, UNSPECIFIED EMPHYSEMA TYPE: ICD-10-CM

## 2023-05-23 PROBLEM — M25.552 PAIN OF LEFT HIP JOINT: Status: ACTIVE | Noted: 2023-01-09

## 2023-05-23 PROBLEM — M79.7 FIBROMYALGIA: Status: ACTIVE | Noted: 2023-05-04

## 2023-05-23 RX ORDER — TIOTROPIUM BROMIDE INHALATION SPRAY 3.12 UG/1
2 SPRAY, METERED RESPIRATORY (INHALATION)
Qty: 4 G | Refills: 5 | Status: SHIPPED | OUTPATIENT
Start: 2023-05-23

## 2023-05-23 RX ORDER — METOPROLOL SUCCINATE 25 MG/1
25 TABLET, EXTENDED RELEASE ORAL DAILY
Qty: 90 TABLET | Refills: 0 | Status: SHIPPED | OUTPATIENT
Start: 2023-05-23

## 2023-05-23 RX ORDER — DULAGLUTIDE 0.75 MG/.5ML
0.75 INJECTION, SOLUTION SUBCUTANEOUS WEEKLY
Qty: 6 ML | Refills: 3 | Status: SHIPPED | OUTPATIENT
Start: 2023-05-23

## 2023-05-23 RX ORDER — ALBUTEROL SULFATE 90 UG/1
AEROSOL, METERED RESPIRATORY (INHALATION)
Qty: 8 G | Refills: 1 | Status: SHIPPED | OUTPATIENT
Start: 2023-05-23

## 2023-05-23 RX ORDER — CETIRIZINE HYDROCHLORIDE 10 MG/1
10 TABLET ORAL DAILY
Qty: 90 TABLET | Refills: 0 | Status: SHIPPED | OUTPATIENT
Start: 2023-05-23

## 2023-05-23 RX ORDER — FAMOTIDINE 40 MG/1
40 TABLET, FILM COATED ORAL 2 TIMES DAILY PRN
Qty: 90 TABLET | Refills: 1 | Status: SHIPPED | OUTPATIENT
Start: 2023-05-23

## 2023-05-23 RX ORDER — CLOBETASOL PROPIONATE 0.5 MG/G
1 CREAM TOPICAL 2 TIMES DAILY
Qty: 60 G | Refills: 1 | Status: SHIPPED | OUTPATIENT
Start: 2023-05-23

## 2023-05-23 RX ORDER — FLUTICASONE PROPIONATE 50 MCG
2 SPRAY, SUSPENSION (ML) NASAL DAILY
Qty: 18.2 ML | Refills: 1 | Status: SHIPPED | OUTPATIENT
Start: 2023-05-23

## 2023-05-23 RX ORDER — FUROSEMIDE 20 MG/1
20 TABLET ORAL DAILY
Qty: 90 TABLET | Refills: 0 | Status: SHIPPED | OUTPATIENT
Start: 2023-05-23

## 2023-05-23 RX ORDER — FAMOTIDINE 20 MG/1
TABLET, FILM COATED ORAL
COMMUNITY
Start: 2023-03-28

## 2023-05-23 RX ORDER — MONTELUKAST SODIUM 10 MG/1
10 TABLET ORAL NIGHTLY
Qty: 90 TABLET | Refills: 1 | Status: SHIPPED | OUTPATIENT
Start: 2023-05-23

## 2023-05-23 RX ORDER — DULOXETIN HYDROCHLORIDE 60 MG/1
60 CAPSULE, DELAYED RELEASE ORAL DAILY
Qty: 90 CAPSULE | Refills: 1 | Status: SHIPPED | OUTPATIENT
Start: 2023-05-23

## 2023-05-23 RX ORDER — MELATONIN
1000 DAILY
Qty: 90 TABLET | Refills: 1 | Status: SHIPPED | OUTPATIENT
Start: 2023-05-23

## 2023-05-23 RX ORDER — MILNACIPRAN HYDROCHLORIDE 25 MG/1
TABLET, FILM COATED ORAL
COMMUNITY
Start: 2023-05-19 | End: 2023-05-23

## 2023-05-23 RX ORDER — GLIPIZIDE 5 MG/1
5 TABLET, FILM COATED, EXTENDED RELEASE ORAL DAILY
Qty: 90 TABLET | Refills: 1 | Status: SHIPPED | OUTPATIENT
Start: 2023-05-23 | End: 2023-11-19

## 2023-05-23 RX ORDER — ROSUVASTATIN CALCIUM 40 MG/1
40 TABLET, COATED ORAL NIGHTLY
Qty: 90 TABLET | Refills: 0 | Status: SHIPPED | OUTPATIENT
Start: 2023-05-23

## 2023-05-23 NOTE — PROGRESS NOTES
Follow Up Office Visit      Patient Name: Claudette Gramajo  : 1965   MRN: [id number]     Chief Complaint:    Chief Complaint   Patient presents with   • Coronary Artery Disease   • Allergic Rhinitis   • Diabetes   • Heartburn   • Chronic Kidney Disease   • Hypertension   • Hyperlipidemia       History of Present Illness: Claudette Gramajo is a 57 y.o. female who is here today to follow up for HTN, hyperlipidemia, DM2, CAD, GERD, CKD3, allergic rhinitis.  Review lab results     Review ct low dose of chest results     A1C-2023  Eye exam- 3/2023- [facility].   Foot exam- - Dr Rangel  mammogram-2023  Pap-2022  Colonoscopy-2022  Ct low dose chest 3.2023    Pt c/o hard to control appetite, overeating, and cooking poorly     Pt reports she is trouble sleeping with her CPAP machine, feeling very tired, requests a referral to sleep center    Also c/o rash on top of left foot tried otc medications no reflief       Subjective      Review of Systems:   Review of Systems   Constitutional: Positive for fatigue. Negative for fever.   HENT: Negative for ear pain and sore throat.    Respiratory: Negative for cough.    Cardiovascular: Negative for chest pain.   Gastrointestinal: Negative for abdominal pain, diarrhea, nausea and vomiting.   Genitourinary: Negative for dysuria.   Musculoskeletal: Negative for myalgias.   Psychiatric/Behavioral: Positive for sleep disturbance.        Past Medical History:   Past Medical History:   Diagnosis Date   • Abnormal ECG    • Anesthesia complication     woke up during surgery   • Arrhythmia    • CAD (coronary artery disease)    • Chest pain    • CHF (congestive heart failure)    • Connective tissue anomaly    • Diabetes mellitus    • Edema    • Hyperlipidemia LDL goal <70 2021   • Myocardial infarct        • FARNAZ on CPAP 2021   • Palpitations    • Sleep apnea        Past Surgical History:   Past Surgical History:   Procedure Laterality Date   •  CARDIAC CATHETERIZATION     • COLONOSCOPY     • COLONOSCOPY N/A 2022    Procedure: COLONOSCOPY WITH POLYPECTOMY;  Surgeon: Sloan Ortiz MD;  Location: [facility] ENDOSCOPY;  Service: Gastroenterology;  Laterality: N/A;  COLON POLYP   • CORONARY ANGIOPLASTY     • CORONARY STENT PLACEMENT     • ENDOSCOPY N/A 2022    Procedure: ESOPHAGOGASTRODUODENOSCOPY WITH BX;  Surgeon: Sloan Ortiz MD;  Location: [facility] ENDOSCOPY;  Service: Gastroenterology;  Laterality: N/A;  HIATAL HERNIA   • UPPER GASTROINTESTINAL ENDOSCOPY     • VENOUS THROMBECTOMY         Family History:   Family History   Problem Relation Age of Onset   • Suicidality Mother    • Heart attack Father    • Heart disease Father    • Cancer Father    • No Known Problems Maternal Grandmother    • No Known Problems Maternal Grandfather    • Heart disease Paternal Grandmother    • Cancer Paternal Grandmother    • Heart disease Paternal Grandfather    • Cancer Paternal Grandfather    • Thyroid disease Paternal Grandfather    • Pancreatic cancer Paternal Grandfather    • Esophageal cancer Paternal Grandfather    • Stomach cancer Paternal Grandfather    • Colon cancer Neg Hx    • Malig Hyperthermia Neg Hx        Social History:   Social History     Socioeconomic History   • Marital status:    Tobacco Use   • Smoking status: Former     Packs/day: 1.00     Years: 26.00     Pack years: 26.00     Types: Cigarettes     Start date: 1986     Quit date: 2012     Years since quittin.3   • Smokeless tobacco: Never   • Tobacco comments:     caffeine use   Vaping Use   • Vaping Use: Never used   Substance and Sexual Activity   • Alcohol use: No   • Drug use: No   • Sexual activity: Yes     Partners: Male     Birth control/protection: Same-sex partner       Medications:     Current Outpatient Medications:   •  Alcohol Swabs (Easy Touch Alcohol Prep Medium) 70 % pads, , Disp: , Rfl:   •  aspirin 81 MG EC tablet, Take 1 tablet by mouth  Daily., Disp: 90 tablet, Rfl: 3  •  Calcipotriene 0.005 % solution, Apply 1 application topically to the appropriate area as directed 2 (Two) Times a Day., Disp: 60 mL, Rfl: 1  •  cetirizine (zyrTEC) 10 MG tablet, Take 1 tablet by mouth Daily., Disp: 90 tablet, Rfl: 0  •  cholecalciferol (VITAMIN D3) 25 MCG (1000 UT) tablet, Take 1 tablet by mouth Daily. Does not take daily only occasionally, Disp: 90 tablet, Rfl: 1  •  clopidogrel (PLAVIX) 75 MG tablet, Take 1 tablet by mouth Daily., Disp: 90 tablet, Rfl: 1  •  DULoxetine (CYMBALTA) 60 MG capsule, Take 1 capsule by mouth Daily., Disp: 90 capsule, Rfl: 1  •  empagliflozin (JARDIANCE) 10 MG tablet tablet, Take 0.5 tablets by mouth Daily. 1/2 tab, Disp: 90 tablet, Rfl: 1  •  famotidine (PEPCID) 20 MG tablet, , Disp: , Rfl:   •  famotidine (Pepcid) 40 MG tablet, Take 1 tablet by mouth 2 (Two) Times a Day As Needed for Heartburn., Disp: 90 tablet, Rfl: 1  •  fluticasone (FLONASE) 50 MCG/ACT nasal spray, 2 sprays into the nostril(s) as directed by provider Daily. 2 sprays each nostril daily, Disp: 18.2 mL, Rfl: 1  •  FREESTYLE LITE test strip, , Disp: , Rfl:   •  furosemide (LASIX) 20 MG tablet, Take 1 tablet by mouth Daily., Disp: 90 tablet, Rfl: 0  •  glipizide (GLUCOTROL XL) 5 MG ER tablet, Take 1 tablet by mouth Daily for 180 days., Disp: 90 tablet, Rfl: 1  •  Lancets (freestyle) lancets, , Disp: , Rfl:   •  metFORMIN (GLUCOPHAGE) 1000 MG tablet, Take 1 tablet by mouth 2 (Two) Times a Day With Meals., Disp: 180 tablet, Rfl: 1  •  metoprolol succinate XL (TOPROL-XL) 25 MG 24 hr tablet, Take 1 tablet by mouth Daily., Disp: 90 tablet, Rfl: 0  •  montelukast (SINGULAIR) 10 MG tablet, Take 1 tablet by mouth Every Night., Disp: 90 tablet, Rfl: 1  •  Narcan 4 MG/0.1ML nasal spray, , Disp: , Rfl:   •  nitroglycerin (NITROSTAT) 0.4 MG SL tablet, Place 1 tablet under the tongue Every 5 (Five) Minutes As Needed for Chest Pain., Disp: 35 tablet, Rfl: 6  •  oxyCODONE (ROXICODONE)  "5 MG immediate release tablet, Take  by mouth As Needed., Disp: , Rfl:   •  rosuvastatin (CRESTOR) 40 MG tablet, Take 1 tablet by mouth Every Night., Disp: 90 tablet, Rfl: 0  •  albuterol sulfate  (90 Base) MCG/ACT inhaler, Take 1-2 Puffs every 4 hours prn, Disp: 8 g, Rfl: 1  •  clobetasol (TEMOVATE) 0.05 % cream, Apply 1 application topically to the appropriate area as directed 2 (Two) Times a Day., Disp: 60 g, Rfl: 1  •  Dulaglutide (Trulicity) 0.75 MG/0.5ML solution pen-injector, Inject 0.75 mg under the skin into the appropriate area as directed 1 (One) Time Per Week., Disp: 6 mL, Rfl: 3  •  tiotropium bromide monohydrate (Spiriva Respimat) 2.5 MCG/ACT aerosol solution inhaler, Inhale 2 puffs Daily., Disp: 4 g, Rfl: 5    Allergies:   Allergies   Allergen Reactions   • Other Unknown - Low Severity     Hay    Sneezing watery eyes cough   • Adhesive Tape Other (See Comments)     BLISTERS   • Dust Mite Extract Other (See Comments)   • Molds & Smuts Cough   • Pollen Extract Cough         Objective     Physical Exam:  Vital Signs:   Vitals:    05/23/23 1401   BP: 122/79   Pulse: 78   Temp: 97.8 °F (36.6 °C)   SpO2: 98%   Weight: 99.8 kg (220 lb)   Height: 172.7 cm (67.99\")     Body mass index is 33.46 kg/m².     Physical Exam  HENT:      Right Ear: Tympanic membrane normal.      Left Ear: Tympanic membrane normal.      Nose: Nose normal.      Mouth/Throat:      Mouth: Mucous membranes are moist.   Eyes:      Conjunctiva/sclera: Conjunctivae normal.   Neck:      Vascular: No carotid bruit.   Cardiovascular:      Rate and Rhythm: Normal rate and regular rhythm.      Heart sounds: Normal heart sounds. No murmur heard.  Pulmonary:      Effort: Pulmonary effort is normal.      Breath sounds: Normal breath sounds.   Abdominal:      General: Bowel sounds are normal.      Palpations: Abdomen is soft.   Musculoskeletal:      Right lower leg: No edema.      Left lower leg: No edema.   Skin:     General: Skin is warm and " dry.      Findings: Rash present.      Comments: Erythematous scaly plaque left dorsal surface foot no surrounding erythema   Neurological:      Mental Status: She is alert.   Psychiatric:         Mood and Affect: Mood normal.             Assessment / Plan      Assessment/Plan:   Diagnoses and all orders for this visit:    1. Type 2 diabetes mellitus with hyperglycemia, without long-term current use of insulin (Primary)  -     CBC Auto Differential; Future  -     Comprehensive Metabolic Panel; Future  -     Hemoglobin A1c; Future  -     Microalbumin / Creatinine Urine Ratio - Urine, Clean Catch; Future  -     TSH; Future  -     Urinalysis With Culture If Indicated -; Future  -     metFORMIN (GLUCOPHAGE) 1000 MG tablet; Take 1 tablet by mouth 2 (Two) Times a Day With Meals.  Dispense: 180 tablet; Refill: 1    2. Hyperlipidemia LDL goal <70  -     Comprehensive Metabolic Panel; Future  -     Lipid Panel; Future    3. Essential hypertension    4. CAD S/P percutaneous coronary angioplasty    5. Gastroesophageal reflux disease without esophagitis    6. Allergic rhinitis, unspecified seasonality, unspecified trigger  -     montelukast (SINGULAIR) 10 MG tablet; Take 1 tablet by mouth Every Night.  Dispense: 90 tablet; Refill: 1    7. Multiple joint pain    8. Stage 3 chronic kidney disease, unspecified whether stage 3a or 3b CKD    9. Vitamin D deficiency  -     cholecalciferol (VITAMIN D3) 25 MCG (1000 UT) tablet; Take 1 tablet by mouth Daily. Does not take daily only occasionally  Dispense: 90 tablet; Refill: 1    10. Pulmonary emphysema, unspecified emphysema type    11. FARNAZ on CPAP  -     Ambulatory Referral to Sleep Medicine    12. Dermatitis    Other orders  -     metoprolol succinate XL (TOPROL-XL) 25 MG 24 hr tablet; Take 1 tablet by mouth Daily.  Dispense: 90 tablet; Refill: 0  -     famotidine (Pepcid) 40 MG tablet; Take 1 tablet by mouth 2 (Two) Times a Day As Needed for Heartburn.  Dispense: 90 tablet; Refill:  1  -     furosemide (LASIX) 20 MG tablet; Take 1 tablet by mouth Daily.  Dispense: 90 tablet; Refill: 0  -     DULoxetine (CYMBALTA) 60 MG capsule; Take 1 capsule by mouth Daily.  Dispense: 90 capsule; Refill: 1  -     cetirizine (zyrTEC) 10 MG tablet; Take 1 tablet by mouth Daily.  Dispense: 90 tablet; Refill: 0  -     glipizide (GLUCOTROL XL) 5 MG ER tablet; Take 1 tablet by mouth Daily for 180 days.  Dispense: 90 tablet; Refill: 1  -     empagliflozin (JARDIANCE) 10 MG tablet tablet; Take 0.5 tablets by mouth Daily. 1/2 tab  Dispense: 90 tablet; Refill: 1  -     rosuvastatin (CRESTOR) 40 MG tablet; Take 1 tablet by mouth Every Night.  Dispense: 90 tablet; Refill: 0  -     fluticasone (FLONASE) 50 MCG/ACT nasal spray; 2 sprays into the nostril(s) as directed by provider Daily. 2 sprays each nostril daily  Dispense: 18.2 mL; Refill: 1  -     tiotropium bromide monohydrate (Spiriva Respimat) 2.5 MCG/ACT aerosol solution inhaler; Inhale 2 puffs Daily.  Dispense: 4 g; Refill: 5  -     albuterol sulfate  (90 Base) MCG/ACT inhaler; Take 1-2 Puffs every 4 hours prn  Dispense: 8 g; Refill: 1  -     Dulaglutide (Trulicity) 0.75 MG/0.5ML solution pen-injector; Inject 0.75 mg under the skin into the appropriate area as directed 1 (One) Time Per Week.  Dispense: 6 mL; Refill: 3  -     clobetasol (TEMOVATE) 0.05 % cream; Apply 1 application topically to the appropriate area as directed 2 (Two) Times a Day.  Dispense: 60 g; Refill: 1       Diabetes mellitus type 2 uncontrolled we will add Trulicity 0.75 mg once weekly continue all other medications at this time reassess hemoglobin A1c in 90 days decrease carb intake increase water intake exercise 30 minutes daily weight loss  Hyperlipidemia LDL goal of 70 with coronary artery disease these currently above goal on current statin dose Crestor 40 mg at nighttime patient reports this is strictly diet related as she has been cooking fatty foods and increase recommend avoid  "fried foods red meat pork products cheese increase fruits vegetables whole grains exercise 30 minutes daily and weight loss  Hypertension currently controlled at this time with meta propanol 25 mg daily  Allergic rhinitis currently controlled with Zyrtec Flonase Singulair provide refills  Multiple joint pain currently controlled with Cymbalta will provide refills  Chronic kidney disease avoid all NSAIDs increase water intake we will continue to monitor  Vitamin D deficiency we will provide a refill vitamin D 1000 units daily  Pulmonary emphysema with shortness of breath we will start Spiriva 2 puffs once daily and albuterol as needed use  Obstructive sleep apnea on CPAP with complaints of noncompliance due to fitting we will place a referral back to the sleep center  Dermatitis we will treat with clobetasol cream          Follow Up:   Return in about 3 months (around 8/23/2023).    Portia Bahena, SARWAT    \"Please note that portions of this note were completed with a voice recognition program.\"    "

## 2023-08-29 ENCOUNTER — OFFICE VISIT (OUTPATIENT)
Dept: SLEEP MEDICINE | Facility: HOSPITAL | Age: 58
End: 2023-08-29
Payer: OTHER GOVERNMENT

## 2023-08-29 ENCOUNTER — OFFICE VISIT (OUTPATIENT)
Dept: FAMILY MEDICINE CLINIC | Facility: CLINIC | Age: 58
End: 2023-08-29
Payer: OTHER GOVERNMENT

## 2023-08-29 VITALS
HEIGHT: 68 IN | HEART RATE: 80 BPM | DIASTOLIC BLOOD PRESSURE: 73 MMHG | SYSTOLIC BLOOD PRESSURE: 120 MMHG | OXYGEN SATURATION: 97 % | WEIGHT: 218 LBS | TEMPERATURE: 96.7 F | BODY MASS INDEX: 33.04 KG/M2

## 2023-08-29 VITALS — BODY MASS INDEX: 33.04 KG/M2 | HEIGHT: 68 IN | OXYGEN SATURATION: 97 % | WEIGHT: 218 LBS | HEART RATE: 100 BPM

## 2023-08-29 DIAGNOSIS — E66.9 OBESITY (BMI 30-39.9): ICD-10-CM

## 2023-08-29 DIAGNOSIS — I10 ESSENTIAL HYPERTENSION: ICD-10-CM

## 2023-08-29 DIAGNOSIS — G47.8 NON-RESTORATIVE SLEEP: ICD-10-CM

## 2023-08-29 DIAGNOSIS — E11.65 TYPE 2 DIABETES MELLITUS WITH HYPERGLYCEMIA, WITHOUT LONG-TERM CURRENT USE OF INSULIN: Primary | ICD-10-CM

## 2023-08-29 DIAGNOSIS — I25.10 CAD S/P PERCUTANEOUS CORONARY ANGIOPLASTY: ICD-10-CM

## 2023-08-29 DIAGNOSIS — G47.30 SLEEP APNEA, UNSPECIFIED TYPE: Primary | ICD-10-CM

## 2023-08-29 DIAGNOSIS — M25.551 BILATERAL HIP PAIN: ICD-10-CM

## 2023-08-29 DIAGNOSIS — E55.9 VITAMIN D DEFICIENCY: ICD-10-CM

## 2023-08-29 DIAGNOSIS — N18.30 STAGE 3 CHRONIC KIDNEY DISEASE, UNSPECIFIED WHETHER STAGE 3A OR 3B CKD: ICD-10-CM

## 2023-08-29 DIAGNOSIS — M25.552 BILATERAL HIP PAIN: ICD-10-CM

## 2023-08-29 DIAGNOSIS — Z98.61 CAD S/P PERCUTANEOUS CORONARY ANGIOPLASTY: ICD-10-CM

## 2023-08-29 DIAGNOSIS — J30.9 ALLERGIC RHINITIS, UNSPECIFIED SEASONALITY, UNSPECIFIED TRIGGER: ICD-10-CM

## 2023-08-29 DIAGNOSIS — G47.10 HYPERSOMNIA: ICD-10-CM

## 2023-08-29 DIAGNOSIS — R06.83 SNORING: ICD-10-CM

## 2023-08-29 DIAGNOSIS — E78.5 HYPERLIPIDEMIA LDL GOAL <70: ICD-10-CM

## 2023-08-29 LAB
ALBUMIN SERPL-MCNC: 5.1 G/DL (ref 3.5–5.2)
ALBUMIN UR-MCNC: 1.9 MG/DL
ALBUMIN/GLOB SERPL: 2 G/DL
ALP SERPL-CCNC: 78 U/L (ref 39–117)
ALT SERPL W P-5'-P-CCNC: 38 U/L (ref 1–33)
ANION GAP SERPL CALCULATED.3IONS-SCNC: 17 MMOL/L (ref 5–15)
AST SERPL-CCNC: 20 U/L (ref 1–32)
BASOPHILS # BLD AUTO: 0.07 10*3/MM3 (ref 0–0.2)
BASOPHILS NFR BLD AUTO: 0.9 % (ref 0–1.5)
BILIRUB CONJ SERPL-MCNC: <0.2 MG/DL (ref 0–0.3)
BILIRUB SERPL-MCNC: 0.3 MG/DL (ref 0–1.2)
BILIRUB UR QL STRIP: NEGATIVE
BUN SERPL-MCNC: 33 MG/DL (ref 6–20)
BUN/CREAT SERPL: 35.1 (ref 7–25)
CALCIUM SPEC-SCNC: 11 MG/DL (ref 8.6–10.5)
CHLORIDE SERPL-SCNC: 96 MMOL/L (ref 98–107)
CHOLEST SERPL-MCNC: 203 MG/DL (ref 0–200)
CLARITY UR: CLEAR
CO2 SERPL-SCNC: 23 MMOL/L (ref 22–29)
COLOR UR: YELLOW
CREAT SERPL-MCNC: 0.94 MG/DL (ref 0.57–1)
CREAT UR-MCNC: 31.9 MG/DL
DEPRECATED RDW RBC AUTO: 44.6 FL (ref 37–54)
EGFRCR SERPLBLD CKD-EPI 2021: 70.5 ML/MIN/1.73
EOSINOPHIL # BLD AUTO: 0.18 10*3/MM3 (ref 0–0.4)
EOSINOPHIL NFR BLD AUTO: 2.3 % (ref 0.3–6.2)
ERYTHROCYTE [DISTWIDTH] IN BLOOD BY AUTOMATED COUNT: 12.9 % (ref 12.3–15.4)
GLOBULIN UR ELPH-MCNC: 2.5 GM/DL
GLUCOSE SERPL-MCNC: 280 MG/DL (ref 65–99)
GLUCOSE UR STRIP-MCNC: ABNORMAL MG/DL
HCT VFR BLD AUTO: 44 % (ref 34–46.6)
HDLC SERPL-MCNC: 47 MG/DL (ref 40–60)
HGB BLD-MCNC: 14.3 G/DL (ref 12–15.9)
HGB UR QL STRIP.AUTO: NEGATIVE
IMM GRANULOCYTES # BLD AUTO: 0.06 10*3/MM3 (ref 0–0.05)
IMM GRANULOCYTES NFR BLD AUTO: 0.8 % (ref 0–0.5)
KETONES UR QL STRIP: NEGATIVE
LDLC SERPL CALC-MCNC: 78 MG/DL (ref 0–100)
LDLC/HDLC SERPL: 1.24 {RATIO}
LEUKOCYTE ESTERASE UR QL STRIP.AUTO: NEGATIVE
LYMPHOCYTES # BLD AUTO: 2.56 10*3/MM3 (ref 0.7–3.1)
LYMPHOCYTES NFR BLD AUTO: 32.1 % (ref 19.6–45.3)
MCH RBC QN AUTO: 30.2 PG (ref 26.6–33)
MCHC RBC AUTO-ENTMCNC: 32.5 G/DL (ref 31.5–35.7)
MCV RBC AUTO: 92.8 FL (ref 79–97)
MICROALBUMIN/CREAT UR: 59.6 MG/G
MONOCYTES # BLD AUTO: 0.64 10*3/MM3 (ref 0.1–0.9)
MONOCYTES NFR BLD AUTO: 8 % (ref 5–12)
NEUTROPHILS NFR BLD AUTO: 4.47 10*3/MM3 (ref 1.7–7)
NEUTROPHILS NFR BLD AUTO: 55.9 % (ref 42.7–76)
NITRITE UR QL STRIP: NEGATIVE
NRBC BLD AUTO-RTO: 0 /100 WBC (ref 0–0.2)
PH UR STRIP.AUTO: 6 [PH] (ref 5–8)
PLATELET # BLD AUTO: 241 10*3/MM3 (ref 140–450)
PMV BLD AUTO: 10.4 FL (ref 6–12)
POTASSIUM SERPL-SCNC: 4.8 MMOL/L (ref 3.5–5.2)
PROT SERPL-MCNC: 7.6 G/DL (ref 6–8.5)
PROT UR QL STRIP: NEGATIVE
RBC # BLD AUTO: 4.74 10*6/MM3 (ref 3.77–5.28)
SODIUM SERPL-SCNC: 136 MMOL/L (ref 136–145)
SP GR UR STRIP: >=1.03 (ref 1–1.03)
TRIGL SERPL-MCNC: 488 MG/DL (ref 0–150)
TSH SERPL DL<=0.05 MIU/L-ACNC: 2.54 UIU/ML (ref 0.27–4.2)
UROBILINOGEN UR QL STRIP: ABNORMAL
VLDLC SERPL-MCNC: 78 MG/DL (ref 5–40)
WBC NRBC COR # BLD: 7.98 10*3/MM3 (ref 3.4–10.8)

## 2023-08-29 PROCEDURE — 82043 UR ALBUMIN QUANTITATIVE: CPT | Performed by: NURSE PRACTITIONER

## 2023-08-29 PROCEDURE — 82248 BILIRUBIN DIRECT: CPT | Performed by: INTERNAL MEDICINE

## 2023-08-29 PROCEDURE — 83036 HEMOGLOBIN GLYCOSYLATED A1C: CPT | Performed by: NURSE PRACTITIONER

## 2023-08-29 PROCEDURE — 82570 ASSAY OF URINE CREATININE: CPT | Performed by: NURSE PRACTITIONER

## 2023-08-29 PROCEDURE — 99204 OFFICE O/P NEW MOD 45 MIN: CPT | Performed by: FAMILY MEDICINE

## 2023-08-29 PROCEDURE — 85025 COMPLETE CBC W/AUTO DIFF WBC: CPT | Performed by: NURSE PRACTITIONER

## 2023-08-29 PROCEDURE — G0463 HOSPITAL OUTPT CLINIC VISIT: HCPCS

## 2023-08-29 PROCEDURE — 80053 COMPREHEN METABOLIC PANEL: CPT | Performed by: INTERNAL MEDICINE

## 2023-08-29 PROCEDURE — 81003 URINALYSIS AUTO W/O SCOPE: CPT | Performed by: NURSE PRACTITIONER

## 2023-08-29 PROCEDURE — 80061 LIPID PANEL: CPT | Performed by: INTERNAL MEDICINE

## 2023-08-29 PROCEDURE — 84443 ASSAY THYROID STIM HORMONE: CPT | Performed by: INTERNAL MEDICINE

## 2023-08-29 RX ORDER — MELATONIN
1000 DAILY
Qty: 90 TABLET | Refills: 1 | Status: SHIPPED | OUTPATIENT
Start: 2023-08-29

## 2023-08-29 RX ORDER — DULAGLUTIDE 0.75 MG/.5ML
0.75 INJECTION, SOLUTION SUBCUTANEOUS WEEKLY
Qty: 6 ML | Refills: 3 | Status: SHIPPED | OUTPATIENT
Start: 2023-08-29 | End: 2023-08-30

## 2023-08-29 RX ORDER — GLIPIZIDE 5 MG/1
5 TABLET, FILM COATED, EXTENDED RELEASE ORAL DAILY
Qty: 90 TABLET | Refills: 1 | Status: SHIPPED | OUTPATIENT
Start: 2023-08-29 | End: 2024-02-25

## 2023-08-29 RX ORDER — ROSUVASTATIN CALCIUM 40 MG/1
40 TABLET, COATED ORAL NIGHTLY
Qty: 90 TABLET | Refills: 0 | Status: SHIPPED | OUTPATIENT
Start: 2023-08-29

## 2023-08-29 RX ORDER — DULOXETIN HYDROCHLORIDE 60 MG/1
60 CAPSULE, DELAYED RELEASE ORAL DAILY
Qty: 90 CAPSULE | Refills: 1 | Status: SHIPPED | OUTPATIENT
Start: 2023-08-29

## 2023-08-29 RX ORDER — FAMOTIDINE 40 MG/1
40 TABLET, FILM COATED ORAL 2 TIMES DAILY PRN
Qty: 90 TABLET | Refills: 1 | Status: SHIPPED | OUTPATIENT
Start: 2023-08-29

## 2023-08-29 RX ORDER — FUROSEMIDE 20 MG/1
20 TABLET ORAL DAILY
Qty: 90 TABLET | Refills: 0 | Status: SHIPPED | OUTPATIENT
Start: 2023-08-29

## 2023-08-29 RX ORDER — CETIRIZINE HYDROCHLORIDE 10 MG/1
10 TABLET ORAL DAILY
Qty: 90 TABLET | Refills: 0 | Status: SHIPPED | OUTPATIENT
Start: 2023-08-29

## 2023-08-29 RX ORDER — ZOLPIDEM TARTRATE 5 MG/1
TABLET ORAL
Qty: 1 TABLET | Refills: 0 | Status: SHIPPED | OUTPATIENT
Start: 2023-08-29

## 2023-08-29 RX ORDER — MONTELUKAST SODIUM 10 MG/1
10 TABLET ORAL NIGHTLY
Qty: 90 TABLET | Refills: 1 | Status: SHIPPED | OUTPATIENT
Start: 2023-08-29

## 2023-08-29 RX ORDER — METOPROLOL SUCCINATE 25 MG/1
25 TABLET, EXTENDED RELEASE ORAL DAILY
Qty: 90 TABLET | Refills: 0 | Status: SHIPPED | OUTPATIENT
Start: 2023-08-29

## 2023-08-29 RX ORDER — MILNACIPRAN HYDROCHLORIDE 25 MG/1
TABLET, FILM COATED ORAL
COMMUNITY
Start: 2023-07-25 | End: 2023-08-29

## 2023-08-29 RX ORDER — DULOXETIN HYDROCHLORIDE 60 MG/1
60 CAPSULE, DELAYED RELEASE ORAL DAILY
Qty: 90 CAPSULE | Refills: 1 | Status: SHIPPED | OUTPATIENT
Start: 2023-08-29 | End: 2023-08-29 | Stop reason: SDUPTHER

## 2023-08-29 RX ORDER — CLOPIDOGREL BISULFATE 75 MG/1
75 TABLET ORAL DAILY
Qty: 90 TABLET | Refills: 1 | Status: SHIPPED | OUTPATIENT
Start: 2023-08-29

## 2023-08-29 RX ORDER — TIOTROPIUM BROMIDE INHALATION SPRAY 3.12 UG/1
SPRAY, METERED RESPIRATORY (INHALATION)
COMMUNITY

## 2023-08-29 NOTE — PROGRESS NOTES
[facility] New Patient/Consultation       Reason for Consultation: keshawn      Patient Care Team:  Angel Bahena APRN as PCP - General (Nurse Practitioner)  Tra Cabrera MD as Consulting Physician (Sleep Medicine)      History of present illness:  Thank you for asking me to see your patient.  The patient is a 58 y.o. female With hypertension hyperlipidemia CAD status post coronary angioplasty type 2 diabetes mellitus CKD stage III and KESHAWN presents today to establish care for KESHAWN.  Machine is over 5 years old.  Machine not working well anymore.  Patient overdue for new machine.  Machine not working well anymore.  Resulted in returning hypersomnia nonrestorative sleep and witnessed apneas.      Social History: See scanned questionnaire    Allergies:  Other, Adhesive tape, Dust mite extract, Molds & smuts, and Pollen extract    Family History: KESHAWN no no       Current Outpatient Medications:     albuterol sulfate  (90 Base) MCG/ACT inhaler, Take 1-2 Puffs every 4 hours prn, Disp: 8 g, Rfl: 1    Alcohol Swabs (Easy Touch Alcohol Prep Medium) 70 % pads, , Disp: , Rfl:     aspirin 81 MG EC tablet, Take 1 tablet by mouth Daily., Disp: 90 tablet, Rfl: 3    Calcipotriene 0.005 % solution, Apply 1 application topically to the appropriate area as directed 2 (Two) Times a Day., Disp: 60 mL, Rfl: 1    cetirizine (zyrTEC) 10 MG tablet, Take 1 tablet by mouth Daily., Disp: 90 tablet, Rfl: 0    cholecalciferol (VITAMIN D3) 25 MCG (1000 UT) tablet, Take 1 tablet by mouth Daily. Does not take daily only occasionally, Disp: 90 tablet, Rfl: 1    clobetasol (TEMOVATE) 0.05 % cream, Apply 1 application topically to the appropriate area as directed 2 (Two) Times a Day., Disp: 60 g, Rfl: 1    clopidogrel (PLAVIX) 75 MG tablet, Take 1 tablet by mouth Daily., Disp: 90 tablet, Rfl: 1    Dulaglutide (Trulicity) 0.75 MG/0.5ML solution pen-injector, Inject 0.75 mg under the skin into the appropriate area as directed  1 (One) Time Per Week., Disp: 6 mL, Rfl: 3    DULoxetine (CYMBALTA) 60 MG capsule, Take 1 capsule by mouth Daily., Disp: 90 capsule, Rfl: 1    empagliflozin (JARDIANCE) 25 MG tablet tablet, , Disp: , Rfl:     famotidine (Pepcid) 40 MG tablet, Take 1 tablet by mouth 2 (Two) Times a Day As Needed for Heartburn. (Patient taking differently: Take 1 tablet by mouth As Needed for Heartburn.), Disp: 90 tablet, Rfl: 1    fluticasone (FLONASE) 50 MCG/ACT nasal spray, 2 sprays into the nostril(s) as directed by provider Daily. 2 sprays each nostril daily, Disp: 18.2 mL, Rfl: 1    FREESTYLE LITE test strip, , Disp: , Rfl:     furosemide (LASIX) 20 MG tablet, Take 1 tablet by mouth Daily., Disp: 90 tablet, Rfl: 0    glipizide (GLUCOTROL XL) 5 MG ER tablet, Take 1 tablet by mouth Daily for 180 days., Disp: 90 tablet, Rfl: 1    Lancets (freestyle) lancets, , Disp: , Rfl:     metFORMIN (GLUCOPHAGE) 1000 MG tablet, Take 1 tablet by mouth 2 (Two) Times a Day With Meals., Disp: 180 tablet, Rfl: 1    metoprolol succinate XL (TOPROL-XL) 25 MG 24 hr tablet, Take 1 tablet by mouth Daily., Disp: 90 tablet, Rfl: 0    montelukast (SINGULAIR) 10 MG tablet, Take 1 tablet by mouth Every Night., Disp: 90 tablet, Rfl: 1    Narcan 4 MG/0.1ML nasal spray, , Disp: , Rfl:     nitroglycerin (NITROSTAT) 0.4 MG SL tablet, Place 1 tablet under the tongue Every 5 (Five) Minutes As Needed for Chest Pain., Disp: 35 tablet, Rfl: 6    oxyCODONE (ROXICODONE) 5 MG immediate release tablet, Take  by mouth As Needed., Disp: , Rfl:     rosuvastatin (CRESTOR) 40 MG tablet, Take 1 tablet by mouth Every Night., Disp: 90 tablet, Rfl: 0    [place] 25 MG tablet tablet, Take 1 tablet every day by oral route as directed for 90 days., Disp: , Rfl:     tiotropium bromide monohydrate (Spiriva Respimat) 2.5 MCG/ACT aerosol solution inhaler, Inhale 2 puffs Daily., Disp: 4 g, Rfl: 5    famotidine (PEPCID) 20 MG tablet, , Disp: , Rfl:     tiotropium bromide monohydrate (Spiriva  "Respimat) 2.5 MCG/ACT aerosol solution inhaler, , Disp: , Rfl:     zolpidem (Ambien) 5 MG tablet, Take 1/2 tab as needed for sleep at night of sleep study only. Do not use at home., Disp: 1 tablet, Rfl: 0    Vital Signs:    Vitals:    08/29/23 1100   Pulse: 100   SpO2: 97%   Weight: 98.9 kg (218 lb)   Height: 172.7 cm (67.99\")      Body mass index is 33.16 kg/mý.  Neck Circumference: 16 inches      REVIEW OF SYSTEMS.  Full review of systems available on the intake form which is scanned in the media tab.  The relevant positive are noted below  Daytime excessive sleepiness with [place] Sleepiness Scale :Total score: 4   Snoring  Negative      Physical exam:  Vitals:    08/29/23 1100   Pulse: 100   SpO2: 97%   Weight: 98.9 kg (218 lb)   Height: 172.7 cm (67.99\")    Body mass index is 33.16 kg/mý. Neck Circumference: 16 inches  HEENT: Head is atraumatic, normocephalic  Eyes: pupils are round equal and reacting to light and accommodation, conjunctiva normal  Throat: tongue normal  NECK:Neck Circumference: 16 inches  RESPIRATORY SYSTEM: Regular respirations  CARDIOVASULAR SYSTEM: Regular rate  EXTREMITES: No cyanosis, clubbing  NEUROLOGICAL SYSTEM: Oriented x 3, no gross motor defects, gait normal      Impression:  1. Sleep apnea, unspecified type    2. Hypersomnia    3. Non-restorative sleep    4. Snoring    5. Obesity (BMI 30-39.9)        Plan:    Good sleep hygiene measures should be maintained.  Weight loss would be beneficial in this patient who is obese BMI 33.2.    I discussed the pathophysiology of obstructive sleep apnea with the patient.  We discussed the adverse outcomes associated with untreated sleep-disordered breathing.  We discussed treatment modalities of obstructive sleep apnea including CPAP device. Sleep study will be scheduled to reassess severity of sleep apnea; patient overdue for new machine.  Weight loss will be strongly beneficial in order to reduce the severity of sleep-disordered breathing.  " Caution during activities that require prolonged concentration is strongly advised.  Patient will be notified of sleep study results after sleep study is completed.  We will place order for new CPAP machine at that time.  Prescription for Ambien was provided to bring to the Sleep lab to improve sleep efficiency.  Patient was asked to not take the Ambien at home.    Thank you for allowing me to participate in your patient's care.    Tra Cabrera MD  Sleep Medicine  08/29/23  12:15 EDT

## 2023-08-29 NOTE — PROGRESS NOTES
Follow Up Office Visit      Patient Name: Claudette Gramajo  : 1965   MRN: [id number]     Chief Complaint:    Chief Complaint   Patient presents with    Allergic Rhinitis    Coronary Artery Disease    Hyperlipidemia    Hypertension    Diabetes    Chronic Kidney Disease    Heartburn       History of Present Illness: Claudette Gramajo is a 58 y.o. female who is here today to follow up for DM2, CAD, hyperlipidemia, HTN, CKD, GERD    A1C-2023  Eye exam- 3/2023- [facility].   Foot exam- - Dr Rangel  mammogram-2023  Pap-2022  Colonoscopy-2022  Ct low dose chest 3.2023    C/o bilateral hip pain for a few months   States the right hip is worse than left   Pt denies injury   She denies numbness and tingling.   No recent imaging. She says even if she slightly touches her hips, they hurt.  Currently attending pain management [facility] on oxycodone     Subjective      Review of Systems:   Review of Systems   Constitutional:  Negative for fever.   HENT:  Negative for ear pain and sore throat.    Respiratory:  Negative for shortness of breath.    Cardiovascular:  Negative for chest pain.   Gastrointestinal:  Negative for abdominal pain, diarrhea, nausea and vomiting.   Endocrine: Negative for polydipsia and polyuria.   Genitourinary:  Negative for dysuria.   Musculoskeletal:  Positive for arthralgias.      Past Medical History:   Past Medical History:   Diagnosis Date    Abnormal ECG     Anesthesia complication     woke up during surgery    Arrhythmia     Arthritis     CAD (coronary artery disease)     Chest pain     CHF (congestive heart failure)     Connective tissue anomaly     Diabetes mellitus     Edema     Hyperlipidemia LDL goal <70 2021    Myocardial infarct     2013    FARNAZ on CPAP 2021    Palpitations     RLS (restless legs syndrome)     Sleep apnea        Past Surgical History:   Past Surgical History:   Procedure Laterality Date    CARDIAC  CATHETERIZATION      COLONOSCOPY      COLONOSCOPY N/A 2022    Procedure: COLONOSCOPY WITH POLYPECTOMY;  Surgeon: Sloan Ortiz MD;  Location: [facility] ENDOSCOPY;  Service: Gastroenterology;  Laterality: N/A;  COLON POLYP    CORONARY ANGIOPLASTY      CORONARY STENT PLACEMENT      ENDOSCOPY N/A 2022    Procedure: ESOPHAGOGASTRODUODENOSCOPY WITH BX;  Surgeon: Sloan Ortiz MD;  Location: [facility] ENDOSCOPY;  Service: Gastroenterology;  Laterality: N/A;  HIATAL HERNIA    UPPER GASTROINTESTINAL ENDOSCOPY      VENOUS THROMBECTOMY         Family History:   Family History   Problem Relation Age of Onset    Suicidality Mother     Heart attack Father     Heart disease Father     Cancer Father     No Known Problems Maternal Grandmother     No Known Problems Maternal Grandfather     Heart disease Paternal Grandmother     Cancer Paternal Grandmother     Heart disease Paternal Grandfather     Cancer Paternal Grandfather     Thyroid disease Paternal Grandfather     Pancreatic cancer Paternal Grandfather     Esophageal cancer Paternal Grandfather     Stomach cancer Paternal Grandfather     Sleep apnea Paternal Grandfather     Colon cancer Neg Hx     Malig Hyperthermia Neg Hx        Social History:   Social History     Socioeconomic History    Marital status:    Tobacco Use    Smoking status: Former     Packs/day: 1.00     Years: 26.00     Pack years: 26.00     Types: Cigarettes     Start date: 1986     Quit date: 2012     Years since quittin.6    Smokeless tobacco: Never    Tobacco comments:     caffeine use   Vaping Use    Vaping Use: Never used   Substance and Sexual Activity    Alcohol use: No    Drug use: Never    Sexual activity: Yes     Partners: Male     Birth control/protection: Same-sex partner       Medications:     Current Outpatient Medications:     albuterol sulfate  (90 Base) MCG/ACT inhaler, Take 1-2 Puffs every 4 hours prn, Disp: 8 g, Rfl: 1    Alcohol Swabs (Easy  Touch Alcohol Prep Medium) 70 % pads, , Disp: , Rfl:     aspirin 81 MG EC tablet, Take 1 tablet by mouth Daily., Disp: 90 tablet, Rfl: 3    Calcipotriene 0.005 % solution, Apply 1 application topically to the appropriate area as directed 2 (Two) Times a Day., Disp: 60 mL, Rfl: 1    cetirizine (zyrTEC) 10 MG tablet, Take 1 tablet by mouth Daily., Disp: 90 tablet, Rfl: 0    cholecalciferol (VITAMIN D3) 25 MCG (1000 UT) tablet, Take 1 tablet by mouth Daily. Does not take daily only occasionally, Disp: 90 tablet, Rfl: 1    clobetasol (TEMOVATE) 0.05 % cream, Apply 1 application topically to the appropriate area as directed 2 (Two) Times a Day., Disp: 60 g, Rfl: 1    clopidogrel (PLAVIX) 75 MG tablet, Take 1 tablet by mouth Daily., Disp: 90 tablet, Rfl: 1    Dulaglutide (Trulicity) 0.75 MG/0.5ML solution pen-injector, Inject 0.75 mg under the skin into the appropriate area as directed 1 (One) Time Per Week., Disp: 6 mL, Rfl: 3    DULoxetine (CYMBALTA) 60 MG capsule, Take 1 capsule by mouth Daily., Disp: 90 capsule, Rfl: 1    empagliflozin (JARDIANCE) 25 MG tablet tablet, , Disp: , Rfl:     famotidine (Pepcid) 40 MG tablet, Take 1 tablet by mouth 2 (Two) Times a Day As Needed for Heartburn., Disp: 90 tablet, Rfl: 1    fluticasone (FLONASE) 50 MCG/ACT nasal spray, 2 sprays into the nostril(s) as directed by provider Daily. 2 sprays each nostril daily, Disp: 18.2 mL, Rfl: 1    FREESTYLE LITE test strip, , Disp: , Rfl:     furosemide (LASIX) 20 MG tablet, Take 1 tablet by mouth Daily., Disp: 90 tablet, Rfl: 0    glipizide (GLUCOTROL XL) 5 MG ER tablet, Take 1 tablet by mouth Daily for 180 days., Disp: 90 tablet, Rfl: 1    Lancets (freestyle) lancets, , Disp: , Rfl:     metFORMIN (GLUCOPHAGE) 1000 MG tablet, Take 1 tablet by mouth 2 (Two) Times a Day With Meals., Disp: 180 tablet, Rfl: 1    metoprolol succinate XL (TOPROL-XL) 25 MG 24 hr tablet, Take 1 tablet by mouth Daily., Disp: 90 tablet, Rfl: 0    montelukast (SINGULAIR)  "10 MG tablet, Take 1 tablet by mouth Every Night., Disp: 90 tablet, Rfl: 1    Narcan 4 MG/0.1ML nasal spray, , Disp: , Rfl:     nitroglycerin (NITROSTAT) 0.4 MG SL tablet, Place 1 tablet under the tongue Every 5 (Five) Minutes As Needed for Chest Pain., Disp: 35 tablet, Rfl: 6    oxyCODONE (ROXICODONE) 5 MG immediate release tablet, Take  by mouth As Needed., Disp: , Rfl:     rosuvastatin (CRESTOR) 40 MG tablet, Take 1 tablet by mouth Every Night., Disp: 90 tablet, Rfl: 0    tiotropium bromide monohydrate (Spiriva Respimat) 2.5 MCG/ACT aerosol solution inhaler, Inhale 2 puffs Daily., Disp: 4 g, Rfl: 5    tiotropium bromide monohydrate (Spiriva Respimat) 2.5 MCG/ACT aerosol solution inhaler, , Disp: , Rfl:     zolpidem (Ambien) 5 MG tablet, Take 1/2 tab as needed for sleep at night of sleep study only. Do not use at home., Disp: 1 tablet, Rfl: 0    Allergies:   Allergies   Allergen Reactions    Other Unknown - Low Severity     Hay    Sneezing watery eyes cough    Adhesive Tape Other (See Comments)     BLISTERS    Dust Mite Extract Other (See Comments)    Molds & Smuts Cough    Pollen Extract Cough           Objective     Physical Exam:  Vital Signs:   Vitals:    08/29/23 1542   BP: 120/73   Pulse: 80   Temp: 96.7 øF (35.9 øC)   SpO2: 97%   Weight: 98.9 kg (218 lb)   Height: 172.5 cm (67.9\")     Body mass index is 33.24 kg/mý.   BMI is >= 30 and <35. (Class 1 Obesity). The following options were offered after discussion;: exercise counseling/recommendations and nutrition counseling/recommendations       Physical Exam  HENT:      Right Ear: Tympanic membrane normal.      Left Ear: Tympanic membrane normal.      Nose: Nose normal.      Mouth/Throat:      Mouth: Mucous membranes are moist.   Eyes:      Conjunctiva/sclera: Conjunctivae normal.   Neck:      Vascular: No carotid bruit.   Cardiovascular:      Rate and Rhythm: Normal rate and regular rhythm.      Heart sounds: Normal heart sounds. No murmur heard.  Pulmonary: "      Effort: Pulmonary effort is normal.      Breath sounds: Normal breath sounds.   Abdominal:      General: Bowel sounds are normal.      Palpations: Abdomen is soft.   Musculoskeletal:      Right lower leg: No edema.      Left lower leg: No edema.      Comments: Pain with internal and external rotation in bilateral hips   Skin:     General: Skin is warm and dry.   Neurological:      Mental Status: She is alert.   Psychiatric:         Mood and Affect: Mood normal.         Behavior: Behavior normal.         Assessment / Plan      Assessment/Plan:   Diagnoses and all orders for this visit:    1. Type 2 diabetes mellitus with hyperglycemia, without long-term current use of insulin (Primary)  -     metFORMIN (GLUCOPHAGE) 1000 MG tablet; Take 1 tablet by mouth 2 (Two) Times a Day With Meals.  Dispense: 180 tablet; Refill: 1    2. Hyperlipidemia LDL goal <70    3. Essential hypertension    4. Stage 3 chronic kidney disease, unspecified whether stage 3a or 3b CKD    5. Vitamin D deficiency  -     cholecalciferol (VITAMIN D3) 25 MCG (1000 UT) tablet; Take 1 tablet by mouth Daily. Does not take daily only occasionally  Dispense: 90 tablet; Refill: 1    6. CAD S/P percutaneous coronary angioplasty  -     clopidogrel (PLAVIX) 75 MG tablet; Take 1 tablet by mouth Daily.  Dispense: 90 tablet; Refill: 1    7. Allergic rhinitis, unspecified seasonality, unspecified trigger  -     montelukast (SINGULAIR) 10 MG tablet; Take 1 tablet by mouth Every Night.  Dispense: 90 tablet; Refill: 1    8. Bilateral hip pain  -     XR Hip With or Without Pelvis 2 - 3 View Right; Future  -     XR Hip With or Without Pelvis 2 - 3 View Left; Future    Other orders  -     cetirizine (zyrTEC) 10 MG tablet; Take 1 tablet by mouth Daily.  Dispense: 90 tablet; Refill: 0  -     Dulaglutide (Trulicity) 0.75 MG/0.5ML solution pen-injector; Inject 0.75 mg under the skin into the appropriate area as directed 1 (One) Time Per Week.  Dispense: 6 mL; Refill:  "3  -     Discontinue: DULoxetine (CYMBALTA) 60 MG capsule; Take 1 capsule by mouth Daily.  Dispense: 90 capsule; Refill: 1  -     famotidine (Pepcid) 40 MG tablet; Take 1 tablet by mouth 2 (Two) Times a Day As Needed for Heartburn.  Dispense: 90 tablet; Refill: 1  -     furosemide (LASIX) 20 MG tablet; Take 1 tablet by mouth Daily.  Dispense: 90 tablet; Refill: 0  -     glipizide (GLUCOTROL XL) 5 MG ER tablet; Take 1 tablet by mouth Daily for 180 days.  Dispense: 90 tablet; Refill: 1  -     metoprolol succinate XL (TOPROL-XL) 25 MG 24 hr tablet; Take 1 tablet by mouth Daily.  Dispense: 90 tablet; Refill: 0  -     rosuvastatin (CRESTOR) 40 MG tablet; Take 1 tablet by mouth Every Night.  Dispense: 90 tablet; Refill: 0  -     DULoxetine (CYMBALTA) 60 MG capsule; Take 1 capsule by mouth Daily.  Dispense: 90 capsule; Refill: 1       Diabetes mellitus type 2 will obtain hemoglobin A1c to monitor patient is not currently checking her blood sugars at home we will continue Jardiance metformin glipizide and Trulicity if hemoglobin A1c is above 6.5 we will increase Trulicity to 1.5 mg once weekly  Hyperlipidemia obtain lipid panel and CMP to monitor current statin dose Crestor 40 mg at nighttime denies myalgias  Hypertension currently controlled with Toprol-XL  Chronic kidney disease recommend avoid all NSAIDs increase water intake we will obtain CMP to monitor  Vitamin D deficiency recommend 1000 units supplementation daily we will provide a refill  Coronary artery disease currently on Plavix and aspirin recommended follow-up with cardio as scheduled  Allergic rhinitis currently controlled with Singulair Zyrtec Flonase will provide refills  Bilateral hip pain will obtain x-ray call with results and further recommendations keep follow-up appointment as scheduled with pain management        Follow Up:   Return in about 6 months (around 2/29/2024).    SARWAT Dietz    \"Please note that portions of this note were " "completed with a voice recognition program.\"    "

## 2023-08-30 ENCOUNTER — HOSPITAL ENCOUNTER (OUTPATIENT)
Dept: GENERAL RADIOLOGY | Facility: HOSPITAL | Age: 58
Discharge: HOME | End: 2023-08-30
Payer: OTHER GOVERNMENT

## 2023-08-30 DIAGNOSIS — E78.5 HYPERLIPIDEMIA LDL GOAL <70: Primary | ICD-10-CM

## 2023-08-30 DIAGNOSIS — M25.551 BILATERAL HIP PAIN: ICD-10-CM

## 2023-08-30 DIAGNOSIS — M25.552 BILATERAL HIP PAIN: ICD-10-CM

## 2023-08-30 LAB — HBA1C MFR BLD: 7.9 % (ref 4.8–5.6)

## 2023-08-30 PROCEDURE — 73523 X-RAY EXAM HIPS BI 5/> VIEWS: CPT

## 2023-08-30 RX ORDER — EZETIMIBE 10 MG/1
10 TABLET ORAL DAILY
Qty: 90 TABLET | Refills: 1 | Status: SHIPPED | OUTPATIENT
Start: 2023-08-30

## 2023-09-01 DIAGNOSIS — M25.552 BILATERAL HIP PAIN: Primary | ICD-10-CM

## 2023-09-01 DIAGNOSIS — M25.551 BILATERAL HIP PAIN: Primary | ICD-10-CM

## 2023-09-07 ENCOUNTER — HOSPITAL ENCOUNTER (OUTPATIENT)
Dept: SLEEP MEDICINE | Facility: HOSPITAL | Age: 58
End: 2023-09-07
Payer: OTHER GOVERNMENT

## 2023-09-07 DIAGNOSIS — E66.9 OBESITY (BMI 30-39.9): ICD-10-CM

## 2023-09-07 DIAGNOSIS — R06.83 SNORING: ICD-10-CM

## 2023-09-07 DIAGNOSIS — G47.30 SLEEP APNEA, UNSPECIFIED TYPE: ICD-10-CM

## 2023-09-07 DIAGNOSIS — G47.10 HYPERSOMNIA: ICD-10-CM

## 2023-09-07 DIAGNOSIS — G47.8 NON-RESTORATIVE SLEEP: ICD-10-CM

## 2023-09-07 PROCEDURE — 95811 POLYSOM 6/>YRS CPAP 4/> PARM: CPT

## 2023-09-12 DIAGNOSIS — G47.10 HYPERSOMNIA: ICD-10-CM

## 2023-09-12 DIAGNOSIS — R06.83 SNORING: ICD-10-CM

## 2023-09-12 DIAGNOSIS — G47.8 NON-RESTORATIVE SLEEP: ICD-10-CM

## 2023-09-12 DIAGNOSIS — G47.33 OBSTRUCTIVE SLEEP APNEA: Primary | ICD-10-CM

## 2023-09-12 DIAGNOSIS — E66.9 OBESITY (BMI 30-39.9): ICD-10-CM

## 2023-09-13 ENCOUNTER — OFFICE VISIT (OUTPATIENT)
Dept: ORTHOPEDIC SURGERY | Facility: CLINIC | Age: 58
End: 2023-09-13
Payer: OTHER GOVERNMENT

## 2023-09-13 VITALS — WEIGHT: 218 LBS | HEIGHT: 67 IN | BODY MASS INDEX: 34.21 KG/M2

## 2023-09-13 DIAGNOSIS — M70.62 TROCHANTERIC BURSITIS OF LEFT HIP: Primary | ICD-10-CM

## 2023-09-13 DIAGNOSIS — M70.61 TROCHANTERIC BURSITIS OF RIGHT HIP: ICD-10-CM

## 2023-09-13 RX ORDER — MILNACIPRAN HYDROCHLORIDE 25 MG/1
1 TABLET, FILM COATED ORAL DAILY
COMMUNITY

## 2023-09-13 NOTE — PROGRESS NOTES
"Chief Complaint  Initial Evaluation of the Right Hip and Initial Evaluation of the Left Hip     Subjective      Claudette Gramajo presents to [facility] for initial evaluation of bilateral hips. The right hip hurts worse then the left .  She has pain on the lateral aspect of the hips.  She has pain with transition from sit to stand.      Allergies   Allergen Reactions    Other Unknown - Low Severity     Hay    Sneezing watery eyes cough    Adhesive Tape Other (See Comments)     BLISTERS    Dust Mite Extract Other (See Comments)    Molds & Smuts Cough    Pollen Extract Cough        Social History     Socioeconomic History    Marital status:    Tobacco Use    Smoking status: Former     Packs/day: 1.00     Years: 26.00     Pack years: 26.00     Types: Cigarettes     Start date: 1986     Quit date: 2012     Years since quittin.7    Smokeless tobacco: Never    Tobacco comments:     caffeine use   Vaping Use    Vaping Use: Never used   Substance and Sexual Activity    Alcohol use: No    Drug use: Never    Sexual activity: Yes     Partners: Male     Birth control/protection: Same-sex partner        I reviewed the patient's chief complaint, history of present illness, review of systems, past medical history, surgical history, family history, social history, medications, and allergy list.     Review of Systems     Constitutional: Denies fevers, chills, weight loss  Cardiovascular: Denies chest pain, shortness of breath  Skin: Denies rashes, acute skin changes  Neurologic: Denies headache, loss of consciousness        Vital Signs:   Ht 170.2 cm (67\")   Wt 98.9 kg (218 lb)   BMI 34.14 kg/m²          Physical Exam  General: Alert. No acute distress    Ortho Exam        BILATERAL HIPS  No skin discoloration, atrophy or swelling. Positive EHL, FHL, GS, and TA. Sensation intact to all 5 nerves of the foot. Positive straight leg raise. Leg lengths appear equal. Ambulates with " Antalgic gait Negative Evelia. Negative Patrick. Good strength to hamstrings, quadriceps, dorsiflexors, and plantar flexors. Knee Extensor Mechanism  intact        Procedures        Imaging Results (Most Recent)       None             Result Review :           XR Hips Bilateral With or Without Pelvis 5 View    Result Date: 8/30/2023  Narrative: PROCEDURE: XR HIPS BILATERAL W OR WO PELVIS 5 VIEW  COMPARISON: None  INDICATIONS: CHRONIC BILATERAL HIP PAIN, WORSE FOR 2 WEEKS. RIGHT WORSE SINCE BEING OVER-EXTENDED YESTERDAY PATIENT STATES.  FINDINGS:  No fracture or malalignment is identified.  There is mild osseous prominence of the lateral acetabular roofs bilaterally as well as the lateral aspects of the femoral head neck junctions.  This predisposes to femoral acetabular impingement.  Mild degenerative changes of the pubic symphysis are noted.      Impression:   1. Morphologic changes of the bilateral hip joints predisposing to femoral acetabular impingement.      Manohar Musa M.D.       Electronically Signed and Approved By: Manohar Musa M.D. on 8/30/2023 at 16:44                     Assessment and Plan     Diagnoses and all orders for this visit:    1. Trochanteric bursitis of left hip (Primary)    2. Trochanteric bursitis of right hip        Discussed the treatment plan with the patient. I reviewed the X-rays that were obtained 8/30/23 with the patient.     Discussed the risks and benefits of conservative measures.  The patient expressed understanding and wished to proceed with a right hip steroid injection.     HEP exercises.         Call or return if worsening symptoms.    Follow Up     PRN      Patient was given instructions and counseling regarding her condition or for health maintenance advice. Please see specific information pulled into the AVS if appropriate.     Scribed for Alexis Hawkins MD by Izabela Hughes MA.  09/13/23   15:07 EDT      I have personally performed the services described in this  document as scribed by the above individual and it is both accurate and complete. Alexis Hawkins MD 09/13/23

## 2023-09-15 ENCOUNTER — TELEPHONE (OUTPATIENT)
Dept: SLEEP MEDICINE | Facility: HOSPITAL | Age: 58
End: 2023-09-15
Payer: OTHER GOVERNMENT

## 2023-09-18 ENCOUNTER — TELEPHONE (OUTPATIENT)
Dept: SLEEP MEDICINE | Facility: HOSPITAL | Age: 58
End: 2023-09-18
Payer: OTHER GOVERNMENT

## 2023-09-25 PROBLEM — Z80.0 FAMILY HISTORY OF ESOPHAGEAL CANCER: Status: RESOLVED | Noted: 2022-05-27 | Resolved: 2023-09-25

## 2023-09-25 PROBLEM — Z80.0 FAMILY HISTORY OF COLON CANCER: Status: RESOLVED | Noted: 2022-05-27 | Resolved: 2023-09-25

## 2023-09-25 PROBLEM — M25.50 MULTIPLE JOINT PAIN: Status: RESOLVED | Noted: 2019-07-16 | Resolved: 2023-09-25

## 2023-09-25 PROBLEM — S83.006A DISLOCATION, PATELLA CLOSED: Status: RESOLVED | Noted: 2017-05-30 | Resolved: 2023-09-25

## 2023-09-25 PROBLEM — L30.9 DERMATITIS: Status: RESOLVED | Noted: 2022-05-24 | Resolved: 2023-09-25

## 2023-09-25 PROBLEM — R10.32 LEFT LOWER QUADRANT ABDOMINAL PAIN: Status: RESOLVED | Noted: 2022-05-27 | Resolved: 2023-09-25

## 2023-09-25 PROBLEM — M25.552 PAIN OF LEFT HIP JOINT: Status: RESOLVED | Noted: 2023-01-09 | Resolved: 2023-09-25

## 2023-09-26 ENCOUNTER — OFFICE VISIT (OUTPATIENT)
Dept: CARDIOLOGY | Facility: CLINIC | Age: 58
End: 2023-09-26
Payer: OTHER GOVERNMENT

## 2023-09-26 VITALS
WEIGHT: 208 LBS | HEIGHT: 67 IN | BODY MASS INDEX: 32.65 KG/M2 | HEART RATE: 78 BPM | DIASTOLIC BLOOD PRESSURE: 77 MMHG | SYSTOLIC BLOOD PRESSURE: 119 MMHG

## 2023-09-26 DIAGNOSIS — E78.5 HYPERLIPIDEMIA LDL GOAL <70: ICD-10-CM

## 2023-09-26 DIAGNOSIS — I10 ESSENTIAL HYPERTENSION: ICD-10-CM

## 2023-09-26 DIAGNOSIS — R42 DIZZINESS: ICD-10-CM

## 2023-09-26 DIAGNOSIS — Z98.61 CAD S/P PERCUTANEOUS CORONARY ANGIOPLASTY: Primary | ICD-10-CM

## 2023-09-26 DIAGNOSIS — I25.10 CAD S/P PERCUTANEOUS CORONARY ANGIOPLASTY: Primary | ICD-10-CM

## 2023-09-26 DIAGNOSIS — R06.09 DOE (DYSPNEA ON EXERTION): ICD-10-CM

## 2023-09-26 NOTE — PROGRESS NOTES
Chief Complaint  No chief complaint on file.    Subjective            History of Present Illness  Claudette Gramajo is a 58-year-old female patient who presents to the office today for follow up. She has CAD with prior stenting, hypertension, and hyperlipidemia. She is compliant with medications. She reports chronic shortness of breath but denies any worsening. She reports lightheadedness with prolonged standing, no longer drives for the last few years. She feels the lightheadedness is progressively worsening over the last couple months. She is in the process of getting a CPAP machine through sleep medicine. She also reports chronic bilateral lower extremity edema that comes and goes on its own. She denies any chest pain or palpitations.     PMH  Past Medical History:   Diagnosis Date    Anesthesia complication     woke up during surgery    Arthritis     CAD (coronary artery disease)     Cellulitis and abscess of leg 03/28/2013    Chronic purulent otitis media 07/01/2013    Connective tissue anomaly     Diabetes mellitus     Dislocation, patella closed 05/30/2017    Family history of colon cancer 05/27/2022    Family history of esophageal cancer 05/27/2022    Hyperlipidemia LDL goal <70 08/31/2021    Myocardial infarct     2013    FARNAZ on CPAP 09/01/2021    RLS (restless legs syndrome)     Sleep apnea          ALLERGY  Allergies   Allergen Reactions    Other Unknown - Low Severity     Hay    Sneezing watery eyes cough    Adhesive Tape Other (See Comments)     BLISTERS    Dust Mite Extract Other (See Comments)    Molds & Smuts Cough    Pollen Extract Cough          SURGICALHX  Past Surgical History:   Procedure Laterality Date    CARDIAC CATHETERIZATION      COLONOSCOPY      COLONOSCOPY N/A 8/26/2022    Procedure: COLONOSCOPY WITH POLYPECTOMY;  Surgeon: Sloan Ortiz MD;  Location: [facility] ENDOSCOPY;  Service: Gastroenterology;  Laterality: N/A;  COLON POLYP    CORONARY ANGIOPLASTY      CORONARY STENT PLACEMENT       ENDOSCOPY N/A 2022    Procedure: ESOPHAGOGASTRODUODENOSCOPY WITH BX;  Surgeon: Sloan Ortiz MD;  Location: [facility] ENDOSCOPY;  Service: Gastroenterology;  Laterality: N/A;  HIATAL HERNIA    UPPER GASTROINTESTINAL ENDOSCOPY      VENOUS THROMBECTOMY            SOC  Social History     Socioeconomic History    Marital status:    Tobacco Use    Smoking status: Former     Packs/day: 1.00     Years: 26.00     Pack years: 26.00     Types: Cigarettes     Start date: 1986     Quit date: 2012     Years since quittin.7    Smokeless tobacco: Never    Tobacco comments:     caffeine use   Vaping Use    Vaping Use: Never used   Substance and Sexual Activity    Alcohol use: No    Drug use: Never    Sexual activity: Yes     Partners: Male     Birth control/protection: Same-sex partner         FAMHX  Family History   Problem Relation Age of Onset    Suicidality Mother     Heart attack Father     Heart disease Father     Cancer Father     No Known Problems Maternal Grandmother     No Known Problems Maternal Grandfather     Heart disease Paternal Grandmother     Cancer Paternal Grandmother     Heart disease Paternal Grandfather     Cancer Paternal Grandfather     Thyroid disease Paternal Grandfather     Pancreatic cancer Paternal Grandfather     Esophageal cancer Paternal Grandfather     Stomach cancer Paternal Grandfather     Sleep apnea Paternal Grandfather     Colon cancer Neg Hx     Malig Hyperthermia Neg Hx           MEDSIGONLY  Current Outpatient Medications on File Prior to Visit   Medication Sig    albuterol sulfate  (90 Base) MCG/ACT inhaler Take 1-2 Puffs every 4 hours prn    Alcohol Swabs (Easy Touch Alcohol Prep Medium) 70 % pads     aspirin 81 MG EC tablet Take 1 tablet by mouth Daily.    cetirizine (zyrTEC) 10 MG tablet Take 1 tablet by mouth Daily.    cholecalciferol (VITAMIN D3) 25 MCG (1000 UT) tablet Take 1 tablet by mouth Daily. Does not take daily only occasionally     clopidogrel (PLAVIX) 75 MG tablet Take 1 tablet by mouth Daily.    Dulaglutide 1.5 MG/0.5ML solution pen-injector Inject 1.5 mg under the skin into the appropriate area as directed 1 (One) Time Per Week.    DULoxetine (CYMBALTA) 60 MG capsule Take 1 capsule by mouth Daily.    empagliflozin (JARDIANCE) 25 MG tablet tablet     ezetimibe (Zetia) 10 MG tablet Take 1 tablet by mouth Daily.    famotidine (Pepcid) 40 MG tablet Take 1 tablet by mouth 2 (Two) Times a Day As Needed for Heartburn.    fluticasone (FLONASE) 50 MCG/ACT nasal spray 2 sprays into the nostril(s) as directed by provider Daily. 2 sprays each nostril daily    FREESTYLE LITE test strip     furosemide (LASIX) 20 MG tablet Take 1 tablet by mouth Daily.    glipizide (GLUCOTROL XL) 5 MG ER tablet Take 1 tablet by mouth Daily for 180 days.    Lancets (freestyle) lancets     metFORMIN (GLUCOPHAGE) 1000 MG tablet Take 1 tablet by mouth 2 (Two) Times a Day With Meals.    metoprolol succinate XL (TOPROL-XL) 25 MG 24 hr tablet Take 1 tablet by mouth Daily.    montelukast (SINGULAIR) 10 MG tablet Take 1 tablet by mouth Every Night.    Narcan 4 MG/0.1ML nasal spray     nitroglycerin (NITROSTAT) 0.4 MG SL tablet Place 1 tablet under the tongue Every 5 (Five) Minutes As Needed for Chest Pain.    oxyCODONE (ROXICODONE) 5 MG immediate release tablet Take  by mouth As Needed.    rosuvastatin (CRESTOR) 40 MG tablet Take 1 tablet by mouth Every Night.    Savella 25 MG tablet tablet Take 1 tablet by mouth Daily.    tiotropium bromide monohydrate (Spiriva Respimat) 2.5 MCG/ACT aerosol solution inhaler Inhale 2 puffs Daily.    [DISCONTINUED] Calcipotriene 0.005 % solution Apply 1 application topically to the appropriate area as directed 2 (Two) Times a Day.    [DISCONTINUED] clobetasol (TEMOVATE) 0.05 % cream Apply 1 application topically to the appropriate area as directed 2 (Two) Times a Day.    [DISCONTINUED] tiotropium bromide monohydrate (Spiriva Respimat) 2.5 MCG/ACT  "aerosol solution inhaler     [DISCONTINUED] zolpidem (Ambien) 5 MG tablet Take 1/2 tab as needed for sleep at night of sleep study only. Do not use at home.     No current facility-administered medications on file prior to visit.       Objective   /77   Pulse 78   Ht 170.2 cm (67\")   Wt 94.3 kg (208 lb)   BMI 32.58 kg/m²       Physical Exam  HENT:      Head: Normocephalic.   Cardiovascular:      Rate and Rhythm: Normal rate and regular rhythm.      Pulses: Normal pulses.      Heart sounds: Normal heart sounds. No murmur heard.  Pulmonary:      Effort: Pulmonary effort is normal.      Breath sounds: Normal breath sounds.   Musculoskeletal:      Cervical back: Neck supple.      Right lower leg: No edema.      Left lower leg: No edema.   Skin:     General: Skin is dry.   Neurological:      Mental Status: She is alert and oriented to person, place, and time.   Psychiatric:         Behavior: Behavior normal.     Result Review :   The following data was reviewed by: SARWAT Harding on 09/26/2023:  proBNP   Date Value Ref Range Status   09/01/2021 216.7 0.0 - 900.0 pg/mL Final     CMP          8/29/2023    16:21   CMP   Glucose 280    BUN 33    Creatinine 0.94    EGFR 70.5    Sodium 136    Potassium 4.8    Chloride 96    Calcium 11.0    Total Protein 7.6    Albumin 5.1    Globulin 2.5    Total Bilirubin 0.3    Alkaline Phosphatase 78    AST (SGOT) 20    ALT (SGPT) 38    Albumin/Globulin Ratio 2.0    BUN/Creatinine Ratio 35.1    Anion Gap 17.0      CBC w/diff          8/29/2023    16:21   CBC w/Diff   WBC 7.98    RBC 4.74    Hemoglobin 14.3    Hematocrit 44.0    MCV 92.8    MCH 30.2    MCHC 32.5    RDW 12.9    Platelets 241    Neutrophil Rel % 55.9    Immature Granulocyte Rel % 0.8    Lymphocyte Rel % 32.1    Monocyte Rel % 8.0    Eosinophil Rel % 2.3    Basophil Rel % 0.9       Lab Results   Component Value Date    TSH 2.540 08/29/2023      Lab Results   Component Value Date    FREET4 1.1 05/07/2021      No " results found for: DDIMERQUANT  No results found for: MG   No results found for: DIGOXIN   Lab Results   Component Value Date    TROPONINT <0.01 09/29/2014           Lipid Panel          8/29/2023    16:21   Lipid Panel   Total Cholesterol 203    Triglycerides 488    HDL Cholesterol 47    VLDL Cholesterol 78    LDL Cholesterol  78    LDL/HDL Ratio 1.24        Results for orders placed in visit on 10/05/21    Adult Transthoracic Echo Complete W/ Cont if Necessary Per Protocol    Interpretation Summary  Normal left ventricular systolic function with an estimated ejection fraction of 60-65%.  No regional wall motion abnormalities were observed.  Left ventricular diastolic function is consistent with impaired relaxation (grade I diastolic dysfunction).  No significant valvular heart disease.         Assessment and Plan    Diagnoses and all orders for this visit:    1. CAD S/P percutaneous coronary angioplasty (Primary)  She denies any anginal symptoms but has progressively worsening symptoms overall of lightheadedness/dizziness and shortness of breath.  Obtain CTA coronary to assess for possible worsening atherosclerosis.  Continue aspirin 81 mg daily and Plavix 75 mg daily.  -     CT Angiogram Coronary; Future    2. Essential hypertension  Currently controlled and without adverse effects from medication, continue metoprolol 25 mg daily and Lasix 20 mg daily.    3. Hyperlipidemia LDL goal <70  Last lipid panel was 8/29/2023 with LDL 78 which is slightly above goal range however she has recently been started on ezetimibe 10 mg daily in addition to her rosuvastatin 40 mg nightly.  Obtain new fasting lipid and hepatic function panel in 2 months, these orders are already in place.    4. Dizziness & 5. DREW (dyspnea on exertion)  She continues to have worsening dizziness and shortness of breath with physical exertion.  Check proBNP to check for possible CHF.  If proBNP level is elevated then will order echocardiogram to  assess left ventricular systolic function.  Check BMP and magnesium level to assess electrolytes and renal function.  Check 48-hour Holter monitor to assess for possible arrhythmia.  -     Magnesium; Future  -     Basic Metabolic Panel; Future  -     Holter Monitor - 48 Hour; Future  -     proBNP; Future          Follow Up   Return in about 6 months (around 3/26/2024) for Follow up with Dr Parker.    Patient was given instructions and counseling regarding her condition or for health maintenance advice. Please see specific information pulled into the AVS if appropriate.     Claudette Alejandraoyle  reports that she quit smoking about 11 years ago. Her smoking use included cigarettes. She started smoking about 37 years ago. She has a 26.00 pack-year smoking history. She has never used smokeless tobacco.         Berna Harry, SARWAT  09/26/23  14:51 EDT    Dictated Utilizing Dragon Dictation

## 2023-10-05 ENCOUNTER — TELEPHONE (OUTPATIENT)
Dept: CARDIOLOGY | Facility: CLINIC | Age: 58
End: 2023-10-05
Payer: OTHER GOVERNMENT

## 2023-10-05 NOTE — TELEPHONE ENCOUNTER
----- Message from SARWAT Baptiste sent at 10/5/2023  4:01 PM EDT -----  Notify pt holter result:  Baseline rhythm was normal sinus with average heart rate of 70  Maximum heart rate 113/Minimum heart rate 52  PVCs/PACs (less than 1% total beats) can be felt as palpitations, limit caffeine intake  No significant dysrhythmias noted  Remind her to get labs done, Follow up as scheduled

## 2023-10-06 ENCOUNTER — LAB (OUTPATIENT)
Dept: LAB | Facility: HOSPITAL | Age: 58
End: 2023-10-06
Payer: OTHER GOVERNMENT

## 2023-10-06 DIAGNOSIS — E78.5 HYPERLIPIDEMIA LDL GOAL <70: ICD-10-CM

## 2023-10-06 DIAGNOSIS — R42 DIZZINESS: ICD-10-CM

## 2023-10-06 DIAGNOSIS — R06.09 DOE (DYSPNEA ON EXERTION): ICD-10-CM

## 2023-10-06 LAB
ALBUMIN SERPL-MCNC: 4.3 G/DL (ref 3.5–5.2)
ALBUMIN/GLOB SERPL: 1.6 G/DL
ALP SERPL-CCNC: 77 U/L (ref 39–117)
ALT SERPL W P-5'-P-CCNC: 34 U/L (ref 1–33)
ANION GAP SERPL CALCULATED.3IONS-SCNC: 11 MMOL/L (ref 5–15)
AST SERPL-CCNC: 18 U/L (ref 1–32)
BILIRUB SERPL-MCNC: 0.5 MG/DL (ref 0–1.2)
BUN SERPL-MCNC: 22 MG/DL (ref 6–20)
BUN/CREAT SERPL: 21.4 (ref 7–25)
CALCIUM SPEC-SCNC: 9.8 MG/DL (ref 8.6–10.5)
CHLORIDE SERPL-SCNC: 105 MMOL/L (ref 98–107)
CHOLEST SERPL-MCNC: 112 MG/DL (ref 0–200)
CO2 SERPL-SCNC: 28 MMOL/L (ref 22–29)
CREAT SERPL-MCNC: 1.03 MG/DL (ref 0.57–1)
EGFRCR SERPLBLD CKD-EPI 2021: 63.2 ML/MIN/1.73
GLOBULIN UR ELPH-MCNC: 2.7 GM/DL
GLUCOSE SERPL-MCNC: 260 MG/DL (ref 65–99)
HDLC SERPL-MCNC: 48 MG/DL (ref 40–60)
LDLC SERPL CALC-MCNC: 44 MG/DL (ref 0–100)
LDLC/HDLC SERPL: 0.88 {RATIO}
MAGNESIUM SERPL-MCNC: 2.2 MG/DL (ref 1.6–2.6)
NT-PROBNP SERPL-MCNC: 92 PG/ML (ref 0–900)
POTASSIUM SERPL-SCNC: 5 MMOL/L (ref 3.5–5.2)
PROT SERPL-MCNC: 7 G/DL (ref 6–8.5)
SODIUM SERPL-SCNC: 144 MMOL/L (ref 136–145)
TRIGL SERPL-MCNC: 109 MG/DL (ref 0–150)
VLDLC SERPL-MCNC: 20 MG/DL (ref 5–40)

## 2023-10-06 PROCEDURE — 80053 COMPREHEN METABOLIC PANEL: CPT

## 2023-10-06 PROCEDURE — 83735 ASSAY OF MAGNESIUM: CPT

## 2023-10-06 PROCEDURE — 36415 COLL VENOUS BLD VENIPUNCTURE: CPT

## 2023-10-06 PROCEDURE — 83880 ASSAY OF NATRIURETIC PEPTIDE: CPT

## 2023-10-06 PROCEDURE — 80061 LIPID PANEL: CPT

## 2023-10-09 ENCOUNTER — TELEPHONE (OUTPATIENT)
Dept: CARDIOLOGY | Facility: CLINIC | Age: 58
End: 2023-10-09
Payer: OTHER GOVERNMENT

## 2023-10-09 NOTE — TELEPHONE ENCOUNTER
----- Message from SARWAT Baptiste sent at 10/7/2023  9:01 AM EDT -----  Labs are good continue current meds

## 2023-10-26 ENCOUNTER — TELEPHONE (OUTPATIENT)
Dept: CARDIOLOGY | Facility: CLINIC | Age: 58
End: 2023-10-26
Payer: OTHER GOVERNMENT

## 2023-10-26 RX ORDER — MIDODRINE HYDROCHLORIDE 2.5 MG/1
2.5 TABLET ORAL DAILY
Qty: 90 TABLET | Refills: 3 | Status: SHIPPED | OUTPATIENT
Start: 2023-10-26

## 2023-10-26 NOTE — TELEPHONE ENCOUNTER
----- Message from SARWAT Baptiste sent at 10/25/2023  4:08 PM EDT -----  Blood pressures are low, would recommend midodrine 2.5 mg daily and continue to monitor blood pressure  ----- Message -----  From: Iram Adams RegSched Rep  Sent: 10/25/2023   4:00 PM EDT  To: SARWAT Baptiste

## 2023-11-30 ENCOUNTER — TELEPHONE (OUTPATIENT)
Dept: CARDIOLOGY | Facility: CLINIC | Age: 58
End: 2023-11-30
Payer: OTHER GOVERNMENT

## 2023-11-30 NOTE — TELEPHONE ENCOUNTER
----- Message from SARWAT Baptiste sent at 11/30/2023  8:49 AM EST -----  Blood pressures appear stable, continue current meds  ----- Message -----  From: Iram Adams RegSched Rep  Sent: 11/30/2023   8:33 AM EST  To: SARWAT Baptiste

## 2023-12-05 ENCOUNTER — OFFICE VISIT (OUTPATIENT)
Dept: SLEEP MEDICINE | Facility: HOSPITAL | Age: 58
End: 2023-12-05
Payer: OTHER GOVERNMENT

## 2023-12-05 VITALS — HEIGHT: 68 IN | HEART RATE: 88 BPM | OXYGEN SATURATION: 97 % | WEIGHT: 216.1 LBS | BODY MASS INDEX: 32.75 KG/M2

## 2023-12-05 DIAGNOSIS — G47.00 INSOMNIA, UNSPECIFIED TYPE: ICD-10-CM

## 2023-12-05 DIAGNOSIS — E66.9 OBESITY (BMI 30-39.9): ICD-10-CM

## 2023-12-05 DIAGNOSIS — G47.33 OBSTRUCTIVE SLEEP APNEA: Primary | ICD-10-CM

## 2023-12-05 DIAGNOSIS — G47.8 NON-RESTORATIVE SLEEP: ICD-10-CM

## 2023-12-05 PROCEDURE — G0463 HOSPITAL OUTPT CLINIC VISIT: HCPCS

## 2023-12-05 RX ORDER — HYDROXYZINE HYDROCHLORIDE 25 MG/1
TABLET, FILM COATED ORAL
Qty: 60 TABLET | Refills: 0 | Status: SHIPPED | OUTPATIENT
Start: 2023-12-05

## 2023-12-05 NOTE — PROGRESS NOTES
"Follow Up Sleep Disorders Center Note     Chief Complaint:  FARNAZ     Primary Care Physician: Angle Bahena APRN    Interval History:   The patient is a 58 y.o. female  who was last seen in the sleep lab: 2023 for PSG; due for new machine; study showed AHI 19.8 lowest SpO2 90%.  Presents today for first follow-up since starting new auto CPAP.  No problems with mask machine hypersomnia nonrestorative sleep.    Downloaded PAP Data Reviewed For Compliance:  DME is [facility].  Downloads between 10/3/2023 - 2023.  Average usage is 11 hours 39 minutes.  Average AHI is 3.6.  Average auto CPAP pressure is 9.8 cm H2O    I have reviewed the above results and compared them with the patient's last downloads and reviewed with the patient.    Review of Systems:    A complete review of systems was done and all were negative with the exception of abdominal bloating morning headache acid reflux dry mouth    Social History:    Social History     Socioeconomic History    Marital status:    Tobacco Use    Smoking status: Former     Packs/day: 1.00     Years: 26.00     Additional pack years: 0.00     Total pack years: 26.00     Types: Cigarettes     Start date: 1986     Quit date: 2012     Years since quittin.9    Smokeless tobacco: Never    Tobacco comments:     caffeine use   Vaping Use    Vaping Use: Never used   Substance and Sexual Activity    Alcohol use: No    Drug use: Never    Sexual activity: Yes     Partners: Male     Birth control/protection: Same-sex partner       Allergies:  Other, Adhesive tape, Dust mite extract, Molds & smuts, and Pollen extract     Medication Review:  Reviewed.      Vital Signs:    Vitals:    23 1300   Pulse: 88   SpO2: 97%   Weight: 98 kg (216 lb 1.6 oz)   Height: 172.7 cm (67.99\")     Body mass index is 32.87 kg/m².    Physical Exam:    Constitutional:  Well developed 58 y.o. female that appears in no apparent distress.  Awake & oriented times 3.  Normal mood " with normal recent and remote memory and normal judgement.  Eyes:  Conjunctivae normal.  Oropharynx: Previously, moist mucous membranes.    Self-administered [place] Sleepiness Scale test results: 6  0-5 Lower normal daytime sleepiness  6-10 Higher normal daytime sleepiness  11-12 Mild, 13-15 Moderate, & 16-24 Severe excessive daytime sleepiness    Impression:   1. Obstructive sleep apnea    2. Insomnia, unspecified type    3. Obesity (BMI 30-39.9)    4. Non-restorative sleep        Obstructive sleep apnea adequately treated with auto CPAP 8-16. The patient appears to be at goal with good compliance and usage.     Plan:  Good sleep hygiene measures should be maintained.  Weight loss would be beneficial in this patient who is obese by Body mass index is 32.87 kg/m²..      After evaluating the patient and assessing results available, the patient is benefiting from the treatment being provided.     The patient will continue CPAP.  After clinical evaluation and review of downloads, I recommend changing to set P of 8 cm H2O and change to standard tubing and adjust humidity settings.  A new prescription will be sent to the patient's DME.  Added hydroxyzine to help with insomnia.  Discussed CBT-I in great detail patient will look into this.    Caution during activities that require prolonged concentration is strongly advised if sleepiness returns. Changing of PAP supplies regularly is important for effective use. Patient needs to change cushion on the mask or plugs on nasal pillows along with disposable filters once every month and change mask frame, tubing, headgear and Velcro straps every 6 months at the minimum.    I answered all of the patient's questions.  The patient will call for any problems and will follow up in 6 weeks.      Tra Cabrera MD  Sleep Medicine  12/05/23  14:41 EST

## 2024-01-12 RX ORDER — FUROSEMIDE 20 MG/1
20 TABLET ORAL DAILY
Qty: 90 TABLET | Refills: 1 | Status: SHIPPED | OUTPATIENT
Start: 2024-01-12

## 2024-01-18 ENCOUNTER — OFFICE VISIT (OUTPATIENT)
Dept: SLEEP MEDICINE | Facility: HOSPITAL | Age: 59
End: 2024-01-18
Payer: OTHER GOVERNMENT

## 2024-01-18 VITALS — OXYGEN SATURATION: 98 % | HEIGHT: 68 IN | WEIGHT: 221 LBS | BODY MASS INDEX: 33.49 KG/M2 | HEART RATE: 88 BPM

## 2024-01-18 DIAGNOSIS — E66.9 OBESITY (BMI 30-39.9): ICD-10-CM

## 2024-01-18 DIAGNOSIS — T88.8XXA MALFUNCTION OF CONTINUOUS POSITIVE AIRWAY PRESSURE (CPAP) OR BILEVEL POSITIVE AIRWAY PRESSURE (BPAP) MACHINE, INITIAL ENCOUNTER: ICD-10-CM

## 2024-01-18 DIAGNOSIS — G47.33 OBSTRUCTIVE SLEEP APNEA: Primary | ICD-10-CM

## 2024-01-18 DIAGNOSIS — G47.00 INSOMNIA, UNSPECIFIED TYPE: ICD-10-CM

## 2024-01-18 PROCEDURE — G0463 HOSPITAL OUTPT CLINIC VISIT: HCPCS

## 2024-01-18 NOTE — PROGRESS NOTES
Follow Up Sleep Disorders Center Note     Chief Complaint:  FARNAZ     Primary Care Physician: Angel Bahena APRN    Interval History:   The patient is a 58 y.o. female  who was last seen in the sleep lab: 2023.  Presents today for follow-up on FARNAZ.  2023 PSG showed AHI 19.8.  At last visit was her first follow-up since starting auto CPAP.  Auto CPAP 8-16.  At last visit we changed her to a set pressure of 8 and decided to change her heated tube to a standard tube and to adjust timidity setting to help with comfort issues with machine.  Hydroxyzine was also prescribed to help with insomnia; we discussed CBT-I in great detail to address her insomnia.    Downloaded PAP Data Reviewed For Compliance:  DME is [employer].  Downloads between 2023 - 2024.  Average usage is 12 hours 57 minutes.  Average AHI is 3.6.  Average auto CPAP pressure is 8    I have reviewed the above results and compared them with the patient's last downloads and reviewed with the patient.    Review of Systems:    A complete review of systems was done and all were negative with the exception of morning headache abdominal bloating dry mouth    Social History:    Social History     Socioeconomic History    Marital status:    Tobacco Use    Smoking status: Former     Packs/day: 1.00     Years: 26.00     Additional pack years: 0.00     Total pack years: 26.00     Types: Cigarettes     Start date: 1986     Quit date: 2012     Years since quittin.0    Smokeless tobacco: Never    Tobacco comments:     caffeine use   Vaping Use    Vaping Use: Never used   Substance and Sexual Activity    Alcohol use: No    Drug use: Never    Sexual activity: Yes     Partners: Male     Birth control/protection: Same-sex partner       Allergies:  Other, Adhesive tape, Dust mite extract, Molds & smuts, and Pollen extract     Medication Review:  Reviewed.      Vital Signs:    Vitals:    24 1300   Pulse: 88   SpO2: 98%  "  Weight: 100 kg (221 lb)   Height: 172.7 cm (67.99\")     Body mass index is 33.61 kg/m².    Physical Exam:    Constitutional:  Well developed 58 y.o. female that appears in no apparent distress.  Awake & oriented times 3.  Normal mood with normal recent and remote memory and normal judgement.  Eyes:  Conjunctivae normal.  Oropharynx: Previously, moist mucous membranes.    Self-administered [place] Sleepiness Scale test results: 9  0-5 Lower normal daytime sleepiness  6-10 Higher normal daytime sleepiness  11-12 Mild, 13-15 Moderate, & 16-24 Severe excessive daytime sleepiness    Impression:   1. Obstructive sleep apnea    2. Obesity (BMI 30-39.9)    3. Malfunction of continuous positive airway pressure (CPAP) or bilevel positive airway pressure (BPAP) machine, initial encounter    4. Insomnia, unspecified type        Obstructive sleep apnea adequately treated with CPAP 8. The patient appears to be at goal with good compliance and usage. Patient will get into more consistent usage with hydroxyzine as this does help with her sleep; was unable to use very consistently since last visit because she had a respiratory illness and had a lot going on with her family.  However it does seem to help when she does uses it.  Patient can call for refills when she is ready.  Patient will also look into CBT-I with [facility].    Improvement in bloating and air leak with pressure of 8 however still having some bloating and air leak; amenable to changing pressure further.    Changing to standard tube has not helped with water collecting in tube; adjusting humidity settings has not helped either.  Patient advised to talk to DME about having machine looked at to make sure humidifier is working appropriately.     Plan:  Good sleep hygiene measures should be maintained.  Weight loss would be beneficial in this patient who obese by Body mass index is 33.61 kg/m²..      After evaluating the patient and assessing " results available, the patient is benefiting from the treatment being provided.     The patient will continue CPAP.  After clinical evaluation and review of downloads, I recommend changing P to 6 cm H2O to further help with bloating and air leak.  A new prescription will be sent to the patient's DME.    Caution during activities that require prolonged concentration is strongly advised if sleepiness returns. Changing of PAP supplies regularly is important for effective use. Patient needs to change cushion on the mask or plugs on nasal pillows along with disposable filters once every month and change mask frame, tubing, headgear and Velcro straps every 6 months at the minimum.    I answered all of the patient's questions.  The patient will call for any problems and will follow up in 3 months.      Tra Cabrera MD  Sleep Medicine  01/18/24  14:00 EST

## 2024-02-19 DIAGNOSIS — E78.5 HYPERLIPIDEMIA LDL GOAL <70: Primary | ICD-10-CM

## 2024-02-19 DIAGNOSIS — E55.9 VITAMIN D DEFICIENCY: ICD-10-CM

## 2024-02-19 DIAGNOSIS — N18.30 STAGE 3 CHRONIC KIDNEY DISEASE, UNSPECIFIED WHETHER STAGE 3A OR 3B CKD: ICD-10-CM

## 2024-02-19 DIAGNOSIS — E11.65 TYPE 2 DIABETES MELLITUS WITH HYPERGLYCEMIA, WITHOUT LONG-TERM CURRENT USE OF INSULIN: ICD-10-CM

## 2024-02-19 RX ORDER — GLIPIZIDE 5 MG/1
5 TABLET, FILM COATED, EXTENDED RELEASE ORAL DAILY
Qty: 90 TABLET | Refills: 1 | Status: SHIPPED | OUTPATIENT
Start: 2024-02-19 | End: 2024-08-17

## 2024-02-19 RX ORDER — EZETIMIBE 10 MG/1
10 TABLET ORAL DAILY
Qty: 90 TABLET | Refills: 1 | Status: SHIPPED | OUTPATIENT
Start: 2024-02-19

## 2024-02-19 RX ORDER — ROSUVASTATIN CALCIUM 40 MG/1
40 TABLET, COATED ORAL NIGHTLY
Qty: 90 TABLET | Refills: 1 | Status: SHIPPED | OUTPATIENT
Start: 2024-02-19

## 2024-02-22 ENCOUNTER — PATIENT MESSAGE (OUTPATIENT)
Dept: FAMILY MEDICINE CLINIC | Facility: CLINIC | Age: 59
End: 2024-02-22
Payer: OTHER GOVERNMENT

## 2024-03-20 RX ORDER — FAMOTIDINE 40 MG/1
40 TABLET, FILM COATED ORAL 2 TIMES DAILY PRN
Qty: 90 TABLET | Refills: 1 | Status: SHIPPED | OUTPATIENT
Start: 2024-03-20

## 2024-03-25 ENCOUNTER — LAB (OUTPATIENT)
Dept: LAB | Facility: HOSPITAL | Age: 59
End: 2024-03-25
Payer: OTHER GOVERNMENT

## 2024-03-25 DIAGNOSIS — N18.30 STAGE 3 CHRONIC KIDNEY DISEASE, UNSPECIFIED WHETHER STAGE 3A OR 3B CKD: ICD-10-CM

## 2024-03-25 DIAGNOSIS — E11.65 TYPE 2 DIABETES MELLITUS WITH HYPERGLYCEMIA, WITHOUT LONG-TERM CURRENT USE OF INSULIN: ICD-10-CM

## 2024-03-25 DIAGNOSIS — E78.5 HYPERLIPIDEMIA LDL GOAL <70: ICD-10-CM

## 2024-03-25 DIAGNOSIS — E55.9 VITAMIN D DEFICIENCY: ICD-10-CM

## 2024-03-25 LAB
25(OH)D3 SERPL-MCNC: 33.2 NG/ML (ref 30–100)
ALBUMIN SERPL-MCNC: 4.5 G/DL (ref 3.5–5.2)
ALBUMIN UR-MCNC: 12.6 MG/DL
ALBUMIN/GLOB SERPL: 1.6 G/DL
ALP SERPL-CCNC: 74 U/L (ref 39–117)
ALT SERPL W P-5'-P-CCNC: 29 U/L (ref 1–33)
ANION GAP SERPL CALCULATED.3IONS-SCNC: 18.5 MMOL/L (ref 5–15)
AST SERPL-CCNC: 24 U/L (ref 1–32)
BACTERIA UR QL AUTO: ABNORMAL /HPF
BASOPHILS # BLD AUTO: 0.05 10*3/MM3 (ref 0–0.2)
BASOPHILS NFR BLD AUTO: 0.6 % (ref 0–1.5)
BILIRUB SERPL-MCNC: 0.6 MG/DL (ref 0–1.2)
BILIRUB UR QL STRIP: NEGATIVE
BUN SERPL-MCNC: 17 MG/DL (ref 6–20)
BUN/CREAT SERPL: 14.5 (ref 7–25)
CALCIUM SPEC-SCNC: 9.4 MG/DL (ref 8.6–10.5)
CHLORIDE SERPL-SCNC: 97 MMOL/L (ref 98–107)
CHOLEST SERPL-MCNC: 101 MG/DL (ref 0–200)
CLARITY UR: CLEAR
CO2 SERPL-SCNC: 22.5 MMOL/L (ref 22–29)
COLOR UR: YELLOW
CREAT SERPL-MCNC: 1.17 MG/DL (ref 0.57–1)
CREAT UR-MCNC: 145.3 MG/DL
DEPRECATED RDW RBC AUTO: 40.3 FL (ref 37–54)
EGFRCR SERPLBLD CKD-EPI 2021: 54.2 ML/MIN/1.73
EOSINOPHIL # BLD AUTO: 0.13 10*3/MM3 (ref 0–0.4)
EOSINOPHIL NFR BLD AUTO: 1.5 % (ref 0.3–6.2)
ERYTHROCYTE [DISTWIDTH] IN BLOOD BY AUTOMATED COUNT: 12.6 % (ref 12.3–15.4)
GLOBULIN UR ELPH-MCNC: 2.8 GM/DL
GLUCOSE SERPL-MCNC: 277 MG/DL (ref 65–99)
GLUCOSE UR STRIP-MCNC: ABNORMAL MG/DL
HBA1C MFR BLD: 9.1 % (ref 4.8–5.6)
HCT VFR BLD AUTO: 44.7 % (ref 34–46.6)
HDLC SERPL-MCNC: 41 MG/DL (ref 40–60)
HGB BLD-MCNC: 14.8 G/DL (ref 12–15.9)
HGB UR QL STRIP.AUTO: NEGATIVE
HOLD SPECIMEN: NORMAL
HYALINE CASTS UR QL AUTO: ABNORMAL /LPF
IMM GRANULOCYTES # BLD AUTO: 0.05 10*3/MM3 (ref 0–0.05)
IMM GRANULOCYTES NFR BLD AUTO: 0.6 % (ref 0–0.5)
KETONES UR QL STRIP: ABNORMAL
LDLC SERPL CALC-MCNC: 30 MG/DL (ref 0–100)
LDLC/HDLC SERPL: 0.54 {RATIO}
LEUKOCYTE ESTERASE UR QL STRIP.AUTO: ABNORMAL
LYMPHOCYTES # BLD AUTO: 3.09 10*3/MM3 (ref 0.7–3.1)
LYMPHOCYTES NFR BLD AUTO: 34.6 % (ref 19.6–45.3)
MCH RBC QN AUTO: 29.2 PG (ref 26.6–33)
MCHC RBC AUTO-ENTMCNC: 33.1 G/DL (ref 31.5–35.7)
MCV RBC AUTO: 88.3 FL (ref 79–97)
MICROALBUMIN/CREAT UR: 86.7 MG/G (ref 0–29)
MONOCYTES # BLD AUTO: 0.61 10*3/MM3 (ref 0.1–0.9)
MONOCYTES NFR BLD AUTO: 6.8 % (ref 5–12)
NEUTROPHILS NFR BLD AUTO: 5.01 10*3/MM3 (ref 1.7–7)
NEUTROPHILS NFR BLD AUTO: 55.9 % (ref 42.7–76)
NITRITE UR QL STRIP: NEGATIVE
NRBC BLD AUTO-RTO: 0 /100 WBC (ref 0–0.2)
PH UR STRIP.AUTO: 5.5 [PH] (ref 5–8)
PLATELET # BLD AUTO: 309 10*3/MM3 (ref 140–450)
PMV BLD AUTO: 10.3 FL (ref 6–12)
POTASSIUM SERPL-SCNC: 3.8 MMOL/L (ref 3.5–5.2)
PROT SERPL-MCNC: 7.3 G/DL (ref 6–8.5)
PROT UR QL STRIP: ABNORMAL
RBC # BLD AUTO: 5.06 10*6/MM3 (ref 3.77–5.28)
RBC # UR STRIP: ABNORMAL /HPF
REF LAB TEST METHOD: ABNORMAL
SODIUM SERPL-SCNC: 138 MMOL/L (ref 136–145)
SP GR UR STRIP: 1.03 (ref 1–1.03)
SQUAMOUS #/AREA URNS HPF: ABNORMAL /HPF
TRIGL SERPL-MCNC: 189 MG/DL (ref 0–150)
TSH SERPL DL<=0.05 MIU/L-ACNC: 1.74 UIU/ML (ref 0.27–4.2)
UROBILINOGEN UR QL STRIP: ABNORMAL
VLDLC SERPL-MCNC: 30 MG/DL (ref 5–40)
WBC # UR STRIP: ABNORMAL /HPF
WBC NRBC COR # BLD AUTO: 8.94 10*3/MM3 (ref 3.4–10.8)

## 2024-03-25 PROCEDURE — 83036 HEMOGLOBIN GLYCOSYLATED A1C: CPT

## 2024-03-25 PROCEDURE — 84443 ASSAY THYROID STIM HORMONE: CPT

## 2024-03-25 PROCEDURE — 87086 URINE CULTURE/COLONY COUNT: CPT

## 2024-03-25 PROCEDURE — 82043 UR ALBUMIN QUANTITATIVE: CPT

## 2024-03-25 PROCEDURE — 82306 VITAMIN D 25 HYDROXY: CPT

## 2024-03-25 PROCEDURE — 81001 URINALYSIS AUTO W/SCOPE: CPT

## 2024-03-25 PROCEDURE — 85025 COMPLETE CBC W/AUTO DIFF WBC: CPT

## 2024-03-25 PROCEDURE — 80053 COMPREHEN METABOLIC PANEL: CPT

## 2024-03-25 PROCEDURE — 82570 ASSAY OF URINE CREATININE: CPT

## 2024-03-25 PROCEDURE — 80061 LIPID PANEL: CPT

## 2024-03-26 LAB — BACTERIA SPEC AEROBE CULT: NO GROWTH

## 2024-03-27 RX ORDER — ZOLPIDEM TARTRATE 5 MG/1
TABLET ORAL
COMMUNITY

## 2024-03-28 ENCOUNTER — OFFICE VISIT (OUTPATIENT)
Dept: FAMILY MEDICINE CLINIC | Facility: CLINIC | Age: 59
End: 2024-03-28
Payer: OTHER GOVERNMENT

## 2024-03-28 VITALS
BODY MASS INDEX: 33.65 KG/M2 | TEMPERATURE: 98.5 F | HEIGHT: 68 IN | WEIGHT: 222 LBS | SYSTOLIC BLOOD PRESSURE: 118 MMHG | DIASTOLIC BLOOD PRESSURE: 82 MMHG | OXYGEN SATURATION: 99 % | HEART RATE: 89 BPM

## 2024-03-28 DIAGNOSIS — E55.9 VITAMIN D DEFICIENCY: ICD-10-CM

## 2024-03-28 DIAGNOSIS — N18.30 STAGE 3 CHRONIC KIDNEY DISEASE, UNSPECIFIED WHETHER STAGE 3A OR 3B CKD: ICD-10-CM

## 2024-03-28 DIAGNOSIS — I25.10 CAD S/P PERCUTANEOUS CORONARY ANGIOPLASTY: ICD-10-CM

## 2024-03-28 DIAGNOSIS — Z87.891 HISTORY OF SMOKING 25-50 PACK YEARS: ICD-10-CM

## 2024-03-28 DIAGNOSIS — R20.0 NUMBNESS AND TINGLING IN BOTH HANDS: ICD-10-CM

## 2024-03-28 DIAGNOSIS — Z98.61 CAD S/P PERCUTANEOUS CORONARY ANGIOPLASTY: ICD-10-CM

## 2024-03-28 DIAGNOSIS — I10 ESSENTIAL HYPERTENSION: ICD-10-CM

## 2024-03-28 DIAGNOSIS — J43.9 PULMONARY EMPHYSEMA, UNSPECIFIED EMPHYSEMA TYPE: ICD-10-CM

## 2024-03-28 DIAGNOSIS — K21.9 GASTROESOPHAGEAL REFLUX DISEASE WITHOUT ESOPHAGITIS: ICD-10-CM

## 2024-03-28 DIAGNOSIS — J30.9 ALLERGIC RHINITIS, UNSPECIFIED SEASONALITY, UNSPECIFIED TRIGGER: ICD-10-CM

## 2024-03-28 DIAGNOSIS — E66.9 OBESITY (BMI 30.0-34.9): ICD-10-CM

## 2024-03-28 DIAGNOSIS — R20.2 NUMBNESS AND TINGLING IN BOTH HANDS: ICD-10-CM

## 2024-03-28 DIAGNOSIS — J30.9 ALLERGIC RHINITIS, UNSPECIFIED SEASONALITY, UNSPECIFIED TRIGGER: Primary | ICD-10-CM

## 2024-03-28 DIAGNOSIS — E11.65 TYPE 2 DIABETES MELLITUS WITH HYPERGLYCEMIA, WITHOUT LONG-TERM CURRENT USE OF INSULIN: Primary | ICD-10-CM

## 2024-03-28 DIAGNOSIS — E78.5 HYPERLIPIDEMIA LDL GOAL <70: ICD-10-CM

## 2024-03-28 RX ORDER — FAMOTIDINE 40 MG/1
40 TABLET, FILM COATED ORAL 2 TIMES DAILY PRN
Qty: 90 TABLET | Refills: 1 | Status: SHIPPED | OUTPATIENT
Start: 2024-03-28

## 2024-03-28 RX ORDER — FLUTICASONE PROPIONATE 50 MCG
2 SPRAY, SUSPENSION (ML) NASAL DAILY
Qty: 18.2 ML | Refills: 1 | Status: SHIPPED | OUTPATIENT
Start: 2024-03-28 | End: 2024-03-28 | Stop reason: SDUPTHER

## 2024-03-28 RX ORDER — FLUTICASONE PROPIONATE 50 MCG
2 SPRAY, SUSPENSION (ML) NASAL DAILY
Qty: 48 G | Refills: 1 | Status: SHIPPED | OUTPATIENT
Start: 2024-03-28

## 2024-03-28 RX ORDER — MONTELUKAST SODIUM 10 MG/1
10 TABLET ORAL NIGHTLY
Qty: 90 TABLET | Refills: 1 | Status: SHIPPED | OUTPATIENT
Start: 2024-03-28

## 2024-03-28 RX ORDER — CLOPIDOGREL BISULFATE 75 MG/1
75 TABLET ORAL DAILY
Qty: 90 TABLET | Refills: 1 | Status: SHIPPED | OUTPATIENT
Start: 2024-03-28

## 2024-03-28 RX ORDER — EZETIMIBE 10 MG/1
10 TABLET ORAL DAILY
Qty: 90 TABLET | Refills: 1 | Status: SHIPPED | OUTPATIENT
Start: 2024-03-28

## 2024-03-28 RX ORDER — DULOXETIN HYDROCHLORIDE 60 MG/1
60 CAPSULE, DELAYED RELEASE ORAL DAILY
Qty: 90 CAPSULE | Refills: 1 | Status: SHIPPED | OUTPATIENT
Start: 2024-03-28

## 2024-03-28 RX ORDER — MELATONIN
1000 DAILY
Qty: 90 TABLET | Refills: 1 | Status: SHIPPED | OUTPATIENT
Start: 2024-03-28

## 2024-03-28 RX ORDER — FUROSEMIDE 20 MG/1
20 TABLET ORAL DAILY
Qty: 90 TABLET | Refills: 1 | Status: SHIPPED | OUTPATIENT
Start: 2024-03-28

## 2024-03-28 RX ORDER — ALBUTEROL SULFATE 90 UG/1
AEROSOL, METERED RESPIRATORY (INHALATION)
Qty: 8 G | Refills: 1 | Status: SHIPPED | OUTPATIENT
Start: 2024-03-28

## 2024-03-28 RX ORDER — CETIRIZINE HYDROCHLORIDE 10 MG/1
10 TABLET ORAL DAILY
Qty: 90 TABLET | Refills: 0 | Status: SHIPPED | OUTPATIENT
Start: 2024-03-28

## 2024-03-28 RX ORDER — ALBUTEROL SULFATE 90 UG/1
AEROSOL, METERED RESPIRATORY (INHALATION)
Qty: 8 G | Refills: 1 | Status: SHIPPED | OUTPATIENT
Start: 2024-03-28 | End: 2024-03-28 | Stop reason: SDUPTHER

## 2024-03-28 RX ORDER — METOPROLOL SUCCINATE 25 MG/1
25 TABLET, EXTENDED RELEASE ORAL DAILY
Qty: 90 TABLET | Refills: 0 | Status: SHIPPED | OUTPATIENT
Start: 2024-03-28

## 2024-03-28 RX ORDER — ROSUVASTATIN CALCIUM 40 MG/1
40 TABLET, COATED ORAL NIGHTLY
Qty: 90 TABLET | Refills: 1 | Status: SHIPPED | OUTPATIENT
Start: 2024-03-28

## 2024-03-28 RX ORDER — GLIPIZIDE 10 MG/1
10 TABLET, FILM COATED, EXTENDED RELEASE ORAL DAILY
Qty: 90 TABLET | Refills: 1 | Status: SHIPPED | OUTPATIENT
Start: 2024-03-28 | End: 2024-09-24

## 2024-03-28 NOTE — PROGRESS NOTES
Follow Up Office Visit      Patient Name: Claudette Gramajo  : 1965   MRN: [id number]     Chief Complaint:    Chief Complaint   Patient presents with    Coronary Artery Disease    Hypertension    Hyperlipidemia    Heartburn    Diabetes    Chronic Kidney Disease       History of Present Illness: Claudette Gramajo is a 58 y.o. female who is here today to follow up for DM2, CAD, hyperlipidemia, HTN, CKD, GERD  Review lab results       Pt reports she was unable to tolerate the increase dose of trulicity 1.5 mg once weekly , states this caused n/v, stopped 3 months ago prior to Rosie did not contact office or seek treatment states she did not want to be in the hospital during the holidays  Pt reports compliant with jardiance and metformin   Weight gain of 6 pounds in the past 3 months 14 pounds in the past 6 months      A1C-3/2024  Eye exam- 3/2023- eye [facility].  Scheduled next Tuesday   Foot exam- - Dr Rangel  mammogram-2023  Pap-2022  Colonoscopy-2022  Ct low dose chest 3.2023  Former; Start date: 1986; Quit date: 2012; Average: 1 pack/day for 26.0 years; Total pack years: 26.0; Types: Cigarettes from 1986 to 2012     C/o She has been having numbness and tingling in her hands.    Subjective      Review of Systems:   Review of Systems   Constitutional:  Negative for fever.   HENT:  Negative for ear pain and sore throat.    Respiratory:  Negative for cough.    Cardiovascular:  Negative for chest pain.   Gastrointestinal:  Negative for abdominal pain, constipation, diarrhea, nausea and vomiting.   Genitourinary:  Negative for dysuria.   Musculoskeletal:  Negative for myalgias.        Past Medical History:   Past Medical History:   Diagnosis Date    Anesthesia complication     woke up during surgery    Arthritis     CAD (coronary artery disease)     Cellulitis and abscess of leg 2013    Chronic purulent otitis media 2013    Connective tissue anomaly      Diabetes mellitus     Dislocation, patella closed 2017    Family history of colon cancer 2022    Family history of esophageal cancer 2022    Hyperlipidemia LDL goal <70 2021    Myocardial infarct         FARNAZ on CPAP 2021    RLS (restless legs syndrome)     Sleep apnea        Past Surgical History:   Past Surgical History:   Procedure Laterality Date    CARDIAC CATHETERIZATION      COLONOSCOPY      COLONOSCOPY N/A 2022    Procedure: COLONOSCOPY WITH POLYPECTOMY;  Surgeon: Sloan Ortiz MD;  Location: [facility] ENDOSCOPY;  Service: Gastroenterology;  Laterality: N/A;  COLON POLYP    CORONARY ANGIOPLASTY      CORONARY STENT PLACEMENT      ENDOSCOPY N/A 2022    Procedure: ESOPHAGOGASTRODUODENOSCOPY WITH BX;  Surgeon: Sloan Ortiz MD;  Location: [facility] ENDOSCOPY;  Service: Gastroenterology;  Laterality: N/A;  HIATAL HERNIA    UPPER GASTROINTESTINAL ENDOSCOPY      VENOUS THROMBECTOMY         Family History:   Family History   Problem Relation Age of Onset    Suicidality Mother     Heart attack Father     Heart disease Father     Cancer Father     No Known Problems Maternal Grandmother     No Known Problems Maternal Grandfather     Heart disease Paternal Grandmother     Cancer Paternal Grandmother     Heart disease Paternal Grandfather     Cancer Paternal Grandfather     Thyroid disease Paternal Grandfather     Pancreatic cancer Paternal Grandfather     Esophageal cancer Paternal Grandfather     Stomach cancer Paternal Grandfather     Sleep apnea Paternal Grandfather     Colon cancer Neg Hx     Malig Hyperthermia Neg Hx        Social History:   Social History     Socioeconomic History    Marital status:    Tobacco Use    Smoking status: Former     Current packs/day: 0.00     Average packs/day: 1 pack/day for 26.0 years (26.0 ttl pk-yrs)     Types: Cigarettes     Start date: 1986     Quit date: 2012     Years since quittin.2    Smokeless  tobacco: Never    Tobacco comments:     caffeine use   Vaping Use    Vaping status: Never Used   Substance and Sexual Activity    Alcohol use: No    Drug use: Never    Sexual activity: Yes     Partners: Male     Birth control/protection: Same-sex partner       Medications:     Current Outpatient Medications:     Alcohol Swabs (Easy Touch Alcohol Prep Medium) 70 % pads, , Disp: , Rfl:     aspirin 81 MG EC tablet, Take 1 tablet by mouth Daily., Disp: 90 tablet, Rfl: 3    cetirizine (zyrTEC) 10 MG tablet, Take 1 tablet by mouth Daily., Disp: 90 tablet, Rfl: 0    cholecalciferol (VITAMIN D3) 25 MCG (1000 UT) tablet, Take 1 tablet by mouth Daily. Does not take daily only occasionally, Disp: 90 tablet, Rfl: 1    clopidogrel (PLAVIX) 75 MG tablet, Take 1 tablet by mouth Daily., Disp: 90 tablet, Rfl: 1    DULoxetine (CYMBALTA) 60 MG capsule, Take 1 capsule by mouth Daily., Disp: 90 capsule, Rfl: 1    empagliflozin (JARDIANCE) 25 MG tablet tablet, Take 1 tablet by mouth Daily., Disp: 90 tablet, Rfl: 1    ezetimibe (Zetia) 10 MG tablet, Take 1 tablet by mouth Daily., Disp: 90 tablet, Rfl: 1    famotidine (Pepcid) 40 MG tablet, Take 1 tablet by mouth 2 (Two) Times a Day As Needed for Heartburn., Disp: 90 tablet, Rfl: 1    FREESTYLE LITE test strip, , Disp: , Rfl:     furosemide (LASIX) 20 MG tablet, Take 1 tablet by mouth Daily., Disp: 90 tablet, Rfl: 1    glipizide (GLUCOTROL XL) 10 MG 24 hr tablet, Take 1 tablet by mouth Daily for 180 days., Disp: 90 tablet, Rfl: 1    Lancets (freestyle) lancets, , Disp: , Rfl:     metFORMIN (GLUCOPHAGE) 1000 MG tablet, Take 1 tablet by mouth 2 (Two) Times a Day With Meals., Disp: 180 tablet, Rfl: 1    metoprolol succinate XL (TOPROL-XL) 25 MG 24 hr tablet, Take 1 tablet by mouth Daily., Disp: 90 tablet, Rfl: 0    midodrine (PROAMATINE) 2.5 MG tablet, Take 1 tablet by mouth Daily., Disp: 90 tablet, Rfl: 3    montelukast (SINGULAIR) 10 MG tablet, Take 1 tablet by mouth Every Night., Disp: 90  "tablet, Rfl: 1    Narcan 4 MG/0.1ML nasal spray, , Disp: , Rfl:     nitroglycerin (NITROSTAT) 0.4 MG SL tablet, Place 1 tablet under the tongue Every 5 (Five) Minutes As Needed for Chest Pain., Disp: 35 tablet, Rfl: 6    oxyCODONE (ROXICODONE) 5 MG immediate release tablet, Take  by mouth As Needed., Disp: , Rfl:     rosuvastatin (CRESTOR) 40 MG tablet, Take 1 tablet by mouth Every Night., Disp: 90 tablet, Rfl: 1    tiotropium bromide monohydrate (Spiriva Respimat) 2.5 MCG/ACT aerosol solution inhaler, Inhale 2 puffs Daily., Disp: 4 g, Rfl: 5    zolpidem (AMBIEN) 5 MG tablet, , Disp: , Rfl:     albuterol sulfate  (90 Base) MCG/ACT inhaler, Take 1-2 Puffs every 4 hours prn, Disp: 8 g, Rfl: 1    fluticasone (FLONASE) 50 MCG/ACT nasal spray, 2 sprays into the nostril(s) as directed by provider Daily. 2 sprays each nostril daily, Disp: 48 g, Rfl: 1    Semaglutide,0.25 or 0.5MG/DOS, (OZEMPIC) 2 MG/3ML solution pen-injector, Inject 0.25 mg under the skin into the appropriate area as directed 1 (One) Time Per Week., Disp: 9 mL, Rfl: 0    Allergies:   Allergies   Allergen Reactions    Other Unknown - Low Severity     Hay    Sneezing watery eyes cough    Adhesive Tape Other (See Comments)     BLISTERS    Dust Mite Extract Other (See Comments)    Molds & Smuts Cough    Pollen Extract Cough           PHQ-2 Total Score: 0   PHQ-9 Total Score: 0     Objective     Physical Exam:  Vital Signs:   Vitals:    03/28/24 1350   BP: 118/82   Pulse: 89   Temp: 98.5 °F (36.9 °C)   SpO2: 99%   Weight: 101 kg (222 lb)   Height: 172.7 cm (67.99\")     Body mass index is 33.76 kg/m².           Physical Exam  HENT:      Right Ear: Tympanic membrane normal.      Left Ear: Tympanic membrane normal.      Nose: Nose normal.      Mouth/Throat:      Mouth: Mucous membranes are moist.   Eyes:      Conjunctiva/sclera: Conjunctivae normal.   Neck:      Vascular: No carotid bruit.   Cardiovascular:      Rate and Rhythm: Normal rate and regular " rhythm.      Heart sounds: Normal heart sounds. No murmur heard.  Pulmonary:      Effort: Pulmonary effort is normal.      Breath sounds: Normal breath sounds.   Abdominal:      General: Bowel sounds are normal.      Palpations: Abdomen is soft.   Musculoskeletal:      Right lower leg: No edema.      Left lower leg: No edema.   Skin:     General: Skin is warm and dry.   Neurological:      Mental Status: She is alert.   Psychiatric:         Mood and Affect: Mood normal.         Behavior: Behavior normal.             Assessment / Plan      Assessment/Plan:   Diagnoses and all orders for this visit:    1. Type 2 diabetes mellitus with hyperglycemia, without long-term current use of insulin (Primary)  -     CBC Auto Differential; Future  -     Comprehensive Metabolic Panel; Future  -     Microalbumin / Creatinine Urine Ratio - Urine, Clean Catch; Future  -     Hemoglobin A1c; Future  -     TSH; Future  -     Urinalysis With Culture If Indicated -; Future  -     metFORMIN (GLUCOPHAGE) 1000 MG tablet; Take 1 tablet by mouth 2 (Two) Times a Day With Meals.  Dispense: 180 tablet; Refill: 1  -     Ambulatory Referral to Podiatry    2. Hyperlipidemia LDL goal <70  -     Comprehensive Metabolic Panel; Future  -     Lipid Panel; Future    3. Essential hypertension    4. CAD S/P percutaneous coronary angioplasty  -     clopidogrel (PLAVIX) 75 MG tablet; Take 1 tablet by mouth Daily.  Dispense: 90 tablet; Refill: 1    5. Vitamin D deficiency  -     cholecalciferol (VITAMIN D3) 25 MCG (1000 UT) tablet; Take 1 tablet by mouth Daily. Does not take daily only occasionally  Dispense: 90 tablet; Refill: 1    6. Obesity (BMI 30.0-34.9)    7. Gastroesophageal reflux disease without esophagitis    8. Stage 3 chronic kidney disease, unspecified whether stage 3a or 3b CKD    9. Allergic rhinitis, unspecified seasonality, unspecified trigger  -     montelukast (SINGULAIR) 10 MG tablet; Take 1 tablet by mouth Every Night.  Dispense: 90  tablet; Refill: 1    10. Numbness and tingling in both hands  -     Vitamin B12  -     EMG & Nerve Conduction Test; Future    11. History of smoking 25-50 pack years  -     CT Chest Low Dose Wo; Future    Other orders  -     Discontinue: albuterol sulfate  (90 Base) MCG/ACT inhaler; Take 1-2 Puffs every 4 hours prn  Dispense: 8 g; Refill: 1  -     cetirizine (zyrTEC) 10 MG tablet; Take 1 tablet by mouth Daily.  Dispense: 90 tablet; Refill: 0  -     DULoxetine (CYMBALTA) 60 MG capsule; Take 1 capsule by mouth Daily.  Dispense: 90 capsule; Refill: 1  -     empagliflozin (JARDIANCE) 25 MG tablet tablet; Take 1 tablet by mouth Daily.  Dispense: 90 tablet; Refill: 1  -     ezetimibe (Zetia) 10 MG tablet; Take 1 tablet by mouth Daily.  Dispense: 90 tablet; Refill: 1  -     famotidine (Pepcid) 40 MG tablet; Take 1 tablet by mouth 2 (Two) Times a Day As Needed for Heartburn.  Dispense: 90 tablet; Refill: 1  -     Discontinue: fluticasone (FLONASE) 50 MCG/ACT nasal spray; 2 sprays into the nostril(s) as directed by provider Daily. 2 sprays each nostril daily  Dispense: 18.2 mL; Refill: 1  -     furosemide (LASIX) 20 MG tablet; Take 1 tablet by mouth Daily.  Dispense: 90 tablet; Refill: 1  -     glipizide (GLUCOTROL XL) 10 MG 24 hr tablet; Take 1 tablet by mouth Daily for 180 days.  Dispense: 90 tablet; Refill: 1  -     metoprolol succinate XL (TOPROL-XL) 25 MG 24 hr tablet; Take 1 tablet by mouth Daily.  Dispense: 90 tablet; Refill: 0  -     rosuvastatin (CRESTOR) 40 MG tablet; Take 1 tablet by mouth Every Night.  Dispense: 90 tablet; Refill: 1  -     Semaglutide,0.25 or 0.5MG/DOS, (OZEMPIC) 2 MG/3ML solution pen-injector; Inject 0.25 mg under the skin into the appropriate area as directed 1 (One) Time Per Week.  Dispense: 9 mL; Refill: 0       Diabetes mellitus type 2 with hyperglycemia we will stop Trulicity start Ozempic will also increase Glucotrol to 10 mg daily continue Jardiance continue metformin recommend  "reduce overall caloric intake increase fiber and protein in diet reduce added carbs sugars exercise 30 minutes daily and weight loss we will follow-up 4 weeks with blood sugar log to discuss if failing Ozempic would start Mounjaro  Hyperlipidemia LDL below goal on current statin dose Crestor 40 mg at nighttime denies myalgias liver enzymes normal  Hypertension currently controlled  Coronary artery disease currently on Plavix aspirin and statin  Vitamin D deficiency recommend 1000 units supplementation daily  Obesity again recommend reduce overall caloric intake increase exercise to 30 minutes daily patient reports she is not exercising this time but does do some housework discussed that this needs to be additional exercise of 30 minutes daily  Reflux currently controlled at this time  Chronic kidney disease currently on Jardiance 25 mg daily recommend increase water intake avoid all NSAIDs would recommend starting proton pump inhibitor for chronic kidney disease  History of smoking greater than 20 pack years  Numbness and tingling of both hands will obtain nerve conduction study B12 levels this may be likely neuropathy as her glucose is uncontrolled    Follow Up:   Return in about 4 weeks (around 4/25/2024).    Portia Bahena, SARWAT    \"Please note that portions of this note were completed with a voice recognition program.\"    "

## 2024-04-02 ENCOUNTER — TRANSCRIBE ORDERS (OUTPATIENT)
Dept: ADMINISTRATIVE | Facility: HOSPITAL | Age: 59
End: 2024-04-02
Payer: OTHER GOVERNMENT

## 2024-04-02 DIAGNOSIS — Z12.31 SCREENING MAMMOGRAM FOR BREAST CANCER: Primary | ICD-10-CM

## 2024-04-08 ENCOUNTER — HOSPITAL ENCOUNTER (OUTPATIENT)
Dept: CT IMAGING | Facility: HOSPITAL | Age: 59
Discharge: HOME | End: 2024-04-08
Admitting: NURSE PRACTITIONER
Payer: OTHER GOVERNMENT

## 2024-04-08 DIAGNOSIS — Z87.891 HISTORY OF SMOKING 25-50 PACK YEARS: ICD-10-CM

## 2024-04-08 PROCEDURE — 71271 CT THORAX LUNG CANCER SCR C-: CPT

## 2024-04-09 ENCOUNTER — HOSPITAL ENCOUNTER (OUTPATIENT)
Facility: HOSPITAL | Age: 59
Discharge: HOME | End: 2024-04-09
Admitting: NURSE PRACTITIONER
Payer: OTHER GOVERNMENT

## 2024-04-09 DIAGNOSIS — R20.2 NUMBNESS AND TINGLING IN BOTH HANDS: ICD-10-CM

## 2024-04-09 DIAGNOSIS — R20.0 NUMBNESS AND TINGLING IN BOTH HANDS: ICD-10-CM

## 2024-04-09 PROCEDURE — 95911 NRV CNDJ TEST 9-10 STUDIES: CPT

## 2024-04-09 PROCEDURE — 95886 MUSC TEST DONE W/N TEST COMP: CPT

## 2024-04-10 DIAGNOSIS — G56.03 BILATERAL CARPAL TUNNEL SYNDROME: Primary | ICD-10-CM

## 2024-04-17 ENCOUNTER — OFFICE VISIT (OUTPATIENT)
Dept: CARDIOLOGY | Facility: CLINIC | Age: 59
End: 2024-04-17
Payer: OTHER GOVERNMENT

## 2024-04-17 VITALS
HEIGHT: 68 IN | HEART RATE: 88 BPM | SYSTOLIC BLOOD PRESSURE: 138 MMHG | WEIGHT: 206 LBS | BODY MASS INDEX: 31.22 KG/M2 | DIASTOLIC BLOOD PRESSURE: 80 MMHG

## 2024-04-17 DIAGNOSIS — I25.10 CAD S/P PERCUTANEOUS CORONARY ANGIOPLASTY: Primary | ICD-10-CM

## 2024-04-17 DIAGNOSIS — I10 ESSENTIAL HYPERTENSION: ICD-10-CM

## 2024-04-17 DIAGNOSIS — Z98.61 CAD S/P PERCUTANEOUS CORONARY ANGIOPLASTY: Primary | ICD-10-CM

## 2024-04-17 DIAGNOSIS — E78.5 HYPERLIPIDEMIA LDL GOAL <70: ICD-10-CM

## 2024-04-17 DIAGNOSIS — R07.2 PRECORDIAL PAIN: ICD-10-CM

## 2024-04-17 PROCEDURE — 99214 OFFICE O/P EST MOD 30 MIN: CPT | Performed by: INTERNAL MEDICINE

## 2024-04-17 RX ORDER — NITROGLYCERIN 0.4 MG/1
TABLET SUBLINGUAL
Qty: 100 TABLET | Refills: 11 | Status: SHIPPED | OUTPATIENT
Start: 2024-04-17

## 2024-04-17 RX ORDER — METOPROLOL SUCCINATE 25 MG/1
25 TABLET, EXTENDED RELEASE ORAL DAILY
Qty: 90 TABLET | Refills: 0 | Status: SHIPPED | OUTPATIENT
Start: 2024-04-17

## 2024-04-17 NOTE — PROGRESS NOTES
Chief Complaint  Follow-up and Coronary Artery Disease    Subjective    Patient has been having some intermittent chest discomfort issues which have occurred while she is cleaning the house or when she gets overheated.  She is also sometimes have them at rest usually just last for a few minutes she reports also recent problems with her blood pressure and hypoglycemia which have been quite debilitating.  Past Medical History:   Diagnosis Date    Anesthesia complication     woke up during surgery    Arthritis     CAD (coronary artery disease)     Cellulitis and abscess of leg 03/28/2013    Chronic purulent otitis media 07/01/2013    Connective tissue anomaly     Diabetes mellitus     Dislocation, patella closed 05/30/2017    Family history of colon cancer 05/27/2022    Family history of esophageal cancer 05/27/2022    Hyperlipidemia LDL goal <70 08/31/2021    Myocardial infarct     2013    FARNAZ on CPAP 09/01/2021    RLS (restless legs syndrome)     Sleep apnea          Current Outpatient Medications:     albuterol sulfate  (90 Base) MCG/ACT inhaler, Take 1-2 Puffs every 4 hours prn, Disp: 8 g, Rfl: 1    Alcohol Swabs (Easy Touch Alcohol Prep Medium) 70 % pads, , Disp: , Rfl:     aspirin 81 MG EC tablet, Take 1 tablet by mouth Daily., Disp: 90 tablet, Rfl: 3    cetirizine (zyrTEC) 10 MG tablet, Take 1 tablet by mouth Daily., Disp: 90 tablet, Rfl: 0    cholecalciferol (VITAMIN D3) 25 MCG (1000 UT) tablet, Take 1 tablet by mouth Daily. Does not take daily only occasionally, Disp: 90 tablet, Rfl: 1    clopidogrel (PLAVIX) 75 MG tablet, Take 1 tablet by mouth Daily., Disp: 90 tablet, Rfl: 1    DULoxetine (CYMBALTA) 60 MG capsule, Take 1 capsule by mouth Daily., Disp: 90 capsule, Rfl: 1    empagliflozin (JARDIANCE) 25 MG tablet tablet, Take 1 tablet by mouth Daily., Disp: 90 tablet, Rfl: 1    ezetimibe (Zetia) 10 MG tablet, Take 1 tablet by mouth Daily., Disp: 90 tablet, Rfl: 1    famotidine (Pepcid) 40 MG tablet,  Take 1 tablet by mouth 2 (Two) Times a Day As Needed for Heartburn., Disp: 90 tablet, Rfl: 1    fluticasone (FLONASE) 50 MCG/ACT nasal spray, 2 sprays into the nostril(s) as directed by provider Daily. 2 sprays each nostril daily, Disp: 48 g, Rfl: 1    FREESTYLE LITE test strip, , Disp: , Rfl:     furosemide (LASIX) 20 MG tablet, Take 1 tablet by mouth Daily., Disp: 90 tablet, Rfl: 1    glipizide (GLUCOTROL XL) 10 MG 24 hr tablet, Take 1 tablet by mouth Daily for 180 days., Disp: 90 tablet, Rfl: 1    Lancets (freestyle) lancets, , Disp: , Rfl:     metFORMIN (GLUCOPHAGE) 1000 MG tablet, Take 1 tablet by mouth 2 (Two) Times a Day With Meals., Disp: 180 tablet, Rfl: 1    metoprolol succinate XL (TOPROL-XL) 25 MG 24 hr tablet, Take 1 tablet by mouth Daily., Disp: 90 tablet, Rfl: 0    midodrine (PROAMATINE) 2.5 MG tablet, Take 1 tablet by mouth Daily., Disp: 90 tablet, Rfl: 3    montelukast (SINGULAIR) 10 MG tablet, Take 1 tablet by mouth Every Night., Disp: 90 tablet, Rfl: 1    Narcan 4 MG/0.1ML nasal spray, , Disp: , Rfl:     nitroglycerin (NITROSTAT) 0.4 MG SL tablet, Place 1 tablet under the tongue Every 5 (Five) Minutes As Needed for Chest Pain., Disp: 35 tablet, Rfl: 6    oxyCODONE (ROXICODONE) 5 MG immediate release tablet, Take  by mouth As Needed., Disp: , Rfl:     rosuvastatin (CRESTOR) 40 MG tablet, Take 1 tablet by mouth Every Night., Disp: 90 tablet, Rfl: 1    Semaglutide,0.25 or 0.5MG/DOS, (OZEMPIC) 2 MG/3ML solution pen-injector, Inject 0.25 mg under the skin into the appropriate area as directed 1 (One) Time Per Week., Disp: 9 mL, Rfl: 0    tiotropium bromide monohydrate (Spiriva Respimat) 2.5 MCG/ACT aerosol solution inhaler, Inhale 2 puffs Daily., Disp: 4 g, Rfl: 5    zolpidem (AMBIEN) 5 MG tablet, , Disp: , Rfl:     nitroglycerin (NITROSTAT) 0.4 MG SL tablet, 1 under the tongue as needed for angina, may repeat q5mins for up three doses, Disp: 100 tablet, Rfl: 11    Medications Discontinued During This  "Encounter   Medication Reason    metoprolol succinate XL (TOPROL-XL) 25 MG 24 hr tablet     metoprolol succinate XL (TOPROL-XL) 25 MG 24 hr tablet      Allergies   Allergen Reactions    Other Unknown - Low Severity     Hay    Sneezing watery eyes cough    Adhesive Tape Other (See Comments)     BLISTERS    Dust Mite Extract Other (See Comments)    Molds & Smuts Cough    Pollen Extract Cough        Social History     Tobacco Use    Smoking status: Former     Current packs/day: 0.00     Average packs/day: 1 pack/day for 26.0 years (26.0 ttl pk-yrs)     Types: Cigarettes     Start date: 1986     Quit date: 2012     Years since quittin.3    Smokeless tobacco: Never    Tobacco comments:     caffeine use   Vaping Use    Vaping status: Never Used   Substance Use Topics    Alcohol use: No    Drug use: Never       Family History   Problem Relation Age of Onset    Suicidality Mother     Heart attack Father     Heart disease Father     Cancer Father     No Known Problems Maternal Grandmother     No Known Problems Maternal Grandfather     Heart disease Paternal Grandmother     Cancer Paternal Grandmother     Heart disease Paternal Grandfather     Cancer Paternal Grandfather     Thyroid disease Paternal Grandfather     Pancreatic cancer Paternal Grandfather     Esophageal cancer Paternal Grandfather     Stomach cancer Paternal Grandfather     Sleep apnea Paternal Grandfather     Colon cancer Neg Hx     Malig Hyperthermia Neg Hx         Objective     /80   Pulse 88   Ht 172.7 cm (67.99\")   Wt 93.4 kg (206 lb)   BMI 31.33 kg/m²       Physical Exam    General Appearance:   no acute distress  Alert and oriented x3  HENT:   lips not cyanotic  Atraumatic  Neck:  No jvd   supple  Respiratory:  no respiratory distress  normal breath sounds  no rales  Cardiovascular:  Regular rate and rhythm  no S3, no S4   no murmur  no rub  Extremities  No cyanosis  lower extremity edema: none    Skin:   warm, dry  No " "rashes      Result Review :     proBNP   Date Value Ref Range Status   10/06/2023 92.0 0.0 - 900.0 pg/mL Final     CMP          8/29/2023    16:21 10/6/2023    15:34 3/25/2024    08:43   CMP   Glucose 280  260  277    BUN 33  22  17    Creatinine 0.94  1.03  1.17    EGFR 70.5  63.2  54.2    Sodium 136  144  138    Potassium 4.8  5.0  3.8    Chloride 96  105  97    Calcium 11.0  9.8  9.4    Total Protein 7.6  7.0  7.3    Albumin 5.1  4.3  4.5    Globulin 2.5  2.7  2.8    Total Bilirubin 0.3  0.5  0.6    Alkaline Phosphatase 78  77  74    AST (SGOT) 20  18  24    ALT (SGPT) 38  34  29    Albumin/Globulin Ratio 2.0  1.6  1.6    BUN/Creatinine Ratio 35.1  21.4  14.5    Anion Gap 17.0  11.0  18.5      CBC w/diff          5/19/2023    14:19 8/29/2023    16:21 3/25/2024    08:43   CBC w/Diff   WBC 6.91  7.98  8.94    RBC 4.89  4.74  5.06    Hemoglobin 14.5  14.3  14.8    Hematocrit 44.2  44.0  44.7    MCV 90.4  92.8  88.3    MCH 29.7  30.2  29.2    MCHC 32.8  32.5  33.1    RDW 13.1  12.9  12.6    Platelets 228  241  309    Neutrophil Rel % 61.1  55.9  55.9    Immature Granulocyte Rel % 0.4  0.8  0.6    Lymphocyte Rel % 29.1  32.1  34.6    Monocyte Rel % 6.2  8.0  6.8    Eosinophil Rel % 2.5  2.3  1.5    Basophil Rel % 0.7  0.9  0.6       Lab Results   Component Value Date    TSH 1.740 03/25/2024      Lab Results   Component Value Date    FREET4 1.1 05/07/2021      No results found for: \"DDIMERQUANT\"  Magnesium   Date Value Ref Range Status   10/06/2023 2.2 1.6 - 2.6 mg/dL Final      No results found for: \"DIGOXIN\"   Lab Results   Component Value Date    TROPONINT <0.01 09/29/2014           Lipid Panel          8/29/2023    16:21 10/6/2023    15:34 3/25/2024    08:43   Lipid Panel   Total Cholesterol 203  112  101    Triglycerides 488  109  189    HDL Cholesterol 47  48  41    VLDL Cholesterol 78  20  30    LDL Cholesterol  78  44  30    LDL/HDL Ratio 1.24  0.88  0.54      No results found for: \"POCTROP\"    Results for " orders placed in visit on 10/05/21    Adult Transthoracic Echo Complete W/ Cont if Necessary Per Protocol    Interpretation Summary  Normal left ventricular systolic function with an estimated ejection fraction of 60-65%.  No regional wall motion abnormalities were observed.  Left ventricular diastolic function is consistent with impaired relaxation (grade I diastolic dysfunction).  No significant valvular heart disease.                 Diagnoses and all orders for this visit:    1. CAD S/P percutaneous coronary angioplasty (Primary)  Assessment & Plan:  Patient with intermittent chest discomfort at times which sounds anginal in nature other times occurring just at rest she has been on Toprol and is concerned about up titration due to side effects and hypotension recommend nuclear stress test instead of the CTA given her previous stenting making it difficult to assess stented areas for ischemia.  If stress test is within normal limits would favor muscle skeletal etiology for her symptoms she can continue with aspirin 81 and Plavix 75 daily.        2. Essential hypertension  Assessment & Plan:  Blood pressure controlled on Toprol 25 daily       3. Hyperlipidemia LDL goal <70  Assessment & Plan:  Patient's LDL is at goal continue on Crestor 40 nightly       4. Precordial pain  -     Stress Test With Myocardial Perfusion Two Day; Future    Other orders  -     metoprolol succinate XL (TOPROL-XL) 25 MG 24 hr tablet; Take 1 tablet by mouth Daily.  Dispense: 90 tablet; Refill: 0  -     nitroglycerin (NITROSTAT) 0.4 MG SL tablet; 1 under the tongue as needed for angina, may repeat q5mins for up three doses  Dispense: 100 tablet; Refill: 11            Follow Up     Return in about 6 months (around 10/17/2024) for Follow with Berna Harry, EKG with F/U.          Patient was given instructions and counseling regarding her condition or for health maintenance advice. Please see specific information pulled into the AVS if  appropriate.

## 2024-04-17 NOTE — ASSESSMENT & PLAN NOTE
Patient with intermittent chest discomfort at times which sounds anginal in nature other times occurring just at rest she has been on Toprol and is concerned about up titration due to side effects and hypotension recommend nuclear stress test instead of the CTA given her previous stenting making it difficult to assess stented areas for ischemia.  If stress test is within normal limits would favor muscle skeletal etiology for her symptoms she can continue with aspirin 81 and Plavix 75 daily.

## 2024-04-18 ENCOUNTER — OFFICE VISIT (OUTPATIENT)
Dept: SLEEP MEDICINE | Facility: HOSPITAL | Age: 59
End: 2024-04-18
Payer: OTHER GOVERNMENT

## 2024-04-18 VITALS — WEIGHT: 207.2 LBS | BODY MASS INDEX: 31.4 KG/M2 | OXYGEN SATURATION: 98 % | HEART RATE: 86 BPM | HEIGHT: 68 IN

## 2024-04-18 DIAGNOSIS — G47.00 INSOMNIA, UNSPECIFIED TYPE: ICD-10-CM

## 2024-04-18 DIAGNOSIS — G47.33 OBSTRUCTIVE SLEEP APNEA: Primary | ICD-10-CM

## 2024-04-18 DIAGNOSIS — E66.9 OBESITY (BMI 30-39.9): ICD-10-CM

## 2024-04-18 PROCEDURE — 99214 OFFICE O/P EST MOD 30 MIN: CPT | Performed by: FAMILY MEDICINE

## 2024-04-18 PROCEDURE — G0463 HOSPITAL OUTPT CLINIC VISIT: HCPCS

## 2024-04-18 RX ORDER — HYDROXYZINE HYDROCHLORIDE 25 MG/1
TABLET, FILM COATED ORAL
Qty: 60 TABLET | Refills: 5 | Status: SHIPPED | OUTPATIENT
Start: 2024-04-18

## 2024-04-18 NOTE — PROGRESS NOTES
[facility] Note     Chief Complaint:  FARNAZ     Primary Care Physician: Angel Bahena APRN    Interval History:   The patient is a 58 y.o. female  who was last seen in the sleep lab: 2024.   PSG showed AHI 19.8.  Started on auto CPAP then changed to set pressure of 8.  At last visit we advised patient to consider more consistent usage with hydroxyzine as this was helping with her sleep. Advised her to call when she needs refill.   Also advised her to look into CBT-I with [facility] psychology group.  Patient was advised to talk to DME about having her machine looked at to see if machines humidifier was working appropriately because changing to a standard tube still did not stop water from collecting in the tube. At last visit we changed her pressure to 6 to help with some residual abdominal bloating and air leak.  Today reports fullface mask fits well does not leak air.     Today reports since turning off humidifer she is more comfortable.  Hydroxyzine is helping as well needs a refill today.  Decrease in pressure has also helped and resolved any bloating issues.    Downloaded PAP Data Reviewed For Compliance:  DME is [employer].  Downloads between 3/16/2024 - 2024.  Average usage is 12 hours 52 minutes.  Average AHI is 5.8.  Average auto CPAP pressure is 6 cm H2O    I have reviewed the above results and compared them with the patient's last downloads and reviewed with the patient.    Review of Systems:    A complete review of systems was done and all were negative with the exception of all negative    Social History:    Social History     Socioeconomic History    Marital status:    Tobacco Use    Smoking status: Former     Current packs/day: 0.00     Average packs/day: 1 pack/day for 26.0 years (26.0 ttl pk-yrs)     Types: Cigarettes     Start date: 1986     Quit date: 2012     Years since quittin.3    Smokeless tobacco: Never    Tobacco  "comments:     caffeine use   Vaping Use    Vaping status: Never Used   Substance and Sexual Activity    Alcohol use: No    Drug use: Never    Sexual activity: Yes     Partners: Male     Birth control/protection: Same-sex partner       Allergies:  Other, Adhesive tape, Dust mite extract, Molds & smuts, and Pollen extract     Medication Review:  Reviewed.      Vital Signs:    Vitals:    04/18/24 1300   Pulse: 86   SpO2: 98%   Weight: 94 kg (207 lb 3.2 oz)   Height: 172.7 cm (67.99\")     Body mass index is 31.51 kg/m².    Physical Exam:    Constitutional:  Well developed 58 y.o. female that appears in no apparent distress.  Awake & oriented times 3.  Normal mood with normal recent and remote memory and normal judgement.  Eyes:  Conjunctivae normal.  Oropharynx: Previously, moist mucous membranes.    Self-administered [place] Sleepiness Scale test results: 7  0-5 Lower normal daytime sleepiness  6-10 Higher normal daytime sleepiness  11-12 Mild, 13-15 Moderate, & 16-24 Severe excessive daytime sleepiness    Impression:   1. Obstructive sleep apnea    2. Obesity (BMI 30-39.9)    3. Insomnia, unspecified type        Obstructive sleep apnea adequately treated with CPAP 6. The patient appears to be at goal with good compliance and usage. The patient has no complaints of hypersomnolence.  Continue hydroxyzine 1 to 2 tablets at night as needed for sleep.    Plan:  Good sleep hygiene measures should be maintained.  Weight loss would be beneficial in this patient who obese by Body mass index is 31.51 kg/m²..      After evaluating the patient and assessing results available, the patient is benefiting from the treatment being provided.     The patient will continue PAP.  After clinical evaluation and review of downloads, I recommend no changes to the patient's pressures.  A new prescription will be sent to the patient's DME.    Caution during activities that require prolonged concentration is strongly advised if sleepiness " returns. Changing of PAP supplies regularly is important for effective use. Patient needs to change cushion on the mask or plugs on nasal pillows along with disposable filters once every month and change mask frame, tubing, headgear and Velcro straps every 6 months at the minimum.    I answered all of the patient's questions.  The patient will call for any problems and will follow up in  6 months.      Tra Cabrera MD  Sleep Medicine  04/18/24  13:16 EDT

## 2024-04-22 ENCOUNTER — OFFICE VISIT (OUTPATIENT)
Dept: ORTHOPEDIC SURGERY | Facility: CLINIC | Age: 59
End: 2024-04-22
Payer: OTHER GOVERNMENT

## 2024-04-22 ENCOUNTER — PREP FOR SURGERY (OUTPATIENT)
Dept: OTHER | Facility: HOSPITAL | Age: 59
End: 2024-04-22
Payer: OTHER GOVERNMENT

## 2024-04-22 VITALS
HEART RATE: 90 BPM | OXYGEN SATURATION: 97 % | SYSTOLIC BLOOD PRESSURE: 109 MMHG | HEIGHT: 67 IN | DIASTOLIC BLOOD PRESSURE: 77 MMHG | BODY MASS INDEX: 32.49 KG/M2 | WEIGHT: 207 LBS

## 2024-04-22 DIAGNOSIS — G56.23 CUBITAL TUNNEL SYNDROME, BILATERAL: Primary | ICD-10-CM

## 2024-04-22 DIAGNOSIS — G56.03 CARPAL TUNNEL SYNDROME, BILATERAL: ICD-10-CM

## 2024-04-22 DIAGNOSIS — G56.03 CARPAL TUNNEL SYNDROME, BILATERAL: Primary | ICD-10-CM

## 2024-04-22 PROBLEM — G56.01 CARPAL TUNNEL SYNDROME OF RIGHT WRIST: Status: ACTIVE | Noted: 2024-04-22

## 2024-04-22 PROCEDURE — 99204 OFFICE O/P NEW MOD 45 MIN: CPT | Performed by: ORTHOPAEDIC SURGERY

## 2024-04-22 RX ORDER — CEFAZOLIN SODIUM 2 G/100ML
2 INJECTION, SOLUTION INTRAVENOUS ONCE
OUTPATIENT
Start: 2024-04-22 | End: 2024-04-22

## 2024-04-22 RX ORDER — CEFAZOLIN SODIUM IN 0.9 % NACL 3 G/100 ML
3 INTRAVENOUS SOLUTION, PIGGYBACK (ML) INTRAVENOUS ONCE
OUTPATIENT
Start: 2024-04-22 | End: 2024-04-22

## 2024-04-22 NOTE — PROGRESS NOTES
"Chief Complaint  Initial Evaluation of the Left Hand and Initial Evaluation of the Right Hand     Subjective      Claudette Gramajo presents to [facility] for initial evaluation of bilateral hands. She had an EMG and is here to review. She has numbness and tingling in bilateral hands.  She has decrease  and [facility] tasks.  She has burning and tingling pain from the wrist up the arm.   She has had symptoms for years.  She cannot even work anymore.     Allergies   Allergen Reactions    Other Unknown - Low Severity     Hay    Sneezing watery eyes cough    Adhesive Tape Other (See Comments)     BLISTERS    Dust Mite Extract Other (See Comments)    Molds & Smuts Cough    Pollen Extract Cough        Social History     Socioeconomic History    Marital status:    Tobacco Use    Smoking status: Former     Current packs/day: 0.00     Average packs/day: 1 pack/day for 26.0 years (26.0 ttl pk-yrs)     Types: Cigarettes     Start date: 1986     Quit date: 2012     Years since quittin.3    Smokeless tobacco: Never    Tobacco comments:     caffeine use   Vaping Use    Vaping status: Never Used   Substance and Sexual Activity    Alcohol use: No    Drug use: Never    Sexual activity: Yes     Partners: Male     Birth control/protection: Same-sex partner        I reviewed the patient's chief complaint, history of present illness, review of systems, past medical history, surgical history, family history, social history, medications, and allergy list.     Review of Systems     Constitutional: Denies fevers, chills, weight loss  Cardiovascular: Denies chest pain, shortness of breath  Skin: Denies rashes, acute skin changes  Neurologic: Denies headache, loss of consciousness      Vital Signs:   /77   Pulse 90   Ht 170.2 cm (67\")   Wt 93.9 kg (207 lb)   SpO2 97%   BMI 32.42 kg/m²          Physical Exam  General: Alert. No acute distress    Ortho Exam        BILATERAL HANDS Positive " Compression testing/ Positive Tinels. NegativeFinkelsteins. Negative Newman's testing. Negative CMC grind testing. Positive Phalens. Full ROM of the hand, fingers, elbow and wrist. Negative Triggering of the digit. Sensation grossly intact to light touch, median, radial and ulnar nerve. Positive AIN, PIN and ulnar nerve motor function intact. Axillary nerve intact. Positive pulses.        Procedures        Imaging Results (Most Recent)       None             Result Review :     IMPRESSION: 1. 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 2. Evidence of a mild to moderate, demyelinating, focal neuropathy of the bilateral median nerves at the wrist (carpal tunnel). 3. Evidence of a mild, demyelinating, focal neuropathy of the right ulnar nerve at the elbow. 4. No electrophysiologic evidence of a cervical radiculopathy, brachial plexopathy, additional focal neuropathy, polyneuropathic changes, myopathy, or motor neuron pathology in the upper extremities.    EMG & Nerve Conduction Test    Result Date: 4/10/2024  Narrative: See attached EMG/NCS report. Electronically signed by Adalberto Blanco PT, 04/10/24, 9:01 AM EDT.               Assessment and Plan     Diagnoses and all orders for this visit:    1. Cubital tunnel syndrome, bilateral (Primary)    2. Carpal tunnel syndrome, bilateral        Discussed the treatment plan with the patient. I reviewed the EMG with the patient.      Discussed the treatment options with the patient, operative vs non-operative. The patient expressed understanding and wished to proceed with a right trigger finger and cubital tunnel release.      Discussed surgery., Risks/benefits discussed with patient including, but not limited to: infection, bleeding, neurovascular damage, re-rupture, aesthetic deformity, need for further surgery, and death., Surgery pamphlet given., and Call or return if worsening symptoms.    Follow Up     2  weeks postoperatively       Patient was given instructions and counseling regarding her condition or for health maintenance advice. Please see specific information pulled into the AVS if appropriate.     Scribed for Alexis Hawkins MD by Izabela Hughes MA.  04/22/24   14:48 EDT    I have personally performed the services described in this document as scribed by the above individual and it is both accurate and complete. Alexis Hawkins MD 04/22/24

## 2024-04-24 ENCOUNTER — OFFICE VISIT (OUTPATIENT)
Dept: PODIATRY | Facility: CLINIC | Age: 59
End: 2024-04-24
Payer: OTHER GOVERNMENT

## 2024-04-24 VITALS
BODY MASS INDEX: 32.18 KG/M2 | OXYGEN SATURATION: 97 % | HEIGHT: 67 IN | DIASTOLIC BLOOD PRESSURE: 74 MMHG | HEART RATE: 94 BPM | WEIGHT: 205 LBS | SYSTOLIC BLOOD PRESSURE: 135 MMHG | TEMPERATURE: 97.3 F

## 2024-04-24 DIAGNOSIS — L74.4 ANHIDROSIS: Primary | ICD-10-CM

## 2024-04-24 DIAGNOSIS — E11.9 DIABETES MELLITUS WITHOUT COMPLICATION: ICD-10-CM

## 2024-04-24 DIAGNOSIS — E11.8 DM FEET: ICD-10-CM

## 2024-04-24 PROCEDURE — 99213 OFFICE O/P EST LOW 20 MIN: CPT | Performed by: PODIATRIST

## 2024-04-24 PROCEDURE — G8404 LOW EXTEMITY NEUR EXAM DOCUM: HCPCS | Performed by: PODIATRIST

## 2024-04-24 RX ORDER — AMMONIUM LACTATE 12 G/100G
LOTION TOPICAL 2 TIMES DAILY
Qty: 225 G | Refills: 11 | Status: SHIPPED | OUTPATIENT
Start: 2024-04-24

## 2024-04-24 NOTE — PROGRESS NOTES
"    [facility]    Today's Date: 04/24/24    Patient Name: Claudette Gramajo  MRN: [id number]  CSN: [id number]  PCP: Angel Bahena APRN, Last PCP Visit: 28 March 2024  Referring Provider: Angel Bahena*    SUBJECTIVE     Chief Complaint   Patient presents with    Left Foot - Establish Care, Annual Exam, Diabetes    Right Foot - Establish Care, Annual Exam, Diabetes     HPI: Claudette Gramajo, a 58 y.o.female, presents to clinic for a diabetic foot evaluation.    New, Established, New Problem:  est    Onset:  insidious    Nature:  NIDDM    Stable, worsening, improving:  stable    Patient controlling diabetes via:  Oral meds    Patient states their most recent blood glucose reading:  \"\".    Patient denies any fevers, chills, nausea, vomiting, shortness of breath, nor any other constitutional signs nor symptoms.    New, Established, New Problem:  SECOND PROBLEM  Location: Bilateral feet  Duration:   Greater than 1 month  Onset: Insidious  Nature: Dry skin  Stable, worsening, improving:  Worsening  Aggravating factors:     Previous Treatment:  Slowly worsening despite using OTC skin lotion.    Past Medical History:   Diagnosis Date    Anesthesia complication     woke up during surgery    Arthritis     Arthritis of neck     CAD (coronary artery disease)     Cellulitis and abscess of leg 03/28/2013    Chronic purulent otitis media 07/01/2013    Connective tissue anomaly     CTS (carpal tunnel syndrome)     Diabetes mellitus     Dislocation, patella closed 05/30/2017    Family history of colon cancer 05/27/2022    Family history of esophageal cancer 05/27/2022    Fracture of wrist 1979    right hand    Hyperlipidemia LDL goal <70 08/31/2021    Knee swelling     Myocardial infarct     2013    FARNAZ on CPAP 09/01/2021    RLS (restless legs syndrome)     Rotator cuff syndrome     Sleep apnea     Subluxation of patella      Past Surgical History:   Procedure Laterality Date    " CARDIAC CATHETERIZATION      COLONOSCOPY      COLONOSCOPY N/A 2022    Procedure: COLONOSCOPY WITH POLYPECTOMY;  Surgeon: Sloan Ortiz MD;  Location: [facility] ENDOSCOPY;  Service: Gastroenterology;  Laterality: N/A;  COLON POLYP    CORONARY ANGIOPLASTY      CORONARY STENT PLACEMENT      ENDOSCOPY N/A 2022    Procedure: ESOPHAGOGASTRODUODENOSCOPY WITH BX;  Surgeon: Sloan Ortiz MD;  Location: [facility] ENDOSCOPY;  Service: Gastroenterology;  Laterality: N/A;  HIATAL HERNIA    KNEE SURGERY  , oct.    right knee    UPPER GASTROINTESTINAL ENDOSCOPY      VENOUS THROMBECTOMY       Family History   Problem Relation Age of Onset    Suicidality Mother     Osteoporosis Mother     Rheumatologic disease Mother     Scoliosis Mother     Heart attack Father     Heart disease Father     Cancer Father     Osteoporosis Father     Rheumatologic disease Father     No Known Problems Maternal Grandmother     No Known Problems Maternal Grandfather     Heart disease Paternal Grandmother     Cancer Paternal Grandmother     Heart disease Paternal Grandfather     Cancer Paternal Grandfather     Thyroid disease Paternal Grandfather     Pancreatic cancer Paternal Grandfather     Esophageal cancer Paternal Grandfather     Stomach cancer Paternal Grandfather     Sleep apnea Paternal Grandfather     Colon cancer Neg Hx     Malig Hyperthermia Neg Hx      Social History     Socioeconomic History    Marital status:    Tobacco Use    Smoking status: Former     Current packs/day: 0.00     Average packs/day: 1 pack/day for 26.0 years (26.0 ttl pk-yrs)     Types: Cigarettes     Start date: 1986     Quit date: 2012     Years since quittin.3    Smokeless tobacco: Never    Tobacco comments:     caffeine use   Vaping Use    Vaping status: Never Used   Substance and Sexual Activity    Alcohol use: No    Drug use: Never    Sexual activity: Yes     Partners: Male     Birth control/protection: Same-sex partner      Allergies   Allergen Reactions    Other Unknown - Low Severity     Hay    Sneezing watery eyes cough    Adhesive Tape Other (See Comments)     BLISTERS    Dust Mite Extract Other (See Comments)    Molds & Smuts Cough    Pollen Extract Cough     Current Outpatient Medications   Medication Sig Dispense Refill    albuterol sulfate  (90 Base) MCG/ACT inhaler Take 1-2 Puffs every 4 hours prn 8 g 1    Alcohol Swabs (Easy Touch Alcohol Prep Medium) 70 % pads       aspirin 81 MG EC tablet Take 1 tablet by mouth Daily. 90 tablet 3    cetirizine (zyrTEC) 10 MG tablet Take 1 tablet by mouth Daily. 90 tablet 0    cholecalciferol (VITAMIN D3) 25 MCG (1000 UT) tablet Take 1 tablet by mouth Daily. Does not take daily only occasionally 90 tablet 1    clopidogrel (PLAVIX) 75 MG tablet Take 1 tablet by mouth Daily. 90 tablet 1    DULoxetine (CYMBALTA) 60 MG capsule Take 1 capsule by mouth Daily. 90 capsule 1    empagliflozin (JARDIANCE) 25 MG tablet tablet Take 1 tablet by mouth Daily. 90 tablet 1    ezetimibe (Zetia) 10 MG tablet Take 1 tablet by mouth Daily. 90 tablet 1    famotidine (Pepcid) 40 MG tablet Take 1 tablet by mouth 2 (Two) Times a Day As Needed for Heartburn. 90 tablet 1    fluticasone (FLONASE) 50 MCG/ACT nasal spray 2 sprays into the nostril(s) as directed by provider Daily. 2 sprays each nostril daily 48 g 1    FREESTYLE LITE test strip       furosemide (LASIX) 20 MG tablet Take 1 tablet by mouth Daily. 90 tablet 1    glipizide (GLUCOTROL XL) 10 MG 24 hr tablet Take 1 tablet by mouth Daily for 180 days. 90 tablet 1    hydrOXYzine (ATARAX) 25 MG tablet Take 1-2  tabs by mouth at night as needed for sleep. 60 tablet 5    Lancets (freestyle) lancets       metFORMIN (GLUCOPHAGE) 1000 MG tablet Take 1 tablet by mouth 2 (Two) Times a Day With Meals. 180 tablet 1    metoprolol succinate XL (TOPROL-XL) 25 MG 24 hr tablet Take 1 tablet by mouth Daily. 90 tablet 0    midodrine (PROAMATINE) 2.5 MG tablet Take 1  tablet by mouth Daily. 90 tablet 3    montelukast (SINGULAIR) 10 MG tablet Take 1 tablet by mouth Every Night. 90 tablet 1    Narcan 4 MG/0.1ML nasal spray       nitroglycerin (NITROSTAT) 0.4 MG SL tablet Place 1 tablet under the tongue Every 5 (Five) Minutes As Needed for Chest Pain. 35 tablet 6    nitroglycerin (NITROSTAT) 0.4 MG SL tablet 1 under the tongue as needed for angina, may repeat q5mins for up three doses 100 tablet 11    oxyCODONE (ROXICODONE) 5 MG immediate release tablet Take  by mouth As Needed.      rosuvastatin (CRESTOR) 40 MG tablet Take 1 tablet by mouth Every Night. 90 tablet 1    Semaglutide,0.25 or 0.5MG/DOS, (OZEMPIC) 2 MG/3ML solution pen-injector Inject 0.25 mg under the skin into the appropriate area as directed 1 (One) Time Per Week. 9 mL 0    tiotropium bromide monohydrate (Spiriva Respimat) 2.5 MCG/ACT aerosol solution inhaler Inhale 2 puffs Daily. 4 g 5    zolpidem (AMBIEN) 5 MG tablet       ammonium lactate (LAC-HYDRIN) 12 % lotion Apply  topically to the appropriate area as directed 2 (Two) Times a Day. 225 g 11     No current facility-administered medications for this visit.     Review of Systems   Constitutional: Negative.    Skin:         Dry skin   All other systems reviewed and are negative.      OBJECTIVE     Vitals:    04/24/24 1453   BP: 135/74   Pulse: 94   Temp: 97.3 °F (36.3 °C)   SpO2: 97%       Body mass index is 32.11 kg/m².    Lab Results   Component Value Date    HGBA1C 9.10 (H) 03/25/2024       Lab Results   Component Value Date    GLUCOSE 277 (H) 03/25/2024    CALCIUM 9.4 03/25/2024     03/25/2024    K 3.8 03/25/2024    CO2 22.5 03/25/2024    CL 97 (L) 03/25/2024    BUN 17 03/25/2024    CREATININE 1.17 (H) 03/25/2024    EGFRIFAFRI 69 10/22/2021    EGFRIFNONA 54 (L) 02/14/2022    BCR 14.5 03/25/2024    ANIONGAP 18.5 (H) 03/25/2024       Patient seen in no apparent distress.      PHYSICAL EXAM:     Foot/Ankle Exam    GENERAL  Diabetic foot exam performed     Appearance:  obese  Orientation:  AAOx3  Affect:  appropriate  Gait:  unimpaired  Assistance:  independent  Right shoe gear: casual shoe  Left shoe gear: casual shoe    VASCULAR     Right Foot Vascularity   Normal vascular exam    Dorsalis pedis:  2+  Posterior tibial:  2+  Skin temperature:  warm  Edema grading:  None  CFT:  < 3 seconds  Pedal hair growth:  Present  Varicosities:  none     Left Foot Vascularity   Normal vascular exam    Dorsalis pedis:  2+  Posterior tibial:  2+  Skin temperature:  warm  Edema grading:  None  CFT:  < 3 seconds  Pedal hair growth:  Present  Varicosities:  none     NEUROLOGIC     Right Foot Neurologic   Normal sensation    Light touch sensation: normal  Vibratory sensation: normal  Hot/Cold sensation: normal  Protective Sensation using [place] Monofilament:   Sites intact: 10  Sites tested: 10     Left Foot Neurologic   Normal sensation    Light touch sensation: normal  Vibratory sensation: normal  Hot/Cold sensation:  normal  Protective Sensation using [place] Monofilament:   Sites intact: 10  Sites tested: 10    MUSCLE STRENGTH     Right Foot Muscle Strength   Foot dorsiflexion:  4  Foot plantar flexion:  4  Foot inversion:  4  Foot eversion:  4     Left Foot Muscle Strength   Foot dorsiflexion:  4  Foot plantar flexion:  4  Foot inversion:  4  Foot eversion:  4    RANGE OF MOTION     Right Foot Range of Motion   Foot and ankle ROM within normal limits       Left Foot Range of Motion   Foot and ankle ROM within normal limits      DERMATOLOGIC      Right Foot Dermatologic   Skin  Positive for dryness.      Left Foot Dermatologic   Skin  Positive for dryness.     Diabetic Foot Exam Performed and Monofilament Test Performed    ASSESSMENT/PLAN     Diagnoses and all orders for this visit:    1. Anhidrosis (Primary)  -     ammonium lactate (LAC-HYDRIN) 12 % lotion; Apply  topically to the appropriate area as directed 2 (Two) Times a Day.  Dispense: 225 g; Refill:  11    2. Diabetes mellitus without complication    3. DM feet    Comprehensive lower extremity examination and evaluation was performed.    Discussed findings and treatment plan including risks, benefits, and treatment options with patient in detail. Patient agreed with treatment plan.    Medications and allergies reviewed.  Reviewed available blood glucose and HgB A1C lab values along with other pertinent labs.  These were discussed with the patient as to their importance of diabetic maintenance.    Diabetic foot exam performed and documented this date, compliant with CQM required standards. Detail of findings as noted in physical exam.  Lower extremity Neurologic exam for diabetic patient performed and documented this date, compliant with PQRS required standards. Detail of findings as noted in physical exam.  Advised patient importance of good routine lower extremity hygiene. Advised patient importance of evaluating for intact skin and pain free nail borders.  Advised patient to use mirror to evaluate plantar/ soles of feet for better visualization. Advised patient monitor and phone office to be seen if any cracking to skin, open lesions, painful nail borders or if nails become elongated prior to next visit. Advised patient importance of daily cleansing of lower extremities, followed by good skin cream to maintain normal hydration of skin. Also advised patient importance of close daily monitoring of blood sugar. Advised to regulate diet and medications to maintain control of blood sugar in optimal range. Contact primary care provider if difficulties maintaining blood sugar levels.  Advised Patient of presence of Diabetes Mellitus condition.  Advised Patient risk of progression and worsening or improvement, then return of condition.  Will monitor condition for any change in future. Treat with most appropriate treatment pending status of condition.  Counseled and advised patient extensively on nature and  ramifications of diabetes. Standard instructions given to patient for good diabetic foot care and maintenance. Advised importance of careful monitoring to avoid break down and complications secondary to diabetes. Advised patient importance of strict maintenance of blood sugar control. Advised patient of possible ominous results from neglect of condition, i.e.: amputation/ loss of digits, feet and legs, or even death.  Patient states understands counseling, will monitor closely, continue good hygiene and routine diabetic foot care. Patient will contact office is questions or problems.      An After Visit Summary was printed and given to the patient at discharge, including (if requested) any available informative/educational handouts regarding diagnosis, treatment, or medications. All questions were answered to patient/family satisfaction. Should symptoms fail to improve or worsen they agree to call or return to clinic or to go to the Emergency Department. Discussed the importance of following up with any needed screening tests/labs/specialist appointments and any requested follow-up recommended by me today. Importance of maintaining follow-up discussed and patient accepts that missed appointments can delay diagnosis and potentially lead to worsening of conditions.    Return in about 1 year (around 4/24/2025) for DFE., or sooner if acute issues arise.    This document has been electronically signed by Topher Keene DPM on April 24, 2024 15:12 EDT

## 2024-04-25 RX ORDER — LANCETS 28 GAUGE
1 EACH MISCELLANEOUS DAILY
Qty: 100 EACH | Refills: 1 | Status: SHIPPED | OUTPATIENT
Start: 2024-04-25

## 2024-04-25 RX ORDER — BLOOD-GLUCOSE METER
1 KIT MISCELLANEOUS DAILY
Qty: 100 EACH | Refills: 1 | Status: SHIPPED | OUTPATIENT
Start: 2024-04-25

## 2024-04-25 RX ORDER — ISOPROPYL ALCOHOL 70 ML/100ML
1 SWAB TOPICAL DAILY
Qty: 100 EACH | Refills: 1 | Status: SHIPPED | OUTPATIENT
Start: 2024-04-25

## 2024-04-30 ENCOUNTER — OFFICE VISIT (OUTPATIENT)
Dept: FAMILY MEDICINE CLINIC | Facility: CLINIC | Age: 59
End: 2024-04-30
Payer: OTHER GOVERNMENT

## 2024-04-30 ENCOUNTER — LAB (OUTPATIENT)
Dept: LAB | Facility: HOSPITAL | Age: 59
End: 2024-04-30
Payer: OTHER GOVERNMENT

## 2024-04-30 VITALS
SYSTOLIC BLOOD PRESSURE: 122 MMHG | DIASTOLIC BLOOD PRESSURE: 78 MMHG | HEIGHT: 67 IN | BODY MASS INDEX: 32.18 KG/M2 | OXYGEN SATURATION: 98 % | TEMPERATURE: 98 F | HEART RATE: 89 BPM | WEIGHT: 205 LBS

## 2024-04-30 DIAGNOSIS — G56.03 CARPAL TUNNEL SYNDROME, BILATERAL: ICD-10-CM

## 2024-04-30 DIAGNOSIS — E11.65 TYPE 2 DIABETES MELLITUS WITH HYPERGLYCEMIA, WITHOUT LONG-TERM CURRENT USE OF INSULIN: Primary | ICD-10-CM

## 2024-04-30 LAB
BILIRUB UR QL STRIP: NEGATIVE
CLARITY UR: CLEAR
COLOR UR: YELLOW
GLUCOSE UR STRIP-MCNC: ABNORMAL MG/DL
HGB UR QL STRIP.AUTO: NEGATIVE
HOLD SPECIMEN: NORMAL
KETONES UR QL STRIP: NEGATIVE
LEUKOCYTE ESTERASE UR QL STRIP.AUTO: NEGATIVE
NITRITE UR QL STRIP: NEGATIVE
PH UR STRIP.AUTO: 6 [PH] (ref 5–8)
PROT UR QL STRIP: NEGATIVE
SP GR UR STRIP: >1.03 (ref 1–1.03)
UROBILINOGEN UR QL STRIP: ABNORMAL
VIT B12 BLD-MCNC: 810 PG/ML (ref 211–946)

## 2024-04-30 PROCEDURE — 99213 OFFICE O/P EST LOW 20 MIN: CPT | Performed by: NURSE PRACTITIONER

## 2024-04-30 PROCEDURE — 81003 URINALYSIS AUTO W/O SCOPE: CPT

## 2024-04-30 PROCEDURE — 82607 VITAMIN B-12: CPT | Performed by: NURSE PRACTITIONER

## 2024-04-30 RX ORDER — LANCETS 28 GAUGE
1 EACH MISCELLANEOUS 3 TIMES DAILY
Qty: 300 EACH | Refills: 2 | Status: SHIPPED | OUTPATIENT
Start: 2024-04-30

## 2024-04-30 RX ORDER — METFORMIN HYDROCHLORIDE 500 MG/1
500 TABLET, EXTENDED RELEASE ORAL
Qty: 90 TABLET | Refills: 1 | Status: SHIPPED | OUTPATIENT
Start: 2024-04-30

## 2024-04-30 RX ORDER — BLOOD-GLUCOSE METER
1 KIT MISCELLANEOUS 3 TIMES DAILY
Qty: 300 EACH | Refills: 2 | Status: SHIPPED | OUTPATIENT
Start: 2024-04-30

## 2024-04-30 NOTE — PROGRESS NOTES
Follow Up Office Visit      Patient Name: Claudette Gramajo  : 1965   MRN: [id number]     Chief Complaint:    Chief Complaint   Patient presents with    Diabetes       History of Present llness: Claudette Gramajo is a 58 y.o. female who is here today to follow up for DM2.      Follow change from trulicity to ozempic, increased glucotrol to 10 mg daily   Since losing weight pt  reports she had some low blood glucose 60's so she decreased her metformin to 500 mg but is still having trouble with low blood glucose during the night since increasing the glucotrol   Patient states her BS has been running lower 90's-low 100's. She has decreased her Metformin to 500 mg weekly.  Loss of 22 lbs in the past 4 weeks       A1C-3/2024  Ozempic 0.25 mg weekly, Metformin 500mg, Jardiance 25mg  Eye exam- 3/2023- eye physicians st[facility].  Scheduled next Tuesday   Foot exam- - Dr Rangel  mammogram-2023  Pap-2022  Colonoscopy-2022      Subjective      Review of Systems:   Review of Systems   Constitutional:  Negative for fever.   Eyes:  Negative for visual disturbance.   Respiratory:  Negative for cough.    Cardiovascular:  Negative for chest pain.   Endocrine: Negative for polydipsia and polyuria.   Neurological:  Negative for dizziness and headaches.        Past Medical History:   Past Medical History:   Diagnosis Date    Anesthesia complication     woke up during surgery    Arthritis     Arthritis of neck     CAD (coronary artery disease)     Cellulitis and abscess of leg 2013    Chronic purulent otitis media 2013    Connective tissue anomaly     CTS (carpal tunnel syndrome)     Diabetes mellitus     Dislocation, patella closed 2017    Family history of colon cancer 2022    Family history of esophageal cancer 2022    Fracture of wrist 1979    right hand    Hyperlipidemia LDL goal <70 2021    Knee swelling     Myocardial infarct         FARNAZ on CPAP 2021    RLS (restless  legs syndrome)     Rotator cuff syndrome     Sleep apnea     Subluxation of patella        Past Surgical History:   Past Surgical History:   Procedure Laterality Date    CARDIAC CATHETERIZATION      COLONOSCOPY      COLONOSCOPY N/A 2022    Procedure: COLONOSCOPY WITH POLYPECTOMY;  Surgeon: Sloan Ortiz MD;  Location: [facility] ENDOSCOPY;  Service: Gastroenterology;  Laterality: N/A;  COLON POLYP    CORONARY ANGIOPLASTY      CORONARY STENT PLACEMENT      ENDOSCOPY N/A 2022    Procedure: ESOPHAGOGASTRODUODENOSCOPY WITH BX;  Surgeon: Sloan Ortiz MD;  Location: [facility] ENDOSCOPY;  Service: Gastroenterology;  Laterality: N/A;  HIATAL HERNIA    KNEE SURGERY  , oct.    right knee    UPPER GASTROINTESTINAL ENDOSCOPY      VENOUS THROMBECTOMY         Family History:   Family History   Problem Relation Age of Onset    Suicidality Mother     Osteoporosis Mother     Rheumatologic disease Mother     Scoliosis Mother     Heart attack Father     Heart disease Father     Cancer Father     Osteoporosis Father     Rheumatologic disease Father     No Known Problems Maternal Grandmother     No Known Problems Maternal Grandfather     Heart disease Paternal Grandmother     Cancer Paternal Grandmother     Heart disease Paternal Grandfather     Cancer Paternal Grandfather     Thyroid disease Paternal Grandfather     Pancreatic cancer Paternal Grandfather     Esophageal cancer Paternal Grandfather     Stomach cancer Paternal Grandfather     Sleep apnea Paternal Grandfather     Colon cancer Neg Hx     Malig Hyperthermia Neg Hx        Social History:   Social History     Socioeconomic History    Marital status:    Tobacco Use    Smoking status: Former     Current packs/day: 0.00     Average packs/day: 1 pack/day for 26.0 years (26.0 ttl pk-yrs)     Types: Cigarettes     Start date: 1986     Quit date: 2012     Years since quittin.3    Smokeless tobacco: Never    Tobacco comments:      caffeine use   Vaping Use    Vaping status: Never Used   Substance and Sexual Activity    Alcohol use: No    Drug use: Never    Sexual activity: Yes     Partners: Male     Birth control/protection: Same-sex partner       Medications:     Current Outpatient Medications:     albuterol sulfate  (90 Base) MCG/ACT inhaler, Take 1-2 Puffs every 4 hours prn, Disp: 8 g, Rfl: 1    Alcohol Swabs (Easy Touch Alcohol Prep Medium) 70 % pads, 1 each by Other route Daily., Disp: 100 each, Rfl: 1    ammonium lactate (LAC-HYDRIN) 12 % lotion, Apply  topically to the appropriate area as directed 2 (Two) Times a Day., Disp: 225 g, Rfl: 11    aspirin 81 MG EC tablet, Take 1 tablet by mouth Daily., Disp: 90 tablet, Rfl: 3    cetirizine (zyrTEC) 10 MG tablet, Take 1 tablet by mouth Daily., Disp: 90 tablet, Rfl: 0    cholecalciferol (VITAMIN D3) 25 MCG (1000 UT) tablet, Take 1 tablet by mouth Daily. Does not take daily only occasionally, Disp: 90 tablet, Rfl: 1    clopidogrel (PLAVIX) 75 MG tablet, Take 1 tablet by mouth Daily., Disp: 90 tablet, Rfl: 1    DULoxetine (CYMBALTA) 60 MG capsule, Take 1 capsule by mouth Daily., Disp: 90 capsule, Rfl: 1    empagliflozin (JARDIANCE) 25 MG tablet tablet, Take 1 tablet by mouth Daily., Disp: 90 tablet, Rfl: 1    ezetimibe (Zetia) 10 MG tablet, Take 1 tablet by mouth Daily., Disp: 90 tablet, Rfl: 1    famotidine (Pepcid) 40 MG tablet, Take 1 tablet by mouth 2 (Two) Times a Day As Needed for Heartburn., Disp: 90 tablet, Rfl: 1    fluticasone (FLONASE) 50 MCG/ACT nasal spray, 2 sprays into the nostril(s) as directed by provider Daily. 2 sprays each nostril daily, Disp: 48 g, Rfl: 1    FREESTYLE LITE test strip, 1 each by Other route 3 times a day., Disp: 300 each, Rfl: 2    furosemide (LASIX) 20 MG tablet, Take 1 tablet by mouth Daily., Disp: 90 tablet, Rfl: 1    hydrOXYzine (ATARAX) 25 MG tablet, Take 1-2  tabs by mouth at night as needed for sleep., Disp: 60 tablet, Rfl: 5    Lancets  "(freestyle) lancets, 1 each by Other route 3 times a day., Disp: 300 each, Rfl: 2    metoprolol succinate XL (TOPROL-XL) 25 MG 24 hr tablet, Take 1 tablet by mouth Daily., Disp: 90 tablet, Rfl: 0    midodrine (PROAMATINE) 2.5 MG tablet, Take 1 tablet by mouth Daily., Disp: 90 tablet, Rfl: 3    montelukast (SINGULAIR) 10 MG tablet, Take 1 tablet by mouth Every Night., Disp: 90 tablet, Rfl: 1    Narcan 4 MG/0.1ML nasal spray, , Disp: , Rfl:     nitroglycerin (NITROSTAT) 0.4 MG SL tablet, Place 1 tablet under the tongue Every 5 (Five) Minutes As Needed for Chest Pain., Disp: 35 tablet, Rfl: 6    nitroglycerin (NITROSTAT) 0.4 MG SL tablet, 1 under the tongue as needed for angina, may repeat q5mins for up three doses, Disp: 100 tablet, Rfl: 11    oxyCODONE (ROXICODONE) 5 MG immediate release tablet, Take  by mouth As Needed., Disp: , Rfl:     rosuvastatin (CRESTOR) 40 MG tablet, Take 1 tablet by mouth Every Night., Disp: 90 tablet, Rfl: 1    Semaglutide,0.25 or 0.5MG/DOS, (OZEMPIC) 2 MG/3ML solution pen-injector, Inject 0.5 mg under the skin into the appropriate area as directed 1 (One) Time Per Week., Disp: 15 mL, Rfl: 2    tiotropium bromide monohydrate (Spiriva Respimat) 2.5 MCG/ACT aerosol solution inhaler, Inhale 2 puffs Daily., Disp: 4 g, Rfl: 5    zolpidem (AMBIEN) 5 MG tablet, , Disp: , Rfl:     metFORMIN ER (GLUCOPHAGE-XR) 500 MG 24 hr tablet, Take 1 tablet by mouth Daily With Dinner., Disp: 90 tablet, Rfl: 1    Allergies:   Allergies   Allergen Reactions    Other Unknown - Low Severity     Hay    Sneezing watery eyes cough    Adhesive Tape Other (See Comments)     BLISTERS    Dust Mite Extract Other (See Comments)    Molds & Smuts Cough    Pollen Extract Cough               Objective     Physical Exam:  Vital Signs:   Vitals:    04/30/24 1427   BP: 100/67   Pulse: 89   Temp: 98 °F (36.7 °C)   SpO2: 98%   Weight: 93 kg (205 lb)   Height: 170.2 cm (67\")     Body mass index is 32.11 kg/m².           Physical " "Exam  Neck:      Vascular: No carotid bruit.   Cardiovascular:      Rate and Rhythm: Normal rate and regular rhythm.      Heart sounds: Normal heart sounds. No murmur heard.  Pulmonary:      Effort: Pulmonary effort is normal.      Breath sounds: Normal breath sounds.   Abdominal:      General: Bowel sounds are normal.      Palpations: Abdomen is soft.   Musculoskeletal:      Right lower leg: No edema.      Left lower leg: No edema.   Skin:     General: Skin is warm and dry.   Neurological:      Mental Status: She is alert.             Assessment / Plan      Assessment/Plan:   Diagnoses and all orders for this visit:    1. Type 2 diabetes mellitus with hyperglycemia, without long-term current use of insulin (Primary)    Other orders  -     Semaglutide,0.25 or 0.5MG/DOS, (OZEMPIC) 2 MG/3ML solution pen-injector; Inject 0.5 mg under the skin into the appropriate area as directed 1 (One) Time Per Week.  Dispense: 15 mL; Refill: 2  -     metFORMIN ER (GLUCOPHAGE-XR) 500 MG 24 hr tablet; Take 1 tablet by mouth Daily With Dinner.  Dispense: 90 tablet; Refill: 1  -     FREESTYLE LITE test strip; 1 each by Other route 3 times a day.  Dispense: 300 each; Refill: 2  -     Lancets (freestyle) lancets; 1 each by Other route 3 times a day.  Dispense: 300 each; Refill: 2         Diabetes mellitus type 2 with hyperglycemia we will stop Glucotrol continue metformin at 500 mg once daily with dinner increase Ozempic to 0.5 mg once weekly recommend increase lean protein intake exercise 30 minutes daily decrease added carbs sugars frequent protein intake with small meals return in 4 weeks with blood sugar log      Follow Up:   Return in about 4 weeks (around 5/28/2024).    Portia Bahena, SARWAT    \"Please note that portions of this note were completed with a voice recognition program.\"    "

## 2024-05-03 PROBLEM — G56.03 CARPAL TUNNEL SYNDROME, BILATERAL: Status: ACTIVE | Noted: 2024-04-22

## 2024-05-13 ENCOUNTER — HOSPITAL ENCOUNTER (OUTPATIENT)
Dept: NUCLEAR MEDICINE | Facility: HOSPITAL | Age: 59
Discharge: HOME | End: 2024-05-13
Payer: OTHER GOVERNMENT

## 2024-05-13 DIAGNOSIS — R07.2 PRECORDIAL PAIN: ICD-10-CM

## 2024-05-13 PROCEDURE — 93017 CV STRESS TEST TRACING ONLY: CPT

## 2024-05-13 PROCEDURE — 25010000002 REGADENOSON 0.4 MG/5ML SOLUTION: Performed by: INTERNAL MEDICINE

## 2024-05-13 PROCEDURE — 0 TECHNETIUM TETROFOSMIN KIT: Performed by: INTERNAL MEDICINE

## 2024-05-13 PROCEDURE — 78452 HT MUSCLE IMAGE SPECT MULT: CPT

## 2024-05-13 PROCEDURE — A9502 TC99M TETROFOSMIN: HCPCS | Performed by: INTERNAL MEDICINE

## 2024-05-13 RX ORDER — REGADENOSON 0.08 MG/ML
0.4 INJECTION, SOLUTION INTRAVENOUS
Status: COMPLETED | OUTPATIENT
Start: 2024-05-13 | End: 2024-05-13

## 2024-05-13 RX ADMIN — REGADENOSON 0.4 MG: 0.08 INJECTION, SOLUTION INTRAVENOUS at 07:35

## 2024-05-13 RX ADMIN — TETROFOSMIN 1 DOSE: 1.38 INJECTION, POWDER, LYOPHILIZED, FOR SOLUTION INTRAVENOUS at 07:35

## 2024-05-14 ENCOUNTER — HOSPITAL ENCOUNTER (OUTPATIENT)
Dept: NUCLEAR MEDICINE | Facility: HOSPITAL | Age: 59
Discharge: HOME | End: 2024-05-14
Payer: OTHER GOVERNMENT

## 2024-05-14 PROCEDURE — A9502 TC99M TETROFOSMIN: HCPCS | Performed by: INTERNAL MEDICINE

## 2024-05-14 PROCEDURE — 0 TECHNETIUM TETROFOSMIN KIT: Performed by: INTERNAL MEDICINE

## 2024-05-14 RX ADMIN — TETROFOSMIN 1 DOSE: 1.38 INJECTION, POWDER, LYOPHILIZED, FOR SOLUTION INTRAVENOUS at 07:15

## 2024-05-20 ENCOUNTER — TELEPHONE (OUTPATIENT)
Dept: CARDIOLOGY | Facility: CLINIC | Age: 59
End: 2024-05-20
Payer: OTHER GOVERNMENT

## 2024-05-20 LAB
BH CV IMMEDIATE POST TECH DATA BLOOD PRESSURE: NORMAL MMHG
BH CV IMMEDIATE POST TECH DATA HEART RATE: 94 BPM
BH CV IMMEDIATE POST TECH DATA OXYGEN SATS: 98 %
BH CV REST NUCLEAR ISOTOPE DOSE: 34.1 MCI
BH CV SIX MINUTE RECOVERY TECH DATA BLOOD PRESSURE: NORMAL
BH CV SIX MINUTE RECOVERY TECH DATA HEART RATE: 90 BPM
BH CV SIX MINUTE RECOVERY TECH DATA OXYGEN SATURATION: 96 %
BH CV STRESS BP STAGE 1: NORMAL
BH CV STRESS COMMENTS STAGE 1: NORMAL
BH CV STRESS DOSE REGADENOSON STAGE 1: 0.4
BH CV STRESS DURATION MIN STAGE 1: 0
BH CV STRESS DURATION SEC STAGE 1: 10
BH CV STRESS HR STAGE 1: 83
BH CV STRESS NUCLEAR ISOTOPE DOSE: 35 MCI
BH CV STRESS PROTOCOL 1: NORMAL
BH CV STRESS RECOVERY BP: NORMAL MMHG
BH CV STRESS RECOVERY HR: 90 BPM
BH CV STRESS RECOVERY O2: 96 %
BH CV STRESS STAGE 1: 1
BH CV THREE MINUTE POST TECH DATA BLOOD PRESSURE: NORMAL MMHG
BH CV THREE MINUTE POST TECH DATA HEART RATE: 92 BPM
BH CV THREE MINUTE POST TECH DATA OXYGEN SATURATION: 97 %
LV EF NUC BP: 64 %
MAXIMAL PREDICTED HEART RATE: 162 BPM
PERCENT MAX PREDICTED HR: 58.02 %
STRESS BASELINE BP: NORMAL MMHG
STRESS BASELINE HR: 78 BPM
STRESS O2 SAT REST: 97 %
STRESS PERCENT HR: 68 %
STRESS POST O2 SAT PEAK: 97 %
STRESS POST PEAK BP: NORMAL MMHG
STRESS POST PEAK HR: 94 BPM
STRESS TARGET HR: 138 BPM

## 2024-05-20 NOTE — TELEPHONE ENCOUNTER
Berna Harry APRN French, Tonya, RN  Stress test shows: Small area of mild decreased perfusion in the anterior apical myocardium on rest with a mild degree of increase on stress images consistent with small degree of ischemia vs attenuation artifact. Find out if she is experiencing exertional angina    SW patient .  to have wife call back

## 2024-05-21 ENCOUNTER — TELEPHONE (OUTPATIENT)
Dept: FAMILY MEDICINE CLINIC | Facility: CLINIC | Age: 59
End: 2024-05-21
Payer: OTHER GOVERNMENT

## 2024-05-21 ENCOUNTER — PREP FOR SURGERY (OUTPATIENT)
Dept: OTHER | Facility: HOSPITAL | Age: 59
End: 2024-05-21
Payer: OTHER GOVERNMENT

## 2024-05-21 DIAGNOSIS — I25.10 CAD S/P PERCUTANEOUS CORONARY ANGIOPLASTY: Primary | ICD-10-CM

## 2024-05-21 DIAGNOSIS — Z98.61 CAD S/P PERCUTANEOUS CORONARY ANGIOPLASTY: Primary | ICD-10-CM

## 2024-05-21 RX ORDER — SODIUM CHLORIDE 0.9 % (FLUSH) 0.9 %
10 SYRINGE (ML) INJECTION AS NEEDED
OUTPATIENT
Start: 2024-05-21

## 2024-05-21 RX ORDER — SODIUM CHLORIDE 9 MG/ML
40 INJECTION, SOLUTION INTRAVENOUS AS NEEDED
OUTPATIENT
Start: 2024-05-21

## 2024-05-21 RX ORDER — SODIUM CHLORIDE 0.9 % (FLUSH) 0.9 %
10 SYRINGE (ML) INJECTION EVERY 12 HOURS SCHEDULED
OUTPATIENT
Start: 2024-05-21

## 2024-05-21 NOTE — TELEPHONE ENCOUNTER
ZOYA patient. Patient states she does have CP and SOB with exertion. Patient also states she fatigued very easily

## 2024-05-21 NOTE — TELEPHONE ENCOUNTER
Patient called today and stated that she got the results of her stress test and she'll be needing an angiogram coronary, which is scheduled 06/13/2024. She wants to know if you should see her before or after that? She is already scheduled for 06/25/2024. However she is stressed out about these results and is not sleeping well. Her sleep Dr gave her medication but it's not helping all night. She said she is sweating. Her blood sugars have been in the 80's and 90's and low 100's and she has lost a little weight on the Ozempic.

## 2024-05-21 NOTE — TELEPHONE ENCOUNTER
Dr Parker recommend left heart catheterization, please find out if May 28th or June 4th works best for patient

## 2024-05-22 ENCOUNTER — TELEPHONE (OUTPATIENT)
Dept: CARDIOLOGY | Facility: CLINIC | Age: 59
End: 2024-05-22
Payer: OTHER GOVERNMENT

## 2024-05-22 NOTE — TELEPHONE ENCOUNTER
I spoke to patient and gave an arrival time of 9:00 on 05/28/24 for [facility]. Patient was instructed to have a  for the day of the procedure and to arrive at the [facility] registration area. Patient was educated about stent placement and informed that in the event of stent placement, the patient will be required to stay overnight for observation. Patient was instructed to hold Metformin for 24 hours prior to procedure. Patient was instructed to continue all other medications as usual. Patient wishes to skip her Ozempic this week. Patient was instructed to be NPO after midnight with sips of water as needed. Patient was instructed to have labs completed 24-48 hours prior to procedure. Patient is agreeable with no other questions or concerns.

## 2024-05-23 RX ORDER — MILNACIPRAN HYDROCHLORIDE 25 MG/1
25 TABLET, FILM COATED ORAL NIGHTLY
COMMUNITY
Start: 2024-04-19

## 2024-05-23 NOTE — PRE-PROCEDURE INSTRUCTIONS
PATIENT INSTRUCTED TO BE:    - NPO AFTER MIDNIGHT EXCEPT CAN HAVE SIPS OF WATER WITH HIS MEDICATION PRIOR TO PROCEDURE    -  INSTRUCTED NO LOTIONS, JEWELRY, PIERCINGS, OR DEODORANT DAY OF THE PROCEDURE    - INSTRUCTED TO TAKE THE FOLLOWING MEDICATIONS THE DAY OF SURGERY:       none    - INSTRUCTED PT TO FOLLOW ANY INSTRUCTIONS GIVEN BY HIS CARDIOLOGIST.    - INFORMED PT THEY ATTEMPT RADIAL APPROACH FIRST IF UNABLE TO PERFORM CATH WITH THAT APPROACH THEY WILL DO A FEMORAL APPROACH.  ALSO, INFORMED PT THEY WILL BE ON BEDREST POSTOP.  IF THE SURGEON FEELS  AN INTERVENTION OR STENTS NEEDS TO BE PLACED, HE/SHE WILL STAY OVER NIGHT FOR OBSERVATION AND MONITORING.     - INFORMED THE PATIENT HE CAN HAVE TWO VISITORS WITH HIM THE DAY OF THE PROCEDURE    - INSTRUCTED PT TO PARK ON THE NORTH SIDE OF THE HOSPITAL IN THE OPEN PARKING LOT, ENTER THE HOSPITAL THRU ENTRANCE C/ [address] AND  CHECK IN AT THE REGISTRATION DESK, AFTER REGISTRATION PT WILL BE TAKEN THE THE PREOP AREA FOR HIS OR HER PROCEDURE.    -ARRIVAL TIME GIVEN BY CARDIOLOGIST OFFICE, INFORMED PT IF ARRIVAL TIME CHANGES OR ADJUSTMENTS NEED TO BE MADE IN THEIR ARRIVAL TIME, HE/SHE WOULD RECEIVE A CALL.    -INSTRUCTED PT TO COME TO [facility] TO GET THEIR LABS/ EKG DONE PRIOR TO PROCEDURE      - PATIENT VERBALIZED UNDERSTANDING

## 2024-05-24 NOTE — H&P
Cardiology Consultation Note  [facility] CATH LAB          Patient Identification:  Claudette Gramajo      [id number]  58 y.o.        female  1965         PCP: Angel Bahena APRN      History of Present Illness:     Patient is a 58-year-old female with a prior history of see ED and stenting, hypertension, dyslipidemia who had been having some intermittent chest discomfort issues which has been noticed with doing such activities as cleaning the house she underwent a stress test which showed mild anterior apical ischemia her symptoms have not resolved despite medical treatment.    Past History:  Past Medical History:   Diagnosis Date    Anesthesia complication     woke up during surgery    Arthritis     Arthritis of neck     CAD (coronary artery disease)     Cellulitis and abscess of leg 03/28/2013    Chronic purulent otitis media 07/01/2013    Connective tissue anomaly     CTS (carpal tunnel syndrome)     Diabetes mellitus     Dislocation, patella closed 05/30/2017    Family history of colon cancer 05/27/2022    Family history of esophageal cancer 05/27/2022    Fracture of wrist 1979    right hand    Hiatal hernia     Hyperlipidemia LDL goal <70 08/31/2021    Knee swelling     Myocardial infarct     2013    FARNAZ on CPAP 09/01/2021    RLS (restless legs syndrome)     Rotator cuff syndrome     tita    Sleep apnea     uses CPAP    Subluxation of patella      Past Surgical History:   Procedure Laterality Date    CARDIAC CATHETERIZATION      x2    COLONOSCOPY      COLONOSCOPY N/A 08/26/2022    Procedure: COLONOSCOPY WITH POLYPECTOMY;  Surgeon: Sloan Ortiz MD;  Location: [facility] ENDOSCOPY;  Service: Gastroenterology;  Laterality: N/A;  COLON POLYP    CORONARY ANGIOPLASTY      CORONARY STENT PLACEMENT      x2    ENDOSCOPY N/A 08/26/2022    Procedure: ESOPHAGOGASTRODUODENOSCOPY WITH BX;  Surgeon: Sloan Ortiz MD;  Location: [facility] ENDOSCOPY;  Service: Gastroenterology;  Laterality:  "N/A;  HIATAL HERNIA    INNER EAR SURGERY      KNEE SURGERY  , oct.    right knee    UPPER GASTROINTESTINAL ENDOSCOPY      VENOUS THROMBECTOMY      patient unsure     Allergies   Allergen Reactions    Other Unknown - Low Severity     Hay    Sneezing watery eyes cough    Adhesive Tape Other (See Comments)     BLISTERS    Dust Mite Extract Other (See Comments)    Molds & Smuts Cough    Pollen Extract Cough     Social History     Socioeconomic History    Marital status:    Tobacco Use    Smoking status: Former     Current packs/day: 0.00     Average packs/day: 1 pack/day for 26.0 years (26.0 ttl pk-yrs)     Types: Cigarettes     Start date: 1986     Quit date: 2012     Years since quittin.4    Smokeless tobacco: Never    Tobacco comments:     caffeine use   Vaping Use    Vaping status: Never Used   Substance and Sexual Activity    Alcohol use: No    Drug use: Never    Sexual activity: Yes     Partners: Male     Birth control/protection: Same-sex partner     Family History   Problem Relation Age of Onset    Suicidality Mother     Osteoporosis Mother     Rheumatologic disease Mother     Scoliosis Mother     Heart attack Father     Heart disease Father     Cancer Father     Osteoporosis Father     Rheumatologic disease Father     No Known Problems Maternal Grandmother     No Known Problems Maternal Grandfather     Heart disease Paternal Grandmother     Cancer Paternal Grandmother     Heart disease Paternal Grandfather     Cancer Paternal Grandfather     Thyroid disease Paternal Grandfather     Pancreatic cancer Paternal Grandfather     Esophageal cancer Paternal Grandfather     Stomach cancer Paternal Grandfather     Sleep apnea Paternal Grandfather     Colon cancer Neg Hx     Malig Hyperthermia Neg Hx      Medications:  No current facility-administered medications for this encounter.     Physical exam:    Ht 170.2 cm (67\")   Wt 93 kg (205 lb)   BMI 32.11 kg/m²  Body mass index is 32.11 kg/m². " "  Oxygen saturation   @FLOWAN(10::1)@ No data recorded    General Appearance:   no acute distress  HENT:   lips not cyanotic  Neck:  thyroid not enlarged  supple  Respiratory:  no respiratory distress  normal breath sounds  no rales  Cardiovascular:  no jugular venous distention  regular rhythm  apical impulse normal  S1 normal, S2 normal  no S3, no S4   no murmur  no rub, no thrill  no carotid bruit  pedal pulses normal  lower extremity edema: none    Gastrointestinal:   bowel sounds normal  non-tender  no hepatomegaly, no splenomegaly  Musculoskeletal:  no clubbing of fingers.   normocephalic, head atraumatic  Skin:   warm, dry  Neuro/Psychiatric:  judgement and insight appropriate  normal mood and affect    Cardiographics:   Results for orders placed in visit on 10/05/21    Adult Transthoracic Echo Complete W/ Cont if Necessary Per Protocol    Interpretation Summary  Normal left ventricular systolic function with an estimated ejection fraction of 60-65%.  No regional wall motion abnormalities were observed.  Left ventricular diastolic function is consistent with impaired relaxation (grade I diastolic dysfunction).  No significant valvular heart disease.      No results found for this or any previous visit.      Cardiolite (Tc-99m Sestamibi) stress test      Lab Review:           Invalid input(s): \"PLATELETCT\"                            CrCl cannot be calculated (Patient's most recent lab result is older than the maximum 30 days allowed.).         Invalid input(s): \"LDLCALC\"          Lab Results   Component Value Date    TSH 1.740 03/25/2024        Lab Results   Component Value Date    HGBA1C 9.10 (H) 03/25/2024      No results found for: \"DIGOXIN\"   No results found for: \"DDIMERQUAN\"       Left ventricular ejection fraction is normal (Calculated EF = 64%).    Findings consistent with a normal ECG stress test.    Small area of mild decreased perfusion in the anterior apical myocardium on rest with a mild degree of " increase on stress images consistent with small degree of ischemia versus attenuation artifact    Low risk abnormality     Assessment:      CAD S/P percutaneous coronary angioplasty      Initial cardiac assessment: Angina patient with exertional anginal discomfort in his chest that showing a moderate anterior apical ischemia recommend proceed with left heart catheterization discussed risk benefits alternatives of bleeding, kidney dysfunction, MI, stroke, and death patient is agreeable to proceeding.      Recommendations:  1.  Left regularization with possible PCI        Thank you for allowing us to share in [facility].        Davis Parker MD  5/24/2024  10:39 EDT

## 2024-05-28 ENCOUNTER — HOSPITAL ENCOUNTER (OUTPATIENT)
Facility: HOSPITAL | Age: 59
Setting detail: HOSPITAL OUTPATIENT SURGERY
Discharge: HOME | End: 2024-05-28
Attending: INTERNAL MEDICINE | Admitting: INTERNAL MEDICINE
Payer: OTHER GOVERNMENT

## 2024-05-28 VITALS
WEIGHT: 194 LBS | OXYGEN SATURATION: 98 % | SYSTOLIC BLOOD PRESSURE: 114 MMHG | TEMPERATURE: 98 F | HEART RATE: 67 BPM | RESPIRATION RATE: 16 BRPM | BODY MASS INDEX: 30.45 KG/M2 | HEIGHT: 67 IN | DIASTOLIC BLOOD PRESSURE: 77 MMHG

## 2024-05-28 DIAGNOSIS — Z98.61 CAD S/P PERCUTANEOUS CORONARY ANGIOPLASTY: ICD-10-CM

## 2024-05-28 DIAGNOSIS — I25.10 CAD S/P PERCUTANEOUS CORONARY ANGIOPLASTY: ICD-10-CM

## 2024-05-28 LAB
ANION GAP SERPL CALCULATED.3IONS-SCNC: 18.7 MMOL/L (ref 5–15)
BUN SERPL-MCNC: 26 MG/DL (ref 6–20)
BUN/CREAT SERPL: 22.6 (ref 7–25)
CALCIUM SPEC-SCNC: 10.3 MG/DL (ref 8.6–10.5)
CHLORIDE SERPL-SCNC: 102 MMOL/L (ref 98–107)
CO2 SERPL-SCNC: 20.3 MMOL/L (ref 22–29)
CREAT SERPL-MCNC: 1.15 MG/DL (ref 0.57–1)
DEPRECATED RDW RBC AUTO: 41 FL (ref 37–54)
EGFRCR SERPLBLD CKD-EPI 2021: 55.3 ML/MIN/1.73
ERYTHROCYTE [DISTWIDTH] IN BLOOD BY AUTOMATED COUNT: 12.8 % (ref 12.3–15.4)
GLUCOSE BLDC GLUCOMTR-MCNC: 162 MG/DL (ref 70–99)
GLUCOSE SERPL-MCNC: 158 MG/DL (ref 65–99)
HCT VFR BLD AUTO: 41.2 % (ref 34–46.6)
HGB BLD-MCNC: 13.9 G/DL (ref 12–15.9)
INR PPP: 1.03 (ref 0.86–1.15)
MCH RBC QN AUTO: 29.6 PG (ref 26.6–33)
MCHC RBC AUTO-ENTMCNC: 33.7 G/DL (ref 31.5–35.7)
MCV RBC AUTO: 87.7 FL (ref 79–97)
PLATELET # BLD AUTO: 232 10*3/MM3 (ref 140–450)
PMV BLD AUTO: 9.6 FL (ref 6–12)
POTASSIUM SERPL-SCNC: 3.7 MMOL/L (ref 3.5–5.2)
PROTHROMBIN TIME: 13.7 SECONDS (ref 11.8–14.9)
RBC # BLD AUTO: 4.7 10*6/MM3 (ref 3.77–5.28)
SODIUM SERPL-SCNC: 141 MMOL/L (ref 136–145)
WBC NRBC COR # BLD AUTO: 8 10*3/MM3 (ref 3.4–10.8)

## 2024-05-28 PROCEDURE — 82948 REAGENT STRIP/BLOOD GLUCOSE: CPT

## 2024-05-28 PROCEDURE — 25010000002 FENTANYL CITRATE (PF) 50 MCG/ML SOLUTION: Performed by: INTERNAL MEDICINE

## 2024-05-28 PROCEDURE — C1769 GUIDE WIRE: HCPCS | Performed by: INTERNAL MEDICINE

## 2024-05-28 PROCEDURE — 25010000002 MIDAZOLAM PER 1MG: Performed by: INTERNAL MEDICINE

## 2024-05-28 PROCEDURE — 93458 L HRT ARTERY/VENTRICLE ANGIO: CPT | Performed by: INTERNAL MEDICINE

## 2024-05-28 PROCEDURE — 85027 COMPLETE CBC AUTOMATED: CPT | Performed by: NURSE PRACTITIONER

## 2024-05-28 PROCEDURE — C1894 INTRO/SHEATH, NON-LASER: HCPCS | Performed by: INTERNAL MEDICINE

## 2024-05-28 PROCEDURE — 99152 MOD SED SAME PHYS/QHP 5/>YRS: CPT | Performed by: INTERNAL MEDICINE

## 2024-05-28 PROCEDURE — 80048 BASIC METABOLIC PNL TOTAL CA: CPT | Performed by: NURSE PRACTITIONER

## 2024-05-28 PROCEDURE — 85610 PROTHROMBIN TIME: CPT | Performed by: NURSE PRACTITIONER

## 2024-05-28 PROCEDURE — 25010000002 HEPARIN (PORCINE) PER 1000 UNITS: Performed by: INTERNAL MEDICINE

## 2024-05-28 PROCEDURE — 99153 MOD SED SAME PHYS/QHP EA: CPT | Performed by: INTERNAL MEDICINE

## 2024-05-28 PROCEDURE — 25510000001 IOPAMIDOL PER 1 ML: Performed by: INTERNAL MEDICINE

## 2024-05-28 RX ORDER — HEPARIN SODIUM 1000 [USP'U]/ML
INJECTION, SOLUTION INTRAVENOUS; SUBCUTANEOUS
Status: DISCONTINUED | OUTPATIENT
Start: 2024-05-28 | End: 2024-05-28 | Stop reason: HOSPADM

## 2024-05-28 RX ORDER — ONDANSETRON 2 MG/ML
4 INJECTION INTRAMUSCULAR; INTRAVENOUS EVERY 6 HOURS PRN
Status: DISCONTINUED | OUTPATIENT
Start: 2024-05-28 | End: 2024-05-28 | Stop reason: HOSPADM

## 2024-05-28 RX ORDER — LIDOCAINE HYDROCHLORIDE 20 MG/ML
INJECTION, SOLUTION INFILTRATION; PERINEURAL
Status: DISCONTINUED | OUTPATIENT
Start: 2024-05-28 | End: 2024-05-28 | Stop reason: HOSPADM

## 2024-05-28 RX ORDER — SODIUM CHLORIDE 9 MG/ML
40 INJECTION, SOLUTION INTRAVENOUS AS NEEDED
Status: DISCONTINUED | OUTPATIENT
Start: 2024-05-28 | End: 2024-05-28 | Stop reason: HOSPADM

## 2024-05-28 RX ORDER — ACETAMINOPHEN 325 MG/1
650 TABLET ORAL EVERY 4 HOURS PRN
Status: DISCONTINUED | OUTPATIENT
Start: 2024-05-28 | End: 2024-05-28 | Stop reason: HOSPADM

## 2024-05-28 RX ORDER — NITROGLYCERIN 0.4 MG/1
0.4 TABLET SUBLINGUAL
Status: DISCONTINUED | OUTPATIENT
Start: 2024-05-28 | End: 2024-05-28 | Stop reason: HOSPADM

## 2024-05-28 RX ORDER — SODIUM CHLORIDE 9 MG/ML
100 INJECTION, SOLUTION INTRAVENOUS CONTINUOUS
Status: DISCONTINUED | OUTPATIENT
Start: 2024-05-28 | End: 2024-05-28 | Stop reason: HOSPADM

## 2024-05-28 RX ORDER — MIDAZOLAM HYDROCHLORIDE 2 MG/2ML
INJECTION, SOLUTION INTRAMUSCULAR; INTRAVENOUS
Status: DISCONTINUED | OUTPATIENT
Start: 2024-05-28 | End: 2024-05-28 | Stop reason: HOSPADM

## 2024-05-28 RX ORDER — SODIUM CHLORIDE 0.9 % (FLUSH) 0.9 %
10 SYRINGE (ML) INJECTION AS NEEDED
Status: DISCONTINUED | OUTPATIENT
Start: 2024-05-28 | End: 2024-05-28 | Stop reason: HOSPADM

## 2024-05-28 RX ORDER — ONDANSETRON 4 MG/1
4 TABLET, ORALLY DISINTEGRATING ORAL EVERY 6 HOURS PRN
Status: DISCONTINUED | OUTPATIENT
Start: 2024-05-28 | End: 2024-05-28 | Stop reason: HOSPADM

## 2024-05-28 RX ORDER — NALOXONE HCL 0.4 MG/ML
0.4 VIAL (ML) INJECTION
Status: DISCONTINUED | OUTPATIENT
Start: 2024-05-28 | End: 2024-05-28 | Stop reason: HOSPADM

## 2024-05-28 RX ORDER — MORPHINE SULFATE 2 MG/ML
1 INJECTION, SOLUTION INTRAMUSCULAR; INTRAVENOUS EVERY 4 HOURS PRN
Status: DISCONTINUED | OUTPATIENT
Start: 2024-05-28 | End: 2024-05-28 | Stop reason: HOSPADM

## 2024-05-28 RX ORDER — FENTANYL CITRATE 50 UG/ML
INJECTION, SOLUTION INTRAMUSCULAR; INTRAVENOUS
Status: DISCONTINUED | OUTPATIENT
Start: 2024-05-28 | End: 2024-05-28 | Stop reason: HOSPADM

## 2024-05-28 RX ORDER — SODIUM CHLORIDE 0.9 % (FLUSH) 0.9 %
10 SYRINGE (ML) INJECTION EVERY 12 HOURS SCHEDULED
Status: DISCONTINUED | OUTPATIENT
Start: 2024-05-28 | End: 2024-05-28 | Stop reason: HOSPADM

## 2024-05-28 NOTE — DISCHARGE INSTRUCTIONS
DISCHARGE INSTRUCTIONS  VASCULAR  PROCEDURES  TR BAND      For your surgery you had:  IV sedation.     You may experience dizziness, drowsiness, or light-headedness for several hours following surgery/procedure.  Do not stay alone today or tonight.  Limit your activity for 24 hours.  Resume your diet slowly.  Follow whatever special dietary instructions you may have been given by your doctor.  You should not drive or operate machinery, drink alcohol, or sign legally binding documents for 24 hours or while you are taking pain medication.    NOTIFY YOUR DOCTOR IF YOU EXPERIENCE ANY OF THE FOLLOWING:  Temperature greater than 101 degrees Fahrenheit  Shaking Chills  Redness or excessive drainage from incision  Nausea, vomiting and/or pain that is not controlled by prescribed medications  Increase in bleeding or bleeding that is excessive  Unable to urinate in 6 hours after surgery  If unable to reach your doctor, please go to the closest Emergency Room    Medications per physician instructions as indicated on After Visit Summary.        [] TR BAND DISCHARGE INSTRUCTIONS:  For 24 hours after the procedure, or as directed by your health care provider:  Do not flex or bend the affected arm.  Do not push or pull heavy objects with the affected arm.  Do not operate machinery or power tools.  Keep affected arm elevated and rested for 48 hours.  Do not apply powder or lotion to the site.  Check your incision site every day for signs of infection. Check for:  Redness, swelling, or pain.  Fluid or blood.  Warmth.  Pus or a bad smell.  May remove bandaid: in 24 hours  Avoid manipulation of the wrist for 24 hours  After the dressing is removed, clean gently with soap and water, apply new dressing.   Avoid submerging site for one week or until healed.

## 2024-05-29 ENCOUNTER — HOSPITAL ENCOUNTER (EMERGENCY)
Facility: HOSPITAL | Age: 59
Discharge: HOME | End: 2024-05-29
Attending: EMERGENCY MEDICINE | Admitting: EMERGENCY MEDICINE
Payer: OTHER GOVERNMENT

## 2024-05-29 ENCOUNTER — APPOINTMENT (OUTPATIENT)
Dept: CT IMAGING | Facility: HOSPITAL | Age: 59
End: 2024-05-29
Payer: OTHER GOVERNMENT

## 2024-05-29 VITALS
HEIGHT: 67 IN | BODY MASS INDEX: 30.13 KG/M2 | RESPIRATION RATE: 16 BRPM | TEMPERATURE: 98.2 F | WEIGHT: 192 LBS | SYSTOLIC BLOOD PRESSURE: 153 MMHG | DIASTOLIC BLOOD PRESSURE: 90 MMHG | HEART RATE: 90 BPM | OXYGEN SATURATION: 98 %

## 2024-05-29 DIAGNOSIS — K52.9 COLITIS: Primary | ICD-10-CM

## 2024-05-29 DIAGNOSIS — N93.9 VAGINAL BLEEDING: ICD-10-CM

## 2024-05-29 LAB
ABO GROUP BLD: NORMAL
ABO GROUP BLD: NORMAL
ALBUMIN SERPL-MCNC: 4.3 G/DL (ref 3.5–5.2)
ALBUMIN/GLOB SERPL: 1.6 G/DL
ALP SERPL-CCNC: 79 U/L (ref 39–117)
ALT SERPL W P-5'-P-CCNC: 32 U/L (ref 1–33)
ANION GAP SERPL CALCULATED.3IONS-SCNC: 17.4 MMOL/L (ref 5–15)
AST SERPL-CCNC: 24 U/L (ref 1–32)
BASOPHILS # BLD AUTO: 0.05 10*3/MM3 (ref 0–0.2)
BASOPHILS NFR BLD AUTO: 0.5 % (ref 0–1.5)
BILIRUB SERPL-MCNC: 0.8 MG/DL (ref 0–1.2)
BLD GP AB SCN SERPL QL: NEGATIVE
BUN SERPL-MCNC: 19 MG/DL (ref 6–20)
BUN/CREAT SERPL: 19.8 (ref 7–25)
CALCIUM SPEC-SCNC: 9.6 MG/DL (ref 8.6–10.5)
CHLORIDE SERPL-SCNC: 102 MMOL/L (ref 98–107)
CO2 SERPL-SCNC: 18.6 MMOL/L (ref 22–29)
CREAT SERPL-MCNC: 0.96 MG/DL (ref 0.57–1)
D-LACTATE SERPL-SCNC: 2.5 MMOL/L (ref 0.5–2)
DEPRECATED RDW RBC AUTO: 41.2 FL (ref 37–54)
EGFRCR SERPLBLD CKD-EPI 2021: 68.7 ML/MIN/1.73
EOSINOPHIL # BLD AUTO: 0.19 10*3/MM3 (ref 0–0.4)
EOSINOPHIL NFR BLD AUTO: 1.7 % (ref 0.3–6.2)
ERYTHROCYTE [DISTWIDTH] IN BLOOD BY AUTOMATED COUNT: 13.1 % (ref 12.3–15.4)
GLOBULIN UR ELPH-MCNC: 2.7 GM/DL
GLUCOSE SERPL-MCNC: 159 MG/DL (ref 65–99)
HCT VFR BLD AUTO: 41.6 % (ref 34–46.6)
HGB BLD-MCNC: 14.1 G/DL (ref 12–15.9)
HOLD SPECIMEN: NORMAL
HOLD SPECIMEN: NORMAL
IMM GRANULOCYTES # BLD AUTO: 0.04 10*3/MM3 (ref 0–0.05)
IMM GRANULOCYTES NFR BLD AUTO: 0.4 % (ref 0–0.5)
LYMPHOCYTES # BLD AUTO: 1.66 10*3/MM3 (ref 0.7–3.1)
LYMPHOCYTES NFR BLD AUTO: 15.2 % (ref 19.6–45.3)
MCH RBC QN AUTO: 29.6 PG (ref 26.6–33)
MCHC RBC AUTO-ENTMCNC: 33.9 G/DL (ref 31.5–35.7)
MCV RBC AUTO: 87.2 FL (ref 79–97)
MONOCYTES # BLD AUTO: 0.77 10*3/MM3 (ref 0.1–0.9)
MONOCYTES NFR BLD AUTO: 7.1 % (ref 5–12)
NEUTROPHILS NFR BLD AUTO: 75.1 % (ref 42.7–76)
NEUTROPHILS NFR BLD AUTO: 8.21 10*3/MM3 (ref 1.7–7)
NRBC BLD AUTO-RTO: 0 /100 WBC (ref 0–0.2)
PLATELET # BLD AUTO: 249 10*3/MM3 (ref 140–450)
PMV BLD AUTO: 10 FL (ref 6–12)
POTASSIUM SERPL-SCNC: 4 MMOL/L (ref 3.5–5.2)
PROT SERPL-MCNC: 7 G/DL (ref 6–8.5)
RBC # BLD AUTO: 4.77 10*6/MM3 (ref 3.77–5.28)
RH BLD: POSITIVE
RH BLD: POSITIVE
SODIUM SERPL-SCNC: 138 MMOL/L (ref 136–145)
T&S EXPIRATION DATE: NORMAL
WBC NRBC COR # BLD AUTO: 10.92 10*3/MM3 (ref 3.4–10.8)
WHOLE BLOOD HOLD COAG: NORMAL
WHOLE BLOOD HOLD SPECIMEN: NORMAL

## 2024-05-29 PROCEDURE — 25510000001 IOPAMIDOL PER 1 ML: Performed by: EMERGENCY MEDICINE

## 2024-05-29 PROCEDURE — 83605 ASSAY OF LACTIC ACID: CPT | Performed by: EMERGENCY MEDICINE

## 2024-05-29 PROCEDURE — 25010000002 ONDANSETRON PER 1 MG: Performed by: REGISTERED NURSE

## 2024-05-29 PROCEDURE — 86900 BLOOD TYPING SEROLOGIC ABO: CPT

## 2024-05-29 PROCEDURE — 96375 TX/PRO/DX INJ NEW DRUG ADDON: CPT

## 2024-05-29 PROCEDURE — 99285 EMERGENCY DEPT VISIT HI MDM: CPT

## 2024-05-29 PROCEDURE — 96374 THER/PROPH/DIAG INJ IV PUSH: CPT

## 2024-05-29 PROCEDURE — 86850 RBC ANTIBODY SCREEN: CPT | Performed by: EMERGENCY MEDICINE

## 2024-05-29 PROCEDURE — 80053 COMPREHEN METABOLIC PANEL: CPT

## 2024-05-29 PROCEDURE — 86901 BLOOD TYPING SEROLOGIC RH(D): CPT

## 2024-05-29 PROCEDURE — 86900 BLOOD TYPING SEROLOGIC ABO: CPT | Performed by: EMERGENCY MEDICINE

## 2024-05-29 PROCEDURE — 36415 COLL VENOUS BLD VENIPUNCTURE: CPT

## 2024-05-29 PROCEDURE — 74177 CT ABD & PELVIS W/CONTRAST: CPT

## 2024-05-29 PROCEDURE — 25810000003 SODIUM CHLORIDE 0.9 % SOLUTION: Performed by: REGISTERED NURSE

## 2024-05-29 PROCEDURE — 85025 COMPLETE CBC W/AUTO DIFF WBC: CPT

## 2024-05-29 PROCEDURE — 86901 BLOOD TYPING SEROLOGIC RH(D): CPT | Performed by: EMERGENCY MEDICINE

## 2024-05-29 PROCEDURE — 25010000002 KETOROLAC TROMETHAMINE PER 15 MG: Performed by: REGISTERED NURSE

## 2024-05-29 RX ORDER — ONDANSETRON 2 MG/ML
4 INJECTION INTRAMUSCULAR; INTRAVENOUS ONCE
Status: COMPLETED | OUTPATIENT
Start: 2024-05-29 | End: 2024-05-29

## 2024-05-29 RX ORDER — HYDROCODONE BITARTRATE AND ACETAMINOPHEN 5; 325 MG/1; MG/1
1 TABLET ORAL EVERY 6 HOURS PRN
Qty: 12 TABLET | Refills: 0 | Status: SHIPPED | OUTPATIENT
Start: 2024-05-29

## 2024-05-29 RX ORDER — KETOROLAC TROMETHAMINE 10 MG/1
10 TABLET, FILM COATED ORAL EVERY 6 HOURS PRN
Qty: 20 TABLET | Refills: 0 | Status: SHIPPED | OUTPATIENT
Start: 2024-05-29 | End: 2024-06-03

## 2024-05-29 RX ORDER — SODIUM CHLORIDE 0.9 % (FLUSH) 0.9 %
10 SYRINGE (ML) INJECTION AS NEEDED
Status: DISCONTINUED | OUTPATIENT
Start: 2024-05-29 | End: 2024-05-29 | Stop reason: HOSPADM

## 2024-05-29 RX ORDER — KETOROLAC TROMETHAMINE 15 MG/ML
15 INJECTION, SOLUTION INTRAMUSCULAR; INTRAVENOUS ONCE
Status: COMPLETED | OUTPATIENT
Start: 2024-05-29 | End: 2024-05-29

## 2024-05-29 RX ORDER — AMOXICILLIN AND CLAVULANATE POTASSIUM 875; 125 MG/1; MG/1
1 TABLET, FILM COATED ORAL 2 TIMES DAILY
Qty: 20 TABLET | Refills: 0 | Status: SHIPPED | OUTPATIENT
Start: 2024-05-29 | End: 2024-06-08

## 2024-05-29 RX ORDER — FLUCONAZOLE 150 MG/1
150 TABLET ORAL ONCE
Qty: 1 TABLET | Refills: 0 | Status: SHIPPED | OUTPATIENT
Start: 2024-05-29 | End: 2024-05-29

## 2024-05-29 RX ORDER — ONDANSETRON 4 MG/1
4 TABLET, ORALLY DISINTEGRATING ORAL EVERY 8 HOURS PRN
Qty: 30 TABLET | Refills: 0 | Status: SHIPPED | OUTPATIENT
Start: 2024-05-29 | End: 2024-06-08

## 2024-05-29 RX ADMIN — ONDANSETRON 4 MG: 2 INJECTION INTRAMUSCULAR; INTRAVENOUS at 12:00

## 2024-05-29 RX ADMIN — IOPAMIDOL 100 ML: 755 INJECTION, SOLUTION INTRAVENOUS at 11:56

## 2024-05-29 RX ADMIN — KETOROLAC TROMETHAMINE 15 MG: 15 INJECTION, SOLUTION INTRAMUSCULAR; INTRAVENOUS at 12:00

## 2024-05-29 RX ADMIN — SODIUM CHLORIDE 1000 ML: 9 INJECTION, SOLUTION INTRAVENOUS at 12:00

## 2024-05-29 NOTE — ED PROVIDER NOTES
"SHARED VISIT NOTE:    Patient is 58 y.o. year old female that presents to the ED for evaluation of rectal bleeding..         ED Course:    /90 (BP Location: Right arm, Patient Position: Lying)   Pulse 90   Temp 98.2 °F (36.8 °C) (Oral)   Resp 16   Ht 170.2 cm (67\")   Wt 87.1 kg (192 lb)   SpO2 98%   BMI 30.07 kg/m²   Results for orders placed or performed during the hospital encounter of 05/29/24   Comprehensive Metabolic Panel    Specimen: Arm, Left; Blood   Result Value Ref Range    Glucose 159 (H) 65 - 99 mg/dL    BUN 19 6 - 20 mg/dL    Creatinine 0.96 0.57 - 1.00 mg/dL    Sodium 138 136 - 145 mmol/L    Potassium 4.0 3.5 - 5.2 mmol/L    Chloride 102 98 - 107 mmol/L    CO2 18.6 (L) 22.0 - 29.0 mmol/L    Calcium 9.6 8.6 - 10.5 mg/dL    Total Protein 7.0 6.0 - 8.5 g/dL    Albumin 4.3 3.5 - 5.2 g/dL    ALT (SGPT) 32 1 - 33 U/L    AST (SGOT) 24 1 - 32 U/L    Alkaline Phosphatase 79 39 - 117 U/L    Total Bilirubin 0.8 0.0 - 1.2 mg/dL    Globulin 2.7 gm/dL    A/G Ratio 1.6 g/dL    BUN/Creatinine Ratio 19.8 7.0 - 25.0    Anion Gap 17.4 (H) 5.0 - 15.0 mmol/L    eGFR 68.7 >60.0 mL/min/1.73   Lactic Acid, Plasma    Specimen: Arm, Left; Blood   Result Value Ref Range    Lactate 2.5 (C) 0.5 - 2.0 mmol/L   CBC Auto Differential    Specimen: Arm, Left; Blood   Result Value Ref Range    WBC 10.92 (H) 3.40 - 10.80 10*3/mm3    RBC 4.77 3.77 - 5.28 10*6/mm3    Hemoglobin 14.1 12.0 - 15.9 g/dL    Hematocrit 41.6 34.0 - 46.6 %    MCV 87.2 79.0 - 97.0 fL    MCH 29.6 26.6 - 33.0 pg    MCHC 33.9 31.5 - 35.7 g/dL    RDW 13.1 12.3 - 15.4 %    RDW-SD 41.2 37.0 - 54.0 fl    MPV 10.0 6.0 - 12.0 fL    Platelets 249 140 - 450 10*3/mm3    Neutrophil % 75.1 42.7 - 76.0 %    Lymphocyte % 15.2 (L) 19.6 - 45.3 %    Monocyte % 7.1 5.0 - 12.0 %    Eosinophil % 1.7 0.3 - 6.2 %    Basophil % 0.5 0.0 - 1.5 %    Immature Grans % 0.4 0.0 - 0.5 %    Neutrophils, Absolute 8.21 (H) 1.70 - 7.00 10*3/mm3    Lymphocytes, Absolute 1.66 0.70 - 3.10 " 10*3/mm3    Monocytes, Absolute 0.77 0.10 - 0.90 10*3/mm3    Eosinophils, Absolute 0.19 0.00 - 0.40 10*3/mm3    Basophils, Absolute 0.05 0.00 - 0.20 10*3/mm3    Immature Grans, Absolute 0.04 0.00 - 0.05 10*3/mm3    nRBC 0.0 0.0 - 0.2 /100 WBC   Type & Screen    Specimen: Arm, Left; Blood   Result Value Ref Range    ABO Type A     RH type Positive     Antibody Screen Negative     T&S Expiration Date 6/5/2024 11:59:00 PM    ABO RH Specimen Verification    Specimen: Blood   Result Value Ref Range    ABO Type A     RH type Positive    Green Top (Gel)   Result Value Ref Range    Extra Tube Hold for add-ons.    Lavender Top   Result Value Ref Range    Extra Tube hold for add-on    Gold Top - SST   Result Value Ref Range    Extra Tube Hold for add-ons.    Light Blue Top   Result Value Ref Range    Extra Tube Hold for add-ons.      Medications   sodium chloride 0.9 % flush 10 mL (has no administration in time range)   sodium chloride 0.9 % bolus 1,000 mL (0 mL Intravenous Stopped 5/29/24 1343)   ondansetron (ZOFRAN) injection 4 mg (4 mg Intravenous Given 5/29/24 1200)   ketorolac (TORADOL) injection 15 mg (15 mg Intravenous Given 5/29/24 1200)   iopamidol (ISOVUE-370) 76 % injection 100 mL (100 mL Intravenous Given 5/29/24 1156)     CT Abdomen Pelvis With Contrast    Result Date: 5/29/2024  Narrative: CT ABDOMEN PELVIS W CONTRAST-  Date of Exam: 5/29/2024 11:53 AM  Indication: ABD PAIN, BLOODY DIARRHEA.  Comparison: CT 6/24/2022  Technique: Axial CT images were obtained of the abdomen and pelvis following the uneventful intravenous administration of 100 cc Isovue-370 . Reconstructed coronal and sagittal images were also obtained. Automated exposure control and iterative construction methods were used.   Findings: Lung bases appear clear.  There is a dense calcification in the left flank soft tissues that appears stable. Liver is within normal limits. Gallbladder, spleen, adrenals, and pancreas appears normal. Kidneys appear  normal. No obstructive uropathy. Bladder appears normal. Uterus and adnexa appear within normal limits.  There is thickening distal left colon and sigmoid colon compatible with colitis. This may extend to the rectum. Inflammatory stranding is seen adjacent to the sigmoid colon and distal left colon. The remaining proximal colon appears normal. The appendix if present is not well visualized. The stomach demonstrates no acute finding. Small bowel is normal. Atherosclerotic changes. No high-grade stenosis. No enlarged adenopathy. Mild spine degenerative changes and degenerative into the hips. No free air is seen. No significant ascites.      Impression: Impression: There is abnormal thickening of the distal left colon and sigmoid colon with mild inflammatory stranding. Thickening may extend into the rectum. Findings likely due to at least moderate distal colitis. There is no significant diverticulosis to suggest diverticulitis. Recommend follow-up to ensure resolution. Colonoscopy may be useful to ensure no underlying lesion is present.  Other findings as above.    Electronically Signed By-SOLEDAD LUTZ MD On:5/29/2024 12:30 PM      Stress Test With Myocardial Perfusion Two Day    Result Date: 5/20/2024  Narrative:   Left ventricular ejection fraction is normal (Calculated EF = 64%).   Findings consistent with a normal ECG stress test.   Small area of mild decreased perfusion in the anterior apical myocardium on rest with a mild degree of increase on stress images consistent with small degree of ischemia versus attenuation artifact   Low risk abnormality Please report      MDM:    Procedures          SHARED VISIT ATTESTATION:    This visit was performed by both myself and an APC.  I performed the substantive portion of the medical decision making. The management plan was made or approved by me, and I take responsibility for patient management.           Frantz Flores, DO  15:00 EDT  05/29/24         Sandra  Frantz VELAZQUEZ,   05/29/24 1502

## 2024-05-29 NOTE — ED PROVIDER NOTES
Time: 10:20 AM EDT  Date of encounter:  5/29/2024  Independent Historian/Clinical History and Information was obtained by:   Patient    History is limited by: N/A    Chief Complaint: Abdominal pain, rectal bleeding      History of Present Illness:  Patient is a 58 y.o. year old female who presents to the emergency companied by her  department for evaluation of abdominal pain and rectal bleeding since yesterday.  Patient states that for the past 2 weeks she has been having difficulty with bowel movements.  Reports that she recently started Ozempic and has not had much of an appetite.  She has been trying to take fiber supplements to help with BMs.  Last few days they have been hard leah.  Yesterday started with diarrhea as well as blood in the stool.  She has had nausea, no vomiting.  Patient reports that she also has had vaginal bleeding since yesterday.  She has been postmenopausal for the past 8 years.  Patient reports that she had a heart cath done yesterday.  Patient is currently on Plavix but has not had it in the past 5 days due to recent procedure..    HPI    Patient Care Team  Primary Care Provider: Angel Bahena APRN    Past Medical History:     Allergies   Allergen Reactions    Adhesive Tape Itching    Molds & Smuts Cough    Pollen Extract Cough     Past Medical History:   Diagnosis Date    Anesthesia complication     woke up during surgery    Arthritis     Arthritis of neck     CAD (coronary artery disease)     Cellulitis and abscess of leg 03/28/2013    Chronic purulent otitis media 07/01/2013    Connective tissue anomaly     CTS (carpal tunnel syndrome)     Diabetes mellitus     Dislocation, patella closed 05/30/2017    Family history of colon cancer 05/27/2022    Family history of esophageal cancer 05/27/2022    Fracture of wrist 1979    right hand    Hiatal hernia     Hyperlipidemia LDL goal <70 08/31/2021    Knee swelling     Myocardial infarct     2013    FARNAZ on CPAP 09/01/2021     RLS (restless legs syndrome)     Rotator cuff syndrome     tita    Sleep apnea     uses CPAP    Subluxation of patella      Past Surgical History:   Procedure Laterality Date    CARDIAC CATHETERIZATION      x2    COLONOSCOPY      COLONOSCOPY N/A 08/26/2022    Procedure: COLONOSCOPY WITH POLYPECTOMY;  Surgeon: Sloan Ortiz MD;  Location: [facility] ENDOSCOPY;  Service: Gastroenterology;  Laterality: N/A;  COLON POLYP    CORONARY ANGIOPLASTY      CORONARY STENT PLACEMENT      x2    ENDOSCOPY N/A 08/26/2022    Procedure: ESOPHAGOGASTRODUODENOSCOPY WITH BX;  Surgeon: Sloan Ortiz MD;  Location: [facility] ENDOSCOPY;  Service: Gastroenterology;  Laterality: N/A;  HIATAL HERNIA    INNER EAR SURGERY      KNEE SURGERY  1984, oct.    right knee    UPPER GASTROINTESTINAL ENDOSCOPY      VENOUS THROMBECTOMY      patient unsure     Family History   Problem Relation Age of Onset    Suicidality Mother     Osteoporosis Mother     Rheumatologic disease Mother     Scoliosis Mother     Heart attack Father     Heart disease Father     Cancer Father     Osteoporosis Father     Rheumatologic disease Father     No Known Problems Maternal Grandmother     No Known Problems Maternal Grandfather     Heart disease Paternal Grandmother     Cancer Paternal Grandmother     Heart disease Paternal Grandfather     Cancer Paternal Grandfather     Thyroid disease Paternal Grandfather     Pancreatic cancer Paternal Grandfather     Esophageal cancer Paternal Grandfather     Stomach cancer Paternal Grandfather     Sleep apnea Paternal Grandfather     Colon cancer Neg Hx     Malig Hyperthermia Neg Hx        Home Medications:  Prior to Admission medications    Medication Sig Start Date End Date Taking? Authorizing Provider   albuterol sulfate  (90 Base) MCG/ACT inhaler Take 1-2 Puffs every 4 hours prn 3/28/24   Angel Bahena APRN   Alcohol Swabs (Easy Touch Alcohol Prep Medium) 70 % pads 1 each by Other route Daily. 4/25/24    Angel Bahena APRN   ammonium lactate (LAC-HYDRIN) 12 % lotion Apply  topically to the appropriate area as directed 2 (Two) Times a Day. 4/24/24   Topher Keene DPM   aspirin 81 MG EC tablet Take 1 tablet by mouth Daily. 3/22/23   Davis Parker MD   cetirizine (zyrTEC) 10 MG tablet Take 1 tablet by mouth Daily. 3/28/24   Angel Bahena APRN   cholecalciferol (VITAMIN D3) 25 MCG (1000 UT) tablet Take 1 tablet by mouth Daily. Does not take daily only occasionally 3/28/24   Angel Bahena APRN   clopidogrel (PLAVIX) 75 MG tablet Take 1 tablet by mouth Daily.  Patient taking differently: Take 1 tablet by mouth Every Night. 3/28/24   Angel Bahena APRN   DULoxetine (CYMBALTA) 60 MG capsule Take 1 capsule by mouth Daily.  Patient taking differently: Take 1 capsule by mouth Every Night. 3/28/24   Angel Bahena APRN   empagliflozin (JARDIANCE) 25 MG tablet tablet Take 1 tablet by mouth Daily.  Patient taking differently: Take 1 tablet by mouth Every Night. 3/28/24   Angel Bahena APRN   ezetimibe (Zetia) 10 MG tablet Take 1 tablet by mouth Daily.  Patient taking differently: Take 1 tablet by mouth Every Night. 3/28/24   Angel Bahena APRN   famotidine (Pepcid) 40 MG tablet Take 1 tablet by mouth 2 (Two) Times a Day As Needed for Heartburn. 3/28/24   Angel Bahena APRN   fluticasone (FLONASE) 50 MCG/ACT nasal spray 2 sprays into the nostril(s) as directed by provider Daily. 2 sprays each nostril daily 3/28/24   Angel Bahena APRN   FREESTYLE LITE test strip 1 each by Other route 3 times a day. 4/30/24   Angel Bahena APRN   furosemide (LASIX) 20 MG tablet Take 1 tablet by mouth Daily.  Patient taking differently: Take 1 tablet by mouth Every Night. 3/28/24   Angel Bahena APRN   hydrOXYzine (ATARAX) 25 MG tablet Take 1-2  tabs by mouth at night as needed for sleep. 4/18/24   Rick  Tra VENTURA MD   Lancets (freestyle) lancets 1 each by Other route 3 times a day. 24   Angel Bahena APRN   metFORMIN ER (GLUCOPHAGE-XR) 500 MG 24 hr tablet Take 1 tablet by mouth Daily With Dinner. 24   Angel Bahena APRN   metoprolol succinate XL (TOPROL-XL) 25 MG 24 hr tablet Take 1 tablet by mouth Daily. 24   Davis Parker MD   montelukast (SINGULAIR) 10 MG tablet Take 1 tablet by mouth Every Night. 3/28/24   Angel Bahena APRN   Narcan 4 MG/0.1ML nasal spray  22   Ariel Arciniega MD   nitroglycerin (NITROSTAT) 0.4 MG SL tablet 1 under the tongue as needed for angina, may repeat q5mins for up three doses 24   Davis Parker MD   oxyCODONE (ROXICODONE) 5 MG immediate release tablet Take  by mouth As Needed. 9/10/20   Ariel Arciniega MD   rosuvastatin (CRESTOR) 40 MG tablet Take 1 tablet by mouth Every Night. 3/28/24   Angel Bahena APRN   Savella 25 MG tablet tablet Take 1 tablet by mouth Every Night. 24   Ariel Arciniega MD   Semaglutide,0.25 or 0.5MG/DOS, (OZEMPIC) 2 MG/3ML solution pen-injector Inject 0.5 mg under the skin into the appropriate area as directed 1 (One) Time Per Week. 24   Angel Bahena APRN   tiotropium bromide monohydrate (Spiriva Respimat) 2.5 MCG/ACT aerosol solution inhaler Inhale 2 puffs Daily. 23   Angel Bahena APRN   zolpidem (AMBIEN) 5 MG tablet Take 1 tablet by mouth At Night As Needed.    Ariel Arciniega MD        Social History:   Social History     Tobacco Use    Smoking status: Former     Current packs/day: 0.00     Average packs/day: 1 pack/day for 26.0 years (26.0 ttl pk-yrs)     Types: Cigarettes     Start date: 1986     Quit date: 2012     Years since quittin.4    Smokeless tobacco: Never    Tobacco comments:     caffeine use   Vaping Use    Vaping status: Never Used   Substance Use Topics    Alcohol use: No    Drug use: Never  "        Review of Systems:  Review of Systems   Constitutional:  Negative for chills and fever.   HENT:  Negative for congestion, ear pain and sore throat.    Eyes:  Negative for pain.   Respiratory:  Negative for cough, chest tightness and shortness of breath.    Cardiovascular:  Negative for chest pain.   Gastrointestinal:  Positive for abdominal pain, blood in stool and nausea. Negative for diarrhea and vomiting.   Genitourinary:  Positive for vaginal bleeding. Negative for flank pain and hematuria.   Musculoskeletal:  Negative for joint swelling.   Skin:  Negative for pallor.   Neurological:  Negative for seizures and headaches.   All other systems reviewed and are negative.       Physical Exam:  /90 (BP Location: Right arm, Patient Position: Lying)   Pulse 90   Temp 98.2 °F (36.8 °C) (Oral)   Resp 16   Ht 170.2 cm (67\")   Wt 87.1 kg (192 lb)   SpO2 98%   BMI 30.07 kg/m²     Physical Exam  Vitals and nursing note reviewed.   Constitutional:       General: She is not in acute distress.     Appearance: Normal appearance. She is obese. She is not toxic-appearing.   HENT:      Head: Normocephalic and atraumatic.      Mouth/Throat:      Mouth: Mucous membranes are moist.   Eyes:      General: No scleral icterus.  Cardiovascular:      Rate and Rhythm: Normal rate and regular rhythm.      Pulses:           Radial pulses are 2+ on the right side and 2+ on the left side.      Heart sounds: Normal heart sounds.   Pulmonary:      Effort: Pulmonary effort is normal. No respiratory distress.      Breath sounds: Normal breath sounds. No wheezing or rhonchi.   Abdominal:      General: Abdomen is protuberant. Bowel sounds are normal.      Palpations: Abdomen is soft.      Tenderness: There is generalized abdominal tenderness. There is guarding. There is no right CVA tenderness, left CVA tenderness or rebound.   Genitourinary:     Exam position: Lithotomy position.      Cervix: Cervical bleeding (Scant amount of " dark red blood) present.   Musculoskeletal:         General: Normal range of motion.      Cervical back: Normal range of motion and neck supple.   Skin:     General: Skin is warm and dry.   Neurological:      Mental Status: She is alert and oriented to person, place, and time. Mental status is at baseline.   Psychiatric:         Mood and Affect: Mood is anxious.                Procedures:  Procedures      Medical Decision Making:      Comorbidities that affect care:    Sleep apnea, Coronary Artery Disease, Diabetes    External Notes reviewed:    Previous Admission Note: Admission on 5/28/2024 for cardiac catheterization and Previous Clinic Note: Most recent PCP visit on 4/30/2024 for chronic disease management.      The following orders were placed and all results were independently analyzed by me:  Orders Placed This Encounter   Procedures    CT Abdomen Pelvis With Contrast    [place] Draw    Comprehensive Metabolic Panel    Lactic Acid, Plasma    Occult Blood, Fecal By Immunoassay - Stool, Per Rectum    CBC Auto Differential    STAT Lactic Acid, Reflex    NPO Diet NPO Type: Strict NPO    Undress & Gown    Measure Blood Pressure    Vital Signs    Orthostatic Blood Pressure    Supplies To Bedside - Notify MD When Ready- Pelvic Cart / Set Up    Pulse Oximetry    Oxygen Therapy- Nasal Cannula; Titrate 1-6 LPM Per SpO2; 90 - 95%    Type & Screen    ABO RH Specimen Verification    Insert Peripheral IV    CBC & Differential    Green Top (Gel)    Lavender Top    Gold Top - SST    Light Blue Top       Medications Given in the Emergency Department:  Medications   sodium chloride 0.9 % flush 10 mL (has no administration in time range)   sodium chloride 0.9 % bolus 1,000 mL (0 mL Intravenous Stopped 5/29/24 1343)   ondansetron (ZOFRAN) injection 4 mg (4 mg Intravenous Given 5/29/24 1200)   ketorolac (TORADOL) injection 15 mg (15 mg Intravenous Given 5/29/24 1200)   iopamidol (ISOVUE-370) 76 % injection 100 mL (100 mL  Intravenous Given 5/29/24 1156)        ED Course:         Labs:    Lab Results (last 24 hours)       Procedure Component Value Units Date/Time    CBC & Differential [[id number]]  (Abnormal) Collected: 05/29/24 0919    Specimen: Blood from Arm, Left Updated: 05/29/24 0929    Narrative:      The following orders were created for panel order CBC & Differential.  Procedure                               Abnormality         Status                     ---------                               -----------         ------                     CBC Auto Differential[[id number]]        Abnormal            Final result                 Please view results for these tests on the individual orders.    Comprehensive Metabolic Panel [[id number]]  (Abnormal) Collected: 05/29/24 0919    Specimen: Blood from Arm, Left Updated: 05/29/24 0945     Glucose 159 mg/dL      BUN 19 mg/dL      Creatinine 0.96 mg/dL      Sodium 138 mmol/L      Potassium 4.0 mmol/L      Chloride 102 mmol/L      CO2 18.6 mmol/L      Calcium 9.6 mg/dL      Total Protein 7.0 g/dL      Albumin 4.3 g/dL      ALT (SGPT) 32 U/L      AST (SGOT) 24 U/L      Alkaline Phosphatase 79 U/L      Total Bilirubin 0.8 mg/dL      Globulin 2.7 gm/dL      A/G Ratio 1.6 g/dL      BUN/Creatinine Ratio 19.8     Anion Gap 17.4 mmol/L      eGFR 68.7 mL/min/1.73     Narrative:      GFR Normal >60  Chronic Kidney Disease <60  Kidney Failure <15      Lactic Acid, Plasma [[id number]]  (Abnormal) Collected: 05/29/24 0919    Specimen: Blood from Arm, Left Updated: 05/29/24 1022     Lactate 2.5 mmol/L     CBC Auto Differential [[id number]]  (Abnormal) Collected: 05/29/24 0919    Specimen: Blood from Arm, Left Updated: 05/29/24 0929     WBC 10.92 10*3/mm3      RBC 4.77 10*6/mm3      Hemoglobin 14.1 g/dL      Hematocrit 41.6 %      MCV 87.2 fL      MCH 29.6 pg      MCHC 33.9 g/dL      RDW 13.1 %      RDW-SD 41.2 fl      MPV 10.0 fL      Platelets 249 10*3/mm3      Neutrophil % 75.1 %      Lymphocyte % 15.2  %      Monocyte % 7.1 %      Eosinophil % 1.7 %      Basophil % 0.5 %      Immature Grans % 0.4 %      Neutrophils, Absolute 8.21 10*3/mm3      Lymphocytes, Absolute 1.66 10*3/mm3      Monocytes, Absolute 0.77 10*3/mm3      Eosinophils, Absolute 0.19 10*3/mm3      Basophils, Absolute 0.05 10*3/mm3      Immature Grans, Absolute 0.04 10*3/mm3      nRBC 0.0 /100 WBC              Imaging:    CT Abdomen Pelvis With Contrast    Result Date: 5/29/2024  CT ABDOMEN PELVIS W CONTRAST-  Date of Exam: 5/29/2024 11:53 AM  Indication: ABD PAIN, BLOODY DIARRHEA.  Comparison: CT 6/24/2022  Technique: Axial CT images were obtained of the abdomen and pelvis following the uneventful intravenous administration of 100 cc Isovue-370 . Reconstructed coronal and sagittal images were also obtained. Automated exposure control and iterative construction methods were used.   Findings: Lung bases appear clear.  There is a dense calcification in the left flank soft tissues that appears stable. Liver is within normal limits. Gallbladder, spleen, adrenals, and pancreas appears normal. Kidneys appear normal. No obstructive uropathy. Bladder appears normal. Uterus and adnexa appear within normal limits.  There is thickening distal left colon and sigmoid colon compatible with colitis. This may extend to the rectum. Inflammatory stranding is seen adjacent to the sigmoid colon and distal left colon. The remaining proximal colon appears normal. The appendix if present is not well visualized. The stomach demonstrates no acute finding. Small bowel is normal. Atherosclerotic changes. No high-grade stenosis. No enlarged adenopathy. Mild spine degenerative changes and degenerative into the hips. No free air is seen. No significant ascites.      Impression: There is abnormal thickening of the distal left colon and sigmoid colon with mild inflammatory stranding. Thickening may extend into the rectum. Findings likely due to at least moderate distal colitis.  There is no significant diverticulosis to suggest diverticulitis. Recommend follow-up to ensure resolution. Colonoscopy may be useful to ensure no underlying lesion is present.  Other findings as above.    Electronically Signed By-SOLEDAD LUTZ MD On:5/29/2024 12:30 PM         Differential Diagnosis and Discussion:    Abdominal Pain: Based on the patient's signs and symptoms, I considered abdominal aortic aneurysm, small bowel obstruction, pancreatitis, acute cholecystitis, acute appendecitis, peptic ulcer disease, gastritis, colitis, endocrine disorders, irritable bowel syndrome and other differential diagnosis an etiology of the patient's abdominal pain.  GI Bleeding: Differential diagnosis includes but is not limited to gastritis, gastric ulcer, stress ulcer, duodenitis, [place] tears, esophageal varices, angiodysplasia, aortic enteric fistula, hematologic issues including thrombocytopenia, GI neoplasm, ulcerative colitis, Crohn's disease, diverticulosis, diverticulitis, hemorrhoids, aortic aneurysm, and polyps  Vaginal Bleeding: Differential diagnosis includes but is not limited to foreign body, tumor, vaginitis, dysfunctional uterine bleeding, endocrine abnormalities, coagulation disorder, systemic illness, polyps, complications of pregnancy (possible ectopic pregnancy).    All labs were reviewed and interpreted by me.  CT scan radiology impression was interpreted by me.    MDM  Number of Diagnoses or Management Options  Colitis  Vaginal bleeding  Diagnosis management comments: The patient is resting comfortably and feels better, is alert and in no distress. Repeat examination is unremarkable and benign; in particular, there's no discomfort at McBurney's point and there is no pulsatile mass. The history, exam, diagnostic testing, and current condition does not suggest acute appendicitis, bowel obstruction, acute cholecystitis, bowel perforation, major gastrointestinal bleeding, severe diverticulitis,  abdominal aortic aneurysm, mesenteric ischemia, volvulus, sepsis, or other significant pathology that warrants further testing, continued ED treatment, admission, or surgical evaluation at this point. The vital signs have been stable.  CT scan was notable for colitis.  Patient will be discharged on oral antibiotics with strict return precautions.  Patient to follow-up with GYN regarding vaginal bleeding.  Patient voiced understanding.  The patient does not have uncontrollable pain, intractable vomiting, or other significant symptoms. The patient's condition is stable and appropriate for discharge from the emergency department.       Amount and/or Complexity of Data Reviewed  Clinical lab tests: reviewed and ordered  Tests in the radiology section of CPT®: reviewed and ordered    Risk of Complications, Morbidity, and/or Mortality  Presenting problems: low  Diagnostic procedures: minimal  Management options: minimal    Patient Progress  Patient progress: improved                 Patient Care Considerations:    SEPSIS was considered but is NOT present in the emergency department as SIRS criteria is not present.      Consultants/Shared Management Plan:    SHARED VISIT: I have discussed the case with my supervising physician, Dr. Flores who states patient can be discharged with oral antibiotics and follow-up with GI and regarding vaginal bleeding. The substantive portion of the medical decision was made by the attesting physician who made or approve the management plan and will take responsibility for the patient.  Clinical findings were discussed and ultimate disposition was made in consult with supervising physician.    Social Determinants of Health:    Patient is independent, reliable, and has access to care.       Disposition and Care Coordination:    Discharged: The patient is suitable and stable for discharge with no need for consideration of admission.        Final diagnoses:   Colitis   Vaginal bleeding         ED Disposition       ED Disposition   Discharge    Condition   Stable    Comment   --               This medical record created using voice recognition software.             Madelin Garcia, APRN  05/29/24 1430

## 2024-05-29 NOTE — DISCHARGE INSTRUCTIONS
CT scan today showed colitis.  Take the antibiotic as prescribed until complete.  Drink plenty of fluids.  Consider a probiotic to support your gut niranjan.  Be sure to take a stool softener while taking narcotic pain medication to prevent constipation.    Follow-up with the gastroenterologist, you may need to have a colonoscopy done.    For your vaginal bleeding follow-up with your gynecologist for further evaluation.    Return for worsening symptoms such as worsening pain, weakness, chest pain, shortness of breath, intractable vomiting, or any new concerns.

## 2024-06-03 ENCOUNTER — TELEPHONE (OUTPATIENT)
Dept: CARDIOLOGY | Facility: CLINIC | Age: 59
End: 2024-06-03
Payer: OTHER GOVERNMENT

## 2024-06-03 NOTE — TELEPHONE ENCOUNTER
Procedure: R Trigger Finger Release and Cubital Tunnel Release    Med Directive: Plavix, Aspirin    PMH: CAD with stent 2014, HTN, HLD    Last Seen: 4/17/24    Cardiac Cath 5/31/24  Conclusions:    1. Patent stents with mild in-stent restenosis  2. Mild nonocclusive coronary disease  3. EF 60%  4. Mildly elevated LVEDP of 18

## 2024-06-04 ENCOUNTER — HOSPITAL ENCOUNTER (OUTPATIENT)
Dept: MAMMOGRAPHY | Facility: HOSPITAL | Age: 59
Discharge: HOME | End: 2024-06-04
Payer: OTHER GOVERNMENT

## 2024-06-04 ENCOUNTER — TELEPHONE (OUTPATIENT)
Dept: ORTHOPEDIC SURGERY | Facility: CLINIC | Age: 59
End: 2024-06-04
Payer: OTHER GOVERNMENT

## 2024-06-04 DIAGNOSIS — Z12.31 SCREENING MAMMOGRAM FOR BREAST CANCER: ICD-10-CM

## 2024-06-04 PROCEDURE — 77063 BREAST TOMOSYNTHESIS BI: CPT

## 2024-06-04 PROCEDURE — 77067 SCR MAMMO BI INCL CAD: CPT

## 2024-06-04 NOTE — TELEPHONE ENCOUNTER
PER DR ENGLISH PATIENT WAS CALLED AND  WAS INFORMED THAT PATIENT CAN HOLD/STOP ASPIRIN AND PLAVIX FOR 5 DAYS PRIOR TO SURGERY.

## 2024-06-06 RX ORDER — FLUCONAZOLE 150 MG/1
TABLET ORAL
COMMUNITY
Start: 2024-05-29

## 2024-06-06 RX ORDER — MIDODRINE HYDROCHLORIDE 2.5 MG/1
TABLET ORAL
COMMUNITY
Start: 2024-05-31

## 2024-06-07 ENCOUNTER — OFFICE VISIT (OUTPATIENT)
Dept: FAMILY MEDICINE CLINIC | Facility: CLINIC | Age: 59
End: 2024-06-07
Payer: OTHER GOVERNMENT

## 2024-06-07 VITALS
DIASTOLIC BLOOD PRESSURE: 72 MMHG | SYSTOLIC BLOOD PRESSURE: 105 MMHG | HEIGHT: 67 IN | WEIGHT: 197 LBS | TEMPERATURE: 96.1 F | HEART RATE: 75 BPM | OXYGEN SATURATION: 99 % | BODY MASS INDEX: 30.92 KG/M2

## 2024-06-07 DIAGNOSIS — K52.9 COLITIS: ICD-10-CM

## 2024-06-07 DIAGNOSIS — N93.9 VAGINAL BLEEDING: ICD-10-CM

## 2024-06-07 DIAGNOSIS — E11.65 TYPE 2 DIABETES MELLITUS WITH HYPERGLYCEMIA, WITHOUT LONG-TERM CURRENT USE OF INSULIN: Primary | ICD-10-CM

## 2024-06-07 PROCEDURE — 99213 OFFICE O/P EST LOW 20 MIN: CPT | Performed by: NURSE PRACTITIONER

## 2024-06-07 RX ORDER — ISOPROPYL ALCOHOL 70 ML/100ML
1 SWAB TOPICAL DAILY
Qty: 100 EACH | Refills: 1 | Status: CANCELLED | OUTPATIENT
Start: 2024-06-07

## 2024-06-07 RX ORDER — MELATONIN
1000 DAILY
Qty: 90 TABLET | Refills: 1 | Status: CANCELLED | OUTPATIENT
Start: 2024-06-07

## 2024-06-07 RX ORDER — TIOTROPIUM BROMIDE INHALATION SPRAY 3.12 UG/1
2 SPRAY, METERED RESPIRATORY (INHALATION)
Qty: 4 G | Refills: 5 | Status: CANCELLED | OUTPATIENT
Start: 2024-06-07

## 2024-06-07 RX ORDER — CLOPIDOGREL BISULFATE 75 MG/1
75 TABLET ORAL NIGHTLY
Qty: 90 TABLET | Refills: 1 | Status: CANCELLED | OUTPATIENT
Start: 2024-06-07

## 2024-06-07 RX ORDER — ALBUTEROL SULFATE 90 UG/1
AEROSOL, METERED RESPIRATORY (INHALATION)
Qty: 8 G | Refills: 1 | Status: CANCELLED | OUTPATIENT
Start: 2024-06-07

## 2024-06-07 RX ORDER — EZETIMIBE 10 MG/1
10 TABLET ORAL NIGHTLY
Qty: 90 TABLET | Refills: 1 | Status: CANCELLED | OUTPATIENT
Start: 2024-06-07

## 2024-06-07 RX ORDER — CETIRIZINE HYDROCHLORIDE 10 MG/1
10 TABLET ORAL DAILY
Qty: 90 TABLET | Refills: 0 | Status: CANCELLED | OUTPATIENT
Start: 2024-06-07

## 2024-06-07 RX ORDER — FLUTICASONE PROPIONATE 50 MCG
2 SPRAY, SUSPENSION (ML) NASAL DAILY
Qty: 48 G | Refills: 1 | Status: CANCELLED | OUTPATIENT
Start: 2024-06-07

## 2024-06-07 RX ORDER — MONTELUKAST SODIUM 10 MG/1
10 TABLET ORAL NIGHTLY
Qty: 90 TABLET | Refills: 1 | Status: CANCELLED | OUTPATIENT
Start: 2024-06-07

## 2024-06-07 RX ORDER — METFORMIN HYDROCHLORIDE 500 MG/1
500 TABLET, EXTENDED RELEASE ORAL
Qty: 90 TABLET | Refills: 1 | Status: CANCELLED | OUTPATIENT
Start: 2024-06-07

## 2024-06-07 RX ORDER — FUROSEMIDE 20 MG/1
20 TABLET ORAL NIGHTLY
Qty: 90 TABLET | Refills: 1 | Status: CANCELLED | OUTPATIENT
Start: 2024-06-07

## 2024-06-07 RX ORDER — DULOXETIN HYDROCHLORIDE 60 MG/1
60 CAPSULE, DELAYED RELEASE ORAL NIGHTLY
Qty: 90 CAPSULE | Refills: 1 | Status: CANCELLED | OUTPATIENT
Start: 2024-06-07

## 2024-06-07 RX ORDER — ROSUVASTATIN CALCIUM 40 MG/1
40 TABLET, COATED ORAL NIGHTLY
Qty: 90 TABLET | Refills: 1 | Status: CANCELLED | OUTPATIENT
Start: 2024-06-07

## 2024-06-07 RX ORDER — FAMOTIDINE 40 MG/1
40 TABLET, FILM COATED ORAL 2 TIMES DAILY PRN
Qty: 90 TABLET | Refills: 1 | Status: CANCELLED | OUTPATIENT
Start: 2024-06-07

## 2024-06-07 NOTE — PROGRESS NOTES
Follow Up Office Visit      Patient Name: Claudette Gramajo  : 1965   MRN: [id number]     Chief Complaint:    Chief Complaint   Patient presents with    Diabetes       History of Present Illness: Claudette Gramajo is a 59 y.o. female who is here today to follow up for DM2     Follow-up discontinuing Glucotrol continue metformin at 500 mg once daily with dinner increase Ozempic to 0.5 mg once weekly     Fasting glucose monitoring at home 100-110  2 hours after meals 130-150's    A1C-2024  Ozempic 0.25 mg weekly, Metformin 500mg, Jardiance 25mg  Eye exam-  eye physicians [facility].  Foot exam- - Dr Rangel  mammogram-2024  Pap-2022  Colonoscopy-2022 dr de la torre     Also follow-up ER visit [facility] 24  Patient is 58 y.o. year old female that presents to the ED for evaluation of rectal bleeding and vaginal bleeding      diagnosed with colitis treated with Augmentin recommended follow-up GYN for vaginal bleeding    Subjective      Review of Systems:   Review of Systems   Constitutional:  Negative for fever.   Respiratory:  Negative for cough.    Cardiovascular:  Negative for chest pain.   Gastrointestinal:  Positive for abdominal pain. Negative for constipation, diarrhea, nausea and vomiting.   Genitourinary:  Negative for dysuria.        Past Medical History:   Past Medical History:   Diagnosis Date    Anesthesia complication     woke up during surgery    Arthritis     Arthritis of neck     CAD (coronary artery disease)     Cellulitis and abscess of leg 2013    Chronic purulent otitis media 2013    Connective tissue anomaly     CTS (carpal tunnel syndrome)     Diabetes mellitus     Dislocation, patella closed 2017    Family history of colon cancer 2022    Family history of esophageal cancer 2022    Fracture of wrist 1979    right hand    Hiatal hernia     Hyperlipidemia LDL goal <70 2021    Knee swelling     Myocardial infarct         FARNAZ  on CPAP 09/01/2021    RLS (restless legs syndrome)     Rotator cuff syndrome     tita    Sleep apnea     uses CPAP    Subluxation of patella        Past Surgical History:   Past Surgical History:   Procedure Laterality Date    CARDIAC CATHETERIZATION      x2    CARDIAC CATHETERIZATION N/A 5/28/2024    Procedure: Left Heart Cath;  Surgeon: Davis Parker MD;  Location: [facility] CATH INVASIVE LOCATION;  Service: Cardiovascular;  Laterality: N/A;    COLONOSCOPY      COLONOSCOPY N/A 08/26/2022    Procedure: COLONOSCOPY WITH POLYPECTOMY;  Surgeon: Sloan Ortiz MD;  Location: [facility] ENDOSCOPY;  Service: Gastroenterology;  Laterality: N/A;  COLON POLYP    CORONARY ANGIOPLASTY      CORONARY STENT PLACEMENT      x2    ENDOSCOPY N/A 08/26/2022    Procedure: ESOPHAGOGASTRODUODENOSCOPY WITH BX;  Surgeon: Sloan Ortiz MD;  Location: [facility] ENDOSCOPY;  Service: Gastroenterology;  Laterality: N/A;  HIATAL HERNIA    INNER EAR SURGERY      KNEE SURGERY  1984, oct.    right knee    UPPER GASTROINTESTINAL ENDOSCOPY      VENOUS THROMBECTOMY      patient unsure       Family History:   Family History   Problem Relation Age of Onset    Suicidality Mother     Osteoporosis Mother     Rheumatologic disease Mother     Scoliosis Mother     Heart attack Father     Heart disease Father     Cancer Father     Osteoporosis Father     Rheumatologic disease Father     No Known Problems Maternal Grandmother     No Known Problems Maternal Grandfather     Heart disease Paternal Grandmother     Cancer Paternal Grandmother     Heart disease Paternal Grandfather     Cancer Paternal Grandfather     Thyroid disease Paternal Grandfather     Pancreatic cancer Paternal Grandfather     Esophageal cancer Paternal Grandfather     Stomach cancer Paternal Grandfather     Sleep apnea Paternal Grandfather     Colon cancer Neg Hx     Malig Hyperthermia Neg Hx        Social History:   Social History     Socioeconomic History    Marital status:     Tobacco Use    Smoking status: Former     Current packs/day: 0.00     Average packs/day: 1 pack/day for 26.0 years (26.0 ttl pk-yrs)     Types: Cigarettes     Start date: 1986     Quit date: 2012     Years since quittin.4    Smokeless tobacco: Never    Tobacco comments:     caffeine use   Vaping Use    Vaping status: Never Used   Substance and Sexual Activity    Alcohol use: No    Drug use: Never    Sexual activity: Yes     Partners: Male     Birth control/protection: Same-sex partner       Medications:     Current Outpatient Medications:     albuterol sulfate  (90 Base) MCG/ACT inhaler, Take 1-2 Puffs every 4 hours prn, Disp: 8 g, Rfl: 1    Alcohol Swabs (Easy Touch Alcohol Prep Medium) 70 % pads, 1 each by Other route Daily., Disp: 100 each, Rfl: 1    ammonium lactate (LAC-HYDRIN) 12 % lotion, Apply  topically to the appropriate area as directed 2 (Two) Times a Day., Disp: 225 g, Rfl: 11    amoxicillin-clavulanate (AUGMENTIN) 875-125 MG per tablet, Take 1 tablet by mouth 2 (Two) Times a Day for 10 days., Disp: 20 tablet, Rfl: 0    aspirin 81 MG EC tablet, Take 1 tablet by mouth Daily., Disp: 90 tablet, Rfl: 3    cetirizine (zyrTEC) 10 MG tablet, Take 1 tablet by mouth Daily., Disp: 90 tablet, Rfl: 0    cholecalciferol (VITAMIN D3) 25 MCG (1000 UT) tablet, Take 1 tablet by mouth Daily. Does not take daily only occasionally, Disp: 90 tablet, Rfl: 1    clopidogrel (PLAVIX) 75 MG tablet, Take 1 tablet by mouth Daily. (Patient taking differently: Take 1 tablet by mouth Every Night.), Disp: 90 tablet, Rfl: 1    DULoxetine (CYMBALTA) 60 MG capsule, Take 1 capsule by mouth Daily. (Patient taking differently: Take 1 capsule by mouth Every Night.), Disp: 90 capsule, Rfl: 1    empagliflozin (JARDIANCE) 25 MG tablet tablet, Take 1 tablet by mouth Daily. (Patient taking differently: Take 1 tablet by mouth Every Night.), Disp: 90 tablet, Rfl: 1    ezetimibe (Zetia) 10 MG tablet, Take 1 tablet by mouth  Daily. (Patient taking differently: Take 1 tablet by mouth Every Night.), Disp: 90 tablet, Rfl: 1    famotidine (Pepcid) 40 MG tablet, Take 1 tablet by mouth 2 (Two) Times a Day As Needed for Heartburn., Disp: 90 tablet, Rfl: 1    fluconazole (DIFLUCAN) 150 MG tablet, TAKE ONE TABLET BY MOUTH for one dose, Disp: , Rfl:     fluticasone (FLONASE) 50 MCG/ACT nasal spray, 2 sprays into the nostril(s) as directed by provider Daily. 2 sprays each nostril daily, Disp: 48 g, Rfl: 1    FREESTYLE LITE test strip, 1 each by Other route 3 times a day., Disp: 300 each, Rfl: 2    furosemide (LASIX) 20 MG tablet, Take 1 tablet by mouth Daily. (Patient taking differently: Take 1 tablet by mouth Every Night.), Disp: 90 tablet, Rfl: 1    HYDROcodone-acetaminophen (NORCO) 5-325 MG per tablet, Take 1 tablet by mouth Every 6 (Six) Hours As Needed for Moderate Pain., Disp: 12 tablet, Rfl: 0    hydrOXYzine (ATARAX) 25 MG tablet, Take 1-2  tabs by mouth at night as needed for sleep., Disp: 60 tablet, Rfl: 5    Lancets (freestyle) lancets, 1 each by Other route 3 times a day., Disp: 300 each, Rfl: 2    metFORMIN ER (GLUCOPHAGE-XR) 500 MG 24 hr tablet, Take 1 tablet by mouth Daily With Dinner., Disp: 90 tablet, Rfl: 1    metoprolol succinate XL (TOPROL-XL) 25 MG 24 hr tablet, Take 1 tablet by mouth Daily., Disp: 90 tablet, Rfl: 0    midodrine (PROAMATINE) 2.5 MG tablet, , Disp: , Rfl:     montelukast (SINGULAIR) 10 MG tablet, Take 1 tablet by mouth Every Night., Disp: 90 tablet, Rfl: 1    Narcan 4 MG/0.1ML nasal spray, , Disp: , Rfl:     nitroglycerin (NITROSTAT) 0.4 MG SL tablet, 1 under the tongue as needed for angina, may repeat q5mins for up three doses, Disp: 100 tablet, Rfl: 11    ondansetron ODT (ZOFRAN-ODT) 4 MG disintegrating tablet, Place 1 tablet on the tongue Every 8 (Eight) Hours As Needed for Nausea or Vomiting for up to 10 days., Disp: 30 tablet, Rfl: 0    oxyCODONE (ROXICODONE) 5 MG immediate release tablet, Take  by mouth As  "Needed., Disp: , Rfl:     rosuvastatin (CRESTOR) 40 MG tablet, Take 1 tablet by mouth Every Night., Disp: 90 tablet, Rfl: 1    Savella 25 MG tablet tablet, Take 1 tablet by mouth Every Night., Disp: , Rfl:     Semaglutide,0.25 or 0.5MG/DOS, (OZEMPIC) 2 MG/3ML solution pen-injector, Inject 0.5 mg under the skin into the appropriate area as directed 1 (One) Time Per Week., Disp: 15 mL, Rfl: 2    tiotropium bromide monohydrate (Spiriva Respimat) 2.5 MCG/ACT aerosol solution inhaler, Inhale 2 puffs Daily., Disp: 4 g, Rfl: 5    zolpidem (AMBIEN) 5 MG tablet, Take 1 tablet by mouth At Night As Needed., Disp: , Rfl:     Allergies:   Allergies   Allergen Reactions    Adhesive Tape Itching    Molds & Smuts Cough    Pollen Extract Cough             Objective     Physical Exam:  Vital Signs:   Vitals:    06/07/24 1444   BP: 105/72   Pulse: 75   Temp: 96.1 °F (35.6 °C)   SpO2: 99%   Weight: 89.4 kg (197 lb)   Height: 170.2 cm (67\")     Body mass index is 30.85 kg/m².           Physical Exam  Cardiovascular:      Rate and Rhythm: Normal rate and regular rhythm.      Heart sounds: Normal heart sounds. No murmur heard.  Pulmonary:      Effort: Pulmonary effort is normal.      Breath sounds: Normal breath sounds.   Abdominal:      General: Bowel sounds are normal.      Palpations: Abdomen is soft.      Tenderness: There is abdominal tenderness.      Comments: Mild tenderness left lower quadrant to palpation   Skin:     General: Skin is warm and dry.   Neurological:      Mental Status: She is alert.             Assessment / Plan      Assessment/Plan:   Diagnoses and all orders for this visit:    1. Type 2 diabetes mellitus with hyperglycemia, without long-term current use of insulin (Primary)    2. Colitis  -     Ambulatory Referral to Gastroenterology    3. Vaginal bleeding  -     Ambulatory Referral to Gynecology         DM2  home glucose readings improving, continue metformin and ozempic follow up in 3 weeks obtain labs prior to " "visit  Colitis continue antibiotic as prescribed by ER provider will provide a referral updated referral to patient's gastroenterologist Dr. Ortiz  Vaginal bleeding will provide referral to gynecology as recommended by ER provider      Follow Up:   Return in about 3 weeks (around 6/28/2024).    Portia Bahena, APRN    \"Please note that portions of this note were completed with a voice recognition program.\"    "

## 2024-06-13 ENCOUNTER — HOSPITAL ENCOUNTER (OUTPATIENT)
Dept: CT IMAGING | Facility: HOSPITAL | Age: 59
End: 2024-06-13
Payer: OTHER GOVERNMENT

## 2024-06-13 ENCOUNTER — TELEPHONE (OUTPATIENT)
Dept: OBSTETRICS AND GYNECOLOGY | Facility: CLINIC | Age: 59
End: 2024-06-13
Payer: OTHER GOVERNMENT

## 2024-06-13 NOTE — TELEPHONE ENCOUNTER
06/13/24 Left voicemail for patient to call the office to give her appt d/t, due to we don't have the verbal clifford to leave ~ on the voicemail, until she is established & signes a verbal clifford.mp

## 2024-06-14 NOTE — PROGRESS NOTES
Chief Complaint      Constipation- Abdominal burning/cramping, rectal bleeding- a lot of blood. Seen in ER, colitis.     History of Present Illness      Claudette Gramajo is a 59 y.o. female who presents to [facility] GA[facility]OLOG[facility] as a new patient following up after recent ER visit related to colitis and constipation.  Patient reports being seen in the emergency department related to abdominal pain and rectal bleeding.  Patient had a CT scan performed which displayed abnormal thickening of the distal left colon and sigmoid colon with inflammatory stranding.  Colonoscopy was recommended.  Patient reports she continues to struggle with abdominal cramping and constipation.  She is eating very little.  Patient reports she is hydrating well drinking plenty of water throughout the day.  Patient reports last time she saw blood in her stool was last Tuesday.  Patient is on Plavix.  Patient denies fever, nausea, vomiting, weight loss, night sweats, melena,  hematemesis.    Most recent labs- 5/29/24    CT Abdomen Pelvis With Contrast - 5/29/24  There is abnormal thickening of the distal left colon and sigmoid colon  with mild inflammatory stranding. Thickening may extend into the rectum.  Findings likely due to at least moderate distal colitis. There is no  significant diverticulosis to suggest diverticulitis. Recommend  follow-up to ensure resolution. Colonoscopy may be useful to ensure no  underlying lesion is present.    Endoscopy: Review of the patient's most recent EGD and colonoscopy performed by Dr. Ortiz on 8/26/2022 4 mm polyp in the rectum.  Small hiatal hernia normal close without the esophagus stomach and duodenum.      Results       Result Review :   The following data was reviewed by: SARWAT Syed on 06/18/2024     CMP          3/25/2024    08:43 5/28/2024    09:20 5/29/2024    09:19   CMP   Glucose 277  158  159    BUN 17  26  19    Creatinine 1.17  1.15  0.96    EGFR 54.2  55.3  68.7  "   Sodium 138  141  138    Potassium 3.8  3.7  4.0    Chloride 97  102  102    Calcium 9.4  10.3  9.6    Total Protein 7.3   7.0    Albumin 4.5   4.3    Globulin 2.8   2.7    Total Bilirubin 0.6   0.8    Alkaline Phosphatase 74   79    AST (SGOT) 24   24    ALT (SGPT) 29   32    Albumin/Globulin Ratio 1.6   1.6    BUN/Creatinine Ratio 14.5  22.6  19.8    Anion Gap 18.5  18.7  17.4      CBC          3/25/2024    08:43 5/28/2024    09:20 5/29/2024    09:19   CBC   WBC 8.94  8.00  10.92    RBC 5.06  4.70  4.77    Hemoglobin 14.8  13.9  14.1    Hematocrit 44.7  41.2  41.6    MCV 88.3  87.7  87.2    MCH 29.2  29.6  29.6    MCHC 33.1  33.7  33.9    RDW 12.6  12.8  13.1    Platelets 309  232  249        Iron Profile No results found for: \"IRON\", \"TIBC\", \"LABIRON\", \"TRANSFERRIN\"  Ferritin No results found for: \"FERRITIN\"         Past Medical History       Past Medical History:   Diagnosis Date    Anesthesia complication     woke up during surgery    Arthritis     Arthritis of neck     CAD (coronary artery disease)     Cellulitis and abscess of leg 03/28/2013    Chronic purulent otitis media 07/01/2013    Connective tissue anomaly     CTS (carpal tunnel syndrome)     Diabetes mellitus     Dislocation, patella closed 05/30/2017    Family history of colon cancer 05/27/2022    Family history of esophageal cancer 05/27/2022    Fracture of wrist 1979    right hand    Hiatal hernia     Hyperlipidemia LDL goal <70 08/31/2021    Knee swelling     Myocardial infarct     2013    FARNAZ on CPAP 09/01/2021    RLS (restless legs syndrome)     Rotator cuff syndrome     tita    Sleep apnea     uses CPAP    Subluxation of patella     Ulcerative colitis 5/28/2024       Past Surgical History:   Procedure Laterality Date    CARDIAC CATHETERIZATION      x2    CARDIAC CATHETERIZATION N/A 5/28/2024    Procedure: Left Heart Cath;  Surgeon: Davis Parker MD;  Location: [facility] CATH INVASIVE LOCATION;  Service: Cardiovascular;  Laterality: N/A;    " COLONOSCOPY      COLONOSCOPY N/A 08/26/2022    Procedure: COLONOSCOPY WITH POLYPECTOMY;  Surgeon: Sloan Ortiz MD;  Location: [facility] ENDOSCOPY;  Service: Gastroenterology;  Laterality: N/A;  COLON POLYP    CORONARY ANGIOPLASTY      CORONARY STENT PLACEMENT      x2    ENDOSCOPY N/A 08/26/2022    Procedure: ESOPHAGOGASTRODUODENOSCOPY WITH BX;  Surgeon: Sloan Ortiz MD;  Location: [facility] ENDOSCOPY;  Service: Gastroenterology;  Laterality: N/A;  HIATAL HERNIA    INNER EAR SURGERY      KNEE SURGERY  1984, oct.    right knee    UPPER GASTROINTESTINAL ENDOSCOPY      VENOUS THROMBECTOMY      patient unsure         Current Outpatient Medications:     albuterol sulfate  (90 Base) MCG/ACT inhaler, Take 1-2 Puffs every 4 hours prn, Disp: 8 g, Rfl: 1    Alcohol Swabs (Easy Touch Alcohol Prep Medium) 70 % pads, 1 each by Other route Daily., Disp: 100 each, Rfl: 1    ammonium lactate (LAC-HYDRIN) 12 % lotion, Apply  topically to the appropriate area as directed 2 (Two) Times a Day., Disp: 225 g, Rfl: 11    aspirin 81 MG EC tablet, Take 1 tablet by mouth Daily., Disp: 90 tablet, Rfl: 3    cetirizine (zyrTEC) 10 MG tablet, Take 1 tablet by mouth Daily., Disp: 90 tablet, Rfl: 0    cholecalciferol (VITAMIN D3) 25 MCG (1000 UT) tablet, Take 1 tablet by mouth Daily. Does not take daily only occasionally, Disp: 90 tablet, Rfl: 1    clopidogrel (PLAVIX) 75 MG tablet, Take 1 tablet by mouth Daily. (Patient taking differently: Take 1 tablet by mouth Every Night.), Disp: 90 tablet, Rfl: 1    DULoxetine (CYMBALTA) 60 MG capsule, Take 1 capsule by mouth Daily. (Patient taking differently: Take 1 capsule by mouth Every Night.), Disp: 90 capsule, Rfl: 1    empagliflozin (JARDIANCE) 25 MG tablet tablet, Take 1 tablet by mouth Daily. (Patient taking differently: Take 1 tablet by mouth Every Night.), Disp: 90 tablet, Rfl: 1    ezetimibe (Zetia) 10 MG tablet, Take 1 tablet by mouth Daily. (Patient taking differently: Take  1 tablet by mouth Every Night.), Disp: 90 tablet, Rfl: 1    famotidine (Pepcid) 40 MG tablet, Take 1 tablet by mouth 2 (Two) Times a Day As Needed for Heartburn., Disp: 90 tablet, Rfl: 1    fluconazole (DIFLUCAN) 150 MG tablet, TAKE ONE TABLET BY MOUTH for one dose, Disp: , Rfl:     fluticasone (FLONASE) 50 MCG/ACT nasal spray, 2 sprays into the nostril(s) as directed by provider Daily. 2 sprays each nostril daily, Disp: 48 g, Rfl: 1    FREESTYLE LITE test strip, 1 each by Other route 3 times a day., Disp: 300 each, Rfl: 2    furosemide (LASIX) 20 MG tablet, Take 1 tablet by mouth Daily. (Patient taking differently: Take 1 tablet by mouth Every Night.), Disp: 90 tablet, Rfl: 1    HYDROcodone-acetaminophen (NORCO) 5-325 MG per tablet, Take 1 tablet by mouth Every 6 (Six) Hours As Needed for Moderate Pain., Disp: 12 tablet, Rfl: 0    hydrOXYzine (ATARAX) 25 MG tablet, Take 1-2  tabs by mouth at night as needed for sleep., Disp: 60 tablet, Rfl: 5    Lancets (freestyle) lancets, 1 each by Other route 3 times a day., Disp: 300 each, Rfl: 2    metFORMIN ER (GLUCOPHAGE-XR) 500 MG 24 hr tablet, Take 1 tablet by mouth Daily With Dinner., Disp: 90 tablet, Rfl: 1    metoprolol succinate XL (TOPROL-XL) 25 MG 24 hr tablet, Take 1 tablet by mouth Daily., Disp: 90 tablet, Rfl: 0    midodrine (PROAMATINE) 2.5 MG tablet, , Disp: , Rfl:     montelukast (SINGULAIR) 10 MG tablet, Take 1 tablet by mouth Every Night., Disp: 90 tablet, Rfl: 1    Narcan 4 MG/0.1ML nasal spray, , Disp: , Rfl:     nitroglycerin (NITROSTAT) 0.4 MG SL tablet, 1 under the tongue as needed for angina, may repeat q5mins for up three doses, Disp: 100 tablet, Rfl: 11    oxyCODONE (ROXICODONE) 5 MG immediate release tablet, Take  by mouth As Needed., Disp: , Rfl:     rosuvastatin (CRESTOR) 40 MG tablet, Take 1 tablet by mouth Every Night., Disp: 90 tablet, Rfl: 1    Savella 25 MG tablet tablet, Take 1 tablet by mouth Every Night., Disp: , Rfl:     Semaglutide,0.25  "or 0.5MG/DOS, (OZEMPIC) 2 MG/3ML solution pen-injector, Inject 0.5 mg under the skin into the appropriate area as directed 1 (One) Time Per Week., Disp: 15 mL, Rfl: 2    tiotropium bromide monohydrate (Spiriva Respimat) 2.5 MCG/ACT aerosol solution inhaler, Inhale 2 puffs Daily., Disp: 4 g, Rfl: 5    zolpidem (AMBIEN) 5 MG tablet, Take 1 tablet by mouth At Night As Needed., Disp: , Rfl:     PEG-KCl-NaCl-NaSulf-Na Asc-C (MoviPrep) 100 g reconstituted solution powder, Take 1,000 mL by mouth Every 12 (Twelve) Hours., Disp: 1 each, Rfl: 0    polyethylene glycol (MIRALAX) 17 GM/SCOOP powder, Take 17 g by mouth Daily for 30 days., Disp: 527 g, Rfl: 1     Allergies   Allergen Reactions    Adhesive Tape Itching    Molds & Smuts Cough    Pollen Extract Cough       Family History   Problem Relation Age of Onset    Suicidality Mother     Osteoporosis Mother     Rheumatologic disease Mother     Scoliosis Mother     Heart attack Father     Heart disease Father     Cancer Father     Osteoporosis Father     Rheumatologic disease Father     No Known Problems Maternal Grandmother     No Known Problems Maternal Grandfather     Heart disease Paternal Grandmother     Cancer Paternal Grandmother     Heart disease Paternal Grandfather     Cancer Paternal Grandfather     Thyroid disease Paternal Grandfather     Pancreatic cancer Paternal Grandfather     Esophageal cancer Paternal Grandfather     Stomach cancer Paternal Grandfather     Sleep apnea Paternal Grandfather     Colon cancer Neg Hx     Malig Hyperthermia Neg Hx         Social History     Social History Narrative    Not on file       Objective       Objective     Vital Signs:   /82 (BP Location: Left arm, Patient Position: Sitting, Cuff Size: Adult)   Ht 170.2 cm (67\")   Wt 88.3 kg (194 lb 11.2 oz)   BMI 30.49 kg/m²     Body mass index is 30.49 kg/m².    Physical Exam  Constitutional:       Appearance: Normal appearance.   Pulmonary:      Effort: Pulmonary effort is " normal.   Neurological:      General: No focal deficit present.      Mental Status: She is alert and oriented to person, place, and time.   Psychiatric:         Mood and Affect: Mood normal.         Behavior: Behavior normal.              Assessment & Plan          Assessment and Plan    Diagnoses and all orders for this visit:    1. Generalized abdominal pain (Primary)  -     Case Request; Standing  -     Follow Anesthesia Guidelines / Protocol; Standing  -     Verify NPO; Standing  -     Verify Bowel Prep Was Successful; Standing  -     Give Tap Water Enema If Bowel Prep Insufficient; Standing  -     Obtain Informed Consent; Standing  -     Case Request    2. Altered bowel habits  -     Case Request; Standing  -     Follow Anesthesia Guidelines / Protocol; Standing  -     Verify NPO; Standing  -     Verify Bowel Prep Was Successful; Standing  -     Give Tap Water Enema If Bowel Prep Insufficient; Standing  -     Obtain Informed Consent; Standing  -     Case Request    3. Constipation, unspecified constipation type  -     Case Request; Standing  -     Follow Anesthesia Guidelines / Protocol; Standing  -     Verify NPO; Standing  -     Verify Bowel Prep Was Successful; Standing  -     Give Tap Water Enema If Bowel Prep Insufficient; Standing  -     Obtain Informed Consent; Standing  -     Case Request    4. Rectal bleeding  -     Case Request; Standing  -     Follow Anesthesia Guidelines / Protocol; Standing  -     Verify NPO; Standing  -     Verify Bowel Prep Was Successful; Standing  -     Give Tap Water Enema If Bowel Prep Insufficient; Standing  -     Obtain Informed Consent; Standing  -     Case Request    5. Abnormal CT scan  -     Case Request; Standing  -     Follow Anesthesia Guidelines / Protocol; Standing  -     Verify NPO; Standing  -     Verify Bowel Prep Was Successful; Standing  -     Give Tap Water Enema If Bowel Prep Insufficient; Standing  -     Obtain Informed Consent; Standing  -     Case  Request    6. Colitis  -     Case Request; Standing  -     Follow Anesthesia Guidelines / Protocol; Standing  -     Verify NPO; Standing  -     Verify Bowel Prep Was Successful; Standing  -     Give Tap Water Enema If Bowel Prep Insufficient; Standing  -     Obtain Informed Consent; Standing  -     Case Request    Other orders  -     polyethylene glycol (MIRALAX) 17 GM/SCOOP powder; Take 17 g by mouth Daily for 30 days.  Dispense: 527 g; Refill: 1  -     PEG-KCl-NaCl-NaSulf-Na Asc-C (MoviPrep) 100 g reconstituted solution powder; Take 1,000 mL by mouth Every 12 (Twelve) Hours.  Dispense: 1 each; Refill: 0      59-year-old female presenting the office today with altered bowel habits, abdominal pain, constipation, rectal bleeding and an abnormal CT scan displaying colitis.  I have recommended that the patient undergo further evaluation with a colonoscopy.  I have discussed this procedure in detail with the patient.  I have discussed the risks, benefits and alternatives.  I have discussed the risk of anesthesia, bleeding and perforation.  Patient understands these risks, benefits and alternatives and wishes to proceed.  I will schedule her at her earliest convenience.  We will seek cardiac clearance related to the patient's Plavix.  Patient was given an extended bowel prep.  Patient instructed to use MiraLAX daily.  Patient agreeable to this plan and will call with any questions or concerns.             Follow Up       Follow Up   Return for Follow up after endoscopy in office.  Patient was given instructions and counseling regarding her condition or for health maintenance advice. Please see specific information pulled into the AVS if appropriate.

## 2024-06-14 NOTE — H&P (VIEW-ONLY)
Chief Complaint      Constipation- Abdominal burning/cramping, rectal bleeding- a lot of blood. Seen in ER, colitis.     History of Present Illness      Claudette Gramajo is a 59 y.o. female who presents to [facility] as a new patient following up after recent ER visit related to colitis and constipation.  Patient reports being seen in the emergency department related to abdominal pain and rectal bleeding.  Patient had a CT scan performed which displayed abnormal thickening of the distal left colon and sigmoid colon with inflammatory stranding.  Colonoscopy was recommended.  Patient reports she continues to struggle with abdominal cramping and constipation.  She is eating very little.  Patient reports she is hydrating well drinking plenty of water throughout the day.  Patient reports last time she saw blood in her stool was last Tuesday.  Patient is on Plavix.  Patient denies fever, nausea, vomiting, weight loss, night sweats, melena,  hematemesis.    Most recent labs- 5/29/24    CT Abdomen Pelvis With Contrast - 5/29/24  There is abnormal thickening of the distal left colon and sigmoid colon  with mild inflammatory stranding. Thickening may extend into the rectum.  Findings likely due to at least moderate distal colitis. There is no  significant diverticulosis to suggest diverticulitis. Recommend  follow-up to ensure resolution. Colonoscopy may be useful to ensure no  underlying lesion is present.    Endoscopy: Review of the patient's most recent EGD and colonoscopy performed by Dr. Ortiz on 8/26/2022 4 mm polyp in the rectum.  Small hiatal hernia normal close without the esophagus stomach and duodenum.      Results       Result Review :   The following data was reviewed by: SARWAT Syed on 06/18/2024     CMP          3/25/2024    08:43 5/28/2024    09:20 5/29/2024    09:19   CMP   Glucose 277  158  159    BUN 17  26  19    Creatinine 1.17  1.15  0.96    EGFR 54.2  55.3  68.7  "   Sodium 138  141  138    Potassium 3.8  3.7  4.0    Chloride 97  102  102    Calcium 9.4  10.3  9.6    Total Protein 7.3   7.0    Albumin 4.5   4.3    Globulin 2.8   2.7    Total Bilirubin 0.6   0.8    Alkaline Phosphatase 74   79    AST (SGOT) 24   24    ALT (SGPT) 29   32    Albumin/Globulin Ratio 1.6   1.6    BUN/Creatinine Ratio 14.5  22.6  19.8    Anion Gap 18.5  18.7  17.4      CBC          3/25/2024    08:43 5/28/2024    09:20 5/29/2024    09:19   CBC   WBC 8.94  8.00  10.92    RBC 5.06  4.70  4.77    Hemoglobin 14.8  13.9  14.1    Hematocrit 44.7  41.2  41.6    MCV 88.3  87.7  87.2    MCH 29.2  29.6  29.6    MCHC 33.1  33.7  33.9    RDW 12.6  12.8  13.1    Platelets 309  232  249        Iron Profile No results found for: \"IRON\", \"TIBC\", \"LABIRON\", \"TRANSFERRIN\"  Ferritin No results found for: \"FERRITIN\"         Past Medical History       Past Medical History:   Diagnosis Date    Anesthesia complication     woke up during surgery    Arthritis     Arthritis of neck     CAD (coronary artery disease)     Cellulitis and abscess of leg 03/28/2013    Chronic purulent otitis media 07/01/2013    Connective tissue anomaly     CTS (carpal tunnel syndrome)     Diabetes mellitus     Dislocation, patella closed 05/30/2017    Family history of colon cancer 05/27/2022    Family history of esophageal cancer 05/27/2022    Fracture of wrist 1979    right hand    Hiatal hernia     Hyperlipidemia LDL goal <70 08/31/2021    Knee swelling     Myocardial infarct     2013    FARNAZ on CPAP 09/01/2021    RLS (restless legs syndrome)     Rotator cuff syndrome     tita    Sleep apnea     uses CPAP    Subluxation of patella     Ulcerative colitis 5/28/2024       Past Surgical History:   Procedure Laterality Date    CARDIAC CATHETERIZATION      x2    CARDIAC CATHETERIZATION N/A 5/28/2024    Procedure: Left Heart Cath;  Surgeon: Davis Parker MD;  Location: [facility] CATH INVASIVE LOCATION;  Service: Cardiovascular;  Laterality: N/A;    " COLONOSCOPY      COLONOSCOPY N/A 08/26/2022    Procedure: COLONOSCOPY WITH POLYPECTOMY;  Surgeon: Sloan Ortiz MD;  Location: [facility] ENDOSCOPY;  Service: Gastroenterology;  Laterality: N/A;  COLON POLYP    CORONARY ANGIOPLASTY      CORONARY STENT PLACEMENT      x2    ENDOSCOPY N/A 08/26/2022    Procedure: ESOPHAGOGASTRODUODENOSCOPY WITH BX;  Surgeon: Sloan Ortiz MD;  Location: formerly [facility] ENDOSCOPY;  Service: Gastroenterology;  Laterality: N/A;  HIATAL HERNIA    INNER EAR SURGERY      KNEE SURGERY  1984, oct.    right knee    UPPER GASTROINTESTINAL ENDOSCOPY      VENOUS THROMBECTOMY      patient unsure         Current Outpatient Medications:     albuterol sulfate  (90 Base) MCG/ACT inhaler, Take 1-2 Puffs every 4 hours prn, Disp: 8 g, Rfl: 1    Alcohol Swabs (Easy Touch Alcohol Prep Medium) 70 % pads, 1 each by Other route Daily., Disp: 100 each, Rfl: 1    ammonium lactate (LAC-HYDRIN) 12 % lotion, Apply  topically to the appropriate area as directed 2 (Two) Times a Day., Disp: 225 g, Rfl: 11    aspirin 81 MG EC tablet, Take 1 tablet by mouth Daily., Disp: 90 tablet, Rfl: 3    cetirizine (zyrTEC) 10 MG tablet, Take 1 tablet by mouth Daily., Disp: 90 tablet, Rfl: 0    cholecalciferol (VITAMIN D3) 25 MCG (1000 UT) tablet, Take 1 tablet by mouth Daily. Does not take daily only occasionally, Disp: 90 tablet, Rfl: 1    clopidogrel (PLAVIX) 75 MG tablet, Take 1 tablet by mouth Daily. (Patient taking differently: Take 1 tablet by mouth Every Night.), Disp: 90 tablet, Rfl: 1    DULoxetine (CYMBALTA) 60 MG capsule, Take 1 capsule by mouth Daily. (Patient taking differently: Take 1 capsule by mouth Every Night.), Disp: 90 capsule, Rfl: 1    empagliflozin (JARDIANCE) 25 MG tablet tablet, Take 1 tablet by mouth Daily. (Patient taking differently: Take 1 tablet by mouth Every Night.), Disp: 90 tablet, Rfl: 1    ezetimibe (Zetia) 10 MG tablet, Take 1 tablet by mouth Daily. (Patient taking differently: Take  1 tablet by mouth Every Night.), Disp: 90 tablet, Rfl: 1    famotidine (Pepcid) 40 MG tablet, Take 1 tablet by mouth 2 (Two) Times a Day As Needed for Heartburn., Disp: 90 tablet, Rfl: 1    fluconazole (DIFLUCAN) 150 MG tablet, TAKE ONE TABLET BY MOUTH for one dose, Disp: , Rfl:     fluticasone (FLONASE) 50 MCG/ACT nasal spray, 2 sprays into the nostril(s) as directed by provider Daily. 2 sprays each nostril daily, Disp: 48 g, Rfl: 1    FREESTYLE LITE test strip, 1 each by Other route 3 times a day., Disp: 300 each, Rfl: 2    furosemide (LASIX) 20 MG tablet, Take 1 tablet by mouth Daily. (Patient taking differently: Take 1 tablet by mouth Every Night.), Disp: 90 tablet, Rfl: 1    HYDROcodone-acetaminophen (NORCO) 5-325 MG per tablet, Take 1 tablet by mouth Every 6 (Six) Hours As Needed for Moderate Pain., Disp: 12 tablet, Rfl: 0    hydrOXYzine (ATARAX) 25 MG tablet, Take 1-2  tabs by mouth at night as needed for sleep., Disp: 60 tablet, Rfl: 5    Lancets (freestyle) lancets, 1 each by Other route 3 times a day., Disp: 300 each, Rfl: 2    metFORMIN ER (GLUCOPHAGE-XR) 500 MG 24 hr tablet, Take 1 tablet by mouth Daily With Dinner., Disp: 90 tablet, Rfl: 1    metoprolol succinate XL (TOPROL-XL) 25 MG 24 hr tablet, Take 1 tablet by mouth Daily., Disp: 90 tablet, Rfl: 0    midodrine (PROAMATINE) 2.5 MG tablet, , Disp: , Rfl:     montelukast (SINGULAIR) 10 MG tablet, Take 1 tablet by mouth Every Night., Disp: 90 tablet, Rfl: 1    Narcan 4 MG/0.1ML nasal spray, , Disp: , Rfl:     nitroglycerin (NITROSTAT) 0.4 MG SL tablet, 1 under the tongue as needed for angina, may repeat q5mins for up three doses, Disp: 100 tablet, Rfl: 11    oxyCODONE (ROXICODONE) 5 MG immediate release tablet, Take  by mouth As Needed., Disp: , Rfl:     rosuvastatin (CRESTOR) 40 MG tablet, Take 1 tablet by mouth Every Night., Disp: 90 tablet, Rfl: 1    Savella 25 MG tablet tablet, Take 1 tablet by mouth Every Night., Disp: , Rfl:     Semaglutide,0.25  "or 0.5MG/DOS, (OZEMPIC) 2 MG/3ML solution pen-injector, Inject 0.5 mg under the skin into the appropriate area as directed 1 (One) Time Per Week., Disp: 15 mL, Rfl: 2    tiotropium bromide monohydrate (Spiriva Respimat) 2.5 MCG/ACT aerosol solution inhaler, Inhale 2 puffs Daily., Disp: 4 g, Rfl: 5    zolpidem (AMBIEN) 5 MG tablet, Take 1 tablet by mouth At Night As Needed., Disp: , Rfl:     PEG-KCl-NaCl-NaSulf-Na Asc-C (MoviPrep) 100 g reconstituted solution powder, Take 1,000 mL by mouth Every 12 (Twelve) Hours., Disp: 1 each, Rfl: 0    polyethylene glycol (MIRALAX) 17 GM/SCOOP powder, Take 17 g by mouth Daily for 30 days., Disp: 527 g, Rfl: 1     Allergies   Allergen Reactions    Adhesive Tape Itching    Molds & Smuts Cough    Pollen Extract Cough       Family History   Problem Relation Age of Onset    Suicidality Mother     Osteoporosis Mother     Rheumatologic disease Mother     Scoliosis Mother     Heart attack Father     Heart disease Father     Cancer Father     Osteoporosis Father     Rheumatologic disease Father     No Known Problems Maternal Grandmother     No Known Problems Maternal Grandfather     Heart disease Paternal Grandmother     Cancer Paternal Grandmother     Heart disease Paternal Grandfather     Cancer Paternal Grandfather     Thyroid disease Paternal Grandfather     Pancreatic cancer Paternal Grandfather     Esophageal cancer Paternal Grandfather     Stomach cancer Paternal Grandfather     Sleep apnea Paternal Grandfather     Colon cancer Neg Hx     Malig Hyperthermia Neg Hx         Social History     Social History Narrative    Not on file       Objective       Objective     Vital Signs:   /82 (BP Location: Left arm, Patient Position: Sitting, Cuff Size: Adult)   Ht 170.2 cm (67\")   Wt 88.3 kg (194 lb 11.2 oz)   BMI 30.49 kg/m²     Body mass index is 30.49 kg/m².    Physical Exam  Constitutional:       Appearance: Normal appearance.   Pulmonary:      Effort: Pulmonary effort is " normal.   Neurological:      General: No focal deficit present.      Mental Status: She is alert and oriented to person, place, and time.   Psychiatric:         Mood and Affect: Mood normal.         Behavior: Behavior normal.              Assessment & Plan          Assessment and Plan    Diagnoses and all orders for this visit:    1. Generalized abdominal pain (Primary)  -     Case Request; Standing  -     Follow Anesthesia Guidelines / Protocol; Standing  -     Verify NPO; Standing  -     Verify Bowel Prep Was Successful; Standing  -     Give Tap Water Enema If Bowel Prep Insufficient; Standing  -     Obtain Informed Consent; Standing  -     Case Request    2. Altered bowel habits  -     Case Request; Standing  -     Follow Anesthesia Guidelines / Protocol; Standing  -     Verify NPO; Standing  -     Verify Bowel Prep Was Successful; Standing  -     Give Tap Water Enema If Bowel Prep Insufficient; Standing  -     Obtain Informed Consent; Standing  -     Case Request    3. Constipation, unspecified constipation type  -     Case Request; Standing  -     Follow Anesthesia Guidelines / Protocol; Standing  -     Verify NPO; Standing  -     Verify Bowel Prep Was Successful; Standing  -     Give Tap Water Enema If Bowel Prep Insufficient; Standing  -     Obtain Informed Consent; Standing  -     Case Request    4. Rectal bleeding  -     Case Request; Standing  -     Follow Anesthesia Guidelines / Protocol; Standing  -     Verify NPO; Standing  -     Verify Bowel Prep Was Successful; Standing  -     Give Tap Water Enema If Bowel Prep Insufficient; Standing  -     Obtain Informed Consent; Standing  -     Case Request    5. Abnormal CT scan  -     Case Request; Standing  -     Follow Anesthesia Guidelines / Protocol; Standing  -     Verify NPO; Standing  -     Verify Bowel Prep Was Successful; Standing  -     Give Tap Water Enema If Bowel Prep Insufficient; Standing  -     Obtain Informed Consent; Standing  -     Case  Request    6. Colitis  -     Case Request; Standing  -     Follow Anesthesia Guidelines / Protocol; Standing  -     Verify NPO; Standing  -     Verify Bowel Prep Was Successful; Standing  -     Give Tap Water Enema If Bowel Prep Insufficient; Standing  -     Obtain Informed Consent; Standing  -     Case Request    Other orders  -     polyethylene glycol (MIRALAX) 17 GM/SCOOP powder; Take 17 g by mouth Daily for 30 days.  Dispense: 527 g; Refill: 1  -     PEG-KCl-NaCl-NaSulf-Na Asc-C (MoviPrep) 100 g reconstituted solution powder; Take 1,000 mL by mouth Every 12 (Twelve) Hours.  Dispense: 1 each; Refill: 0      59-year-old female presenting the office today with altered bowel habits, abdominal pain, constipation, rectal bleeding and an abnormal CT scan displaying colitis.  I have recommended that the patient undergo further evaluation with a colonoscopy.  I have discussed this procedure in detail with the patient.  I have discussed the risks, benefits and alternatives.  I have discussed the risk of anesthesia, bleeding and perforation.  Patient understands these risks, benefits and alternatives and wishes to proceed.  I will schedule her at her earliest convenience.  We will seek cardiac clearance related to the patient's Plavix.  Patient was given an extended bowel prep.  Patient instructed to use MiraLAX daily.  Patient agreeable to this plan and will call with any questions or concerns.             Follow Up       Follow Up   Return for Follow up after endoscopy in office.  Patient was given instructions and counseling regarding her condition or for health maintenance advice. Please see specific information pulled into the AVS if appropriate.

## 2024-06-18 ENCOUNTER — TELEPHONE (OUTPATIENT)
Dept: GASTROENTEROLOGY | Facility: CLINIC | Age: 59
End: 2024-06-18
Payer: OTHER GOVERNMENT

## 2024-06-18 ENCOUNTER — OFFICE VISIT (OUTPATIENT)
Dept: GASTROENTEROLOGY | Facility: CLINIC | Age: 59
End: 2024-06-18
Payer: OTHER GOVERNMENT

## 2024-06-18 VITALS
BODY MASS INDEX: 30.56 KG/M2 | WEIGHT: 194.7 LBS | DIASTOLIC BLOOD PRESSURE: 82 MMHG | HEIGHT: 67 IN | SYSTOLIC BLOOD PRESSURE: 118 MMHG

## 2024-06-18 DIAGNOSIS — R10.84 GENERALIZED ABDOMINAL PAIN: Primary | ICD-10-CM

## 2024-06-18 DIAGNOSIS — R19.4 ALTERED BOWEL HABITS: ICD-10-CM

## 2024-06-18 DIAGNOSIS — K62.5 RECTAL BLEEDING: ICD-10-CM

## 2024-06-18 DIAGNOSIS — K59.00 CONSTIPATION, UNSPECIFIED CONSTIPATION TYPE: ICD-10-CM

## 2024-06-18 DIAGNOSIS — R93.89 ABNORMAL CT SCAN: ICD-10-CM

## 2024-06-18 DIAGNOSIS — K52.9 COLITIS: ICD-10-CM

## 2024-06-18 PROCEDURE — 99214 OFFICE O/P EST MOD 30 MIN: CPT | Performed by: NURSE PRACTITIONER

## 2024-06-18 RX ORDER — PEG-3350, SODIUM SULFATE, SODIUM CHLORIDE, POTASSIUM CHLORIDE, SODIUM ASCORBATE AND ASCORBIC ACID 7.5-2.691G
1000 KIT ORAL EVERY 12 HOURS
Qty: 1 EACH | Refills: 0 | Status: SHIPPED | OUTPATIENT
Start: 2024-06-18

## 2024-06-18 RX ORDER — POLYETHYLENE GLYCOL 3350 17 G/17G
17 POWDER, FOR SOLUTION ORAL DAILY
Qty: 527 G | Refills: 1 | Status: SHIPPED | OUTPATIENT
Start: 2024-06-18 | End: 2024-07-18

## 2024-06-18 NOTE — TELEPHONE ENCOUNTER
6/18/2024    Dear DR ENGLISH,     Patient: Claudette Gramajo   YOB: 1965        This patient is waiting to have a Colonoscopy which I will perform at [facility] on 07/09/24 .     Our records indicate this patient is currently taking PLAVIX. This procedure requires the patient to suspend their anticoagulant medication prior to surgery.     Please respond to this request noting your recommendations. You may contact our office at [phone] Option 1 with any questions. I appreciate your prompt response in this matter.     Please return this form to our office no later than two weeks prior to the procedure date listed above. Please return form to [phone].     ____ I approve my patient to stop taking their PLAVIX 5 days prior to the scheduled procedure.    ____ I do NOT approve my patient to stop taking their PLAVIX at this time.      ____ I approve my patient from a Cardiac  standpoint    ____ I do NOT approve my patient from a Cardiac  standpoint at this time      Please specify clearance expiration date:____________________________________      Approving physician name (please print): _____________________________________________      Approving physician signature: ________________________________ Date:________________    Sincerely,  [facility] - Gastroenterology   Dr. DYLON SORIA          Please fax approval or denial to our office as soon as possible.

## 2024-06-18 NOTE — TELEPHONE ENCOUNTER
Procedure: Colonoscopy and/or EGD     Med Directive: Plavix     PMH: CAD with stent 2014, HTN, HLD     Last Seen: 4/17/24

## 2024-06-19 ENCOUNTER — PATIENT ROUNDING (BHMG ONLY) (OUTPATIENT)
Dept: GASTROENTEROLOGY | Facility: CLINIC | Age: 59
End: 2024-06-19
Payer: OTHER GOVERNMENT

## 2024-06-19 NOTE — PROGRESS NOTES
"NEW PATIENT GYN VISIT    Chief Complaint   Patient presents with    Gynecologic Exam    postmenopausal bleeding       HPI:   59 y.o. LMP: No LMP recorded. Patient is postmenopausal.  Patient presents today to discuss postmenopausal bleeding.  She states she had an episode of light bleeding a week ago.  She states she also had rectal bleeding at the time.  She went to the emergency department and a CT scan was performed.  She states she has not had any bleeding since that time.  She also admits to some cramping that happened.  She states she went through menopause in  and has not had any bleeding since that time.    Social History     Substance and Sexual Activity   Sexual Activity Yes    Partners: Male       Mammo Screening Digital Tomosynthesis Bilateral With CAD (2024 16:29)    CT Abdomen Pelvis With Contrast (2024 11:57)      History: PMHx, Meds, Allergies, PSHx, Social Hx, and POBHx all reviewed and updated.  Last Completed Pap Smear            PAP SMEAR (Every 3 Years) Next due on 2022  PAP, Liquid Based ([facility] Only)    2019  Outside Procedure: VT CYTOPATH CERV/VAG THIN LAYER                    PHYSICAL EXAM:  /70   Pulse 72   Ht 170.2 cm (67\")   Wt 88.5 kg (195 lb)   BMI 30.54 kg/m²   General- NAD, alert and oriented, appropriate  Psych- Normal mood  Respiratory- No labored breathing  Abdomen- Soft, non distended, non tender  Extremities- No edema  Skin- No lesions, no rashes      ASSESSMENT AND PLAN:  Diagnoses and all orders for this visit:    1. PMB (postmenopausal bleeding) (Primary)  -     US Non-ob Transvaginal; Future    We discussed postmenopausal bleeding in detail.  I discussed the endometrial thickness should be 4 mm or less.  She has not had an ultrasound.  We will schedule a pelvic ultrasound to evaluate the endometrium.  I discussed that likely she will need a hysteroscopy D&C for a directed biopsy.  We will discuss that further at " the next office visit.  All questions were answered.          Follow Up:  Return in about 2 weeks (around 7/4/2024) for Recheck.        Filomena Lorenzo DO  06/20/2024    [facility]  [address] [place] [address]  Dept: [phone]  Dept Fax: [phone]  Loc: [phone]  Loc Fax: [phone]

## 2024-06-19 NOTE — PROGRESS NOTES
6/19/2024      Hello, may I speak with Claudette I Avila     My name is Pooja. I am calling from [facility] [place]. I show that you had a recent visit with SARWAT Syed.    Before we get started may I verify your date of birth? 1965    I am calling to officially welcome you to our practice and ask about your recent visit. Is this a good time to talk? Left Voicemail, ok per CHELSEA    Tell me about your visit with us. What things went well?    We strive to ensure that we protect your safety and privacy. Is there anything we could have done to improve this during your visit?        We're always looking for ways to make our patients' experiences even better. Do you have recommendations on ways we may improve?    Overall were you satisfied with your first visit to our practice?    I appreciate you taking the time to speak with me today. Is there anything else I can do for you?    I am glad to hear that you had a very good visit and I appreciate you taking the time to provide feedback on this call. We would greatly appreciate you filling out a survey if you receive one in the mail, email or text. This is a great opportunity to provide any additional feedback that you may think of after this call as well.       Thank you, and have a great day.

## 2024-06-20 ENCOUNTER — OFFICE VISIT (OUTPATIENT)
Dept: OBSTETRICS AND GYNECOLOGY | Facility: CLINIC | Age: 59
End: 2024-06-20
Payer: OTHER GOVERNMENT

## 2024-06-20 VITALS
HEIGHT: 67 IN | HEART RATE: 72 BPM | SYSTOLIC BLOOD PRESSURE: 108 MMHG | WEIGHT: 195 LBS | DIASTOLIC BLOOD PRESSURE: 70 MMHG | BODY MASS INDEX: 30.61 KG/M2

## 2024-06-20 DIAGNOSIS — N95.0 PMB (POSTMENOPAUSAL BLEEDING): Primary | ICD-10-CM

## 2024-06-20 RX ORDER — GLIPIZIDE 10 MG/1
TABLET, EXTENDED RELEASE ORAL
COMMUNITY
Start: 2024-06-18

## 2024-06-25 ENCOUNTER — TELEPHONE (OUTPATIENT)
Dept: OBSTETRICS AND GYNECOLOGY | Facility: CLINIC | Age: 59
End: 2024-06-25

## 2024-06-25 ENCOUNTER — LAB (OUTPATIENT)
Dept: LAB | Facility: HOSPITAL | Age: 59
End: 2024-06-25
Payer: OTHER GOVERNMENT

## 2024-06-25 DIAGNOSIS — E11.65 TYPE 2 DIABETES MELLITUS WITH HYPERGLYCEMIA, WITHOUT LONG-TERM CURRENT USE OF INSULIN: ICD-10-CM

## 2024-06-25 DIAGNOSIS — E78.5 HYPERLIPIDEMIA LDL GOAL <70: ICD-10-CM

## 2024-06-25 LAB
ALBUMIN SERPL-MCNC: 4.6 G/DL (ref 3.5–5.2)
ALBUMIN UR-MCNC: <1.2 MG/DL
ALBUMIN/GLOB SERPL: 1.9 G/DL
ALP SERPL-CCNC: 66 U/L (ref 39–117)
ALT SERPL W P-5'-P-CCNC: 20 U/L (ref 1–33)
ANION GAP SERPL CALCULATED.3IONS-SCNC: 13.3 MMOL/L (ref 5–15)
AST SERPL-CCNC: 17 U/L (ref 1–32)
BASOPHILS # BLD AUTO: 0.05 10*3/MM3 (ref 0–0.2)
BASOPHILS NFR BLD AUTO: 0.7 % (ref 0–1.5)
BILIRUB SERPL-MCNC: 0.5 MG/DL (ref 0–1.2)
BILIRUB UR QL STRIP: NEGATIVE
BUN SERPL-MCNC: 18 MG/DL (ref 6–20)
BUN/CREAT SERPL: 24.7 (ref 7–25)
CALCIUM SPEC-SCNC: 10 MG/DL (ref 8.6–10.5)
CHLORIDE SERPL-SCNC: 106 MMOL/L (ref 98–107)
CHOLEST SERPL-MCNC: 105 MG/DL (ref 0–200)
CLARITY UR: CLEAR
CO2 SERPL-SCNC: 20.7 MMOL/L (ref 22–29)
COLOR UR: YELLOW
CREAT SERPL-MCNC: 0.73 MG/DL (ref 0.57–1)
CREAT UR-MCNC: 64.3 MG/DL
DEPRECATED RDW RBC AUTO: 43.2 FL (ref 37–54)
EGFRCR SERPLBLD CKD-EPI 2021: 94.9 ML/MIN/1.73
EOSINOPHIL # BLD AUTO: 0.24 10*3/MM3 (ref 0–0.4)
EOSINOPHIL NFR BLD AUTO: 3.2 % (ref 0.3–6.2)
ERYTHROCYTE [DISTWIDTH] IN BLOOD BY AUTOMATED COUNT: 13 % (ref 12.3–15.4)
GLOBULIN UR ELPH-MCNC: 2.4 GM/DL
GLUCOSE SERPL-MCNC: 147 MG/DL (ref 65–99)
GLUCOSE UR STRIP-MCNC: ABNORMAL MG/DL
HBA1C MFR BLD: 6.8 % (ref 4.8–5.6)
HCT VFR BLD AUTO: 43 % (ref 34–46.6)
HDLC SERPL-MCNC: 46 MG/DL (ref 40–60)
HGB BLD-MCNC: 13.8 G/DL (ref 12–15.9)
HGB UR QL STRIP.AUTO: NEGATIVE
HOLD SPECIMEN: NORMAL
IMM GRANULOCYTES # BLD AUTO: 0.03 10*3/MM3 (ref 0–0.05)
IMM GRANULOCYTES NFR BLD AUTO: 0.4 % (ref 0–0.5)
KETONES UR QL STRIP: NEGATIVE
LDLC SERPL CALC-MCNC: 39 MG/DL (ref 0–100)
LDLC/HDLC SERPL: 0.82 {RATIO}
LEUKOCYTE ESTERASE UR QL STRIP.AUTO: NEGATIVE
LYMPHOCYTES # BLD AUTO: 2.69 10*3/MM3 (ref 0.7–3.1)
LYMPHOCYTES NFR BLD AUTO: 36.2 % (ref 19.6–45.3)
MCH RBC QN AUTO: 29.4 PG (ref 26.6–33)
MCHC RBC AUTO-ENTMCNC: 32.1 G/DL (ref 31.5–35.7)
MCV RBC AUTO: 91.5 FL (ref 79–97)
MICROALBUMIN/CREAT UR: NORMAL MG/G{CREAT}
MONOCYTES # BLD AUTO: 0.57 10*3/MM3 (ref 0.1–0.9)
MONOCYTES NFR BLD AUTO: 7.7 % (ref 5–12)
NEUTROPHILS NFR BLD AUTO: 3.86 10*3/MM3 (ref 1.7–7)
NEUTROPHILS NFR BLD AUTO: 51.8 % (ref 42.7–76)
NITRITE UR QL STRIP: NEGATIVE
NRBC BLD AUTO-RTO: 0 /100 WBC (ref 0–0.2)
PH UR STRIP.AUTO: 6 [PH] (ref 5–8)
PLATELET # BLD AUTO: 258 10*3/MM3 (ref 140–450)
PMV BLD AUTO: 10.6 FL (ref 6–12)
POTASSIUM SERPL-SCNC: 3.9 MMOL/L (ref 3.5–5.2)
PROT SERPL-MCNC: 7 G/DL (ref 6–8.5)
PROT UR QL STRIP: NEGATIVE
RBC # BLD AUTO: 4.7 10*6/MM3 (ref 3.77–5.28)
SODIUM SERPL-SCNC: 140 MMOL/L (ref 136–145)
SP GR UR STRIP: >1.03 (ref 1–1.03)
TRIGL SERPL-MCNC: 107 MG/DL (ref 0–150)
TSH SERPL DL<=0.05 MIU/L-ACNC: 0.81 UIU/ML (ref 0.27–4.2)
UROBILINOGEN UR QL STRIP: ABNORMAL
VLDLC SERPL-MCNC: 20 MG/DL (ref 5–40)
WBC NRBC COR # BLD AUTO: 7.44 10*3/MM3 (ref 3.4–10.8)

## 2024-06-25 PROCEDURE — 81003 URINALYSIS AUTO W/O SCOPE: CPT

## 2024-06-25 PROCEDURE — 83036 HEMOGLOBIN GLYCOSYLATED A1C: CPT

## 2024-06-25 PROCEDURE — 80061 LIPID PANEL: CPT

## 2024-06-25 PROCEDURE — 82570 ASSAY OF URINE CREATININE: CPT

## 2024-06-25 PROCEDURE — 80053 COMPREHEN METABOLIC PANEL: CPT

## 2024-06-25 PROCEDURE — 84443 ASSAY THYROID STIM HORMONE: CPT

## 2024-06-25 PROCEDURE — 85025 COMPLETE CBC W/AUTO DIFF WBC: CPT

## 2024-06-25 PROCEDURE — 82043 UR ALBUMIN QUANTITATIVE: CPT

## 2024-06-25 NOTE — TELEPHONE ENCOUNTER
Caller: Claudette Gramajo    Relationship: Self    Best call back number: [phone]    What is the best time to reach you: BETOUY    Who are you requesting to speak with (clinical staff, provider,  specific staff member): SCHEDULING    Do you know the name of the person who called: DEBORAH    What was the call regarding: SCHEDULING U/S AND F/U WITH DR. AGUILA    Is it okay if the provider responds through [employer]: PLEASE CALL PT TO SCHEDULE

## 2024-06-26 PROBLEM — E11.9 TYPE 2 DIABETES MELLITUS WITHOUT COMPLICATION, WITHOUT LONG-TERM CURRENT USE OF INSULIN: Status: ACTIVE | Noted: 2022-05-24

## 2024-06-26 NOTE — PROGRESS NOTES
Follow Up Office Visit      Patient Name: Claudette Gramajo  : 1965   MRN: [id number]     Chief Complaint:    Chief Complaint   Patient presents with    Diabetes    Coronary Artery Disease    Hypertension    Hyperlipidemia    Heartburn    Chronic Kidney Disease       History of Present Illness: Caludette Gramajo is a 59 y.o. female who is here today to follow up for DM2, CAD, HTN, hyperlipidemia, GERD, CKD  Review lab results      A1C-2024 6.8      Currently taking  Metformin 500mg, Jardiance 25mg    Pt reports she discontinued ozempic 0.5 mg on her own after last office visit, states It made her constipated   Pt reports constipation resolved now with some diarrhea   Colonoscopy scheduled  with dr de la torre   Would like to resume ozempic at 0.25 mg because her appetite is back she is gaining weight  Fasting glucose at home this morning 118   Ranging up to 149 since stopping her ozempic     Eye exam-- eye physicians [facility]   Foot exam- - Dr Rangel  mammogram-2024  Pap-2022  Colonoscopy-2022             Subjective      Review of Systems:   Review of Systems   Constitutional:  Negative for fever.   Respiratory:  Negative for cough.    Cardiovascular:  Negative for chest pain.   Gastrointestinal:  Negative for abdominal pain, constipation, diarrhea, nausea and vomiting.   Genitourinary:  Negative for dysuria.   Musculoskeletal:  Negative for myalgias.        Past Medical History:   Past Medical History:   Diagnosis Date    Anesthesia complication     woke up during surgery    Arthritis     Arthritis of neck     CAD (coronary artery disease)     Cellulitis and abscess of leg 2013    Chronic purulent otitis media 2013    Connective tissue anomaly     CTS (carpal tunnel syndrome)     Diabetes mellitus     Dislocation, patella closed 2017    Family history of colon cancer 2022    Family history of esophageal cancer 2022    Fracture of wrist 1979    right hand     Hiatal hernia     Hyperlipidemia LDL goal <70 08/31/2021    Knee swelling     Migraine     Myocardial infarct     2013    FARNAZ on CPAP 09/01/2021    RLS (restless legs syndrome)     Rotator cuff syndrome     tita    Sleep apnea     uses CPAP    Subluxation of patella     Ulcerative colitis 5/28/2024       Past Surgical History:   Past Surgical History:   Procedure Laterality Date    CARDIAC CATHETERIZATION      x2    CARDIAC CATHETERIZATION N/A 5/28/2024    Procedure: Left Heart Cath;  Surgeon: Davis Parker MD;  Location: [facility] CATH INVASIVE LOCATION;  Service: Cardiovascular;  Laterality: N/A;    COLONOSCOPY      COLONOSCOPY N/A 08/26/2022    Procedure: COLONOSCOPY WITH POLYPECTOMY;  Surgeon: Sloan Ortiz MD;  Location: [facility] ENDOSCOPY;  Service: Gastroenterology;  Laterality: N/A;  COLON POLYP    CORONARY ANGIOPLASTY      CORONARY STENT PLACEMENT      x2    ENDOSCOPY N/A 08/26/2022    Procedure: ESOPHAGOGASTRODUODENOSCOPY WITH BX;  Surgeon: Sloan Ortiz MD;  Location: [facility] ENDOSCOPY;  Service: Gastroenterology;  Laterality: N/A;  HIATAL HERNIA    INNER EAR SURGERY      KNEE SURGERY  1984, oct.    right knee    UPPER GASTROINTESTINAL ENDOSCOPY      VENOUS THROMBECTOMY      patient unsure       Family History:   Family History   Problem Relation Age of Onset    Heart attack Father     Heart disease Father     Cancer Father     Osteoporosis Father     Rheumatologic disease Father     Suicidality Mother     Osteoporosis Mother     Rheumatologic disease Mother     Scoliosis Mother     Heart disease Paternal Grandfather     Cancer Paternal Grandfather     Thyroid disease Paternal Grandfather     Pancreatic cancer Paternal Grandfather     Esophageal cancer Paternal Grandfather     Stomach cancer Paternal Grandfather     Sleep apnea Paternal Grandfather     Heart disease Paternal Grandmother     Cancer Paternal Grandmother     No Known Problems Maternal Grandmother     No Known Problems Maternal  Grandfather     Colon cancer Neg Hx     Malig Hyperthermia Neg Hx     Breast cancer Neg Hx     Ovarian cancer Neg Hx     Uterine cancer Neg Hx     Deep vein thrombosis Neg Hx     Pulmonary embolism Neg Hx        Social History:   Social History     Socioeconomic History    Marital status:    Tobacco Use    Smoking status: Former     Current packs/day: 0.00     Average packs/day: 1 pack/day for 26.0 years (26.0 ttl pk-yrs)     Types: Cigarettes     Start date: 1986     Quit date: 2012     Years since quittin.4     Passive exposure: Past    Smokeless tobacco: Never    Tobacco comments:     caffeine use   Vaping Use    Vaping status: Never Used   Substance and Sexual Activity    Alcohol use: No    Drug use: Never    Sexual activity: Yes     Partners: Male       Medications:     Current Outpatient Medications:     albuterol sulfate  (90 Base) MCG/ACT inhaler, Take 1-2 Puffs every 4 hours prn, Disp: 8 g, Rfl: 1    Alcohol Swabs (Easy Touch Alcohol Prep Medium) 70 % pads, 1 each by Other route Daily., Disp: 100 each, Rfl: 1    ammonium lactate (LAC-HYDRIN) 12 % lotion, Apply  topically to the appropriate area as directed 2 (Two) Times a Day., Disp: 225 g, Rfl: 11    aspirin 81 MG EC tablet, Take 1 tablet by mouth Daily., Disp: 90 tablet, Rfl: 3    cetirizine (zyrTEC) 10 MG tablet, Take 1 tablet by mouth Daily., Disp: 90 tablet, Rfl: 0    cholecalciferol (VITAMIN D3) 25 MCG (1000 UT) tablet, Take 1 tablet by mouth Daily. Does not take daily only occasionally, Disp: 90 tablet, Rfl: 1    clopidogrel (PLAVIX) 75 MG tablet, Take 1 tablet by mouth Daily. (Patient taking differently: Take 1 tablet by mouth Every Night.), Disp: 90 tablet, Rfl: 1    empagliflozin (JARDIANCE) 25 MG tablet tablet, Take 1 tablet by mouth Daily., Disp: 90 tablet, Rfl: 1    ezetimibe (Zetia) 10 MG tablet, Take 1 tablet by mouth Daily., Disp: 90 tablet, Rfl: 1    famotidine (Pepcid) 40 MG tablet, Take 1 tablet by mouth 2 (Two)  Times a Day As Needed for Heartburn., Disp: 90 tablet, Rfl: 1    fluticasone (FLONASE) 50 MCG/ACT nasal spray, 2 sprays into the nostril(s) as directed by provider Daily. 2 sprays each nostril daily, Disp: 48 g, Rfl: 1    FREESTYLE LITE test strip, 1 each by Other route 3 times a day., Disp: 300 each, Rfl: 2    furosemide (LASIX) 20 MG tablet, Take 1 tablet by mouth Daily. (Patient taking differently: Take 1 tablet by mouth Every Night.), Disp: 90 tablet, Rfl: 1    hydrOXYzine (ATARAX) 25 MG tablet, Take 1-2  tabs by mouth at night as needed for sleep., Disp: 60 tablet, Rfl: 5    Lancets (freestyle) lancets, 1 each by Other route 3 times a day., Disp: 300 each, Rfl: 2    metFORMIN ER (GLUCOPHAGE-XR) 500 MG 24 hr tablet, Take 1 tablet by mouth Daily With Dinner., Disp: 90 tablet, Rfl: 1    metoprolol succinate XL (TOPROL-XL) 25 MG 24 hr tablet, Take 1 tablet by mouth Daily., Disp: 90 tablet, Rfl: 0    montelukast (SINGULAIR) 10 MG tablet, Take 1 tablet by mouth Every Night., Disp: 90 tablet, Rfl: 1    Narcan 4 MG/0.1ML nasal spray, , Disp: , Rfl:     nitroglycerin (NITROSTAT) 0.4 MG SL tablet, 1 under the tongue as needed for angina, may repeat q5mins for up three doses, Disp: 100 tablet, Rfl: 11    oxyCODONE (ROXICODONE) 5 MG immediate release tablet, Take  by mouth As Needed., Disp: , Rfl:     PEG-KCl-NaCl-NaSulf-Na Asc-C (MoviPrep) 100 g reconstituted solution powder, Take 1,000 mL by mouth Every 12 (Twelve) Hours., Disp: 1 each, Rfl: 0    polyethylene glycol (MIRALAX) 17 GM/SCOOP powder, Take 17 g by mouth Daily for 30 days., Disp: 527 g, Rfl: 1    rosuvastatin (CRESTOR) 40 MG tablet, Take 1 tablet by mouth Every Night., Disp: 90 tablet, Rfl: 1    Savella 25 MG tablet tablet, Take 1 tablet by mouth Every Night., Disp: , Rfl:     tiotropium bromide monohydrate (Spiriva Respimat) 2.5 MCG/ACT aerosol solution inhaler, Inhale 2 puffs Daily., Disp: 4 g, Rfl: 5    zolpidem (AMBIEN) 5 MG tablet, Take 1 tablet by mouth  "At Night As Needed., Disp: , Rfl:     Semaglutide,0.25 or 0.5MG/DOS, (OZEMPIC) 2 MG/3ML solution pen-injector, Inject 0.25 mg under the skin into the appropriate area as directed 1 (One) Time Per Week., Disp: 3 mL, Rfl: 3    Allergies:   Allergies   Allergen Reactions    Adhesive Tape Itching    Molds & Smuts Cough    Pollen Extract Cough             Objective     Physical Exam:  Vital Signs:   Vitals:    06/27/24 1507   BP: 102/74   Pulse: 75   Temp: 98.1 °F (36.7 °C)   SpO2: 97%   Weight: 90.7 kg (200 lb)   Height: 170.2 cm (67\")     Body mass index is 31.32 kg/m².           Physical Exam  HENT:      Head: Normocephalic.      Nose: Nose normal.   Eyes:      Conjunctiva/sclera: Conjunctivae normal.   Neck:      Vascular: No carotid bruit.   Cardiovascular:      Rate and Rhythm: Normal rate and regular rhythm.      Heart sounds: Normal heart sounds. No murmur heard.  Pulmonary:      Effort: Pulmonary effort is normal.      Breath sounds: Normal breath sounds.   Abdominal:      General: Bowel sounds are normal.      Palpations: Abdomen is soft.   Musculoskeletal:      Right lower leg: No edema.      Left lower leg: No edema.   Skin:     General: Skin is warm and dry.   Neurological:      Mental Status: She is alert.   Psychiatric:         Mood and Affect: Mood normal.         Behavior: Behavior normal.             Assessment / Plan      Assessment/Plan:   Diagnoses and all orders for this visit:    1. Type 2 diabetes mellitus without complication, without long-term current use of insulin (Primary)  -     CBC Auto Differential; Future  -     Comprehensive Metabolic Panel; Future  -     Microalbumin / Creatinine Urine Ratio - Urine, Clean Catch; Future  -     Hemoglobin A1c; Future  -     TSH; Future  -     Urinalysis With Culture If Indicated -; Future  -     CBC Auto Differential; Future  -     Comprehensive Metabolic Panel; Future  -     Microalbumin / Creatinine Urine Ratio - Urine, Clean Catch; Future  -     " Hemoglobin A1c; Future  -     TSH; Future  -     Urinalysis With Culture If Indicated -; Future    2. Essential hypertension    3. Hyperlipidemia LDL goal <70  -     Comprehensive Metabolic Panel; Future  -     Lipid Panel; Future  -     Comprehensive Metabolic Panel; Future  -     Lipid Panel; Future    4. CAD S/P percutaneous coronary angioplasty    5. Stage 3 chronic kidney disease, unspecified whether stage 3a or 3b CKD  -     Comprehensive Metabolic Panel; Future  -     Comprehensive Metabolic Panel; Future    6. History of smoking 25-50 pack years    7. Pulmonary emphysema, unspecified emphysema type  -     albuterol sulfate  (90 Base) MCG/ACT inhaler; Take 1-2 Puffs every 4 hours prn  Dispense: 8 g; Refill: 1    8. Vitamin D deficiency  -     cholecalciferol (VITAMIN D3) 25 MCG (1000 UT) tablet; Take 1 tablet by mouth Daily. Does not take daily only occasionally  Dispense: 90 tablet; Refill: 1    9. Allergic rhinitis, unspecified seasonality, unspecified trigger  -     fluticasone (FLONASE) 50 MCG/ACT nasal spray; 2 sprays into the nostril(s) as directed by provider Daily. 2 sprays each nostril daily  Dispense: 48 g; Refill: 1  -     montelukast (SINGULAIR) 10 MG tablet; Take 1 tablet by mouth Every Night.  Dispense: 90 tablet; Refill: 1    Other orders  -     Alcohol Swabs (Easy Touch Alcohol Prep Medium) 70 % pads; 1 each by Other route Daily.  Dispense: 100 each; Refill: 1  -     cetirizine (zyrTEC) 10 MG tablet; Take 1 tablet by mouth Daily.  Dispense: 90 tablet; Refill: 0  -     ezetimibe (Zetia) 10 MG tablet; Take 1 tablet by mouth Daily.  Dispense: 90 tablet; Refill: 1  -     famotidine (Pepcid) 40 MG tablet; Take 1 tablet by mouth 2 (Two) Times a Day As Needed for Heartburn.  Dispense: 90 tablet; Refill: 1  -     metFORMIN ER (GLUCOPHAGE-XR) 500 MG 24 hr tablet; Take 1 tablet by mouth Daily With Dinner.  Dispense: 90 tablet; Refill: 1  -     rosuvastatin (CRESTOR) 40 MG tablet; Take 1 tablet by  "mouth Every Night.  Dispense: 90 tablet; Refill: 1  -     empagliflozin (JARDIANCE) 25 MG tablet tablet; Take 1 tablet by mouth Daily.  Dispense: 90 tablet; Refill: 1  -     Semaglutide,0.25 or 0.5MG/DOS, (OZEMPIC) 2 MG/3ML solution pen-injector; Inject 0.25 mg under the skin into the appropriate area as directed 1 (One) Time Per Week.  Dispense: 3 mL; Refill: 3         Diabetes mellitus type 2 hemoglobin A1c not at goal 6.5 will resume Ozempic at 0.25 mg recommend exercise 30 minutes daily weight loss we will continue metformin and Jardiance at this time we will follow-up in 90 days  Hypertension currently controlled at this time  Hyperlipidemia LDL below goal on current statin dose Crestor 40 mg at nighttime denies myalgias liver enzymes normal patient is status post angioplasty currently on aspirin and Plavix for cardiology follow-up scheduled  Chronic kidney disease avoid all NSAIDs continue Jardiance at this time  History of smoking greater than 25 pack years of pulmonary emphysema no wheezing or shortness of breath albuterol as needed CT low-dose of the chest up-to-date  Allergic rhinitis currently controlled [facility]  Vitamin D deficiency continue 1000 units supplementation daily      Follow Up:   Return in about 3 months (around 9/27/2024).    SARWAT Dietz    \"Please note that portions of this note were completed with a voice recognition program.\"    "

## 2024-06-27 ENCOUNTER — OFFICE VISIT (OUTPATIENT)
Dept: FAMILY MEDICINE CLINIC | Facility: CLINIC | Age: 59
End: 2024-06-27
Payer: OTHER GOVERNMENT

## 2024-06-27 VITALS
TEMPERATURE: 98.1 F | HEART RATE: 75 BPM | HEIGHT: 67 IN | BODY MASS INDEX: 31.39 KG/M2 | OXYGEN SATURATION: 97 % | DIASTOLIC BLOOD PRESSURE: 74 MMHG | SYSTOLIC BLOOD PRESSURE: 102 MMHG | WEIGHT: 200 LBS

## 2024-06-27 DIAGNOSIS — N18.30 STAGE 3 CHRONIC KIDNEY DISEASE, UNSPECIFIED WHETHER STAGE 3A OR 3B CKD: ICD-10-CM

## 2024-06-27 DIAGNOSIS — E11.9 TYPE 2 DIABETES MELLITUS WITHOUT COMPLICATION, WITHOUT LONG-TERM CURRENT USE OF INSULIN: Primary | ICD-10-CM

## 2024-06-27 DIAGNOSIS — I25.10 CAD S/P PERCUTANEOUS CORONARY ANGIOPLASTY: ICD-10-CM

## 2024-06-27 DIAGNOSIS — I10 ESSENTIAL HYPERTENSION: ICD-10-CM

## 2024-06-27 DIAGNOSIS — Z98.61 CAD S/P PERCUTANEOUS CORONARY ANGIOPLASTY: ICD-10-CM

## 2024-06-27 DIAGNOSIS — Z87.891 HISTORY OF SMOKING 25-50 PACK YEARS: ICD-10-CM

## 2024-06-27 DIAGNOSIS — J30.9 ALLERGIC RHINITIS, UNSPECIFIED SEASONALITY, UNSPECIFIED TRIGGER: ICD-10-CM

## 2024-06-27 DIAGNOSIS — J43.9 PULMONARY EMPHYSEMA, UNSPECIFIED EMPHYSEMA TYPE: ICD-10-CM

## 2024-06-27 DIAGNOSIS — E55.9 VITAMIN D DEFICIENCY: ICD-10-CM

## 2024-06-27 DIAGNOSIS — E78.5 HYPERLIPIDEMIA LDL GOAL <70: ICD-10-CM

## 2024-06-27 PROCEDURE — 99214 OFFICE O/P EST MOD 30 MIN: CPT | Performed by: NURSE PRACTITIONER

## 2024-06-27 RX ORDER — EZETIMIBE 10 MG/1
10 TABLET ORAL DAILY
Qty: 90 TABLET | Refills: 1 | Status: SHIPPED | OUTPATIENT
Start: 2024-06-27

## 2024-06-27 RX ORDER — ALBUTEROL SULFATE 90 UG/1
AEROSOL, METERED RESPIRATORY (INHALATION)
Qty: 8 G | Refills: 1 | Status: SHIPPED | OUTPATIENT
Start: 2024-06-27

## 2024-06-27 RX ORDER — ISOPROPYL ALCOHOL 70 ML/100ML
1 SWAB TOPICAL DAILY
Qty: 100 EACH | Refills: 1 | Status: SHIPPED | OUTPATIENT
Start: 2024-06-27

## 2024-06-27 RX ORDER — DULOXETIN HYDROCHLORIDE 60 MG/1
60 CAPSULE, DELAYED RELEASE ORAL DAILY
Qty: 90 CAPSULE | Refills: 1 | Status: CANCELLED | OUTPATIENT
Start: 2024-06-27

## 2024-06-27 RX ORDER — MONTELUKAST SODIUM 10 MG/1
10 TABLET ORAL NIGHTLY
Qty: 90 TABLET | Refills: 1 | Status: SHIPPED | OUTPATIENT
Start: 2024-06-27

## 2024-06-27 RX ORDER — MELATONIN
1000 DAILY
Qty: 90 TABLET | Refills: 1 | Status: SHIPPED | OUTPATIENT
Start: 2024-06-27

## 2024-06-27 RX ORDER — METFORMIN HYDROCHLORIDE 500 MG/1
500 TABLET, EXTENDED RELEASE ORAL
Qty: 90 TABLET | Refills: 1 | Status: SHIPPED | OUTPATIENT
Start: 2024-06-27

## 2024-06-27 RX ORDER — FLUTICASONE PROPIONATE 50 MCG
2 SPRAY, SUSPENSION (ML) NASAL DAILY
Qty: 48 G | Refills: 1 | Status: SHIPPED | OUTPATIENT
Start: 2024-06-27

## 2024-06-27 RX ORDER — ROSUVASTATIN CALCIUM 40 MG/1
40 TABLET, COATED ORAL NIGHTLY
Qty: 90 TABLET | Refills: 1 | Status: SHIPPED | OUTPATIENT
Start: 2024-06-27

## 2024-06-27 RX ORDER — CLOPIDOGREL BISULFATE 75 MG/1
75 TABLET ORAL DAILY
Qty: 90 TABLET | Refills: 1 | Status: CANCELLED | OUTPATIENT
Start: 2024-06-27

## 2024-06-27 RX ORDER — FUROSEMIDE 20 MG/1
20 TABLET ORAL DAILY
Qty: 90 TABLET | Refills: 1 | Status: CANCELLED | OUTPATIENT
Start: 2024-06-27

## 2024-06-27 RX ORDER — CETIRIZINE HYDROCHLORIDE 10 MG/1
10 TABLET ORAL DAILY
Qty: 90 TABLET | Refills: 0 | Status: SHIPPED | OUTPATIENT
Start: 2024-06-27

## 2024-06-27 RX ORDER — FAMOTIDINE 40 MG/1
40 TABLET, FILM COATED ORAL 2 TIMES DAILY PRN
Qty: 90 TABLET | Refills: 1 | Status: SHIPPED | OUTPATIENT
Start: 2024-06-27

## 2024-07-01 NOTE — PRE-PROCEDURE INSTRUCTIONS
"Instructed on date and arrival time of 1030. Come to entrance \"C\". Must have  over age 18 to drive home.  May have two visitors; however, children under 12 must stay in waiting room.  Discussed clear liquid diet (no red or purple), bowel prep, and NPO.  May take medications as usual except for blood thinners, diabetic medications, and weight loss medications.  Verbalized understanding of instructions given.  Instructed to call for questions or concerns.  Hold Plavix for 5 days prior to procedure.  Cardiac and blood thinner clearances noted in chart.  "

## 2024-07-05 ENCOUNTER — ANESTHESIA EVENT (OUTPATIENT)
Dept: GASTROENTEROLOGY | Facility: HOSPITAL | Age: 59
End: 2024-07-05
Payer: OTHER GOVERNMENT

## 2024-07-05 NOTE — ANESTHESIA PREPROCEDURE EVALUATION
Anesthesia Evaluation     Patient summary reviewed and Nursing notes reviewed   NPO Solid Status: > 8 hours  NPO Liquid Status: > 2 hours           Airway   Mallampati: II  TM distance: >3 FB  Neck ROM: limited  No difficulty expected  Dental - normal exam     Pulmonary - normal exam    breath sounds clear to auscultation  (+) COPD mild,sleep apnea on CPAP  Cardiovascular - normal exam  Exercise tolerance: good (4-7 METS)    ECG reviewed  PT is on anticoagulation therapy  Rhythm: regular  Rate: normal    (+) hypertension well controlled, past MI  >12 months, CAD, hyperlipidemia      Neuro/Psych  (+) headaches, numbness  GI/Hepatic/Renal/Endo    (+) obesity, hiatal hernia, GERD, GI bleeding lower resolved, liver disease fatty liver disease, renal disease- CRI, diabetes mellitus type 2 well controlled, thyroid problem     Musculoskeletal     Abdominal   (+) obese   Substance History      OB/GYN          Other   arthritis,     ROS/Med Hx Other:   Last dose Ozempic: 4 weeks ago    Stress test 05/13/24:    Left ventricular ejection fraction is normal (Calculated EF = 64%).  ·  Findings consistent with a normal ECG stress test.  ·  Small area of mild decreased perfusion in the anterior apical myocardium on rest with a mild degree of increase on stress images consistent with small degree of ischemia versus attenuation artifact  ·  Low risk abnormality     EKG 09/06/22: HR 78, SR, Borderline left axis deviation, probable anterior infarct, age indeterminant    Ct abdomen /pelvis 05/29/24:   Impression:  There is abnormal thickening of the distal left colon and sigmoid colon  with mild inflammatory stranding. Thickening may extend into the rectum.  Findings likely due to at least moderate distal colitis. There is no significant diverticulosis to suggest diverticulitis. Recommend follow-up to ensure resolution. Colonoscopy may be useful to ensure no  underlying lesion is present. Other findings as above.    Cardiac clearance  received from Dr. Parker on 06/19/24 with moderate risks and may stop Plavix 5 days prior to procedure                     Anesthesia Plan    ASA 3     general   total IV anesthesia  (Total IV Anesthesia    Patient understands anesthesia not responsible for dental damage.  )  intravenous induction     Anesthetic plan, risks, benefits, and alternatives have been provided, discussed and informed consent has been obtained with: patient and spouse/significant other.  Pre-procedure education provided  Plan discussed with CRNA.        CODE STATUS:

## 2024-07-09 ENCOUNTER — ANESTHESIA (OUTPATIENT)
Dept: GASTROENTEROLOGY | Facility: HOSPITAL | Age: 59
End: 2024-07-09
Payer: OTHER GOVERNMENT

## 2024-07-09 ENCOUNTER — HOSPITAL ENCOUNTER (OUTPATIENT)
Facility: HOSPITAL | Age: 59
Setting detail: HOSPITAL OUTPATIENT SURGERY
Discharge: HOME | End: 2024-07-09
Attending: INTERNAL MEDICINE | Admitting: INTERNAL MEDICINE
Payer: OTHER GOVERNMENT

## 2024-07-09 VITALS
DIASTOLIC BLOOD PRESSURE: 79 MMHG | BODY MASS INDEX: 31.15 KG/M2 | TEMPERATURE: 97.8 F | SYSTOLIC BLOOD PRESSURE: 124 MMHG | WEIGHT: 198.85 LBS | HEART RATE: 72 BPM | RESPIRATION RATE: 22 BRPM | OXYGEN SATURATION: 98 %

## 2024-07-09 DIAGNOSIS — R19.4 ALTERED BOWEL HABITS: ICD-10-CM

## 2024-07-09 DIAGNOSIS — K62.5 RECTAL BLEEDING: ICD-10-CM

## 2024-07-09 DIAGNOSIS — R10.84 GENERALIZED ABDOMINAL PAIN: ICD-10-CM

## 2024-07-09 DIAGNOSIS — K59.00 CONSTIPATION, UNSPECIFIED CONSTIPATION TYPE: ICD-10-CM

## 2024-07-09 DIAGNOSIS — K52.9 COLITIS: ICD-10-CM

## 2024-07-09 DIAGNOSIS — R93.89 ABNORMAL CT SCAN: ICD-10-CM

## 2024-07-09 LAB — GLUCOSE BLDC GLUCOMTR-MCNC: 103 MG/DL (ref 70–99)

## 2024-07-09 PROCEDURE — 25010000002 PROPOFOL 10 MG/ML EMULSION: Performed by: NURSE ANESTHETIST, CERTIFIED REGISTERED

## 2024-07-09 PROCEDURE — 25810000003 LACTATED RINGERS PER 1000 ML: Performed by: NURSE ANESTHETIST, CERTIFIED REGISTERED

## 2024-07-09 PROCEDURE — 88305 TISSUE EXAM BY PATHOLOGIST: CPT | Performed by: INTERNAL MEDICINE

## 2024-07-09 PROCEDURE — 82948 REAGENT STRIP/BLOOD GLUCOSE: CPT

## 2024-07-09 PROCEDURE — 25810000003 LACTATED RINGERS PER 1000 ML

## 2024-07-09 RX ORDER — SODIUM CHLORIDE, SODIUM LACTATE, POTASSIUM CHLORIDE, CALCIUM CHLORIDE 600; 310; 30; 20 MG/100ML; MG/100ML; MG/100ML; MG/100ML
30 INJECTION, SOLUTION INTRAVENOUS CONTINUOUS
Status: DISCONTINUED | OUTPATIENT
Start: 2024-07-09 | End: 2024-07-09 | Stop reason: HOSPADM

## 2024-07-09 RX ORDER — LIDOCAINE HYDROCHLORIDE 20 MG/ML
INJECTION, SOLUTION EPIDURAL; INFILTRATION; INTRACAUDAL; PERINEURAL AS NEEDED
Status: DISCONTINUED | OUTPATIENT
Start: 2024-07-09 | End: 2024-07-09 | Stop reason: SURG

## 2024-07-09 RX ORDER — POLYETHYLENE GLYCOL 3350 17 G/17G
17 POWDER, FOR SOLUTION ORAL DAILY
COMMUNITY

## 2024-07-09 RX ORDER — SODIUM CHLORIDE, SODIUM LACTATE, POTASSIUM CHLORIDE, CALCIUM CHLORIDE 600; 310; 30; 20 MG/100ML; MG/100ML; MG/100ML; MG/100ML
INJECTION, SOLUTION INTRAVENOUS CONTINUOUS PRN
Status: DISCONTINUED | OUTPATIENT
Start: 2024-07-09 | End: 2024-07-09 | Stop reason: SURG

## 2024-07-09 RX ORDER — PROPOFOL 10 MG/ML
VIAL (ML) INTRAVENOUS AS NEEDED
Status: DISCONTINUED | OUTPATIENT
Start: 2024-07-09 | End: 2024-07-09 | Stop reason: SURG

## 2024-07-09 RX ADMIN — SODIUM CHLORIDE, POTASSIUM CHLORIDE, SODIUM LACTATE AND CALCIUM CHLORIDE: 600; 310; 30; 20 INJECTION, SOLUTION INTRAVENOUS at 08:38

## 2024-07-09 RX ADMIN — PROPOFOL 165 MCG/KG/MIN: 10 INJECTION, EMULSION INTRAVENOUS at 08:44

## 2024-07-09 RX ADMIN — LIDOCAINE HYDROCHLORIDE 60 MG: 20 INJECTION, SOLUTION EPIDURAL; INFILTRATION; INTRACAUDAL; PERINEURAL at 08:44

## 2024-07-09 RX ADMIN — SODIUM CHLORIDE, POTASSIUM CHLORIDE, SODIUM LACTATE AND CALCIUM CHLORIDE 30 ML/HR: 600; 310; 30; 20 INJECTION, SOLUTION INTRAVENOUS at 07:58

## 2024-07-09 RX ADMIN — PROPOFOL 50 MG: 10 INJECTION, EMULSION INTRAVENOUS at 08:43

## 2024-07-09 NOTE — ANESTHESIA POSTPROCEDURE EVALUATION
Patient: Claudette Gramajo    Procedure Summary       Date: 07/09/24 Room / Location: [facility] ENDOSCOPY 2 / [facility] ENDOSCOPY    Anesthesia Start: 0841 Anesthesia Stop: 0915    Procedure: COLONOSCOPY WITH COLD SNARE POLYPECTOMIES Diagnosis:       Generalized abdominal pain      Altered bowel habits      Constipation, unspecified constipation type      Rectal bleeding      Abnormal CT scan      Colitis      (Generalized abdominal pain [R10.84])      (Altered bowel habits [R19.4])      (Constipation, unspecified constipation type [K59.00])      (Rectal bleeding [K62.5])      (Abnormal CT scan [R93.89])      (Colitis [K52.9])    Surgeons: Sloan Ortiz MD Provider: Kailash Black CRNA    Anesthesia Type: general ASA Status: 3            Anesthesia Type: general    Vitals  Vitals Value Taken Time   /79 07/09/24 0930   Temp 36.6 °C (97.8 °F) 07/09/24 0915   Pulse 74 07/09/24 0931   Resp 22 07/09/24 0930   SpO2 98 % 07/09/24 0931   Vitals shown include unfiled device data.        Post Anesthesia Care and Evaluation    Patient location during evaluation: bedside  Patient participation: complete - patient participated  Level of consciousness: awake  Pain management: adequate    Airway patency: patent  Anesthetic complications: No anesthetic complications  PONV Status: controlled  Cardiovascular status: acceptable and stable  Respiratory status: acceptable

## 2024-07-10 ENCOUNTER — ANESTHESIA EVENT (OUTPATIENT)
Dept: PERIOP | Facility: HOSPITAL | Age: 59
End: 2024-07-10
Payer: OTHER GOVERNMENT

## 2024-07-10 NOTE — PRE-PROCEDURE INSTRUCTIONS
ATIENT INSTRUCTED TO BE:    - NOTHING TO EAT AFTER MIDNIGHT OR CHEW, EXCEPT CAN HAVE CLEAR LIQUIDS 2 HOURS PRIOR TO SURGERY ARRIVAL TIME , NO MORE THAN 8 OZ. (NOTHING RED)     - TO HOLD ALL VITAMINS, SUPPLEMENTS, NSAIDS FOR ONE WEEK PRIOR TO THEIR SURGICAL PROCEDURE    - DO NOT TAKE _____LAST DOSE 6/10/24_________________ 7 DAYS PRIOR TO PROCEDURE PER ANESTHESIA RECOMMENDATIONS/INSTRUCTIONS     - INSTRUCTED PT TO USE SURGICAL SOAP 1 TIME THE NIGHT PRIOR TO SURGERY ___________ OR THE AM OF SURGERY _____________   USE THE SOAP FROM NECK TO TOES, AVOID THEIR FACE, HAIR, AND PRIVATE PARTS. IF USE THE SOAP THE NIGHT PRIOR TO SURGERY, CHANGE BED LINENS AND NO PETS IN THE BED.     INSTRUCTED NO LOTIONS, JEWELRY, PIERCINGS,  NAIL POLISH, OR DEODORANT DAY OF SURGERY    - IF DIABETIC, CHECK BLOOD GLUCOSE IF LESS THAN 70 OR HAVING SYMPTOMS CALL THE PREOP AREA FOR INSTRUCTIONS ON AM OF SURGERY ( [phone])    -INSTRUCTED TO TAKE THE FOLLOWING MEDICATIONS THE DAY OF SURGERY WITH SIPS OF WATER:   ALBUTEROL INHALER AS NEEDED, CETIRIZINE, METOPROLOL, SPIREVA INHALER  CONTINUE TO HOLD: VITAMINS, ASPIRIN, PLAVIX, JARDIANCE, AND OZEMPIC      - DO NOT BRING ANY MEDICATIONS WITH YOU TO THE HOSPITAL THE DAY OF SURGERY, EXCEPT IF USE INHALERS. BRING INHALERS DAY OF SURGERY       - BRING CPAP OR BIPAP TO THE HOSPITAL ONLY IF YOU ARE SPENDING THE NIGHT  NA  - DO NOT SMOKE OR VAPE 24 HOURS PRIOR TO PROCEDURE PER ANESTHESIA REQUEST   NA  -MAKE SURE YOU HAVE A RIDE HOME OR SOMEONE TO STAY WITH YOU THE DAY OF THE PROCEDURE AFTER YOU GO HOME     - FOLLOW ANY OTHER INSTRUCTIONS GIVEN TO YOU BY YOUR SURGEON'S OFFICE.     - DAY OF SURGERY __ COME TO [facility], [facility] FLOOR. CHECK IN AT THE DESK FOR REGISTRATION/SURGERY    - YOU WILL RECEIVE A PHONE CALL THE DAY PRIOR TO SURGERY BETWEEN 1PM AND 4 PM WITH ARRIVAL TIME, IF YOUR SURGERY IS ON A MONDAY YOU WILL RECEIVE A CALL THE FRIDAY PRIOR TO SURGERY DATE    - BRING CASH OR CREDIT  CARD FOR COPAYMENT OF MEDICATIONS AFTER SURGERY IF YOU USE THE HOSPITAL PHARMACY (MEDS TO BED)  NA  - PREADMISSION TESTING NURSE DOV WARREN [phone] IF HAVE ANY QUESTIONS     -PATIENT PROVIDED THE NUMBER FOR PREOP SURGICAL DEPT IF HAD QUESTIONS AFTER HOURS PRIOR TO SURGERY ([phone]).  INFORMED PT IF NO ANSWER, LEAVE A MESSAGE AND SOMEONE WILL RETURN THEIR CALL       PATIENT VERBALIZED UNDERSTANDING

## 2024-07-11 ENCOUNTER — ANESTHESIA (OUTPATIENT)
Dept: PERIOP | Facility: HOSPITAL | Age: 59
End: 2024-07-11
Payer: OTHER GOVERNMENT

## 2024-07-11 ENCOUNTER — HOSPITAL ENCOUNTER (OUTPATIENT)
Facility: HOSPITAL | Age: 59
Discharge: HOME | End: 2024-07-11
Attending: ORTHOPAEDIC SURGERY | Admitting: ORTHOPAEDIC SURGERY
Payer: OTHER GOVERNMENT

## 2024-07-11 VITALS
SYSTOLIC BLOOD PRESSURE: 164 MMHG | HEIGHT: 67 IN | WEIGHT: 196.43 LBS | BODY MASS INDEX: 30.83 KG/M2 | TEMPERATURE: 96.8 F | RESPIRATION RATE: 20 BRPM | HEART RATE: 66 BPM | OXYGEN SATURATION: 100 % | DIASTOLIC BLOOD PRESSURE: 83 MMHG

## 2024-07-11 DIAGNOSIS — G56.03 CARPAL TUNNEL SYNDROME, BILATERAL: ICD-10-CM

## 2024-07-11 LAB
GLUCOSE BLDC GLUCOMTR-MCNC: 161 MG/DL (ref 70–99)
GLUCOSE BLDC GLUCOMTR-MCNC: 216 MG/DL (ref 70–99)

## 2024-07-11 PROCEDURE — 25010000002 KETOROLAC TROMETHAMINE PER 15 MG: Performed by: NURSE ANESTHETIST, CERTIFIED REGISTERED

## 2024-07-11 PROCEDURE — 25010000002 CEFAZOLIN PER 500 MG: Performed by: ORTHOPAEDIC SURGERY

## 2024-07-11 PROCEDURE — 25010000002 ONDANSETRON PER 1 MG: Performed by: NURSE ANESTHETIST, CERTIFIED REGISTERED

## 2024-07-11 PROCEDURE — 25010000002 BUPIVACAINE (PF) 0.5 % SOLUTION: Performed by: ORTHOPAEDIC SURGERY

## 2024-07-11 PROCEDURE — 25810000003 LACTATED RINGERS PER 1000 ML: Performed by: ANESTHESIOLOGY

## 2024-07-11 PROCEDURE — 25010000002 HYDROMORPHONE 1 MG/ML SOLUTION: Performed by: NURSE ANESTHETIST, CERTIFIED REGISTERED

## 2024-07-11 PROCEDURE — 25010000002 MIDAZOLAM PER 1MG: Performed by: ANESTHESIOLOGY

## 2024-07-11 PROCEDURE — 64721 CARPAL TUNNEL SURGERY: CPT | Performed by: ORTHOPAEDIC SURGERY

## 2024-07-11 PROCEDURE — S0260 H&P FOR SURGERY: HCPCS | Performed by: ORTHOPAEDIC SURGERY

## 2024-07-11 PROCEDURE — 25010000002 FENTANYL CITRATE (PF) 50 MCG/ML SOLUTION: Performed by: NURSE ANESTHETIST, CERTIFIED REGISTERED

## 2024-07-11 PROCEDURE — 82948 REAGENT STRIP/BLOOD GLUCOSE: CPT | Performed by: NURSE ANESTHETIST, CERTIFIED REGISTERED

## 2024-07-11 PROCEDURE — 82948 REAGENT STRIP/BLOOD GLUCOSE: CPT | Performed by: ORTHOPAEDIC SURGERY

## 2024-07-11 PROCEDURE — 25010000002 PROPOFOL 10 MG/ML EMULSION: Performed by: NURSE ANESTHETIST, CERTIFIED REGISTERED

## 2024-07-11 PROCEDURE — 25010000002 DEXAMETHASONE PER 1 MG: Performed by: NURSE ANESTHETIST, CERTIFIED REGISTERED

## 2024-07-11 PROCEDURE — 64718 REVISE ULNAR NERVE AT ELBOW: CPT | Performed by: ORTHOPAEDIC SURGERY

## 2024-07-11 RX ORDER — SUCCINYLCHOLINE/SOD CL,ISO/PF 100 MG/5ML
SYRINGE (ML) INTRAVENOUS AS NEEDED
Status: DISCONTINUED | OUTPATIENT
Start: 2024-07-11 | End: 2024-07-11 | Stop reason: SURG

## 2024-07-11 RX ORDER — KETOROLAC TROMETHAMINE 15 MG/ML
INJECTION, SOLUTION INTRAMUSCULAR; INTRAVENOUS AS NEEDED
Status: DISCONTINUED | OUTPATIENT
Start: 2024-07-11 | End: 2024-07-11 | Stop reason: SURG

## 2024-07-11 RX ORDER — FENTANYL CITRATE 50 UG/ML
INJECTION, SOLUTION INTRAMUSCULAR; INTRAVENOUS AS NEEDED
Status: DISCONTINUED | OUTPATIENT
Start: 2024-07-11 | End: 2024-07-11 | Stop reason: SURG

## 2024-07-11 RX ORDER — TIOTROPIUM BROMIDE INHALATION SPRAY 3.12 UG/1
2 SPRAY, METERED RESPIRATORY (INHALATION)
Qty: 4 G | Refills: 5 | Status: SHIPPED | OUTPATIENT
Start: 2024-07-11 | End: 2024-07-11 | Stop reason: SDUPTHER

## 2024-07-11 RX ORDER — TIOTROPIUM BROMIDE INHALATION SPRAY 3.12 UG/1
2 SPRAY, METERED RESPIRATORY (INHALATION)
Qty: 4 G | Refills: 5 | Status: SHIPPED | OUTPATIENT
Start: 2024-07-11

## 2024-07-11 RX ORDER — OXYCODONE HYDROCHLORIDE 5 MG/1
5 TABLET ORAL
Status: COMPLETED | OUTPATIENT
Start: 2024-07-11 | End: 2024-07-11

## 2024-07-11 RX ORDER — MAGNESIUM HYDROXIDE 1200 MG/15ML
LIQUID ORAL AS NEEDED
Status: DISCONTINUED | OUTPATIENT
Start: 2024-07-11 | End: 2024-07-11 | Stop reason: HOSPADM

## 2024-07-11 RX ORDER — PROMETHAZINE HYDROCHLORIDE 12.5 MG/1
25 TABLET ORAL ONCE AS NEEDED
Status: DISCONTINUED | OUTPATIENT
Start: 2024-07-11 | End: 2024-07-11 | Stop reason: HOSPADM

## 2024-07-11 RX ORDER — PROPOFOL 10 MG/ML
VIAL (ML) INTRAVENOUS AS NEEDED
Status: DISCONTINUED | OUTPATIENT
Start: 2024-07-11 | End: 2024-07-11 | Stop reason: SURG

## 2024-07-11 RX ORDER — ONDANSETRON 2 MG/ML
4 INJECTION INTRAMUSCULAR; INTRAVENOUS ONCE AS NEEDED
Status: DISCONTINUED | OUTPATIENT
Start: 2024-07-11 | End: 2024-07-11 | Stop reason: HOSPADM

## 2024-07-11 RX ORDER — MIDAZOLAM HYDROCHLORIDE 2 MG/2ML
2 INJECTION, SOLUTION INTRAMUSCULAR; INTRAVENOUS ONCE
Status: COMPLETED | OUTPATIENT
Start: 2024-07-11 | End: 2024-07-11

## 2024-07-11 RX ORDER — ACETAMINOPHEN 500 MG
1000 TABLET ORAL ONCE
Status: DISCONTINUED | OUTPATIENT
Start: 2024-07-11 | End: 2024-07-11 | Stop reason: HOSPADM

## 2024-07-11 RX ORDER — HYDROCODONE BITARTRATE AND ACETAMINOPHEN 7.5; 325 MG/1; MG/1
1 TABLET ORAL EVERY 4 HOURS PRN
Qty: 25 TABLET | Refills: 0 | Status: SHIPPED | OUTPATIENT
Start: 2024-07-11

## 2024-07-11 RX ORDER — SODIUM CHLORIDE, SODIUM LACTATE, POTASSIUM CHLORIDE, CALCIUM CHLORIDE 600; 310; 30; 20 MG/100ML; MG/100ML; MG/100ML; MG/100ML
9 INJECTION, SOLUTION INTRAVENOUS CONTINUOUS PRN
Status: DISCONTINUED | OUTPATIENT
Start: 2024-07-11 | End: 2024-07-11 | Stop reason: HOSPADM

## 2024-07-11 RX ORDER — LIDOCAINE HYDROCHLORIDE 20 MG/ML
INJECTION, SOLUTION EPIDURAL; INFILTRATION; INTRACAUDAL; PERINEURAL AS NEEDED
Status: DISCONTINUED | OUTPATIENT
Start: 2024-07-11 | End: 2024-07-11 | Stop reason: SURG

## 2024-07-11 RX ORDER — SCOLOPAMINE TRANSDERMAL SYSTEM 1 MG/1
1 PATCH, EXTENDED RELEASE TRANSDERMAL ONCE
Status: DISCONTINUED | OUTPATIENT
Start: 2024-07-11 | End: 2024-07-11 | Stop reason: HOSPADM

## 2024-07-11 RX ORDER — PROMETHAZINE HYDROCHLORIDE 25 MG/1
25 SUPPOSITORY RECTAL ONCE AS NEEDED
Status: DISCONTINUED | OUTPATIENT
Start: 2024-07-11 | End: 2024-07-11 | Stop reason: HOSPADM

## 2024-07-11 RX ORDER — ONDANSETRON 2 MG/ML
INJECTION INTRAMUSCULAR; INTRAVENOUS AS NEEDED
Status: DISCONTINUED | OUTPATIENT
Start: 2024-07-11 | End: 2024-07-11 | Stop reason: SURG

## 2024-07-11 RX ORDER — DEXAMETHASONE SODIUM PHOSPHATE 4 MG/ML
INJECTION, SOLUTION INTRA-ARTICULAR; INTRALESIONAL; INTRAMUSCULAR; INTRAVENOUS; SOFT TISSUE AS NEEDED
Status: DISCONTINUED | OUTPATIENT
Start: 2024-07-11 | End: 2024-07-11 | Stop reason: SURG

## 2024-07-11 RX ORDER — BUPIVACAINE HYDROCHLORIDE 5 MG/ML
INJECTION, SOLUTION EPIDURAL; INTRACAUDAL AS NEEDED
Status: DISCONTINUED | OUTPATIENT
Start: 2024-07-11 | End: 2024-07-11 | Stop reason: HOSPADM

## 2024-07-11 RX ADMIN — SODIUM CHLORIDE, POTASSIUM CHLORIDE, SODIUM LACTATE AND CALCIUM CHLORIDE: 600; 310; 30; 20 INJECTION, SOLUTION INTRAVENOUS at 08:53

## 2024-07-11 RX ADMIN — DEXAMETHASONE SODIUM PHOSPHATE 4 MG: 4 INJECTION, SOLUTION INTRAMUSCULAR; INTRAVENOUS at 08:55

## 2024-07-11 RX ADMIN — FENTANYL CITRATE 25 MCG: 50 INJECTION, SOLUTION INTRAMUSCULAR; INTRAVENOUS at 09:25

## 2024-07-11 RX ADMIN — LIDOCAINE HYDROCHLORIDE 80 MG: 20 INJECTION, SOLUTION INTRAVENOUS at 08:58

## 2024-07-11 RX ADMIN — FENTANYL CITRATE 25 MCG: 50 INJECTION, SOLUTION INTRAMUSCULAR; INTRAVENOUS at 09:21

## 2024-07-11 RX ADMIN — ONDANSETRON HYDROCHLORIDE 4 MG: 2 SOLUTION INTRAMUSCULAR; INTRAVENOUS at 08:55

## 2024-07-11 RX ADMIN — HYDROMORPHONE HYDROCHLORIDE 0.5 MG: 1 INJECTION, SOLUTION INTRAMUSCULAR; INTRAVENOUS; SUBCUTANEOUS at 10:04

## 2024-07-11 RX ADMIN — SCOPALAMINE 1 PATCH: 1 PATCH, EXTENDED RELEASE TRANSDERMAL at 08:17

## 2024-07-11 RX ADMIN — PROPOFOL 150 MG: 10 INJECTION, EMULSION INTRAVENOUS at 08:58

## 2024-07-11 RX ADMIN — SODIUM CHLORIDE 2 G: 9 INJECTION, SOLUTION INTRAVENOUS at 09:03

## 2024-07-11 RX ADMIN — MIDAZOLAM HYDROCHLORIDE 2 MG: 1 INJECTION, SOLUTION INTRAMUSCULAR; INTRAVENOUS at 08:49

## 2024-07-11 RX ADMIN — PROPOFOL 50 MG: 10 INJECTION, EMULSION INTRAVENOUS at 09:15

## 2024-07-11 RX ADMIN — ONDANSETRON 4 MG: 2 INJECTION INTRAMUSCULAR; INTRAVENOUS at 10:04

## 2024-07-11 RX ADMIN — KETOROLAC TROMETHAMINE 30 MG: 15 INJECTION, SOLUTION INTRAMUSCULAR; INTRAVENOUS at 08:55

## 2024-07-11 RX ADMIN — OXYCODONE HYDROCHLORIDE 5 MG: 5 TABLET ORAL at 10:22

## 2024-07-11 RX ADMIN — HYDROMORPHONE HYDROCHLORIDE 0.5 MG: 1 INJECTION, SOLUTION INTRAMUSCULAR; INTRAVENOUS; SUBCUTANEOUS at 10:23

## 2024-07-11 RX ADMIN — FENTANYL CITRATE 50 MCG: 50 INJECTION, SOLUTION INTRAMUSCULAR; INTRAVENOUS at 08:58

## 2024-07-11 RX ADMIN — Medication 100 MG: at 08:58

## 2024-07-11 RX ADMIN — OXYCODONE HYDROCHLORIDE 5 MG: 5 TABLET ORAL at 10:38

## 2024-07-11 NOTE — OP NOTE
CUBITAL TUNNEL RELEASE, CARPAL TUNNEL RELEASE  Procedure Report    Patient Name:  Claudette Gramajo  YOB: 1965    Date of Surgery:  7/11/2024       Pre-op Diagnosis:   Carpal tunnel syndrome, bilateral [G56.03]     Carpal tunnel syndrome  Right cubital tunnel syndrome  Post-Op Diagnosis Codes:     * Carpal tunnel syndrome, bilateral [G56.03]    Procedure/CPT® Codes:      Procedure(s):  Right right CUBITAL TUNNEL RELEASE and ulnar nerve transposition  CARPAL TUNNEL RELEASE    Staff:  Surgeon(s):  Alexis Hawkins MD    Assistant: uAgustine Gonzalez    Anesthesia: General    Estimated Blood Loss: 5 mL    Implants:    Nothing was implanted during the procedure    Specimen:          None      Complications: None    Description of Procedure:   RIGHT CARPAL TUNNEL RELEASE:  The right hand and upper extremity were prepped and draped satisfactorily using alcohol and ChloraPrep.      A standard incision was made just ulnar to the thenar crease to the thumb, approximately 1.5 cm length, carried down through subcutaneous tissue and fascia, carried down to the palmaris fascia with tenotomy scissors.  Using a 69 blade the transverse carpal ligament was released over the median nerve.  The median nerve had significant signs of compression.  A Phoenix elevator was used to protect the nerves during the release and after release had been completed, it was checked proximally and distally and under loupe magnification was satisfactory.  The nerve was inspected and was intact along with the motor branch.  The wound was copiously irrigated.  The skin was closed with horizontal mattress suture nylon and interrupted 3-0 horizontal mattress sutures.  Curvilinear incision was made over the medial epicondyle approximately 4 simmers length dissection was carried down to the ulnar nerve it was isolated just proximal to the medial epicondyle and dissected freely proximally and distally and freed of all of its attachments using  combination of scissors and bipolar cautery the nerve was mobilized anteriorly a fascial sling was then created and while the nerve was in a relaxed position and protected with a joker a fascial sling was sewn to the deep fat using 0 Vicryl the nerve was then much more relaxed position and was completely free and a good position through the new tunnel the wound was copiously irrigated and closed with Vicryl and running Prolene incisions were washed and dried and sterile dressings applied including a Ortho-Glass elbow splint posteriorly and a volar plaster splint for the carpal tunnel Ace wrap's patient tolerated procedure well was explained to recovery room.  Alexis Hawkins MD     Date: 7/11/2024  Time: 09:45 EDT

## 2024-07-11 NOTE — DISCHARGE INSTRUCTIONS
DISCHARGE INSTRUCTIONS  CARPAL TUNNEL RELEASE      For your surgery you had:  General anesthesia (you may have a sore throat for the first 24 hours)  IV sedation  Local anesthesia  Monitored anesthesia care  Regional Anesthesia  You received a medication patch for nausea prevention today (behind the ear). It is recommended that you remove it 24-48 hours post-operatively. It must be removed within 72 hours.   You may experience dizziness, drowsiness, or lightheadedness for several hours following surgery.  Do not stay alone today or tonight.  Limit your activity for 24 hours.  Resume your diet slowly.    You should not drive or operate machinery, drink alcohol, or sign legally binding documents for 24 hours or while you are taking pain medication.   Protect the arm with a sling or follow your physician's specific instructions. Carry the upper arm in a sling so that the hand and wrist are above the level of the heart. Elevate affected arm on a pillow when resting.   Use ice to affected area for 48-72 hours. Apply 20 minutes on - 20 minutes off. Do NOT apply directly to skin.  Exercise fingers frequently by making a full fist and fully straightening the fingers. This will help prevent swelling and stiffness.  Do NOT do any heavy lifting, pulling or strenuous activities using the affected hand. [x] Keep splint clean and dry.  [x] Do NOT submerge in water.  [x] Keep incision area clean and dry.  [x] You may shower tomorrow, keep splint dry and intact. Cover with syran wrap or plastic bag.     NOTIFY YOUR DOCTOR IF YOU EXPERIENCE ANY OF THE FOLLOWING:  Temperature greater than 101 degrees Fahrenheit  Shaking Chills  Redness or excessive drainage from incision  Nausea, vomiting and/or pain that is not controlled by prescribed medications  Increase in bleeding or bleeding that is excessive  Unable to urinate in 6 hours after surgery  If unable to reach your doctor, please go to the closest Emergency Room    Last dose of  pain medication was given at: oxycodone 10mg @ 10:37AM.     You should see   for follow-up care   on   .  Phone number:      SPECIAL INSTRUCTIONS:               I have read and received the above instructions.

## 2024-07-11 NOTE — ANESTHESIA PREPROCEDURE EVALUATION
Anesthesia Evaluation     Patient summary reviewed and Nursing notes reviewed   NPO Solid Status: > 8 hours  NPO Liquid Status: > 2 hours           Airway   Mallampati: II  TM distance: >3 FB  Neck ROM: limited  No difficulty expected  Dental - normal exam     Pulmonary - normal exam    breath sounds clear to auscultation  (+) COPD mild,sleep apnea on CPAP  Cardiovascular - normal exam  Exercise tolerance: good (4-7 METS)    ECG reviewed  PT is on anticoagulation therapy  Patient on routine beta blocker and Beta blocker given within 24 hours of surgery  Rhythm: regular  Rate: normal    (+) hypertension (metoprolol, lasix) well controlled, past MI  >12 months, CAD, cardiac stents (2014, 2013; plavix stopped 9 days ago) more than 12 months ago , hyperlipidemia    ROS comment: Cath (5/8/24; chest pain, abnl stress test):  1. Patent stents with mild in-stent restenosis  2. Mild nonocclusive coronary disease  3. EF 60%  4. Mildly elevated LVEDP of 18    Stress test  (5/13/24; chest pain):  ·  Left ventricular ejection fraction is normal (Calculated EF = 64%).  ·  Findings consistent with a normal ECG stress test.  ·  Small area of mild decreased perfusion in the anterior apical myocardium on rest with a mild degree of increase on stress images consistent with small degree of ischemia versus attenuation artifact  ·  Low risk abnormality           Neuro/Psych  (+) headaches, numbness  GI/Hepatic/Renal/Endo    (+) obesity, hiatal hernia, GERD, GI bleeding lower resolved, liver disease fatty liver disease, renal disease- CRI, diabetes mellitus (ozempic, jardiance, metformin) type 2 well controlled, thyroid problem     Musculoskeletal     Abdominal   (+) obese   Substance History      OB/GYN          Other   arthritis,     ROS/Med Hx Other:   Last dose Ozempic: 4 weeks ago    Cardiac clearance received from Dr. Parker on 06/19/24 with moderate risks and may stop Plavix 5 days prior to procedure                     Anesthesia  Plan    ASA 3     general and MAC     (Patient understands anesthesia not responsible for dental damage.  )  intravenous induction     Anesthetic plan, risks, benefits, and alternatives have been provided, discussed and informed consent has been obtained with: patient.  Pre-procedure education provided  Plan discussed with CRNA.        CODE STATUS:

## 2024-07-11 NOTE — H&P
"Chief Complaint  Initial Evaluation of the Left Hand and Initial Evaluation of the Right Hand        Subjective  Claudette Gramajo presents to [facility] for initial evaluation of bilateral hands. She had an EMG and is here to review. She has numbness and tingling in bilateral hands.  She has decrease  and [facility] tasks.  She has burning and tingling pain from the wrist up the arm.   She has had symptoms for years.  She cannot even work anymore.      Allergies         Allergies   Allergen Reactions    Other Unknown - Low Severity       Hay    Sneezing watery eyes cough    Adhesive Tape Other (See Comments)       BLISTERS    Dust Mite Extract Other (See Comments)    Molds & Smuts Cough    Pollen Extract Cough            Social History   Social History            Socioeconomic History    Marital status:    Tobacco Use    Smoking status: Former       Current packs/day: 0.00       Average packs/day: 1 pack/day for 26.0 years (26.0 ttl pk-yrs)       Types: Cigarettes       Start date: 1986       Quit date: 2012       Years since quittin.3    Smokeless tobacco: Never    Tobacco comments:       caffeine use   Vaping Use    Vaping status: Never Used   Substance and Sexual Activity    Alcohol use: No    Drug use: Never    Sexual activity: Yes       Partners: Male       Birth control/protection: Same-sex partner            I reviewed the patient's chief complaint, history of present illness, review of systems, past medical history, surgical history, family history, social history, medications, and allergy list.      Review of Systems      Constitutional: Denies fevers, chills, weight loss  Cardiovascular: Denies chest pain, shortness of breath  Skin: Denies rashes, acute skin changes  Neurologic: Denies headache, loss of consciousness        Vital Signs:   /77   Pulse 90   Ht 170.2 cm (67\")   Wt 93.9 kg (207 lb)   SpO2 97%   BMI 32.42 kg/m²           Physical " Exam  General: Alert. No acute distress     Ortho Exam          BILATERAL HANDS Positive Compression testing/ Positive Tinels. NegativeFinkelsteins. Negative Newman's testing. Negative CMC grind testing. Positive Phalens. Full ROM of the hand, fingers, elbow and wrist. Negative Triggering of the digit. Sensation grossly intact to light touch, median, radial and ulnar nerve. Positive AIN, PIN and ulnar nerve motor function intact. Axillary nerve intact. Positive pulses.          Procedures           Imaging Results (Most Recent)         None                      Result Review  :      IMPRESSION: 1. 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 2. Evidence of a mild to moderate, demyelinating, focal neuropathy of the bilateral median nerves at the wrist (carpal tunnel). 3. Evidence of a mild, demyelinating, focal neuropathy of the right ulnar nerve at the elbow. 4. No electrophysiologic evidence of a cervical radiculopathy, brachial plexopathy, additional focal neuropathy, polyneuropathic changes, myopathy, or motor neuron pathology in the upper extremities.     EMG & Nerve Conduction Test     Result Date: 4/10/2024  Narrative: See attached EMG/NCS report. Electronically signed by Adalberto Blanco PT, 04/10/24, 9:01 AM EDT.                   Assessment  Assessment and Plan      Diagnoses and all orders for this visit:     1. Cubital tunnel syndrome, bilateral (Primary)     2. Carpal tunnel syndrome, bilateral           Discussed the treatment plan with the patient. I reviewed the EMG with the patient.       Discussed the treatment options with the patient, operative vs non-operative. The patient expressed understanding and wished to proceed with a right trigger finger and cubital tunnel release.       Discussed surgery., Risks/benefits discussed with patient including, but not limited to: infection, bleeding, neurovascular damage, re-rupture, aesthetic  deformity, need for further surgery, and death., Surgery pamphlet given., and Call or return if worsening symptoms.     Follow Up      2 weeks postoperatively

## 2024-07-23 ENCOUNTER — HOSPITAL ENCOUNTER (OUTPATIENT)
Dept: ULTRASOUND IMAGING | Facility: HOSPITAL | Age: 59
Discharge: HOME | End: 2024-07-23
Admitting: STUDENT IN AN ORGANIZED HEALTH CARE EDUCATION/TRAINING PROGRAM
Payer: OTHER GOVERNMENT

## 2024-07-23 DIAGNOSIS — N95.0 PMB (POSTMENOPAUSAL BLEEDING): ICD-10-CM

## 2024-07-23 PROCEDURE — 76856 US EXAM PELVIC COMPLETE: CPT

## 2024-07-23 PROCEDURE — 76830 TRANSVAGINAL US NON-OB: CPT

## 2024-07-24 ENCOUNTER — OFFICE VISIT (OUTPATIENT)
Dept: ORTHOPEDIC SURGERY | Facility: CLINIC | Age: 59
End: 2024-07-24
Payer: OTHER GOVERNMENT

## 2024-07-24 VITALS
BODY MASS INDEX: 30.86 KG/M2 | WEIGHT: 196.6 LBS | HEART RATE: 76 BPM | DIASTOLIC BLOOD PRESSURE: 91 MMHG | HEIGHT: 67 IN | OXYGEN SATURATION: 97 % | SYSTOLIC BLOOD PRESSURE: 150 MMHG

## 2024-07-24 DIAGNOSIS — Z98.890 S/P CUBITAL TUNNEL RELEASE: ICD-10-CM

## 2024-07-24 DIAGNOSIS — Z98.890 S/P CARPAL TUNNEL RELEASE: Primary | ICD-10-CM

## 2024-07-24 DIAGNOSIS — M79.641 RIGHT HAND PAIN: ICD-10-CM

## 2024-07-24 DIAGNOSIS — G56.03 CARPAL TUNNEL SYNDROME, BILATERAL: ICD-10-CM

## 2024-07-24 DIAGNOSIS — M25.531 RIGHT WRIST PAIN: ICD-10-CM

## 2024-07-24 PROCEDURE — 99024 POSTOP FOLLOW-UP VISIT: CPT

## 2024-07-24 RX ORDER — HYDROCODONE BITARTRATE AND ACETAMINOPHEN 7.5; 325 MG/1; MG/1
1 TABLET ORAL EVERY 4 HOURS PRN
Qty: 25 TABLET | Refills: 0 | Status: CANCELLED | OUTPATIENT
Start: 2024-07-24

## 2024-07-24 RX ORDER — HYDROCODONE BITARTRATE AND ACETAMINOPHEN 7.5; 325 MG/1; MG/1
1 TABLET ORAL EVERY 4 HOURS PRN
Qty: 25 TABLET | Refills: 0 | Status: SHIPPED | OUTPATIENT
Start: 2024-07-24

## 2024-07-25 ENCOUNTER — TELEPHONE (OUTPATIENT)
Dept: ORTHOPEDIC SURGERY | Facility: CLINIC | Age: 59
End: 2024-07-25
Payer: OTHER GOVERNMENT

## 2024-07-25 NOTE — TELEPHONE ENCOUNTER
----- Message from [facility] [employer] sent at 7/24/2024  5:01 PM EDT -----  Regarding: Medication Denied   Contact: [phone]  I received a message that my refill was denied.  Do you know why?     Thanks,  Claudette

## 2024-07-29 NOTE — PROGRESS NOTES
GYN Visit    Chief Complaint   Patient presents with    Follow-up     Ultrasound done on 24       HPI:   59 y.o. LMP: No LMP recorded. Patient is postmenopausal. Patient presents to discuss ultrasound findings.  Patient has not had any further bleeding.    Social History     Substance and Sexual Activity   Sexual Activity Yes    Partners: Male       History: PMHx, Meds, Allergies, PSHx, Social Hx, and POBHx all reviewed and updated.  Last Completed Pap Smear            PAP SMEAR (Every 3 Years) Next due on 2022  PAP, Liquid Based ([facility] Only)    2019  Outside Procedure: MA CYTOPATH CERV/VAG THIN LAYER                    PHYSICAL EXAM:  /76   Pulse 81   Wt 87.1 kg (192 lb)   Breastfeeding No   BMI 30.06 kg/m²   General- NAD, alert and oriented, appropriate  Psych- Normal mood  Respiratory- No labored breathing  Abdomen- Soft, non distended, non tender  Extremities- No edema  Skin- No lesions, no rashes      ASSESSMENT AND PLAN:  Diagnoses and all orders for this visit:    1. PMB (postmenopausal bleeding) (Primary)  -     Case Request; Standing  -     sodium chloride 0.9 % flush 3 mL  -     sodium chloride 0.9 % flush 10 mL  -     sodium chloride 0.9 % infusion 40 mL  -     Case Request    Other orders  -     Code Status and Medical Interventions: CPR (Attempt to Resuscitate); Full Support; Standing  -     Follow Anesthesia Guidelines / Protocol; Future  -     Follow Anesthesia Guidelines / Protocol; Standing  -     Chlorhexidine Skin Prep; Future  -     Obtain Informed Consent; Standing  -     Verify / Perform Chlorhexidine Skin Prep; Standing  -     CBC & Differential; Standing  -     Insert Peripheral IV; Standing  -     Saline Lock & Maintain IV Access; Standing  -     Place Sequential Compression Device; Standing  -     Maintain Sequential Compression Device; Standing    Discussed ultrasound findings that showed endometrial stripe of 11 mm.  I discussed with  the patient that in the postmenopausal setting this is thickened and needs to be evaluated.  I recommend a hysteroscopy D&C possible MyoSure for a directed biopsy.     Risks and benefits and alternatives of surgery were discussed with patient.  Risks are not limited to anesthesia, bleeding, blood transfusion, infection, damage to surrounding organs, wound separation, re-operation, thromboembolic disease, death.          Follow Up:  Return in about 2 weeks (around 8/13/2024) for Post op visit.        Fliomena Lorenzo DO  07/30/2024    [facility]  [facility] [place] [address]  Dept: [phone]  Dept Fax: [phone]  Loc: [phone]  Loc Fax: [phone]      Dr. Mcdonough ([place], [place])

## 2024-07-29 NOTE — PROGRESS NOTES
"Chief Complaint  Follow-up of the Right Hand    Subjective      Claudette Gramajo presents to [facility] for follow up of her right wrist and elbow.  Patient is 2 weeks status post right carpal tunnel release and cubital tunnel release performed by Dr. Hawkins on 7/11/2024.  Patient arrives today with splint intact.  Patient reports that she has had quite a bit of pain within the hand.  She states that she has been taking her pain medication and is requesting a refill today.  She states that she does still have some numbness within her fingertips but that it has improved in her hand.  She does report that she fell a few days ago onto her palm but her splint was still intact and did not break.  She states since then she does have quite a bit of pain within the hand.  She denies any discomfort or concerns with the elbow.    Allergies   Allergen Reactions    Adhesive Tape Itching    Molds & Smuts Cough    Pollen Extract Cough       Objective     Vital Signs:   Vitals:    07/24/24 1027   BP: 150/91   Pulse: 76   SpO2: 97%   Weight: 89.2 kg (196 lb 9.6 oz)   Height: 170.2 cm (67.01\")     Body mass index is 30.78 kg/m².    I reviewed the patient's chief complaint, history of present illness, review of systems, past medical history, surgical history, family history, social history, medications, and allergy list.     REVIEW OF SYSTEMS    Constitutional: Denies fevers, chills, weight loss  Cardiovascular: Denies chest pain, shortness of breath  Skin: Denies rashes, acute skin changes  Neurologic: Denies headache, loss of consciousness  MSK: Right wrist pain right elbow pain.     Ortho Exam  Right upper extremity: Sutures removed.  Steri-Strips applied.  Demonstrates active mildly decreased elbow flexion and extension.  Tenderness to palpation over the elbow.  Mild elbow swelling.  Sensation intact.  Neurovascular intact.  Forearm soft, nontender.  Demonstrates active slightly decreased wrist flexion " and extension.  Bruising to the palm.  Well-healing incision.  No concerns for infection at this time.          Imaging Results (Most Recent)       None                Assessment and Plan   Diagnoses and all orders for this visit:    1. S/P carpal tunnel release (Primary)    2. S/P cubital tunnel release    3. Right hand pain    4. Right wrist pain         Claudette Gramajo presents today 2 weeks post op right carpal tunnel release and right cubital tunnel release performed by Dr. Hawkins on 7/11/2024. Sutures removed today in office without complications. No active drainage or redness noted. No concerning signs of infection.  Incision site care was reviewed today. Patient instructed not to soak or submerge incision. Do not apply any lotions, creams, or ointments to the incision at this time.  We discussed concerning signs and symptoms regarding the incision site.  Patient understood and agreed.  Continue icing and elevation as needed help with pain and swelling.     Patient advised on return to carpal tunnel braces nightly and with activity.  Okay for patient to apply gauze over the incision while wearing the wrist brace to avoid irritation.  Comfort form wrist brace was provided today.  Discussed avoiding repetitive range of motion of the hand or wrist.  No heavy lifting, pushing or pulling until incision is fully healed.  Home exercises were provided and demonstrated in office today. Discussed the importance of these exercises, including working on making a tight fist. We discussed ordering formal occupational therapy if needed..       Patient will follow up in 1 week.  We will assess hand swelling, pain and decide to proceed with therapy if needed.    Call or return if symptoms worsen or patient has any concerns.          Tobacco Use: Medium Risk (7/24/2024)    Patient History     Smoking Tobacco Use: Former     Smokeless Tobacco Use: Never     Passive Exposure: Past     Patient reports they have a history of tobacco  use; encouraged continued tobacco cessation for further health benefits.            Follow Up   No follow-ups on file.  There are no Patient Instructions on file for this visit.  Patient was given instructions and counseling regarding her condition or for health maintenance advice. Please see specific information pulled into the AVS if appropriate.       Dictated Utilizing Dragon Dictation. Please note that portions of this note were completed with a voice recognition program. Part of this note may be an electronic transcription/translation of spoken language to printed text using the Dragon Dictation System.

## 2024-07-30 ENCOUNTER — OFFICE VISIT (OUTPATIENT)
Dept: OBSTETRICS AND GYNECOLOGY | Facility: CLINIC | Age: 59
End: 2024-07-30
Payer: OTHER GOVERNMENT

## 2024-07-30 VITALS
HEART RATE: 81 BPM | SYSTOLIC BLOOD PRESSURE: 122 MMHG | WEIGHT: 192 LBS | DIASTOLIC BLOOD PRESSURE: 76 MMHG | BODY MASS INDEX: 30.06 KG/M2

## 2024-07-30 DIAGNOSIS — N95.0 PMB (POSTMENOPAUSAL BLEEDING): Primary | ICD-10-CM

## 2024-07-30 PROCEDURE — 99213 OFFICE O/P EST LOW 20 MIN: CPT | Performed by: STUDENT IN AN ORGANIZED HEALTH CARE EDUCATION/TRAINING PROGRAM

## 2024-07-30 RX ORDER — SODIUM CHLORIDE 0.9 % (FLUSH) 0.9 %
10 SYRINGE (ML) INJECTION AS NEEDED
OUTPATIENT
Start: 2024-07-30

## 2024-07-30 RX ORDER — SODIUM CHLORIDE 0.9 % (FLUSH) 0.9 %
3 SYRINGE (ML) INJECTION EVERY 12 HOURS SCHEDULED
OUTPATIENT
Start: 2024-07-30

## 2024-07-30 RX ORDER — SODIUM CHLORIDE 9 MG/ML
40 INJECTION, SOLUTION INTRAVENOUS AS NEEDED
OUTPATIENT
Start: 2024-07-30

## 2024-07-31 ENCOUNTER — OFFICE VISIT (OUTPATIENT)
Dept: ORTHOPEDIC SURGERY | Facility: CLINIC | Age: 59
End: 2024-07-31
Payer: OTHER GOVERNMENT

## 2024-07-31 VITALS
BODY MASS INDEX: 30.24 KG/M2 | DIASTOLIC BLOOD PRESSURE: 72 MMHG | HEIGHT: 67 IN | HEART RATE: 74 BPM | OXYGEN SATURATION: 98 % | WEIGHT: 192.68 LBS | SYSTOLIC BLOOD PRESSURE: 113 MMHG

## 2024-07-31 DIAGNOSIS — Z98.890 S/P CUBITAL TUNNEL RELEASE: Primary | ICD-10-CM

## 2024-07-31 DIAGNOSIS — R22.31 LOCALIZED SWELLING ON RIGHT HAND: ICD-10-CM

## 2024-07-31 DIAGNOSIS — Z98.890 S/P CARPAL TUNNEL RELEASE: ICD-10-CM

## 2024-07-31 DIAGNOSIS — M79.641 RIGHT HAND PAIN: ICD-10-CM

## 2024-07-31 PROCEDURE — 99024 POSTOP FOLLOW-UP VISIT: CPT

## 2024-07-31 NOTE — PROGRESS NOTES
"Chief Complaint  Follow-up and Pain of the Right Wrist, Pain and Follow-up of the Right Elbow, and Suture / Staple Removal    Subjective      Claudette Gramajo presents to [facility] for follow up of her right wrist and elbow.  Patient is 3 weeks status post right carpal tunnel release and cubital tunnel release performed Dr. Hawkins on 7/11/2024.  Patient returns today for a incision check and to check on her right hand swelling and pain.  Patient did have a fall the day before she return to clinic last week and was having significant right hand pain and swelling.  Today the pain is still persistent around her thumb but the swelling has improved.  She has been able to work on her wrist range of motion but states that is very difficult and painful to move her thumb.  She reports the numbness and tingling has resolved.    Allergies   Allergen Reactions    Adhesive Tape Itching    Molds & Smuts Cough    Pollen Extract Cough       Objective     Vital Signs:   Vitals:    07/31/24 0757   BP: 113/72   Pulse: 74   SpO2: 98%   Weight: 87.4 kg (192 lb 10.9 oz)   Height: 170.2 cm (67\")     Body mass index is 30.18 kg/m².    I reviewed the patient's chief complaint, history of present illness, review of systems, past medical history, surgical history, family history, social history, medications, and allergy list.     REVIEW OF SYSTEMS    Constitutional: Denies fevers, chills, weight loss  Cardiovascular: Denies chest pain, shortness of breath  Skin: Denies rashes, acute skin changes  Neurologic: Denies headache, loss of consciousness  MSK: Right wrist pain.     Ortho Exam  Carpal Tunnel Release   General: Alert. No acute distress.  Right upper extremity: Well-healing incisions about the palm and elbow.  No active drainage or redness noted. No concerning signs of infection.  Demonstrates active elbow flexion and extension with mild associated stiffness. Finger range of motion intact.  Stiffness and pain " with finger range of motion, specifically her right thumb. Demonstrates active wrist flexion and extension with associated stiffness. Thumb opposition intact. Palmar abduction of the thumb intact.  Sensation intact. Neurovascularly intact. Palpable radial pulse.            Imaging Results (Most Recent)       Procedure Component Value Units Date/Time    XR Hand 2 View Right [[id number]] Resulted: 07/31/24 1050     Updated: 07/31/24 1051    Narrative:      X-Ray Report:  Study: X-rays ordered, taken in the office, and reviewed today  Site: Right hand xray  Indication: Fall, right hand pain  View: AP, lateral right hand view(s)  Findings: No acute osseous abnormalities identified.  Prior studies available for comparison: yes                 Assessment and Plan   Diagnoses and all orders for this visit:    1. S/P cubital tunnel release (Primary)  -     XR Hand 2 View Right  -     Ambulatory Referral to Physical Therapy    2. S/P carpal tunnel release  -     XR Hand 2 View Right  -     Ambulatory Referral to Physical Therapy    3. Right hand pain  -     XR Hand 2 View Right  -     Ambulatory Referral to Physical Therapy    4. Localized swelling on right hand  -     XR Hand 2 View Right  -     Ambulatory Referral to Physical Therapy         Claudette Gramajo presents to [facility] for her right hand.  X-rays were obtained and reviewed with patient.  No acute fractures secondary to her recent fall.  Advised patient to continue bracing.  OT was ordered today for patient to start with range of motion exercises of her wrist and her hand.  Discussed the importance of achieving full elbow extension and flexion.  Continue icing and elevate as needed.  Discussed activity modifications.    Patient will follow-up in approximately 3 weeks.      Tobacco Use: Medium Risk (7/31/2024)    Patient History     Smoking Tobacco Use: Former     Smokeless Tobacco Use: Never     Passive Exposure: Past     Patient  reports they have a history of tobacco use; encouraged continued tobacco cessation for further health benefits.            Follow Up   Return in about 3 weeks (around 8/21/2024).  There are no Patient Instructions on file for this visit.  Patient was given instructions and counseling regarding her condition or for health maintenance advice. Please see specific information pulled into the AVS if appropriate.       Dictated Utilizing Dragon Dictation. Please note that portions of this note were completed with a voice recognition program. Part of this note may be an electronic transcription/translation of spoken language to printed text using the Dragon Dictation System.                                          Answers submitted by the patient for this visit:  Other (Submitted on 7/28/2024)  Please describe your symptoms.: hand and arm surgery post op follow-up  Have you had these symptoms before?: Yes  How long have you been having these symptoms?: Greater than 2 weeks  Primary Reason for Visit (Submitted on 7/28/2024)  What is the primary reason for your visit?: Other

## 2024-08-16 ENCOUNTER — TELEPHONE (OUTPATIENT)
Dept: ORTHOPEDIC SURGERY | Facility: CLINIC | Age: 59
End: 2024-08-16

## 2024-08-16 ENCOUNTER — HOSPITAL ENCOUNTER (EMERGENCY)
Facility: HOSPITAL | Age: 59
Discharge: HOME | End: 2024-08-16
Attending: EMERGENCY MEDICINE
Payer: OTHER GOVERNMENT

## 2024-08-16 VITALS
SYSTOLIC BLOOD PRESSURE: 128 MMHG | TEMPERATURE: 98.2 F | WEIGHT: 189.15 LBS | HEIGHT: 67 IN | HEART RATE: 75 BPM | BODY MASS INDEX: 29.69 KG/M2 | DIASTOLIC BLOOD PRESSURE: 80 MMHG | OXYGEN SATURATION: 100 % | RESPIRATION RATE: 20 BRPM

## 2024-08-16 DIAGNOSIS — M25.531 RIGHT WRIST PAIN: ICD-10-CM

## 2024-08-16 DIAGNOSIS — G56.01 CARPAL TUNNEL SYNDROME OF RIGHT WRIST: Primary | ICD-10-CM

## 2024-08-16 PROCEDURE — 99282 EMERGENCY DEPT VISIT SF MDM: CPT

## 2024-08-16 RX ORDER — SULFAMETHOXAZOLE AND TRIMETHOPRIM 800; 160 MG/1; MG/1
1 TABLET ORAL 2 TIMES DAILY
Qty: 10 TABLET | Refills: 0 | Status: SHIPPED | OUTPATIENT
Start: 2024-08-16 | End: 2024-08-21

## 2024-08-16 RX ORDER — CEPHALEXIN 500 MG/1
500 CAPSULE ORAL 4 TIMES DAILY
Qty: 20 CAPSULE | Refills: 0 | Status: SHIPPED | OUTPATIENT
Start: 2024-08-16 | End: 2024-08-21

## 2024-08-16 NOTE — ED PROVIDER NOTES
Time: 7:08 PM EDT  Date of encounter:  8/16/2024  Independent Historian/Clinical History and Information was obtained by:   Patient    History is limited by: N/A    Chief Complaint: Wound check      History of Present Illness:  Patient is a 59 y.o. year old female who presents to the emergency department for evaluation of wound check to right wrist status post carpal tunnel release July 11, 2024 with Dr. Hawkins. She has done well with follow ups. She's had some dehiscence on her wound. She had a follow-up with orthopedics July 30, 2024.  No erythema or drainage. She called Dr. Hawkins's office today who told her to come to the ED for further eval.      Patient Care Team  Primary Care Provider: Angel Bahena APRN    Past Medical History:     Allergies   Allergen Reactions    Adhesive Tape Itching    Molds & Smuts Cough    Pollen Extract Cough     Past Medical History:   Diagnosis Date    Anesthesia complication     woke up during surgery    Arthritis     Arthritis of neck     CAD (coronary artery disease)     FOLLOWS AMADOR, LAST STENT 2014    Cellulitis and abscess of leg 03/28/2013    Chronic purulent otitis media 07/01/2013    Connective tissue anomaly     CTS (carpal tunnel syndrome)     Diabetes mellitus     Dislocation, patella closed 05/30/2017    Elevated cholesterol     Family history of colon cancer 05/27/2022    Family history of esophageal cancer 05/27/2022    Fracture of wrist 1979    right hand    Hiatal hernia     Hyperlipidemia LDL goal <70 08/31/2021    Knee swelling     Migraine     Myocardial infarct     2013    FARNAZ on CPAP 09/01/2021    RLS (restless legs syndrome)     Rotator cuff syndrome     tita    Sleep apnea     uses CPAP    Subluxation of patella     Ulcerative colitis 5/28/2024     Past Surgical History:   Procedure Laterality Date    ADENOIDECTOMY      APPENDECTOMY      CARDIAC CATHETERIZATION      x2    CARDIAC CATHETERIZATION N/A 05/28/2024    Procedure: Left Heart Cath;  Surgeon:  Davis Parker MD;  Location: [facility] CATH INVASIVE LOCATION;  Service: Cardiovascular;  Laterality: N/A;    CARPAL TUNNEL RELEASE Right 7/11/2024    Procedure: CARPAL TUNNEL RELEASE;  Surgeon: Alexis Hawkins MD;  Location: [facility] OR OSC;  Service: Orthopedics;  Laterality: Right;    COLONOSCOPY      COLONOSCOPY N/A 08/26/2022    Procedure: COLONOSCOPY WITH POLYPECTOMY;  Surgeon: Sloan Ortiz MD;  Location: [facility] ENDOSCOPY;  Service: Gastroenterology;  Laterality: N/A;  COLON POLYP    COLONOSCOPY N/A 07/09/2024    Procedure: COLONOSCOPY WITH COLD SNARE POLYPECTOMIES;  Surgeon: Sloan Ortiz MD;  Location: [facility] ENDOSCOPY;  Service: Gastroenterology;  Laterality: N/A;  COLON POLYPS, DIVERTICULOSIS    CORONARY ANGIOPLASTY      CORONARY STENT PLACEMENT      x2    CUBITAL TUNNEL RELEASE Right 7/11/2024    Procedure: CUBITAL TUNNEL RELEASE;  Surgeon: Alexis Hawkins MD;  Location: [facility] OR OSC;  Service: Orthopedics;  Laterality: Right;    ENDOSCOPY N/A 08/26/2022    Procedure: ESOPHAGOGASTRODUODENOSCOPY WITH BX;  Surgeon: Sloan Ortiz MD;  Location: [facility] ENDOSCOPY;  Service: Gastroenterology;  Laterality: N/A;  HIATAL HERNIA    HERNIA REPAIR      INNER EAR SURGERY      KNEE SURGERY  1984, oct.    right knee    TONSILLECTOMY      UPPER GASTROINTESTINAL ENDOSCOPY      VENOUS THROMBECTOMY      patient unsure     Family History   Problem Relation Age of Onset    Heart attack Father     Heart disease Father     Cancer Father     Osteoporosis Father     Rheumatologic disease Father     Suicidality Mother     Osteoporosis Mother     Rheumatologic disease Mother     Scoliosis Mother     Heart disease Paternal Grandfather     Cancer Paternal Grandfather     Thyroid disease Paternal Grandfather     Pancreatic cancer Paternal Grandfather     Esophageal cancer Paternal Grandfather     Stomach cancer Paternal Grandfather     Sleep apnea Paternal Grandfather     Heart disease Paternal Grandmother      Cancer Paternal Grandmother     No Known Problems Maternal Grandmother     No Known Problems Maternal Grandfather     Colon cancer Neg Hx     Malig Hyperthermia Neg Hx     Breast cancer Neg Hx     Ovarian cancer Neg Hx     Uterine cancer Neg Hx     Deep vein thrombosis Neg Hx     Pulmonary embolism Neg Hx        Home Medications:  Prior to Admission medications    Medication Sig Start Date End Date Taking? Authorizing Provider   albuterol sulfate  (90 Base) MCG/ACT inhaler Take 1-2 Puffs every 4 hours prn 6/27/24   Angel Bahena APRN   Alcohol Swabs (Easy Touch Alcohol Prep Medium) 70 % pads 1 each by Other route Daily. 6/27/24   Angel Bahena APRN   ammonium lactate (LAC-HYDRIN) 12 % lotion Apply  topically to the appropriate area as directed 2 (Two) Times a Day. 4/24/24   Topher Keene DPM   aspirin 81 MG EC tablet Take 1 tablet by mouth Daily. 3/22/23   Davis Parker MD   cetirizine (zyrTEC) 10 MG tablet Take 1 tablet by mouth Daily.  Patient taking differently: Take 1 tablet by mouth 2 (Two) Times a Day. 6/27/24   Angel Bahena APRN   cholecalciferol (VITAMIN D3) 25 MCG (1000 UT) tablet Take 1 tablet by mouth Daily. Does not take daily only occasionally 6/27/24   Angel Bahena APRN   clopidogrel (PLAVIX) 75 MG tablet Take 1 tablet by mouth Daily.  Patient taking differently: Take 1 tablet by mouth Every Night. 3/28/24   Angel Bahena APRN   empagliflozin (JARDIANCE) 25 MG tablet tablet Take 1 tablet by mouth Daily. 6/27/24   Angel Bahena APRN   ezetimibe (Zetia) 10 MG tablet Take 1 tablet by mouth Daily.  Patient taking differently: Take 1 tablet by mouth Every Night. 6/27/24   Angel Bahena APRN   famotidine (Pepcid) 40 MG tablet Take 1 tablet by mouth 2 (Two) Times a Day As Needed for Heartburn.  Patient taking differently: Take 1 tablet by mouth At Night As Needed for Heartburn. 6/27/24   Josef  SARWAT Morales   fluticasone (FLONASE) 50 MCG/ACT nasal spray 2 sprays into the nostril(s) as directed by provider Daily. 2 sprays each nostril daily  Patient taking differently: 2 sprays into the nostril(s) as directed by provider Daily As Needed. 2 sprays each nostril daily 6/27/24   Angel Bahena APRN   FREESTYLE LITE test strip 1 each by Other route 3 times a day. 4/30/24   Angel Bahena APRN   furosemide (LASIX) 20 MG tablet Take 1 tablet by mouth Daily.  Patient taking differently: Take 1 tablet by mouth Every Night. 3/28/24   Angel Bahena APRN   HYDROcodone-acetaminophen (Norco) 7.5-325 MG per tablet Take 1 tablet by mouth Every 4 (Four) Hours As Needed for Moderate Pain. 7/11/24   Alexis Hawkins MD   HYDROcodone-acetaminophen (Norco) 7.5-325 MG per tablet Take 1 tablet by mouth Every 4 (Four) Hours As Needed for Moderate Pain. 7/24/24   Alexis Hawkins MD   hydrOXYzine (ATARAX) 25 MG tablet Take 1-2  tabs by mouth at night as needed for sleep. 4/18/24   Tra Cabrera MD   Lancets (freestyle) lancets 1 each by Other route 3 times a day. 4/30/24   Angel Bahena APRN   metFORMIN ER (GLUCOPHAGE-XR) 500 MG 24 hr tablet Take 1 tablet by mouth Daily With Dinner. 6/27/24   Angel Bahena APRN   metoprolol succinate XL (TOPROL-XL) 25 MG 24 hr tablet Take 1 tablet by mouth Daily.  Patient taking differently: Take 1 tablet by mouth Daily As Needed (TAKES AS NEEDED FOR HTN). 4/17/24   Davis Parker MD   montelukast (SINGULAIR) 10 MG tablet Take 1 tablet by mouth Every Night. 6/27/24   Angel Bahena APRN   Narcan 4 MG/0.1ML nasal spray  9/14/22   ProviderAriel MD   nitroglycerin (NITROSTAT) 0.4 MG SL tablet 1 under the tongue as needed for angina, may repeat q5mins for up three doses 4/17/24   ParkerDavis MD   polyethylene glycol (MiraLax) 17 g packet Take 17 g by mouth Daily.    Provider, MD Ariel   rosuvastatin (CRESTOR) 40  "MG tablet Take 1 tablet by mouth Every Night. 24   Angel Bahena APRN   Savella 25 MG tablet tablet Take 1 tablet by mouth Every Night. 24   ProviderAriel MD   Semaglutide,0.25 or 0.5MG/DOS, (OZEMPIC) 2 MG/3ML solution pen-injector Inject 0.25 mg under the skin into the appropriate area as directed 1 (One) Time Per Week. 24   Angel Bahena APRN   tiotropium bromide monohydrate (Spiriva Respimat) 2.5 MCG/ACT aerosol solution inhaler Inhale 2 puffs Daily. 24   Angel Bahena APRN   zolpidem (AMBIEN) 5 MG tablet Take 1 tablet by mouth At Night As Needed.    Provider, MD Ariel        Social History:   Social History     Tobacco Use    Smoking status: Former     Current packs/day: 0.00     Average packs/day: 1 pack/day for 26.0 years (26.0 ttl pk-yrs)     Types: Cigarettes     Start date: 1986     Quit date: 2012     Years since quittin.6     Passive exposure: Past    Smokeless tobacco: Never    Tobacco comments:     caffeine use   Vaping Use    Vaping status: Never Used   Substance Use Topics    Alcohol use: No    Drug use: Never         Review of Systems:  Review of Systems   Constitutional:  Negative for chills and fever.   HENT:  Negative for ear pain.    Eyes:  Negative for pain.   Respiratory:  Negative for cough and shortness of breath.    Cardiovascular:  Negative for chest pain.   Gastrointestinal:  Negative for abdominal pain, diarrhea, nausea and vomiting.   Genitourinary:  Negative for dysuria.   Musculoskeletal:  Positive for arthralgias.   Skin:  Positive for color change. Negative for rash.   Neurological:  Negative for headaches.        Physical Exam:  /80   Pulse 75   Temp 98.2 °F (36.8 °C) (Oral)   Resp 20   Ht 170.2 cm (67\")   Wt 85.8 kg (189 lb 2.5 oz)   SpO2 100%   BMI 29.63 kg/m²     Physical Exam  HENT:      Head: Normocephalic.      Mouth/Throat:      Mouth: Mucous membranes are moist.   Eyes:      " Pupils: Pupils are equal, round, and reactive to light.   Pulmonary:      Effort: Pulmonary effort is normal.   Abdominal:      General: There is no distension.   Musculoskeletal:      Cervical back: Neck supple.      Comments: R Hand: Approximated wound on the wrist with a very small fat stranding in the proximal side of the incision. Mild redness. No drainage. Tender to touch. Patient able to open and close hand. Neurovasc intact. Cap refill normal. Radial pulse 2+.   Skin:     General: Skin is warm and dry.   Neurological:      General: No focal deficit present.      Mental Status: She is alert and oriented to person, place, and time.   Psychiatric:         Mood and Affect: Mood normal.         Behavior: Behavior normal.                  Procedures:  Procedures      Medical Decision Making:      Comorbidities that affect care:    CAD, diabetes, HLD, carpal tunnel syndrome    External Notes reviewed:    Previous Clinic Note: Reviewed clinic note on 7/30/2024.      The following orders were placed and all results were independently analyzed by me:  No orders of the defined types were placed in this encounter.      Medications Given in the Emergency Department:  Medications - No data to display     ED Course:    ED Course as of 08/16/24 2239   Fri Aug 16, 2024   1908 --- PROVIDER IN TRIAGE NOTE ---    The patient was evaluated by meCele in triage. Orders were placed and the patient is currently awaiting disposition.    [AJ]      ED Course User Index  [AJ] Cele Bermudez PA-C       Labs:    Lab Results (last 24 hours)       ** No results found for the last 24 hours. **             Imaging:    No Radiology Exams Resulted Within Past 24 Hours      Differential Diagnosis and Discussion:    Wound Evaluation: Differential diagnosis includes but is not limited to laceration, abrasion, puncture, burn, ulcer, cellulitis, abscess, vasculitis, malignancy, and rash.        MDM  Risk of Complications,  Morbidity, and/or Mortality  Presenting problems: moderate  Diagnostic procedures: low  Management options: low    Patient Progress  Patient progress: stable    Patient presents to the emergency department for evaluation of wound check to right wrist status post carpal tunnel release July 11, 2024 with Dr. Hawkins. She has done well with follow ups. She's had some dehiscence on her wound. She had a follow-up with orthopedics July 30, 2024.  No erythema or drainage. She called Dr. Hawkins's office today who told her to come to the ED for further eval.    On exam  R Hand: Approximated wound on the wrist with a very small fat stranding in the proximal side of the incision. Mild redness. No drainage. Tender to touch. Patient able to open and close hand. Neurovasc intact. Cap refill normal. Radial pulse 2+.    Discussed this patient with Dr. Hawkins. He recommends to discharge the patient with keflex and bactrim. He would like the patient to follow up on Wednesday at his office.     Will dc pt with keflex and bactrim.             Patient Care Considerations:    LABS: I considered ordering labs, however patient is systemically well. VSS. Afebrile.      Consultants/Shared Management Plan:    None    Social Determinants of Health:    Patient is independent, reliable, and has access to care.       Disposition and Care Coordination:    Discharged: The patient is suitable and stable for discharge with no need for consideration of admission.    I have explained the patient´s condition, diagnoses and treatment plan based on the information available to me at this time. I have answered questions and addressed any concerns. The patient has a good  understanding of the patient´s diagnosis, condition, and treatment plan as can be expected at this point. The vital signs have been stable. The patient´s condition is stable and appropriate for discharge from the emergency department.      The patient will pursue further outpatient evaluation  with the primary care physician or other designated or consulting physician as outlined in the discharge instructions. They are agreeable to this plan of care and follow-up instructions have been explained in detail. The patient has received these instructions in written format and has expressed an understanding of the discharge instructions. The patient is aware that any significant change in condition or worsening of symptoms should prompt an immediate return to this or the closest emergency department or call to 911.  I have explained discharge medications and the need for follow up with the patient/caretakers. This was also printed in the discharge instructions. Patient was discharged with the following medications and follow up:      Medication List        New Prescriptions      cephalexin 500 MG capsule  Commonly known as: KEFLEX  Take 1 capsule by mouth 4 (Four) Times a Day for 5 days.     sulfamethoxazole-trimethoprim 800-160 MG per tablet  Commonly known as: BACTRIM DS,SEPTRA DS  Take 1 tablet by mouth 2 (Two) Times a Day for 5 days.            Changed      cetirizine 10 MG tablet  Commonly known as: zyrTEC  Take 1 tablet by mouth Daily.  What changed: when to take this     clopidogrel 75 MG tablet  Commonly known as: PLAVIX  Take 1 tablet by mouth Daily.  What changed: when to take this     ezetimibe 10 MG tablet  Commonly known as: Zetia  Take 1 tablet by mouth Daily.  What changed: when to take this     famotidine 40 MG tablet  Commonly known as: Pepcid  Take 1 tablet by mouth 2 (Two) Times a Day As Needed for Heartburn.  What changed: when to take this     fluticasone 50 MCG/ACT nasal spray  Commonly known as: FLONASE  2 sprays into the nostril(s) as directed by provider Daily. 2 sprays each nostril daily  What changed:   when to take this  reasons to take this     furosemide 20 MG tablet  Commonly known as: LASIX  Take 1 tablet by mouth Daily.  What changed: when to take this     metoprolol succinate  XL 25 MG 24 hr tablet  Commonly known as: TOPROL-XL  Take 1 tablet by mouth Daily.  What changed:   when to take this  reasons to take this               Where to Get Your Medications        These medications were sent to [facility] #[id number] - [place], [place] - [address] - [phone]  - [phone] FX  [address] [place] [address]      Hours: 24-hours Phone: [phone]   cephalexin 500 MG capsule  sulfamethoxazole-trimethoprim 800-160 MG per tablet      Been, Alexis VENTURA MD  [address]  [place] [id number] [phone]    On 8/21/2024  For wound re-check       Final diagnoses:   Carpal tunnel syndrome of right wrist   Right wrist pain        ED Disposition       ED Disposition   Discharge    Condition   Stable    Comment   --               This medical record created using voice recognition software.             Dillon Doshi PA  08/16/24 6102

## 2024-08-16 NOTE — ED TRIAGE NOTES
Pt to ED from home s/p right wrist surgery 7/11/24.      Pt states at first follow up appointment all but two stitches removed and then wound opened up.      Second follow up appointment 7/31 remaining two stitches removed, site cleaned, xray done r/t thumb pain, pt DC home with wound care instructions to keep wound open and dry.      Pt has small piece of tissue protruding from site and pt c/o increased pain and itching at site.  No redness, no warmth noted to site.

## 2024-08-17 NOTE — DISCHARGE INSTRUCTIONS
You are being discharged home today with Keflex and Bactrim. Take these medications as prescribed.     Follow up with Dr. Hawkins on Wednesday (8/21/24).    Return to the Emergency Department if you develop any uncontrollable fever, intractable pain, nausea, vomiting.

## 2024-08-19 DIAGNOSIS — I25.10 CAD S/P PERCUTANEOUS CORONARY ANGIOPLASTY: ICD-10-CM

## 2024-08-19 DIAGNOSIS — Z98.61 CAD S/P PERCUTANEOUS CORONARY ANGIOPLASTY: ICD-10-CM

## 2024-08-20 RX ORDER — CLOPIDOGREL BISULFATE 75 MG/1
75 TABLET ORAL DAILY
Qty: 90 TABLET | Refills: 1 | Status: SHIPPED | OUTPATIENT
Start: 2024-08-20

## 2024-08-20 RX ORDER — FUROSEMIDE 20 MG/1
20 TABLET ORAL DAILY
Qty: 90 TABLET | Refills: 1 | Status: SHIPPED | OUTPATIENT
Start: 2024-08-20

## 2024-08-21 ENCOUNTER — OFFICE VISIT (OUTPATIENT)
Dept: ORTHOPEDIC SURGERY | Facility: CLINIC | Age: 59
End: 2024-08-21
Payer: OTHER GOVERNMENT

## 2024-08-21 VITALS
HEART RATE: 80 BPM | OXYGEN SATURATION: 96 % | SYSTOLIC BLOOD PRESSURE: 149 MMHG | DIASTOLIC BLOOD PRESSURE: 82 MMHG | BODY MASS INDEX: 29.66 KG/M2 | HEIGHT: 67 IN | WEIGHT: 189 LBS

## 2024-08-21 DIAGNOSIS — Z98.890 S/P CUBITAL TUNNEL RELEASE: Primary | ICD-10-CM

## 2024-08-21 DIAGNOSIS — Z98.890 S/P CARPAL TUNNEL RELEASE: ICD-10-CM

## 2024-08-21 PROCEDURE — 99024 POSTOP FOLLOW-UP VISIT: CPT | Performed by: ORTHOPAEDIC SURGERY

## 2024-08-21 NOTE — PROGRESS NOTES
"Chief Complaint  Follow-up of the Right Hand     Subjective      Claudette Gramajo presents to [facility] for follow up of the right hand.  She had a right carpal tunnel and cubital tunnel release on 24.  Due to the patients high blood pressure reading today, I advised the patient to contact their PCP.  She has decrease  and swelling.  She is doing therapy at home.  She notes stiffness in the 4 th and 5 th digit. She has decrease numbness in the first 3 digits.     Allergies   Allergen Reactions    Adhesive Tape Itching    Molds & Smuts Cough    Pollen Extract Cough        Social History     Socioeconomic History    Marital status:    Tobacco Use    Smoking status: Former     Current packs/day: 0.00     Average packs/day: 1 pack/day for 26.0 years (26.0 ttl pk-yrs)     Types: Cigarettes     Start date: 1986     Quit date: 2012     Years since quittin.6     Passive exposure: Past    Smokeless tobacco: Never    Tobacco comments:     caffeine use   Vaping Use    Vaping status: Never Used   Substance and Sexual Activity    Alcohol use: No    Drug use: Never    Sexual activity: Yes     Partners: Male        I reviewed the patient's chief complaint, history of present illness, review of systems, past medical history, surgical history, family history, social history, medications, and allergy list.     Review of Systems     Constitutional: Denies fevers, chills, weight loss  Cardiovascular: Denies chest pain, shortness of breath  Skin: Denies rashes, acute skin changes  Neurologic: Denies headache, loss of consciousness        Vital Signs:   /82   Pulse 80   Ht 170.2 cm (67\")   Wt 85.7 kg (189 lb)   SpO2 96%   BMI 29.60 kg/m²          Physical Exam  General: Alert. No acute distress    Ortho Exam        Right upper extremity: Well-healing incisions about the palm and elbow.  No active drainage or redness noted. No concerning signs of infection.  " Demonstrates active elbow flexion and extension with mild associated stiffness. Finger range of motion intact.  Stiffness and pain with finger range of motion, specifically her right thumb. Demonstrates active wrist flexion and extension with associated stiffness. Thumb opposition intact. Palmar abduction of the thumb intact.  Sensation intact. Neurovascularly intact. Palpable radial pulse.           Procedures        Imaging Results (Most Recent)       None             Result Review :            Assessment and Plan     Diagnoses and all orders for this visit:    1. S/P cubital tunnel release (Primary)    2. S/P carpal tunnel release        Discussed the treatment plan with the patient.     Prescribed therapy Continue bracing.  Leave incision alone.       Call or return if worsening symptoms.    Follow Up     4-6 weeks assess ROM .        Patient was given instructions and counseling regarding her condition or for health maintenance advice. Please see specific information pulled into the AVS if appropriate.     Scribed for Alexis Hawkins MD by Izabela Hughes MA.  08/21/24   11:19 EDT      I have personally performed the services described in this document as scribed by the above individual and it is both accurate and complete. Alexis Hawkins MD 08/21/24

## 2024-08-28 NOTE — NURSING NOTE
PT STATES SHE WAS NOT INSTRUCTED TO HOLD ASA OR PLAVIX. NO DOCUMENTATION OF MED DIRECTIVE SEEN IN CHART. DR. SUAREZ OFFICE CALLED, LM WITH ABIMBOLA RE: NEED TO HOLD MEDS. MESSAGE TO BE SENT TO DR. SUAREZ, AWAITING FURTHER ORDERS.  ABIMBOLA REPLIED AND STATES DR. SUAREZ WILL STOP ASA AND PLAVIX WITH LAST DOSE BEING 8/27/24 PT TO BE NOTIFIED BY OFFICE.

## 2024-08-28 NOTE — PRE-PROCEDURE INSTRUCTIONS
ATIENT INSTRUCTED TO BE:    - NOTHING TO EAT AFTER MIDNIGHT OR CHEW, EXCEPT CAN HAVE CLEAR LIQUIDS 2 HOURS PRIOR TO SURGERY ARRIVAL TIME , NO MORE THAN 8 OZ. (NOTHING RED)     - TO HOLD ALL VITAMINS, SUPPLEMENTS, NSAIDS FOR ONE WEEK PRIOR TO THEIR SURGICAL PROCEDURE  JARDIANCE LD 8/27/24  METFORMIN TAKE LAST DOSE 8/29/24 PRIOR TO 6 PM  LASIC LAST DOSE 8/29/24  ASA LAST DOSE 8/29/24  DR. SUAREZ TO INST ON NEED TO HOLD PLAVIX, OFFICE NOTIFIED.   - DO NOT TAKE __OZEMPIC LAST DOSE 8/17/24____________________ 7 DAYS PRIOR TO PROCEDURE PER ANESTHESIA RECOMMENDATIONS/INSTRUCTIONS   J  - INSTRUCTED PT TO USE SURGICAL SOAP 1 TIME THE NIGHT PRIOR TO SURGERY ___________ OR THE AM OF SURGERY _____________   USE THE SOAP FROM NECK TO TOES, AVOID THEIR FACE, HAIR, AND PRIVATE PARTS. IF USE THE SOAP THE NIGHT PRIOR TO SURGERY, CHANGE BED LINENS AND NO PETS IN THE BED.     INSTRUCTED NO LOTIONS, JEWELRY, PIERCINGS,  NAIL POLISH, OR DEODORANT DAY OF SURGERY    - IF DIABETIC, CHECK BLOOD GLUCOSE IF LESS THAN 70 OR HAVING SYMPTOMS CALL THE PREOP AREA FOR INSTRUCTIONS ON AM OF SURGERY [phone])    -INSTRUCTED TO TAKE THE FOLLOWING MEDICATIONS THE DAY OF SURGERY WITH SIPS OF WATER: ALBUTEROL INHALER AS NEEDED, ZYRTEC, ZETIA, METROPROLOL, SPRIEVA INHALER          - DO NOT BRING ANY MEDICATIONS WITH YOU TO THE HOSPITAL THE DAY OF SURGERY, EXCEPT IF USE INHALERS. BRING INHALERS DAY OF SURGERY       - BRING CPAP OR BIPAP TO THE HOSPITAL ONLY IF YOU ARE SPENDING THE NIGHT    - DO NOT SMOKE OR VAPE 24 HOURS PRIOR TO PROCEDURE PER ANESTHESIA REQUEST     -MAKE SURE YOU HAVE A RIDE HOME OR SOMEONE TO STAY WITH YOU THE DAY OF THE PROCEDURE AFTER YOU GO HOME     - FOLLOW ANY OTHER INSTRUCTIONS GIVEN TO YOU BY YOUR SURGEON'S OFFICE.     - DAY OF SURGERY ___ COME TO [facility]/ [facility], FIRST FLOOR. CHECK IN AT THE DESK FOR REGISTRATION/SURGERY    - YOU WILL RECEIVE A PHONE CALL THE DAY PRIOR TO SURGERY BETWEEN 1PM AND 4 PM WITH ARRIVAL TIME,  IF YOUR SURGERY IS ON A MONDAY YOU WILL RECEIVE A CALL THE FRIDAY PRIOR TO SURGERY DATE    - BRING CASH OR CREDIT CARD FOR COPAYMENT OF MEDICATIONS AFTER SURGERY IF YOU USE THE HOSPITAL PHARMACY (MEDS TO BED)  NA  - PREADMISSION TESTING NURSE DOV WARREN [phone] IF HAVE ANY QUESTIONS     -PATIENT PROVIDED THE NUMBER FOR PREOP SURGICAL DEPT IF HAD QUESTIONS AFTER HOURS PRIOR TO SURGERY ([phone]).  INFORMED PT IF NO ANSWER, LEAVE A MESSAGE AND SOMEONE WILL RETURN THEIR CALL       PATIENT VERBALIZED UNDERSTANDING

## 2024-08-30 ENCOUNTER — ANESTHESIA (OUTPATIENT)
Dept: PERIOP | Facility: HOSPITAL | Age: 59
End: 2024-08-30
Payer: OTHER GOVERNMENT

## 2024-08-30 ENCOUNTER — HOSPITAL ENCOUNTER (OUTPATIENT)
Facility: HOSPITAL | Age: 59
Setting detail: HOSPITAL OUTPATIENT SURGERY
Discharge: HOME | End: 2024-08-30
Attending: STUDENT IN AN ORGANIZED HEALTH CARE EDUCATION/TRAINING PROGRAM | Admitting: STUDENT IN AN ORGANIZED HEALTH CARE EDUCATION/TRAINING PROGRAM
Payer: OTHER GOVERNMENT

## 2024-08-30 ENCOUNTER — ANESTHESIA EVENT (OUTPATIENT)
Dept: PERIOP | Facility: HOSPITAL | Age: 59
End: 2024-08-30
Payer: OTHER GOVERNMENT

## 2024-08-30 VITALS
HEART RATE: 64 BPM | BODY MASS INDEX: 30.35 KG/M2 | OXYGEN SATURATION: 97 % | DIASTOLIC BLOOD PRESSURE: 74 MMHG | TEMPERATURE: 98 F | WEIGHT: 193.34 LBS | HEIGHT: 67 IN | SYSTOLIC BLOOD PRESSURE: 123 MMHG | RESPIRATION RATE: 16 BRPM

## 2024-08-30 DIAGNOSIS — N95.0 PMB (POSTMENOPAUSAL BLEEDING): ICD-10-CM

## 2024-08-30 LAB
BASOPHILS # BLD AUTO: 0.06 10*3/MM3 (ref 0–0.2)
BASOPHILS NFR BLD AUTO: 0.7 % (ref 0–1.5)
DEPRECATED RDW RBC AUTO: 40.8 FL (ref 37–54)
EOSINOPHIL # BLD AUTO: 0.23 10*3/MM3 (ref 0–0.4)
EOSINOPHIL NFR BLD AUTO: 2.8 % (ref 0.3–6.2)
ERYTHROCYTE [DISTWIDTH] IN BLOOD BY AUTOMATED COUNT: 12.7 % (ref 12.3–15.4)
GLUCOSE BLDC GLUCOMTR-MCNC: 142 MG/DL (ref 70–99)
GLUCOSE BLDC GLUCOMTR-MCNC: 156 MG/DL (ref 70–99)
HCT VFR BLD AUTO: 40.4 % (ref 34–46.6)
HGB BLD-MCNC: 13.5 G/DL (ref 12–15.9)
IMM GRANULOCYTES # BLD AUTO: 0.03 10*3/MM3 (ref 0–0.05)
IMM GRANULOCYTES NFR BLD AUTO: 0.4 % (ref 0–0.5)
LYMPHOCYTES # BLD AUTO: 2.95 10*3/MM3 (ref 0.7–3.1)
LYMPHOCYTES NFR BLD AUTO: 36.3 % (ref 19.6–45.3)
MCH RBC QN AUTO: 29.5 PG (ref 26.6–33)
MCHC RBC AUTO-ENTMCNC: 33.4 G/DL (ref 31.5–35.7)
MCV RBC AUTO: 88.2 FL (ref 79–97)
MONOCYTES # BLD AUTO: 0.66 10*3/MM3 (ref 0.1–0.9)
MONOCYTES NFR BLD AUTO: 8.1 % (ref 5–12)
NEUTROPHILS NFR BLD AUTO: 4.19 10*3/MM3 (ref 1.7–7)
NEUTROPHILS NFR BLD AUTO: 51.7 % (ref 42.7–76)
NRBC BLD AUTO-RTO: 0 /100 WBC (ref 0–0.2)
PLATELET # BLD AUTO: 244 10*3/MM3 (ref 140–450)
PMV BLD AUTO: 10.4 FL (ref 6–12)
RBC # BLD AUTO: 4.58 10*6/MM3 (ref 3.77–5.28)
WBC NRBC COR # BLD AUTO: 8.12 10*3/MM3 (ref 3.4–10.8)

## 2024-08-30 PROCEDURE — 25010000002 FENTANYL CITRATE (PF) 50 MCG/ML SOLUTION: Performed by: NURSE ANESTHETIST, CERTIFIED REGISTERED

## 2024-08-30 PROCEDURE — 88305 TISSUE EXAM BY PATHOLOGIST: CPT | Performed by: STUDENT IN AN ORGANIZED HEALTH CARE EDUCATION/TRAINING PROGRAM

## 2024-08-30 PROCEDURE — 25810000003 LACTATED RINGERS PER 1000 ML: Performed by: ANESTHESIOLOGY

## 2024-08-30 PROCEDURE — 85025 COMPLETE CBC W/AUTO DIFF WBC: CPT | Performed by: STUDENT IN AN ORGANIZED HEALTH CARE EDUCATION/TRAINING PROGRAM

## 2024-08-30 PROCEDURE — 25010000002 DEXAMETHASONE PER 1 MG: Performed by: NURSE ANESTHETIST, CERTIFIED REGISTERED

## 2024-08-30 PROCEDURE — 82948 REAGENT STRIP/BLOOD GLUCOSE: CPT | Performed by: NURSE ANESTHETIST, CERTIFIED REGISTERED

## 2024-08-30 PROCEDURE — 25010000002 METOCLOPRAMIDE PER 10 MG: Performed by: ANESTHESIOLOGY

## 2024-08-30 PROCEDURE — 25010000002 ONDANSETRON PER 1 MG: Performed by: NURSE ANESTHETIST, CERTIFIED REGISTERED

## 2024-08-30 PROCEDURE — 82948 REAGENT STRIP/BLOOD GLUCOSE: CPT | Performed by: STUDENT IN AN ORGANIZED HEALTH CARE EDUCATION/TRAINING PROGRAM

## 2024-08-30 PROCEDURE — 25010000002 PROPOFOL 10 MG/ML EMULSION: Performed by: NURSE ANESTHETIST, CERTIFIED REGISTERED

## 2024-08-30 PROCEDURE — 25010000002 MIDAZOLAM PER 1MG: Performed by: ANESTHESIOLOGY

## 2024-08-30 RX ORDER — MIDAZOLAM HYDROCHLORIDE 2 MG/2ML
2 INJECTION, SOLUTION INTRAMUSCULAR; INTRAVENOUS ONCE
Status: COMPLETED | OUTPATIENT
Start: 2024-08-30 | End: 2024-08-30

## 2024-08-30 RX ORDER — METOCLOPRAMIDE HYDROCHLORIDE 5 MG/ML
10 INJECTION INTRAMUSCULAR; INTRAVENOUS ONCE
Status: COMPLETED | OUTPATIENT
Start: 2024-08-30 | End: 2024-08-30

## 2024-08-30 RX ORDER — PROMETHAZINE HYDROCHLORIDE 25 MG/1
25 SUPPOSITORY RECTAL ONCE AS NEEDED
Status: DISCONTINUED | OUTPATIENT
Start: 2024-08-30 | End: 2024-08-30 | Stop reason: HOSPADM

## 2024-08-30 RX ORDER — ACETAMINOPHEN 325 MG/1
650 TABLET ORAL EVERY 6 HOURS PRN
Qty: 30 TABLET | Refills: 1 | Status: SHIPPED | OUTPATIENT
Start: 2024-08-30

## 2024-08-30 RX ORDER — OXYCODONE HYDROCHLORIDE 5 MG/1
5 TABLET ORAL
Status: DISCONTINUED | OUTPATIENT
Start: 2024-08-30 | End: 2024-08-30 | Stop reason: HOSPADM

## 2024-08-30 RX ORDER — SODIUM CHLORIDE 9 MG/ML
40 INJECTION, SOLUTION INTRAVENOUS AS NEEDED
Status: DISCONTINUED | OUTPATIENT
Start: 2024-08-30 | End: 2024-08-30 | Stop reason: HOSPADM

## 2024-08-30 RX ORDER — DEXAMETHASONE SODIUM PHOSPHATE 4 MG/ML
INJECTION, SOLUTION INTRA-ARTICULAR; INTRALESIONAL; INTRAMUSCULAR; INTRAVENOUS; SOFT TISSUE AS NEEDED
Status: DISCONTINUED | OUTPATIENT
Start: 2024-08-30 | End: 2024-08-30 | Stop reason: SURG

## 2024-08-30 RX ORDER — SODIUM CHLORIDE 0.9 % (FLUSH) 0.9 %
3 SYRINGE (ML) INJECTION EVERY 12 HOURS SCHEDULED
Status: DISCONTINUED | OUTPATIENT
Start: 2024-08-30 | End: 2024-08-30 | Stop reason: HOSPADM

## 2024-08-30 RX ORDER — ONDANSETRON 2 MG/ML
4 INJECTION INTRAMUSCULAR; INTRAVENOUS ONCE AS NEEDED
Status: DISCONTINUED | OUTPATIENT
Start: 2024-08-30 | End: 2024-08-30 | Stop reason: HOSPADM

## 2024-08-30 RX ORDER — ONDANSETRON 2 MG/ML
INJECTION INTRAMUSCULAR; INTRAVENOUS AS NEEDED
Status: DISCONTINUED | OUTPATIENT
Start: 2024-08-30 | End: 2024-08-30 | Stop reason: SURG

## 2024-08-30 RX ORDER — EPHEDRINE SULFATE 50 MG/ML
INJECTION INTRAVENOUS AS NEEDED
Status: DISCONTINUED | OUTPATIENT
Start: 2024-08-30 | End: 2024-08-30 | Stop reason: SURG

## 2024-08-30 RX ORDER — IBUPROFEN 800 MG/1
800 TABLET, FILM COATED ORAL EVERY 8 HOURS PRN
Qty: 30 TABLET | Refills: 1 | Status: SHIPPED | OUTPATIENT
Start: 2024-08-30

## 2024-08-30 RX ORDER — MAGNESIUM HYDROXIDE 1200 MG/15ML
LIQUID ORAL AS NEEDED
Status: DISCONTINUED | OUTPATIENT
Start: 2024-08-30 | End: 2024-08-30 | Stop reason: HOSPADM

## 2024-08-30 RX ORDER — LIDOCAINE HYDROCHLORIDE 20 MG/ML
INJECTION, SOLUTION EPIDURAL; INFILTRATION; INTRACAUDAL; PERINEURAL AS NEEDED
Status: DISCONTINUED | OUTPATIENT
Start: 2024-08-30 | End: 2024-08-30 | Stop reason: SURG

## 2024-08-30 RX ORDER — ACETAMINOPHEN 500 MG
1000 TABLET ORAL ONCE
Status: COMPLETED | OUTPATIENT
Start: 2024-08-30 | End: 2024-08-30

## 2024-08-30 RX ORDER — DEXMEDETOMIDINE HYDROCHLORIDE 4 UG/ML
INJECTION, SOLUTION INTRAVENOUS AS NEEDED
Status: DISCONTINUED | OUTPATIENT
Start: 2024-08-30 | End: 2024-08-30 | Stop reason: SURG

## 2024-08-30 RX ORDER — FENTANYL CITRATE 50 UG/ML
INJECTION, SOLUTION INTRAMUSCULAR; INTRAVENOUS AS NEEDED
Status: DISCONTINUED | OUTPATIENT
Start: 2024-08-30 | End: 2024-08-30 | Stop reason: SURG

## 2024-08-30 RX ORDER — PROMETHAZINE HYDROCHLORIDE 12.5 MG/1
25 TABLET ORAL ONCE AS NEEDED
Status: DISCONTINUED | OUTPATIENT
Start: 2024-08-30 | End: 2024-08-30 | Stop reason: HOSPADM

## 2024-08-30 RX ORDER — SCOLOPAMINE TRANSDERMAL SYSTEM 1 MG/1
1 PATCH, EXTENDED RELEASE TRANSDERMAL
Status: DISCONTINUED | OUTPATIENT
Start: 2024-08-30 | End: 2024-08-30 | Stop reason: HOSPADM

## 2024-08-30 RX ORDER — SODIUM CHLORIDE 0.9 % (FLUSH) 0.9 %
10 SYRINGE (ML) INJECTION AS NEEDED
Status: DISCONTINUED | OUTPATIENT
Start: 2024-08-30 | End: 2024-08-30 | Stop reason: HOSPADM

## 2024-08-30 RX ORDER — PROPOFOL 10 MG/ML
VIAL (ML) INTRAVENOUS AS NEEDED
Status: DISCONTINUED | OUTPATIENT
Start: 2024-08-30 | End: 2024-08-30 | Stop reason: SURG

## 2024-08-30 RX ORDER — SODIUM CHLORIDE, SODIUM LACTATE, POTASSIUM CHLORIDE, CALCIUM CHLORIDE 600; 310; 30; 20 MG/100ML; MG/100ML; MG/100ML; MG/100ML
9 INJECTION, SOLUTION INTRAVENOUS CONTINUOUS PRN
Status: DISCONTINUED | OUTPATIENT
Start: 2024-08-30 | End: 2024-08-30 | Stop reason: HOSPADM

## 2024-08-30 RX ADMIN — ACETAMINOPHEN 1000 MG: 500 TABLET ORAL at 11:34

## 2024-08-30 RX ADMIN — MIDAZOLAM HYDROCHLORIDE 2 MG: 1 INJECTION, SOLUTION INTRAMUSCULAR; INTRAVENOUS at 12:39

## 2024-08-30 RX ADMIN — DEXMEDETOMIDINE HYDROCHLORIDE IN 0.9% SODIUM CHLORIDE 4 MCG: 4 INJECTION INTRAVENOUS at 13:43

## 2024-08-30 RX ADMIN — EPHEDRINE SULFATE 10 MG: 50 INJECTION INTRAVENOUS at 13:13

## 2024-08-30 RX ADMIN — EPHEDRINE SULFATE 10 MG: 50 INJECTION INTRAVENOUS at 13:18

## 2024-08-30 RX ADMIN — FENTANYL CITRATE 50 MCG: 50 INJECTION, SOLUTION INTRAMUSCULAR; INTRAVENOUS at 12:47

## 2024-08-30 RX ADMIN — METOCLOPRAMIDE HYDROCHLORIDE 10 MG: 5 INJECTION INTRAMUSCULAR; INTRAVENOUS at 12:41

## 2024-08-30 RX ADMIN — DEXAMETHASONE SODIUM PHOSPHATE 4 MG: 4 INJECTION, SOLUTION INTRAMUSCULAR; INTRAVENOUS at 12:50

## 2024-08-30 RX ADMIN — ONDANSETRON HYDROCHLORIDE 4 MG: 2 SOLUTION INTRAMUSCULAR; INTRAVENOUS at 12:50

## 2024-08-30 RX ADMIN — PROPOFOL 40 MG: 10 INJECTION, EMULSION INTRAVENOUS at 13:37

## 2024-08-30 RX ADMIN — DEXMEDETOMIDINE HYDROCHLORIDE IN 0.9% SODIUM CHLORIDE 4 MCG: 4 INJECTION INTRAVENOUS at 13:39

## 2024-08-30 RX ADMIN — SODIUM CHLORIDE, POTASSIUM CHLORIDE, SODIUM LACTATE AND CALCIUM CHLORIDE: 600; 310; 30; 20 INJECTION, SOLUTION INTRAVENOUS at 13:40

## 2024-08-30 RX ADMIN — FENTANYL CITRATE 50 MCG: 50 INJECTION, SOLUTION INTRAMUSCULAR; INTRAVENOUS at 13:37

## 2024-08-30 RX ADMIN — EPHEDRINE SULFATE 10 MG: 50 INJECTION INTRAVENOUS at 13:46

## 2024-08-30 RX ADMIN — SCOPALAMINE 1 PATCH: 1 PATCH, EXTENDED RELEASE TRANSDERMAL at 11:35

## 2024-08-30 RX ADMIN — PROPOFOL 160 MG: 10 INJECTION, EMULSION INTRAVENOUS at 12:47

## 2024-08-30 RX ADMIN — LIDOCAINE HYDROCHLORIDE 100 MG: 20 INJECTION, SOLUTION INTRAVENOUS at 12:47

## 2024-08-30 RX ADMIN — SODIUM CHLORIDE, POTASSIUM CHLORIDE, SODIUM LACTATE AND CALCIUM CHLORIDE 9 ML/HR: 600; 310; 30; 20 INJECTION, SOLUTION INTRAVENOUS at 11:35

## 2024-08-30 NOTE — DISCHARGE INSTRUCTIONS
DISCHARGE INSTRUCTIONS  GYNECOLOGICAL  PROCEDURES        For your surgery you had:  General anesthesia (you may have a sore throat for the first 24 hours)  You may experience dizziness, drowsiness, or lightheadedness for several hours following surgery.  Do not stay alone today or tonight.  Limit your activity for 24 hours.  Resume your diet slowly.  Follow any special dietary instructions you may have been given by your doctor.  You should not drive or operate machinery, drink alcohol, or sign legally binding documents for 24 hours or while you are taking pain medication.     NOTIFY YOUR DOCTOR IF YOU EXPERIENCE ANY OF THE FOLLOWING:  Temperature greater than 101 degrees Fahrenheit  Shaking Chills  Redness or excessive drainage from incision  Nausea, vomiting and/or pain that is not controlled by prescribed medications  Increase in bleeding or bleeding that is excessive  Unable to urinate in 6 hours after surgery  If unable to reach your doctor, please go to the closest Emergency Room []  Remove dressing:      []  Skin adhesive will flake off in 10-14 days.     [x]  Nothing in the vagina for 2 weeks to include intercourse, douches, or tampons.  [x]  You may resume intercourse and the use of tampons as your physician has instructed you.        Vaginal bleeding may be expected for several days with flow decreasing with time and never any heavier than a normal  period.  If you have an ablation, vaginal discharge is expected after bleeding stops.   If you have foul smelling discharge, notify your physician.  Medications per physician instructions as indicated on After Visit Summary.     Last dose of pain medication was given at:                                                                                        .     SPECIAL INSTRUCTIONS:

## 2024-08-30 NOTE — ANESTHESIA PREPROCEDURE EVALUATION
Anesthesia Evaluation     Patient summary reviewed and Nursing notes reviewed   no history of anesthetic complications:   NPO Solid Status: > 8 hours  NPO Liquid Status: > 2 hours           Airway   Mallampati: III  TM distance: >3 FB  Neck ROM: full  No difficulty expected  Dental      Pulmonary - normal exam    breath sounds clear to auscultation  (+) COPD moderate,sleep apnea on CPAP  Cardiovascular - normal exam  Exercise tolerance: good (4-7 METS)    ECG reviewed  PT is on anticoagulation therapy  Patient on routine beta blocker and Beta blocker given within 24 hours of surgery  Rhythm: regular  Rate: normal    (+) hypertension, past MI  >12 months, CAD, cardiac stents more than 12 months ago , hyperlipidemia      Neuro/Psych  (+) headaches, numbness  GI/Hepatic/Renal/Endo    (+) hiatal hernia, GERD, GI bleeding lower resolved, liver disease fatty liver disease, renal disease- CRI, diabetes mellitus type 2, thyroid problem     Musculoskeletal     Abdominal    Substance History - negative use     OB/GYN negative ob/gyn ROS         Other   arthritis,     ROS/Med Hx Other: >4METS.HX CAD,MI,STENTS (2014 LAST),DM,AUB.CATH 5/31/24 EF 60%,MILD NONOCCLUS. CAD,MILD INSTENT RESTENOSIS. PREV. CARDS CLEARANCE X2 6/3/24,7/9/24 FOR EGD/COLONOSCOPY, TRIGGER FINGER AND CUBITAL TUNNEL RELEASE. DENIES ANY STATUS CHANGES. KT                 Anesthesia Plan    ASA 3     general     (Patient understands anesthesia not responsible for dental damage.    Last ozempic dose was)  intravenous induction     Anesthetic plan, risks, benefits, and alternatives have been provided, discussed and informed consent has been obtained with: patient.    Use of blood products discussed with patient .    Plan discussed with CRNA.      CODE STATUS:

## 2024-08-30 NOTE — OP NOTE
DILATATION AND CURETTAGE HYSTEROSCOPY  Procedure Report    Patient Name:  Claudette Gramajo  YOB: 1965    Date of Surgery:  8/30/2024     Indications: Postmenopausal bleeding    Pre-op Diagnosis:   PMB (postmenopausal bleeding) [N95.0]       Post-Op Diagnosis Codes:     * PMB (postmenopausal bleeding) [N95.0]    Procedure/CPT® Codes:      Procedure(s):  DILATATION AND CURETTAGE HYSTEROSCOPY WITH POSSIBLE MYOSURE        Staff:  Surgeon(s):  Filomena Lorenzo DO         Anesthesia: Choice    Estimated Blood Loss:  25cc    Implants:    Nothing was implanted during the procedure    Specimen:          Specimens       ID Source Type Tests Collected By Collected At Frozen?    A Endometrial Curettings Tissue TISSUE PATHOLOGY EXAM   Filomena Lorenzo DO 8/30/24 1300 No    Description: Endometrial curettings                Findings: Scarred appearing cavity, possible impending fibroid into the cavity, no discrete masses noted    Complications: none    Description of Procedure:   The patient was taken to the operating room where a time out was performed to confirm correct patient and correct procedure. The patient was placed under general anesthesia. The patient was then positioned on the operating table in the dorsal lithotomy position with legs supported using stirrups. The patient was then prepped and draped in the usual sterile fashion. A straight catheter was used to drain the bladder.  Using two right angle retractors, the lip of the cervix was visualized and grasped using a single-tooth tenaculum. The cervix was then adequately dilated using Hegar dilators. The hysteroscope was introduced under direct visualization using normal saline solution as the distending media. The hysteroscope was advanced however appeared to be a false tract.  Hysteroscope was continued to used to try to hydrodissect to open the cavity.  Questionable cavity appears scarred with possible fibroid and pending the cavity from the left.    Andreia called to the room and agrees with assessment.  No ostia were visualized.  A sharp curetting was performed. This was done by starting at the 12 o'clock position and rotating a total of 360 degrees in order to cover all surfaces. Endometrial tissue was obtained and was sent to pathology. Following the curetting, good hemostasis was noted. The single-tooth tenaculum was removed from the lip of the cervix.  Laceration noted on the posterior aspect of the cervix due to the tenaculum.  The small laceration was repaired with a 3-0 Vicryl in a running locked fashion.  Good hemostasis was noted.  The retractors were then removed from the vagina. Sponge count was noted to be correct x2. The patient tolerated the procedure well and was transferred to the recovery room in stable condition.         Filomena Lorenzo DO     Date: 8/30/2024  Time: 15:23 EDT

## 2024-08-30 NOTE — INTERVAL H&P NOTE
H&P reviewed. The patient was examined and there are no changes to the H&P.  Filomena Lorenzo DO

## 2024-08-30 NOTE — H&P
GYN HISTORY & PHYSICAL      Patient Name: Claudette Gramajo  : 1965  MRN: [id number]    Subjective     Chief Complaint: Postmenopausal bleeding    HPI:  59 y.o.  No LMP recorded. Patient is postmenopausal.. Patient here today to undergo hysteroscopy D&C possible MyoSure.   She wishes to proceed with the above procedure. She denies any F/C, D/C, N/V, CP or SOB.     Risks and benefits and alternatives of surgery were discussed with patient.  Risks are not limited to anesthesia, bleeding, blood transfusion, infection, damage to surrounding organs, wound separation, re-operation, thromboembolic disease, death.            ROS: Review of Systems      Personal History     PMHx:    Past Medical History:   Diagnosis Date    Anesthesia complication     woke up during surgery    Arthritis     Arthritis of neck     CAD (coronary artery disease)     FOLLOWS ENGLISH, LAST STENT 2014    Cellulitis and abscess of leg 2013    NO CURRENT ISSUES    Chronic purulent otitis media 2013    Connective tissue anomaly     Constipation     CTS (carpal tunnel syndrome)     Diabetes mellitus     Dislocation, patella closed 2017    Elevated cholesterol     Family history of colon cancer 2022    Family history of esophageal cancer 2022    Fracture of wrist 1979    right hand    Hiatal hernia     Hyperlipidemia LDL goal <70 2021    Hypertension     Knee swelling     Migraine     Myocardial infarct         FARNAZ on CPAP 2021    Post-menopausal bleeding     RLS (restless legs syndrome)     Rotator cuff syndrome     tita    Sleep apnea     uses CPAP    Subluxation of patella     Ulcerative colitis 2024    NO CURRENT ISSUES       OBHx:   OB History    Para Term  AB Living   2 2 2     2   SAB IAB Ectopic Molar Multiple Live Births             2      # Outcome Date GA Lbr Leonel/2nd Weight Sex Type Anes PTL Lv   2 Term 02 41w0d   M    TAE   1 Term 10/16/88 40w0d   M    TAE         Home Medications:  Easy Touch Alcohol Prep Medium, HYDROcodone-acetaminophen, Semaglutide(0.25 or 0.5MG/DOS), albuterol sulfate HFA, ammonium lactate, aspirin, cetirizine, cholecalciferol, clopidogrel, empagliflozin, ezetimibe, famotidine, fluticasone, freestyle, furosemide, glucose blood, hydrOXYzine, metFORMIN ER, metoprolol succinate XL, milnacipran, montelukast, naloxone, nitroglycerin, polyethylene glycol, rosuvastatin, tiotropium bromide monohydrate, and zolpidem    Allergies:  Allergies   Allergen Reactions    Adhesive Tape Itching     NO TAPES     Molds & Smuts Cough    Nickel Rash    Pollen Extract Cough       PSHx:   Past Surgical History:   Procedure Laterality Date    ADENOIDECTOMY      APPENDECTOMY      CARDIAC CATHETERIZATION      x2    CARDIAC CATHETERIZATION N/A 05/28/2024    Procedure: Left Heart Cath;  Surgeon: Davis Parker MD;  Location: [facility] CATH INVASIVE LOCATION;  Service: Cardiovascular;  Laterality: N/A;    CARPAL TUNNEL RELEASE Right 7/11/2024    Procedure: CARPAL TUNNEL RELEASE;  Surgeon: Alexis Hawkins MD;  Location: [facility] OR [facility];  Service: Orthopedics;  Laterality: Right;    COLONOSCOPY      COLONOSCOPY N/A 08/26/2022    Procedure: COLONOSCOPY WITH POLYPECTOMY;  Surgeon: Sloan Ortiz MD;  Location: [facility] ENDOSCOPY;  Service: Gastroenterology;  Laterality: N/A;  COLON POLYP    COLONOSCOPY N/A 07/09/2024    Procedure: COLONOSCOPY WITH COLD SNARE POLYPECTOMIES;  Surgeon: Sloan Ortiz MD;  Location: [facility] ENDOSCOPY;  Service: Gastroenterology;  Laterality: N/A;  COLON POLYPS, DIVERTICULOSIS    CORONARY ANGIOPLASTY      CORONARY STENT PLACEMENT      x2    CUBITAL TUNNEL RELEASE Right 7/11/2024    Procedure: CUBITAL TUNNEL RELEASE;  Surgeon: Alexis Hawkins MD;  Location: [facility];  Service: Orthopedics;  Laterality: Right;    ENDOSCOPY N/A 08/26/2022    Procedure: ESOPHAGOGASTRODUODENOSCOPY WITH BX;  Surgeon: Sloan Ortiz MD;  Location: [facility]  ENDOSCOPY;  Service: Gastroenterology;  Laterality: N/A;  HIATAL HERNIA    HERNIA REPAIR      INNER EAR SURGERY      KNEE SURGERY  1984, oct.    right knee    TONSILLECTOMY      UPPER GASTROINTESTINAL ENDOSCOPY      VENOUS THROMBECTOMY      patient unsure       Social History:    reports that she quit smoking about 12 years ago. Her smoking use included cigarettes. She started smoking about 38 years ago. She has a 26 pack-year smoking history. She has been exposed to tobacco smoke. She has never used smokeless tobacco. She reports that she does not drink alcohol and does not use drugs.      Objective     Vitals:        PHYSICAL EXAM:   General- NAD, alert and oriented, appropriate  Psych- Normal mood, good memory  CV- Regular rhythm, no murnurs  Resp- CTA to bases, no wheezes  Abdomen- NABS, soft, non distended, non tender, no masses  Pelvic- Defer to OR     Lab/Imaging/Other:        Assessment / Plan     Assessment:  Postmenopausal bleeding    Plan:   Hysteroscopy D&C possible MyoSure  Patient to follow up in office in 2 weeks for post op visit. All questions and concerns have been answered.       Counseling: Risks and benefits and alternatives of surgery were discussed with patient.  Risks are not limited to anesthesia, bleeding, blood transfusion, infection, damage to surrounding organs, wound separation, re-operation, thromboembolic disease, death.  See separate written and signed consent for surgical specific risks and benefits.        Signature:   Electronically signed by:    Filomena Lorenzo DO  08/29/24  21:20 EDT

## 2024-09-09 NOTE — PROGRESS NOTES
"Post Operative Visit        Chief Complaint   Patient presents with    Post-op       HPI:   Claudette Gramajo is here for a post op visit.  She had a DILATATION AND CURETTAGE HYSTEROSCOPY WITH POSSIBLE MYOSURE on 08/30/2024 and has no complaints.  We have gone over her pathology and any available pictures and all questions have been answered.    Pain:  Yes not as bad as the first three days though.  Vaginal bleeding:  Yes, decreasing  Vaginal discharge:  No  Fever/chills:  No  Good appetite:  Yes  Normal bladder function:  No pt c/o pain when needing to urinate. First three days were the worst and now its a lingering pain.   Normal bowel function:  Yes  Hot flashes: Yes    PHYSICAL EXAM:  /82   Pulse 79   Ht 170.2 cm (67\")   Wt 88.5 kg (195 lb)   BMI 30.54 kg/m²   General- NAD, alert and oriented, appropriate  Psych- Normal mood, good memory  Abdomen- Soft, non distended, non tender  Ext- No edema  Skin- No lesions, no rashes        ASSESSMENT and PLAN:  Post-operative exam    Diagnoses and all orders for this visit:    1. Postoperative follow-up (Primary)    2. Menopausal symptoms    Complaining of night sweats, hot flashes, mood swings.  HRT contraindicated.  Discussed [place] with the patient.  Samples given today.  Recent CMP normal.    Operative report, surgical findings and any pathology/photos reviewed.  All questions answered.     Counseling:   Ok to resume normal activities  Ok to resume intercourse  Return to school/work without limitations      Follow Up:  Return in about 1 year (around 9/11/2025).            Filomena Lorenzo DO  09/11/2024    [facility] [place] [facility]  [address] [place] [address]  Dept: [phone]  Dept Fax: [phone]  Loc: [phone]  Loc Fax: [phone]     "

## 2024-09-11 ENCOUNTER — OFFICE VISIT (OUTPATIENT)
Dept: OBSTETRICS AND GYNECOLOGY | Facility: CLINIC | Age: 59
End: 2024-09-11
Payer: OTHER GOVERNMENT

## 2024-09-11 VITALS
SYSTOLIC BLOOD PRESSURE: 126 MMHG | HEIGHT: 67 IN | DIASTOLIC BLOOD PRESSURE: 82 MMHG | HEART RATE: 79 BPM | WEIGHT: 195 LBS | BODY MASS INDEX: 30.61 KG/M2

## 2024-09-11 DIAGNOSIS — N95.1 MENOPAUSAL SYMPTOMS: ICD-10-CM

## 2024-09-11 DIAGNOSIS — Z09 POSTOPERATIVE FOLLOW-UP: Primary | ICD-10-CM

## 2024-09-11 PROCEDURE — 99024 POSTOP FOLLOW-UP VISIT: CPT | Performed by: STUDENT IN AN ORGANIZED HEALTH CARE EDUCATION/TRAINING PROGRAM

## 2024-09-27 ENCOUNTER — LAB (OUTPATIENT)
Dept: LAB | Facility: HOSPITAL | Age: 59
End: 2024-09-27
Payer: OTHER GOVERNMENT

## 2024-09-27 DIAGNOSIS — E78.5 HYPERLIPIDEMIA LDL GOAL <70: ICD-10-CM

## 2024-09-27 DIAGNOSIS — N18.30 STAGE 3 CHRONIC KIDNEY DISEASE, UNSPECIFIED WHETHER STAGE 3A OR 3B CKD: ICD-10-CM

## 2024-09-27 DIAGNOSIS — E11.9 TYPE 2 DIABETES MELLITUS WITHOUT COMPLICATION, WITHOUT LONG-TERM CURRENT USE OF INSULIN: ICD-10-CM

## 2024-09-27 LAB
ALBUMIN SERPL-MCNC: 4.9 G/DL (ref 3.5–5.2)
ALBUMIN UR-MCNC: <1.2 MG/DL
ALBUMIN/GLOB SERPL: 1.8 G/DL
ALP SERPL-CCNC: 85 U/L (ref 39–117)
ALT SERPL W P-5'-P-CCNC: 25 U/L (ref 1–33)
ANION GAP SERPL CALCULATED.3IONS-SCNC: 15.5 MMOL/L (ref 5–15)
AST SERPL-CCNC: 20 U/L (ref 1–32)
BASOPHILS # BLD AUTO: 0.04 10*3/MM3 (ref 0–0.2)
BASOPHILS NFR BLD AUTO: 0.4 % (ref 0–1.5)
BILIRUB SERPL-MCNC: 0.4 MG/DL (ref 0–1.2)
BILIRUB UR QL STRIP: NEGATIVE
BUN SERPL-MCNC: 22 MG/DL (ref 6–20)
BUN/CREAT SERPL: 20.2 (ref 7–25)
CALCIUM SPEC-SCNC: 10.2 MG/DL (ref 8.6–10.5)
CHLORIDE SERPL-SCNC: 99 MMOL/L (ref 98–107)
CHOLEST SERPL-MCNC: 124 MG/DL (ref 0–200)
CLARITY UR: CLEAR
CO2 SERPL-SCNC: 22.5 MMOL/L (ref 22–29)
COLOR UR: YELLOW
CREAT SERPL-MCNC: 1.09 MG/DL (ref 0.57–1)
CREAT UR-MCNC: 111.3 MG/DL
DEPRECATED RDW RBC AUTO: 40.2 FL (ref 37–54)
EGFRCR SERPLBLD CKD-EPI 2021: 58.6 ML/MIN/1.73
EOSINOPHIL # BLD AUTO: 0.19 10*3/MM3 (ref 0–0.4)
EOSINOPHIL NFR BLD AUTO: 2.1 % (ref 0.3–6.2)
ERYTHROCYTE [DISTWIDTH] IN BLOOD BY AUTOMATED COUNT: 12.3 % (ref 12.3–15.4)
GLOBULIN UR ELPH-MCNC: 2.7 GM/DL
GLUCOSE SERPL-MCNC: 131 MG/DL (ref 65–99)
GLUCOSE UR STRIP-MCNC: ABNORMAL MG/DL
HBA1C MFR BLD: 7.1 % (ref 4.8–5.6)
HCT VFR BLD AUTO: 44.3 % (ref 34–46.6)
HDLC SERPL-MCNC: 49 MG/DL (ref 40–60)
HGB BLD-MCNC: 14.6 G/DL (ref 12–15.9)
HGB UR QL STRIP.AUTO: NEGATIVE
HOLD SPECIMEN: NORMAL
IMM GRANULOCYTES # BLD AUTO: 0.03 10*3/MM3 (ref 0–0.05)
IMM GRANULOCYTES NFR BLD AUTO: 0.3 % (ref 0–0.5)
KETONES UR QL STRIP: NEGATIVE
LDLC SERPL CALC-MCNC: 51 MG/DL (ref 0–100)
LDLC/HDLC SERPL: 0.96 {RATIO}
LEUKOCYTE ESTERASE UR QL STRIP.AUTO: NEGATIVE
LYMPHOCYTES # BLD AUTO: 3.79 10*3/MM3 (ref 0.7–3.1)
LYMPHOCYTES NFR BLD AUTO: 41.9 % (ref 19.6–45.3)
MCH RBC QN AUTO: 29.7 PG (ref 26.6–33)
MCHC RBC AUTO-ENTMCNC: 33 G/DL (ref 31.5–35.7)
MCV RBC AUTO: 90.2 FL (ref 79–97)
MICROALBUMIN/CREAT UR: NORMAL MG/G{CREAT}
MONOCYTES # BLD AUTO: 0.69 10*3/MM3 (ref 0.1–0.9)
MONOCYTES NFR BLD AUTO: 7.6 % (ref 5–12)
NEUTROPHILS NFR BLD AUTO: 4.3 10*3/MM3 (ref 1.7–7)
NEUTROPHILS NFR BLD AUTO: 47.7 % (ref 42.7–76)
NITRITE UR QL STRIP: NEGATIVE
NRBC BLD AUTO-RTO: 0 /100 WBC (ref 0–0.2)
PH UR STRIP.AUTO: 6 [PH] (ref 5–8)
PLATELET # BLD AUTO: 293 10*3/MM3 (ref 140–450)
PMV BLD AUTO: 10.3 FL (ref 6–12)
POTASSIUM SERPL-SCNC: 4.2 MMOL/L (ref 3.5–5.2)
PROT SERPL-MCNC: 7.6 G/DL (ref 6–8.5)
PROT UR QL STRIP: NEGATIVE
RBC # BLD AUTO: 4.91 10*6/MM3 (ref 3.77–5.28)
SODIUM SERPL-SCNC: 137 MMOL/L (ref 136–145)
SP GR UR STRIP: >1.03 (ref 1–1.03)
TRIGL SERPL-MCNC: 139 MG/DL (ref 0–150)
TSH SERPL DL<=0.05 MIU/L-ACNC: 1.17 UIU/ML (ref 0.27–4.2)
UROBILINOGEN UR QL STRIP: ABNORMAL
VLDLC SERPL-MCNC: 24 MG/DL (ref 5–40)
WBC NRBC COR # BLD AUTO: 9.04 10*3/MM3 (ref 3.4–10.8)

## 2024-09-27 PROCEDURE — 84443 ASSAY THYROID STIM HORMONE: CPT

## 2024-09-27 PROCEDURE — 81003 URINALYSIS AUTO W/O SCOPE: CPT

## 2024-09-27 PROCEDURE — 80053 COMPREHEN METABOLIC PANEL: CPT

## 2024-09-27 PROCEDURE — 83036 HEMOGLOBIN GLYCOSYLATED A1C: CPT

## 2024-09-27 PROCEDURE — 85025 COMPLETE CBC W/AUTO DIFF WBC: CPT

## 2024-09-27 PROCEDURE — 82570 ASSAY OF URINE CREATININE: CPT

## 2024-09-27 PROCEDURE — 80061 LIPID PANEL: CPT

## 2024-09-27 PROCEDURE — 82043 UR ALBUMIN QUANTITATIVE: CPT

## 2024-09-30 ENCOUNTER — OFFICE VISIT (OUTPATIENT)
Dept: ORTHOPEDIC SURGERY | Facility: CLINIC | Age: 59
End: 2024-09-30
Payer: OTHER GOVERNMENT

## 2024-09-30 VITALS
HEART RATE: 92 BPM | DIASTOLIC BLOOD PRESSURE: 77 MMHG | SYSTOLIC BLOOD PRESSURE: 121 MMHG | HEIGHT: 67 IN | BODY MASS INDEX: 30.62 KG/M2 | WEIGHT: 195.11 LBS | OXYGEN SATURATION: 98 %

## 2024-09-30 DIAGNOSIS — M79.641 RIGHT HAND PAIN: Primary | ICD-10-CM

## 2024-09-30 DIAGNOSIS — Z98.890 S/P CUBITAL TUNNEL RELEASE: ICD-10-CM

## 2024-09-30 DIAGNOSIS — Z98.890 S/P CARPAL TUNNEL RELEASE: ICD-10-CM

## 2024-09-30 PROCEDURE — 99024 POSTOP FOLLOW-UP VISIT: CPT

## 2024-10-01 ENCOUNTER — OFFICE VISIT (OUTPATIENT)
Dept: FAMILY MEDICINE CLINIC | Facility: CLINIC | Age: 59
End: 2024-10-01
Payer: OTHER GOVERNMENT

## 2024-10-01 VITALS
HEART RATE: 75 BPM | WEIGHT: 192 LBS | TEMPERATURE: 97.6 F | BODY MASS INDEX: 30.13 KG/M2 | OXYGEN SATURATION: 98 % | HEIGHT: 67 IN | SYSTOLIC BLOOD PRESSURE: 106 MMHG | DIASTOLIC BLOOD PRESSURE: 74 MMHG

## 2024-10-01 DIAGNOSIS — E78.5 HYPERLIPIDEMIA LDL GOAL <70: ICD-10-CM

## 2024-10-01 DIAGNOSIS — J43.9 PULMONARY EMPHYSEMA, UNSPECIFIED EMPHYSEMA TYPE: ICD-10-CM

## 2024-10-01 DIAGNOSIS — N18.30 STAGE 3 CHRONIC KIDNEY DISEASE, UNSPECIFIED WHETHER STAGE 3A OR 3B CKD: ICD-10-CM

## 2024-10-01 DIAGNOSIS — E11.9 TYPE 2 DIABETES MELLITUS WITHOUT COMPLICATION, WITHOUT LONG-TERM CURRENT USE OF INSULIN: Primary | ICD-10-CM

## 2024-10-01 DIAGNOSIS — J30.9 ALLERGIC RHINITIS, UNSPECIFIED SEASONALITY, UNSPECIFIED TRIGGER: ICD-10-CM

## 2024-10-01 DIAGNOSIS — I10 ESSENTIAL HYPERTENSION: ICD-10-CM

## 2024-10-01 DIAGNOSIS — Z87.891 HISTORY OF SMOKING 25-50 PACK YEARS: ICD-10-CM

## 2024-10-01 DIAGNOSIS — Z98.61 CAD S/P PERCUTANEOUS CORONARY ANGIOPLASTY: ICD-10-CM

## 2024-10-01 DIAGNOSIS — E55.9 VITAMIN D DEFICIENCY: ICD-10-CM

## 2024-10-01 DIAGNOSIS — I25.10 CAD S/P PERCUTANEOUS CORONARY ANGIOPLASTY: ICD-10-CM

## 2024-10-01 DIAGNOSIS — G47.00 INSOMNIA, UNSPECIFIED TYPE: ICD-10-CM

## 2024-10-01 PROCEDURE — 99214 OFFICE O/P EST MOD 30 MIN: CPT | Performed by: NURSE PRACTITIONER

## 2024-10-01 RX ORDER — METFORMIN HCL 500 MG
1000 TABLET, EXTENDED RELEASE 24 HR ORAL
Qty: 180 TABLET | Refills: 1 | Status: SHIPPED | OUTPATIENT
Start: 2024-10-01

## 2024-10-01 RX ORDER — TRAZODONE HYDROCHLORIDE 50 MG/1
50 TABLET, FILM COATED ORAL NIGHTLY
Qty: 90 TABLET | Refills: 1 | Status: SHIPPED | OUTPATIENT
Start: 2024-10-01

## 2024-10-01 RX ORDER — BLOOD SUGAR DIAGNOSTIC
1 STRIP MISCELLANEOUS WEEKLY
Qty: 50 EACH | Refills: 1 | Status: SHIPPED | OUTPATIENT
Start: 2024-10-01

## 2024-10-01 NOTE — PROGRESS NOTES
Follow Up Office Visit      Patient Name: Claudette Gramajo  : 1965   MRN: [id number]     Chief Complaint: DM2, CAD, HTN, hyperlipidemia, GERD, CKD      History of Present Illness: Claudette Gramajo is a 59 y.o. female who is here today to follow up for DM2, CAD, HTN, hyperlipidemia, GERD, CKD  Review lab results     Follow up resuming ozempic  Currently taking  Metformin 500mg, Jardiance 25mg   A1C- 2024        [facility] exam-- [facility]   Foot exam- - Dr Rangel  mammogram-2024  Pap-2022  Colonoscopy-2022         Follow up GYN/ D and C  1. Postoperative follow-up (Primary)   2. Menopausal symptoms   Complaining of night sweats, hot flashes, mood swings.  HRT contraindicated.  Discussed Veozah with the patient.  Samples given today.  Recent CMP normal.     Operative report, surgical findings and any pathology/photos reviewed.  All questions answered.    Counseling:   Ok to resume normal activities  Ok to resume intercourse  Return to school/work without limitations     Follow Up:  Return in about 1 year (around 2025).    Pt c/o insomnia, hydroxyzine is not helping    Subjective      Review of Systems:   Review of Systems   Constitutional:  Negative for fever.   Respiratory:  Negative for cough.    Cardiovascular:  Negative for chest pain.   Gastrointestinal:  Negative for abdominal pain, constipation, diarrhea, nausea and vomiting.   Genitourinary:  Negative for dysuria.   Musculoskeletal:  Negative for myalgias.   Psychiatric/Behavioral:  Positive for sleep disturbance.         Past Medical History:   Past Medical History:   Diagnosis Date    Anesthesia complication     woke up during surgery    Arthritis     Arthritis of neck     CAD (coronary artery disease)     FOLLOWS AMADOR, LAST STENT 2014    Cellulitis and abscess of leg 2013    NO CURRENT ISSUES    Chronic purulent otitis media 2013    Connective tissue anomaly     Constipation     CTS (carpal tunnel  syndrome)     Diabetes mellitus     Dislocation, patella closed 05/30/2017    Elevated cholesterol     Family history of colon cancer 05/27/2022    Family history of esophageal cancer 05/27/2022    Fracture of wrist 1979    right hand    Hiatal hernia     Hyperlipidemia LDL goal <70 08/31/2021    Hypertension     Knee swelling     Migraine     Myocardial infarct     2013    FARNAZ on CPAP 09/01/2021    Post-menopausal bleeding     RLS (restless legs syndrome)     Rotator cuff syndrome     tita    Sleep apnea     uses CPAP    Subluxation of patella     Ulcerative colitis 5/28/2024    NO CURRENT ISSUES       Past Surgical History:   Past Surgical History:   Procedure Laterality Date    ADENOIDECTOMY      APPENDECTOMY      CARDIAC CATHETERIZATION      x2    CARDIAC CATHETERIZATION N/A 05/28/2024    Procedure: Left Heart Cath;  Surgeon: Davis Parker MD;  Location: [facility] CATH INVASIVE LOCATION;  Service: Cardiovascular;  Laterality: N/A;    CARPAL TUNNEL RELEASE Right 07/11/2024    Procedure: CARPAL TUNNEL RELEASE;  Surgeon: Alexis Hawkins MD;  [place] [facility] [place] [facility] [place]  Service: [place]opedics;  Laterality: Right;    COLONOSCOPY      COLONOSCOPY N/A 08/26/2022    Procedure: COLONOSCOPY WITH POLYPECTOMY;  Surgeon: Sloan Ortiz MD;  Location: [facility] ENDOSCOPY;  Service: Gastroenterology;  Laterality: N/A;  COLON POLYP    COLONOSCOPY N/A 07/09/2024    Procedure: COLONOSCOPY WITH COLD SNARE POLYPECTOMIES;  Surgeon: Sloan Ortiz MD;  Location: [facility] ENDOSCOPY;  Service: Gastroenterology;  Laterality: N/A;  COLON POLYPS, DIVERTICULOSIS    CORONARY ANGIOPLASTY      CORONARY STENT PLACEMENT      x2 2013 2014    CUBITAL TUNNEL RELEASE Right 07/11/2024    Procedure: CUBITAL TUNNEL RELEASE;  Surgeon: Alexis Hawkins MD;  [place] [facility] [place] [facility] [place]  Service: Orthopedics;  Laterality: Right;    D & C HYSTEROSCOPY N/A 8/30/2024    Procedure: DILATATION AND CURETTAGE,  HYSTEROSCOPY;  Surgeon: Filomena Lorenzo DO;   Location: [facility] [place] [facility] [place];  Service: Gynecology;  Laterality: N/A;    ENDOSCOPY N/A 2022    Procedure: ESOPHAGOGASTRODUODENOSCOPY WITH BX;  Surgeon: Sloan Ortiz MD;  Location: [facility] ENDOSCOPY;  Service: Gastroenterology;  Laterality: N/A;  HIATAL HERNIA    HERNIA REPAIR      INNER EAR SURGERY      KNEE SURGERY  , oct.    right knee    TONSILLECTOMY      UPPER GASTROINTESTINAL ENDOSCOPY      VENOUS THROMBECTOMY      patient unsure       Family History:   Family History   Problem Relation Age of Onset    Heart attack Father     Heart disease Father     Cancer Father     Osteoporosis Father     Rheumatologic disease Father     Suicidality Mother     Osteoporosis Mother     Rheumatologic disease Mother     Scoliosis Mother     Heart disease Paternal Grandfather     Cancer Paternal Grandfather     Thyroid disease Paternal Grandfather     Pancreatic cancer Paternal Grandfather     Esophageal cancer Paternal Grandfather     Stomach cancer Paternal Grandfather     Sleep apnea Paternal Grandfather     Heart disease Paternal Grandmother     Cancer Paternal Grandmother     No Known Problems Maternal Grandmother     No Known Problems Maternal Grandfather     Colon cancer Neg Hx     Malig Hyperthermia Neg Hx     Breast cancer Neg Hx     Ovarian cancer Neg Hx     Uterine cancer Neg Hx     Deep vein thrombosis Neg Hx     Pulmonary embolism Neg Hx        Social History:   Social History     Socioeconomic History    Marital status:    Tobacco Use    Smoking status: Former     Current packs/day: 0.00     Average packs/day: 1 pack/day for 26.0 years (26.0 ttl pk-yrs)     Types: Cigarettes     Start date: 1986     Quit date: 2012     Years since quittin.7     Passive exposure: Past    Smokeless tobacco: Never    Tobacco comments:     caffeine use   Vaping Use    Vaping status: Never Used   Substance and Sexual Activity    Alcohol use: No    Drug use: Never    Sexual activity: Yes     Partners:  Male       Medications:     Current Outpatient Medications:     acetaminophen (Tylenol) 325 MG tablet, Take 2 tablets by mouth Every 6 (Six) Hours As Needed for Mild Pain (alternate with motrin)., Disp: 30 tablet, Rfl: 1    albuterol sulfate  (90 Base) MCG/ACT inhaler, Take 1-2 Puffs every 4 hours prn, Disp: 8 g, Rfl: 1    Alcohol Swabs (Easy Touch Alcohol Prep Medium) 70 % pads, 1 each by Other route Daily., Disp: 100 each, Rfl: 1    ammonium lactate (LAC-HYDRIN) 12 % lotion, Apply  topically to the appropriate area as directed 2 (Two) Times a Day., Disp: 225 g, Rfl: 11    aspirin 81 MG EC tablet, Take 1 tablet by mouth Daily., Disp: 90 tablet, Rfl: 3    cetirizine (zyrTEC) 10 MG tablet, Take 1 tablet by mouth Daily. (Patient taking differently: Take 1 tablet by mouth 2 (Two) Times a Day.), Disp: 90 tablet, Rfl: 0    cholecalciferol (VITAMIN D3) 25 MCG (1000 UT) tablet, Take 1 tablet by mouth Daily. Does not take daily only occasionally, Disp: 90 tablet, Rfl: 1    clopidogrel (PLAVIX) 75 MG tablet, Take 1 tablet by mouth Daily., Disp: 90 tablet, Rfl: 1    empagliflozin (JARDIANCE) 25 MG tablet tablet, Take 1 tablet by mouth Daily., Disp: 90 tablet, Rfl: 1    ezetimibe (Zetia) 10 MG tablet, Take 1 tablet by mouth Daily., Disp: 90 tablet, Rfl: 1    famotidine (Pepcid) 40 MG tablet, Take 1 tablet by mouth 2 (Two) Times a Day As Needed for Heartburn. (Patient taking differently: Take 1 tablet by mouth At Night As Needed for Heartburn.), Disp: 90 tablet, Rfl: 1    fluticasone (FLONASE) 50 MCG/ACT nasal spray, 2 sprays into the nostril(s) as directed by provider Daily. 2 sprays each nostril daily (Patient taking differently: Administer 2 sprays into the nostril(s) as directed by provider Daily As Needed. 2 sprays each nostril daily), Disp: 48 g, Rfl: 1    FREESTYLE LITE test strip, 1 each by Other route 3 times a day., Disp: 300 each, Rfl: 2    furosemide (LASIX) 20 MG tablet, Take 1 tablet by mouth Daily., Disp:  90 tablet, Rfl: 1    HYDROcodone-acetaminophen (Norco) 7.5-325 MG per tablet, Take 1 tablet by mouth Every 4 (Four) Hours As Needed for Moderate Pain., Disp: 25 tablet, Rfl: 0    Lancets (freestyle) lancets, 1 each by Other route 3 times a day., Disp: 300 each, Rfl: 2    metFORMIN ER (GLUCOPHAGE-XR) 500 MG 24 hr tablet, Take 2 tablets by mouth Daily With Dinner., Disp: 180 tablet, Rfl: 1    metoprolol succinate XL (TOPROL-XL) 25 MG 24 hr tablet, Take 1 tablet by mouth Daily. (Patient taking differently: Take 1 tablet by mouth Daily As Needed (TAKES AS NEEDED FOR HTN).), Disp: 90 tablet, Rfl: 0    montelukast (SINGULAIR) 10 MG tablet, Take 1 tablet by mouth Every Night. (Patient taking differently: Take 1 tablet by mouth At Night As Needed.), Disp: 90 tablet, Rfl: 1    Narcan 4 MG/0.1ML nasal spray, , Disp: , Rfl:     nitroglycerin (NITROSTAT) 0.4 MG SL tablet, 1 under the tongue as needed for angina, may repeat q5mins for up three doses, Disp: 100 tablet, Rfl: 11    rosuvastatin (CRESTOR) 40 MG tablet, Take 1 tablet by mouth Every Night., Disp: 90 tablet, Rfl: 1    Savella 25 MG tablet tablet, Take 1 tablet by mouth Every Night., Disp: , Rfl:     Semaglutide,0.25 or 0.5MG/DOS, (OZEMPIC) 2 MG/3ML solution pen-injector, Inject 0.25 mg under the skin into the appropriate area as directed 1 (One) Time Per Week., Disp: 3 mL, Rfl: 3    tiotropium bromide monohydrate (Spiriva Respimat) 2.5 MCG/ACT aerosol solution inhaler, Inhale 2 puffs Daily. (Patient taking differently: Inhale 2 puffs Daily As Needed.), Disp: 4 g, Rfl: 5    Insulin Pen Needle (Pen Needles) 31G X 6 MM misc, Use 1 each 1 (One) Time Per Week., Disp: 50 each, Rfl: 1    traZODone (DESYREL) 50 MG tablet, Take 1 tablet by mouth Every Night., Disp: 90 tablet, Rfl: 1    Allergies:   Allergies   Allergen Reactions    Adhesive Tape Itching     NO TAPES     Molds & Smuts Cough    Nickel Rash    Pollen Extract Cough             Objective     Physical Exam:  Vital  "Signs:   Vitals:    10/01/24 1540   BP: 106/74   Pulse: 75   Temp: 97.6 °F (36.4 °C)   SpO2: 98%   Weight: 87.1 kg (192 lb)   Height: 170.2 cm (67\")     Body mass index is 30.07 kg/m².           Physical Exam  HENT:      Right Ear: Tympanic membrane normal.      Left Ear: Tympanic membrane normal.      Nose: Nose normal.      Mouth/Throat:      Mouth: Mucous membranes are moist.   Eyes:      Conjunctiva/sclera: Conjunctivae normal.   Neck:      Vascular: No carotid bruit.   Cardiovascular:      Rate and Rhythm: Normal rate and regular rhythm.      Heart sounds: Normal heart sounds. No murmur heard.  Pulmonary:      Effort: Pulmonary effort is normal.      Breath sounds: Normal breath sounds.   Abdominal:      General: Bowel sounds are normal.      Palpations: Abdomen is soft.   Musculoskeletal:      Right lower leg: No edema.      Left lower leg: No edema.   Skin:     General: Skin is warm and dry.   Neurological:      Mental Status: She is alert.   Psychiatric:         Mood and Affect: Mood normal.         Behavior: Behavior normal.             Assessment / Plan      Assessment/Plan:   Diagnoses and all orders for this visit:    1. Type 2 diabetes mellitus without complication, without long-term current use of insulin (Primary)    2. Essential hypertension    3. Hyperlipidemia LDL goal <70    4. CAD S/P percutaneous coronary angioplasty    5. Stage 3 chronic kidney disease, unspecified whether stage 3a or 3b CKD    6. Vitamin D deficiency    7. Allergic rhinitis, unspecified seasonality, unspecified trigger    8. History of smoking 25-50 pack years    9. Pulmonary emphysema, unspecified emphysema type    10. Insomnia, unspecified type    Other orders  -     metFORMIN ER (GLUCOPHAGE-XR) 500 MG 24 hr tablet; Take 2 tablets by mouth Daily With Dinner.  Dispense: 180 tablet; Refill: 1  -     traZODone (DESYREL) 50 MG tablet; Take 1 tablet by mouth Every Night.  Dispense: 90 tablet; Refill: 1  -     Insulin Pen Needle " "(Pen Needles) 31G X 6 MM misc; Use 1 each 1 (One) Time Per Week.  Dispense: 50 each; Refill: 1         Diabetes mellitus type 2 hemoglobin A1c greater than 7 patient reports she is unable to tolerate a larger dose than 0.25 mg Ozempic once weekly will increase metformin to 2 tablets with dinner do recommend reducing added carbs sugars exercise 30 minutes daily increasing lean protein intake will reassess in 90 days continue Jardiance  Hypertension currently controlled  Hyperlipidemia LDL below goal of 70 on current statin dose Crestor 40 mg at nighttime denies myalgias  Coronary artery disease currently on Plavix and aspirin per cardiology follow-up as scheduled  Chronic kidney disease stage III avoid all NSAIDs follow-up with nephrology as scheduled  Vitamin D deficiency continue current supplementation daily  Allergic rhinitis symptoms currently controlled with Flonase and Zyrtec  Pulmonary emphysema no wheezing or shortness of breath at this time  Insomnia uncontrolled with hydroxyzine will discontinue start trazodone discussed good sleep hygiene      Follow Up:   Return in about 3 months (around 1/1/2025).    Portia Bahena, SARWAT    \"Please note that portions of this note were completed with a voice recognition program.\"    "

## 2024-10-02 PROCEDURE — 90656 IIV3 VACC NO PRSV 0.5 ML IM: CPT | Performed by: NURSE PRACTITIONER

## 2024-10-02 PROCEDURE — 90471 IMMUNIZATION ADMIN: CPT | Performed by: NURSE PRACTITIONER

## 2024-10-02 NOTE — PROGRESS NOTES
"Chief Complaint  Edema and Follow-up of the Right Wrist and Edema and Follow-up of the Right Elbow    Subjective      Claudette Gramajo presents to [facility] for follow up of her right wrist and elbow.  Patient is 11 weeks status post cubital tunnel release and carpal tunnel release performed Dr. Hawkins on 7/11/2024.  Patient has been going to physical therapy at [facility] and states that the physical therapist states that she is doing very well.  Patient states that she still having weakness within her hand as well as swelling/pain to the base of her palm.  She states that she has an area on her incision that she is concerned is not healing and is still \"open\".  She states the numbness and tingling has resolved but she still has \"a ways to go\".    Allergies   Allergen Reactions    Adhesive Tape Itching     NO TAPES     Molds & Smuts Cough    Nickel Rash    Pollen Extract Cough       Objective     Vital Signs:   Vitals:    09/30/24 1507   BP: 121/77   Pulse: 92   SpO2: 98%   Weight: 88.5 kg (195 lb 1.7 oz)   Height: 170.2 cm (67\")     Body mass index is 30.56 kg/m².    I reviewed the patient's chief complaint, history of present illness, review of systems, past medical history, surgical history, family history, social history, medications, and allergy list.     REVIEW OF SYSTEMS    Constitutional: Denies fevers, chills, weight loss  Cardiovascular: Denies chest pain, shortness of breath  Skin: Denies rashes, acute skin changes  Neurologic: Denies headache, loss of consciousness  MSK: Right wrist pain, right elbow pain.     Ortho Exam  Carpal Tunnel Release   General: Alert. No acute distress.  Right upper extremity: Well-healed incision about the palm.  No active drainage or redness noted. No concerning signs of infection.  Tenderness to palpation over incision.  Palpable scar tissue surrounds incision.  Full elbow range of motion. Finger range of motion intact.  Mild stiffness with finger " range of motion. Demonstrates active wrist flexion and extension with associated stiffness. Thumb opposition intact. Palmar abduction of the thumb intact.  Sensation intact. Neurovascularly intact. Palpable radial pulse.            Imaging Results (Most Recent)       None                Assessment and Plan   Diagnoses and all orders for this visit:    1. Right hand pain (Primary)    2. S/P carpal tunnel release    3. S/P cubital tunnel release         Claudette Gramajo is here today 11 weeks status post right carpal tunnel release and cubital tunnel release performed by Dr Hawkins on 7/11/2024.  Patient can discontinue carpal tunnel brace at this time, as tolerated. Discussed option of home exercises vs OT for continued ROM and strengthening efforts.  Patient elected to continue physical therapy to help with further strengthening.  Gradually return to activity as tolerated.     Patient will follow-up in approximately 6 to 8 weeks for reevaluation.      Tobacco Use: Medium Risk (10/1/2024)    Patient History     Smoking Tobacco Use: Former     Smokeless Tobacco Use: Never     Passive Exposure: Past     Patient reports they have a history of tobacco use; encouraged continued tobacco cessation for further health benefits.            Follow Up   No follow-ups on file.  There are no Patient Instructions on file for this visit.  Patient was given instructions and counseling regarding her condition or for health maintenance advice. Please see specific information pulled into the AVS if appropriate.       Dictated Utilizing Dragon Dictation. Please note that portions of this note were completed with a voice recognition program. Part of this note may be an electronic transcription/translation of spoken language to printed text using the Dragon Dictation System.

## 2024-10-17 ENCOUNTER — OFFICE VISIT (OUTPATIENT)
Dept: CARDIOLOGY | Facility: CLINIC | Age: 59
End: 2024-10-17
Payer: OTHER GOVERNMENT

## 2024-10-17 VITALS
DIASTOLIC BLOOD PRESSURE: 75 MMHG | HEART RATE: 81 BPM | BODY MASS INDEX: 29.79 KG/M2 | WEIGHT: 189.8 LBS | HEIGHT: 67 IN | SYSTOLIC BLOOD PRESSURE: 126 MMHG

## 2024-10-17 DIAGNOSIS — I10 ESSENTIAL HYPERTENSION: ICD-10-CM

## 2024-10-17 DIAGNOSIS — Z98.61 CAD S/P PERCUTANEOUS CORONARY ANGIOPLASTY: Primary | ICD-10-CM

## 2024-10-17 DIAGNOSIS — E78.5 HYPERLIPIDEMIA LDL GOAL <70: ICD-10-CM

## 2024-10-17 DIAGNOSIS — I25.10 CAD S/P PERCUTANEOUS CORONARY ANGIOPLASTY: Primary | ICD-10-CM

## 2024-10-17 PROBLEM — R10.84 GENERALIZED ABDOMINAL PAIN: Status: RESOLVED | Noted: 2024-06-18 | Resolved: 2024-10-17

## 2024-10-17 PROBLEM — G56.01 CARPAL TUNNEL SYNDROME OF RIGHT WRIST: Status: RESOLVED | Noted: 2024-04-22 | Resolved: 2024-10-17

## 2024-10-17 PROBLEM — R20.0 NUMBNESS AND TINGLING IN BOTH HANDS: Status: RESOLVED | Noted: 2024-03-28 | Resolved: 2024-10-17

## 2024-10-17 PROBLEM — R93.89 ABNORMAL CT SCAN: Status: RESOLVED | Noted: 2024-06-18 | Resolved: 2024-10-17

## 2024-10-17 PROBLEM — R20.2 NUMBNESS AND TINGLING IN BOTH HANDS: Status: RESOLVED | Noted: 2024-03-28 | Resolved: 2024-10-17

## 2024-10-17 PROBLEM — K59.00 CONSTIPATION: Status: RESOLVED | Noted: 2024-06-18 | Resolved: 2024-10-17

## 2024-10-17 PROCEDURE — 93000 ELECTROCARDIOGRAM COMPLETE: CPT | Performed by: NURSE PRACTITIONER

## 2024-10-17 PROCEDURE — 99214 OFFICE O/P EST MOD 30 MIN: CPT | Performed by: NURSE PRACTITIONER

## 2024-10-17 RX ORDER — FUROSEMIDE 20 MG
20 TABLET ORAL DAILY
Qty: 90 TABLET | Refills: 1 | Status: SHIPPED | OUTPATIENT
Start: 2024-10-17

## 2024-10-17 RX ORDER — METOPROLOL SUCCINATE 25 MG/1
25 TABLET, EXTENDED RELEASE ORAL DAILY PRN
Qty: 90 TABLET | Refills: 1 | Status: SHIPPED | OUTPATIENT
Start: 2024-10-17

## 2024-10-17 RX ORDER — ROSUVASTATIN CALCIUM 40 MG/1
40 TABLET, COATED ORAL NIGHTLY
Qty: 90 TABLET | Refills: 1 | Status: SHIPPED | OUTPATIENT
Start: 2024-10-17

## 2024-10-17 RX ORDER — EZETIMIBE 10 MG/1
10 TABLET ORAL DAILY
Qty: 90 TABLET | Refills: 1 | Status: SHIPPED | OUTPATIENT
Start: 2024-10-17

## 2024-10-17 NOTE — PROGRESS NOTES
Chief Complaint  Follow-up, Coronary Artery Disease, Hypertension, and Hyperlipidemia    Subjective            History of Present Illness  Claudette Gramajo is a 59-year-old female patient who presents to the office today for follow up. She has CAD with prior stenting, hypertension, and hyperlipidemia. She is compliant with medications. She reports blood pressures have been fluctuating lately due to increased amount of pain. She is working with pain management to get this under control. She denies any new or worsening cardiac symptoms today.    PMH  Past Medical History:   Diagnosis Date    Anesthesia complication     woke up during surgery    Arthritis     Arthritis of neck     CAD (coronary artery disease)     FOLLOWS AMADOR, LAST STENT 2014    Cellulitis and abscess of leg 03/28/2013    NO CURRENT ISSUES    Chronic purulent otitis media 07/01/2013    Connective tissue anomaly     Constipation     CTS (carpal tunnel syndrome)     Diabetes mellitus     Dislocation, patella closed 05/30/2017    Elevated cholesterol     Family history of colon cancer 05/27/2022    Family history of esophageal cancer 05/27/2022    Fracture of wrist 1979    right hand    Hiatal hernia     Hyperlipidemia LDL goal <70 08/31/2021    Hypertension     Knee swelling     Migraine     Myocardial infarct     2013    FARNAZ on CPAP 09/01/2021    Post-menopausal bleeding     RLS (restless legs syndrome)     Rotator cuff syndrome     tita    Sleep apnea     uses CPAP    Subluxation of patella     Ulcerative colitis 5/28/2024    NO CURRENT ISSUES         ALLERGY  Allergies   Allergen Reactions    Adhesive Tape Itching     NO TAPES     Molds & Smuts Cough    Nickel Rash    Pollen Extract Cough          SURGICALHX  Past Surgical History:   Procedure Laterality Date    ADENOIDECTOMY      APPENDECTOMY      CARDIAC CATHETERIZATION      x2    CARDIAC CATHETERIZATION N/A 05/28/2024    Procedure: Left Heart Cath;  Surgeon: Davis Parker MD;  Location: [facility]  INVASIVE LOCATION;  Service: Cardiovascular;  Laterality: N/A;    CARPAL TUNNEL RELEASE Right 2024    Procedure: CARPAL TUNNEL RELEASE;  Surgeon: Alexis Hawkins MD;  Location: [facility] OR [facility];  Service: Orthopedics;  Laterality: Right;    COLONOSCOPY      COLONOSCOPY N/A 2022    Procedure: COLONOSCOPY WITH POLYPECTOMY;  Surgeon: Sloan Ortiz MD;  Location: [facility] ENDOSCOPY;  Service: Gastroenterology;  Laterality: N/A;  COLON POLYP    COLONOSCOPY N/A 2024    Procedure: COLONOSCOPY WITH COLD SNARE POLYPECTOMIES;  Surgeon: Sloan Ortiz MD;  Location: [facility] ENDOSCOPY;  Service: Gastroenterology;  Laterality: N/A;  COLON POLYPS, DIVERTICULOSIS    CORONARY ANGIOPLASTY      CORONARY STENT PLACEMENT      x2 2013    CUBITAL TUNNEL RELEASE Right 2024    Procedure: CUBITAL TUNNEL RELEASE;  Surgeon: Alexis Hawkins MD;  Location: [facility] OR [facility];  Service: Orthopedics;  Laterality: Right;    D & C HYSTEROSCOPY N/A 2024    Procedure: DILATATION AND CURETTAGE,  HYSTEROSCOPY;  Surgeon: Filomena Lorenzo DO;  Location: [facility] OR [facility];  Service: Gynecology;  Laterality: N/A;    ENDOSCOPY N/A 2022    Procedure: ESOPHAGOGASTRODUODENOSCOPY WITH BX;  Surgeon: Sloan Ortiz MD;  Location: [facility] ENDOSCOPY;  Service: Gastroenterology;  Laterality: N/A;  HIATAL HERNIA    HERNIA REPAIR      INNER EAR SURGERY      KNEE SURGERY  , oct.    right knee    TONSILLECTOMY      UPPER GASTROINTESTINAL ENDOSCOPY      VENOUS THROMBECTOMY      patient unsure          SOC  Social History     Socioeconomic History    Marital status:    Tobacco Use    Smoking status: Former     Current packs/day: 0.00     Average packs/day: 1 pack/day for 26.0 years (26.0 ttl pk-yrs)     Types: Cigarettes     Start date: 1986     Quit date: 2012     Years since quittin.8     Passive exposure: Past    Smokeless tobacco: Never    Tobacco comments:     caffeine use   Vaping Use    Vaping  status: Never Used   Substance and Sexual Activity    Alcohol use: No    Drug use: Never    Sexual activity: Yes     Partners: Male         FAMHX  Family History   Problem Relation Age of Onset    Heart attack Father     Heart disease Father     Cancer Father     Osteoporosis Father     Rheumatologic disease Father     Suicidality Mother     Osteoporosis Mother     Rheumatologic disease Mother     Scoliosis Mother     Heart disease Paternal Grandfather     Cancer Paternal Grandfather     Thyroid disease Paternal Grandfather     Pancreatic cancer Paternal Grandfather     Esophageal cancer Paternal Grandfather     Stomach cancer Paternal Grandfather     Sleep apnea Paternal Grandfather     Heart disease Paternal Grandmother     Cancer Paternal Grandmother     No Known Problems Maternal Grandmother     No Known Problems Maternal Grandfather     Colon cancer Neg Hx     Malig Hyperthermia Neg Hx     Breast cancer Neg Hx     Ovarian cancer Neg Hx     Uterine cancer Neg Hx     Deep vein thrombosis Neg Hx     Pulmonary embolism Neg Hx           MEDSIGONLY  Current Outpatient Medications on File Prior to Visit   Medication Sig    acetaminophen (Tylenol) 325 MG tablet Take 2 tablets by mouth Every 6 (Six) Hours As Needed for Mild Pain (alternate with motrin).    albuterol sulfate  (90 Base) MCG/ACT inhaler Take 1-2 Puffs every 4 hours prn    Alcohol Swabs (Easy Touch Alcohol Prep Medium) 70 % pads 1 each by Other route Daily.    ammonium lactate (LAC-HYDRIN) 12 % lotion Apply  topically to the appropriate area as directed 2 (Two) Times a Day.    aspirin 81 MG EC tablet Take 1 tablet by mouth Daily.    cetirizine (zyrTEC) 10 MG tablet Take 1 tablet by mouth Daily. (Patient taking differently: Take 1 tablet by mouth 2 (Two) Times a Day.)    cholecalciferol (VITAMIN D3) 25 MCG (1000 UT) tablet Take 1 tablet by mouth Daily. Does not take daily only occasionally    clopidogrel (PLAVIX) 75 MG tablet Take 1 tablet by mouth  Daily.    empagliflozin (JARDIANCE) 25 MG tablet tablet Take 1 tablet by mouth Daily.    ezetimibe (Zetia) 10 MG tablet Take 1 tablet by mouth Daily.    famotidine (Pepcid) 40 MG tablet Take 1 tablet by mouth 2 (Two) Times a Day As Needed for Heartburn. (Patient taking differently: Take 1 tablet by mouth At Night As Needed for Heartburn.)    fluticasone (FLONASE) 50 MCG/ACT nasal spray 2 sprays into the nostril(s) as directed by provider Daily. 2 sprays each nostril daily (Patient taking differently: Administer 2 sprays into the nostril(s) as directed by provider Daily As Needed. 2 sprays each nostril daily)    FREESTYLE LITE test strip 1 each by Other route 3 times a day.    furosemide (LASIX) 20 MG tablet Take 1 tablet by mouth Daily.    Insulin Pen Needle (Pen Needles) 31G X 6 MM misc Use 1 each 1 (One) Time Per Week.    Lancets (freestyle) lancets 1 each by Other route 3 times a day.    metFORMIN ER (GLUCOPHAGE-XR) 500 MG 24 hr tablet Take 2 tablets by mouth Daily With Dinner.    metoprolol succinate XL (TOPROL-XL) 25 MG 24 hr tablet Take 1 tablet by mouth Daily. (Patient taking differently: Take 1 tablet by mouth Daily As Needed (TAKES AS NEEDED FOR HTN).)    montelukast (SINGULAIR) 10 MG tablet Take 1 tablet by mouth Every Night. (Patient taking differently: Take 1 tablet by mouth At Night As Needed.)    Narcan 4 MG/0.1ML nasal spray     nitroglycerin (NITROSTAT) 0.4 MG SL tablet 1 under the tongue as needed for angina, may repeat q5mins for up three doses    rosuvastatin (CRESTOR) 40 MG tablet Take 1 tablet by mouth Every Night.    Savella 25 MG tablet tablet Take 1 tablet by mouth Every Night. (Patient taking differently: Take 1 tablet by mouth Every 12 (Twelve) Hours.)    Semaglutide,0.25 or 0.5MG/DOS, (OZEMPIC) 2 MG/3ML solution pen-injector Inject 0.25 mg under the skin into the appropriate area as directed 1 (One) Time Per Week.    tiotropium bromide monohydrate (Spiriva Respimat) 2.5 MCG/ACT aerosol  "solution inhaler Inhale 2 puffs Daily. (Patient taking differently: Inhale 2 puffs Daily As Needed.)    traZODone (DESYREL) 50 MG tablet Take 1 tablet by mouth Every Night.    [DISCONTINUED] HYDROcodone-acetaminophen (Norco) 7.5-325 MG per tablet Take 1 tablet by mouth Every 4 (Four) Hours As Needed for Moderate Pain. (Patient not taking: Reported on 10/17/2024)     No current facility-administered medications on file prior to visit.         Objective   /75   Pulse 81   Ht 170.2 cm (67.01\")   Wt 86.1 kg (189 lb 12.8 oz)   BMI 29.72 kg/m²       Physical Exam  HENT:      Head: Normocephalic.   Neck:      Vascular: No carotid bruit.   Cardiovascular:      Rate and Rhythm: Normal rate and regular rhythm.      Pulses: Normal pulses.      Heart sounds: Normal heart sounds. No murmur heard.  Pulmonary:      Effort: Pulmonary effort is normal.      Breath sounds: Normal breath sounds.   Musculoskeletal:      Cervical back: Neck supple.      Right lower leg: No edema.      Left lower leg: No edema.   Skin:     General: Skin is dry.   Neurological:      Mental Status: She is alert and oriented to person, place, and time.   Psychiatric:         Behavior: Behavior normal.       ECG 12 Lead    Date/Time: 10/17/2024 3:05 PM  Performed by: Berna Harry APRN    Authorized by: Berna Harry APRN  Comparison: compared with previous ECG from 9/6/2022  Similar to previous ECG  Rhythm: sinus rhythm  Rate: normal  BPM: 72  Conduction: conduction normal  ST Segments: ST segments normal  QRS axis: left  Other findings: low voltage and T wave abnormality    Clinical impression: non-specific ECG      Result Review :   The following data was reviewed by: SARWAT Harding on 10/17/2024:  proBNP   Date Value Ref Range Status   10/06/2023 92.0 0.0 - 900.0 pg/mL Final     CMP          9/27/2024    08:32   CMP   Glucose 131    BUN 22    Creatinine 1.09    EGFR 58.6    Sodium 137    Potassium 4.2    Chloride 99  " "  Calcium 10.2    Total Protein 7.6    Albumin 4.9    Globulin 2.7    Total Bilirubin 0.4    Alkaline Phosphatase 85    AST (SGOT) 20    ALT (SGPT) 25    Albumin/Globulin Ratio 1.8    BUN/Creatinine Ratio 20.2    Anion Gap 15.5      CBC w/diff          9/27/2024    08:32   CBC w/Diff   WBC 9.04    RBC 4.91    Hemoglobin 14.6    Hematocrit 44.3    MCV 90.2    MCH 29.7    MCHC 33.0    RDW 12.3    Platelets 293    Neutrophil Rel % 47.7    Immature Granulocyte Rel % 0.3    Lymphocyte Rel % 41.9    Monocyte Rel % 7.6    Eosinophil Rel % 2.1    Basophil Rel % 0.4       Lab Results   Component Value Date    TSH 1.170 09/27/2024      Lab Results   Component Value Date    FREET4 1.1 05/07/2021      No results found for: \"DDIMERQUANT\"  Magnesium   Date Value Ref Range Status   10/06/2023 2.2 1.6 - 2.6 mg/dL Final      No results found for: \"DIGOXIN\"   Lab Results   Component Value Date    TROPONINT <0.01 09/29/2014           Lipid Panel          9/27/2024    08:32   Lipid Panel   Total Cholesterol 124    Triglycerides 139    HDL Cholesterol 49    VLDL Cholesterol 24    LDL Cholesterol  51    LDL/HDL Ratio 0.96        Results for orders placed in visit on 10/05/21    Adult Transthoracic Echo Complete W/ Cont if Necessary Per Protocol    Interpretation Summary  Normal left ventricular systolic function with an estimated ejection fraction of 60-65%.  No regional wall motion abnormalities were observed.  Left ventricular diastolic function is consistent with impaired relaxation (grade I diastolic dysfunction).  No significant valvular heart disease.           Assessment and Plan    Diagnoses and all orders for this visit:    1. CAD S/P percutaneous coronary angioplasty (Primary)  She denies angina today, continue aspirin 81 mg daily and plavix 75 mg daily.    2. Essential hypertension  Currently controlled and without adverse effects from medication, continue lasix 20 mg daily and metoprolol 25 mg daily as needed for elevated " blood pressure/heart rate.    3. Hyperlipidemia LDL goal <70  Last lipid panel was 9/27/24 with LDL 51 which is within goal range, continue rosuvastatin 40 mg daily and zetia 10 mg daily. Repeat fasting labs prior to next appointment.        Other orders  -     rosuvastatin (CRESTOR) 40 MG tablet; Take 1 tablet by mouth Every Night.  Dispense: 90 tablet; Refill: 1  -     furosemide (LASIX) 20 MG tablet; Take 1 tablet by mouth Daily.  Dispense: 90 tablet; Refill: 1  -     ezetimibe (Zetia) 10 MG tablet; Take 1 tablet by mouth Daily.  Dispense: 90 tablet; Refill: 1  -     metoprolol succinate XL (TOPROL-XL) 25 MG 24 hr tablet; Take 1 tablet by mouth Daily As Needed (TAKES AS NEEDED FOR HTN).  Dispense: 90 tablet; Refill: 1  -     ECG 12 Lead            Follow Up   Return in about 6 months (around 4/17/2025) for Follow up with Dr Parker.    Patient was given instructions and counseling regarding her condition or for health maintenance advice. Please see specific information pulled into the AVS if appropriate.     Claudette Gramajo  reports that she quit smoking about 12 years ago. Her smoking use included cigarettes. She started smoking about 38 years ago. She has a 26 pack-year smoking history. She has been exposed to tobacco smoke. She has never used smokeless tobacco.          SARWAT Harding  10/17/24  15:08 EDT    Dictated Utilizing Dragon Dictation

## 2024-11-11 ENCOUNTER — OFFICE VISIT (OUTPATIENT)
Dept: ORTHOPEDIC SURGERY | Facility: CLINIC | Age: 59
End: 2024-11-11
Payer: OTHER GOVERNMENT

## 2024-11-11 VITALS — BODY MASS INDEX: 29.66 KG/M2 | HEIGHT: 67 IN | WEIGHT: 189 LBS

## 2024-11-11 DIAGNOSIS — Z98.890 S/P CUBITAL TUNNEL RELEASE: ICD-10-CM

## 2024-11-11 DIAGNOSIS — M25.531 RIGHT WRIST PAIN: ICD-10-CM

## 2024-11-11 DIAGNOSIS — M25.521 RIGHT ELBOW PAIN: Primary | ICD-10-CM

## 2024-11-11 DIAGNOSIS — Z98.890 S/P CARPAL TUNNEL RELEASE: ICD-10-CM

## 2024-11-11 NOTE — PROGRESS NOTES
"Chief Complaint  Follow-up of the Right Wrist and Follow-up of the Right Elbow     Subjective      Claudette Gramajo presents to [facility] for follow up of the right wrist and follow up of the right elbow.  She had cubital tunnel release and carpal tunnel release performed on 2024. She is doing better.  She still feels like her 5 th digit drifts to the side. She notes some swelling in the elbow.  She notes the pain is better then from before surgery.    Allergies   Allergen Reactions    Adhesive Tape Itching     NO TAPES     Molds & Smuts Cough    Nickel Rash    Pollen Extract Cough        Social History     Socioeconomic History    Marital status:    Tobacco Use    Smoking status: Former     Current packs/day: 0.00     Average packs/day: 1 pack/day for 26.0 years (26.0 ttl pk-yrs)     Types: Cigarettes     Start date: 1986     Quit date: 2012     Years since quittin.8     Passive exposure: Past    Smokeless tobacco: Never    Tobacco comments:     caffeine use   Vaping Use    Vaping status: Never Used   Substance and Sexual Activity    Alcohol use: No    Drug use: Never    Sexual activity: Yes     Partners: Male        I reviewed the patient's chief complaint, history of present illness, review of systems, past medical history, surgical history, family history, social history, medications, and allergy list.     Review of Systems     Constitutional: Denies fevers, chills, weight loss  Cardiovascular: Denies chest pain, shortness of breath  Skin: Denies rashes, acute skin changes  Neurologic: Denies headache, loss of consciousness        Vital Signs:   Ht 170.2 cm (67.01\")   Wt 85.7 kg (189 lb)   BMI 29.59 kg/m²          Physical Exam  General: Alert. No acute distress    Ortho Exam      Right upper extremity: Well-healed incision about the palm.  No active drainage or redness noted. No concerning signs of infection.  Tenderness to palpation over incision.  " Palpable scar tissue surrounds incision.  Full elbow range of motion. Finger range of motion intact.  Mild stiffness with finger range of motion. Demonstrates active wrist flexion and extension with associated stiffness. Thumb opposition intact. Palmar abduction of the thumb intact.  Sensation intact. Neurovascularly intact. Palpable radial pulse.          Procedures        Imaging Results (Most Recent)       None             Result Review :          Assessment and Plan     Diagnoses and all orders for this visit:    1. Right elbow pain (Primary)    2. Right wrist pain    3. S/P carpal tunnel release    4. S/P cubital tunnel release        Discussed the treatment plan with the patient.     Continue physical therapy.  Work on desensitization.         Call or return if worsening symptoms.    Follow Up     PRN      Patient was given instructions and counseling regarding her condition or for health maintenance advice. Please see specific information pulled into the AVS if appropriate.     Scribed for Alexis Hawkins MD by Izabela Hughes MA.  11/11/24   15:42 EST      I have personally performed the services described in this document as scribed by the above individual and it is both accurate and complete. Alexis Hawkins MD 11/11/24

## 2024-12-30 NOTE — PROGRESS NOTES
Follow Up Office Visit      Patient Name: Claudette Gramajo  : 1965   MRN: [id number]     Chief Complaint:    Chief Complaint   Patient presents with    Diabetes    Hyperlipidemia    Hypertension         History of Present Illness: Claudette Gramajo is a 59 y.o. female who is here today to follow up for  DM2, CAD, HTN, hyperlipidemia, GERD, CKD     Pt reports BS readings at home   Pt is also stating she has two place one on each leg mole like but the color is changing would like to referral to dermatology    Also c/o rash on forehead for about 4 weeks, tried otc lotion no improvement     A1C- 2025  6.3   BS running      Eye exam-- [facility] [place]   Foot exam- - Dr Rangel  mammogram-2024  Pap-2022  Colonoscopy-2024  Ct low dose chest 2024     Claudette Gramajo  reports that she quit smoking about 13 years ago. Her smoking use included cigarettes. She started smoking about 38 years ago. She has a 26 pack-year smoking history. She has been exposed to tobacco smoke. She has never used smokeless tobacco.     Follow up cardiology consult per progress note 10/17/2024  1. CAD S/P percutaneous coronary angioplasty (Primary)  She denies angina today, continue aspirin 81 mg daily and plavix 75 mg daily.     2. Essential hypertension  Currently controlled and without adverse effects from medication, continue lasix 20 mg daily and metoprolol 25 mg daily as needed for elevated blood pressure/heart rate.     3. Hyperlipidemia LDL goal <70  Last lipid panel was 24 with LDL 51 which is within goal range, continue rosuvastatin 40 mg daily and zetia 10 mg daily. Repeat fasting labs prior to next appointment.               Subjective      Review of Systems:   Review of Systems   Constitutional:  Negative for fever.   Eyes:  Negative for visual disturbance.   Respiratory:  Negative for cough.    Cardiovascular:  Negative for chest pain.   Gastrointestinal:  Negative for abdominal pain.    Endocrine: Negative for polydipsia and polyuria.   Genitourinary:  Negative for dysuria.   Musculoskeletal:  Negative for myalgias.        Past Medical History:   Past Medical History:   Diagnosis Date    Anesthesia complication     woke up during surgery    Arthritis     Arthritis of neck     CAD (coronary artery disease)     FOLLOWS AMADOR, LAST STENT 2014    Cellulitis and abscess of leg 03/28/2013    NO CURRENT ISSUES    Chronic purulent otitis media 07/01/2013    Connective tissue anomaly     Constipation     CTS (carpal tunnel syndrome)     Diabetes mellitus     Dislocation, patella closed 05/30/2017    Elevated cholesterol     Family history of colon cancer 05/27/2022    Family history of esophageal cancer 05/27/2022    Fracture of wrist 1979    right hand    Hiatal hernia     Hyperlipidemia LDL goal <70 08/31/2021    Hypertension     Knee swelling     Migraine     Myocardial infarct     2013    FARNAZ on CPAP 09/01/2021    Post-menopausal bleeding     RLS (restless legs syndrome)     Rotator cuff syndrome     tita    Sleep apnea     uses CPAP    Subluxation of patella     Ulcerative colitis 5/28/2024    NO CURRENT ISSUES       Past Surgical History:   Past Surgical History:   Procedure Laterality Date    ADENOIDECTOMY      APPENDECTOMY      CARDIAC CATHETERIZATION      x2    CARDIAC CATHETERIZATION N/A 05/28/2024    Procedure: Left Heart Cath;  Surgeon: Davis Parker MD;  Location: [facility] CATH INVASIVE LOCATION;  Service: Cardiovascular;  Laterality: N/A;    CARPAL TUNNEL RELEASE Right 07/11/2024    Procedure: CARPAL TUNNEL RELEASE;  Surgeon: Alexis Hawkins MD;  Location: [facility] OR OSC;  Service: Orthopedics;  Laterality: Right;    COLONOSCOPY      COLONOSCOPY N/A 08/26/2022    Procedure: COLONOSCOPY WITH POLYPECTOMY;  Surgeon: Sloan Ortiz MD;  Location: [facility] ENDOSCOPY;  Service: Gastroenterology;  Laterality: N/A;  COLON POLYP    COLONOSCOPY N/A 07/09/2024    Procedure: COLONOSCOPY WITH COLD  SNARE POLYPECTOMIES;  Surgeon: Sloan Ortiz MD;  Location: [facility] ENDOSCOPY;  Service: Gastroenterology;  Laterality: N/A;  COLON POLYPS, DIVERTICULOSIS    CORONARY ANGIOPLASTY      CORONARY STENT PLACEMENT      x2 2013 2014    CUBITAL TUNNEL RELEASE Right 07/11/2024    Procedure: CUBITAL TUNNEL RELEASE;  Surgeon: Alexis Hawkins MD;  Location: [facility] OR [facility];  Service: Orthopedics;  Laterality: Right;    D & C HYSTEROSCOPY N/A 8/30/2024    Procedure: DILATATION AND CURETTAGE,  HYSTEROSCOPY;  Surgeon: Filomena Lorenzo DO;  Location: [facility] OR [facility] [place] [facility];  Service: Gynecology;  Laterality: N/A;    ENDOSCOPY N/A 08/26/2022    Procedure: ESOPHAGOGASTRODUODENOSCOPY WITH BX;  Surgeon: Sloan Ortiz MD;  Location: [facility] ENDOSCOPY;  Service: Gastroenterology;  Laterality: N/A;  HIATAL HERNIA    HERNIA REPAIR      INNER EAR SURGERY      KNEE SURGERY  1984, oct.    right knee    TONSILLECTOMY      UPPER GASTROINTESTINAL ENDOSCOPY      VENOUS THROMBECTOMY      patient unsure       Family History:   Family History   Problem Relation Age of Onset    Heart attack Father     Heart disease Father     Cancer Father     Osteoporosis Father     Rheumatologic disease Father     Suicidality Mother     Osteoporosis Mother     Rheumatologic disease Mother     Scoliosis Mother     Heart disease Paternal Grandfather     Cancer Paternal Grandfather     Thyroid disease Paternal Grandfather     Pancreatic cancer Paternal Grandfather     Esophageal cancer Paternal Grandfather     Stomach cancer Paternal Grandfather     Sleep apnea Paternal Grandfather     Heart disease Paternal Grandmother     Cancer Paternal Grandmother     No Known Problems Maternal Grandmother     No Known Problems Maternal Grandfather     Colon cancer Neg Hx     Malig Hyperthermia Neg Hx     Breast cancer Neg Hx     Ovarian cancer Neg Hx     Uterine cancer Neg Hx     Deep vein thrombosis Neg Hx     Pulmonary embolism Neg Hx        Social History:   Social  History     Socioeconomic History    Marital status:    Tobacco Use    Smoking status: Former     Current packs/day: 0.00     Average packs/day: 1 pack/day for 26.0 years (26.0 ttl pk-yrs)     Types: Cigarettes     Start date: 1986     Quit date: 2012     Years since quittin.0     Passive exposure: Past    Smokeless tobacco: Never    Tobacco comments:     caffeine use   Vaping Use    Vaping status: Never Used   Substance and Sexual Activity    Alcohol use: No    Drug use: Never    Sexual activity: Yes     Partners: Male       Medications:     Current Outpatient Medications:     acetaminophen (Tylenol) 325 MG tablet, Take 2 tablets by mouth Every 6 (Six) Hours As Needed for Mild Pain (alternate with motrin)., Disp: 30 tablet, Rfl: 1    albuterol sulfate  (90 Base) MCG/ACT inhaler, Take 1-2 Puffs every 4 hours prn, Disp: 8 g, Rfl: 1    ammonium lactate (LAC-HYDRIN) 12 % lotion, Apply  topically to the appropriate area as directed 2 (Two) Times a Day., Disp: 225 g, Rfl: 11    aspirin 81 MG EC tablet, Take 1 tablet by mouth Daily., Disp: , Rfl:     cetirizine (zyrTEC) 10 MG tablet, Take 1 tablet by mouth Daily., Disp: 90 tablet, Rfl: 0    cholecalciferol (VITAMIN D3) 25 MCG (1000 UT) tablet, Take 1 tablet by mouth Daily. Does not take daily only occasionally, Disp: 90 tablet, Rfl: 1    clopidogrel (PLAVIX) 75 MG tablet, Take 1 tablet by mouth Daily., Disp: 90 tablet, Rfl: 1    empagliflozin (JARDIANCE) 25 MG tablet tablet, Take 1 tablet by mouth Daily., Disp: 90 tablet, Rfl: 1    ezetimibe (Zetia) 10 MG tablet, Take 1 tablet by mouth Daily., Disp: 90 tablet, Rfl: 1    famotidine (Pepcid) 40 MG tablet, Take 1 tablet by mouth 2 (Two) Times a Day As Needed for Heartburn. (Patient taking differently: Take 1 tablet by mouth At Night As Needed for Heartburn.), Disp: 90 tablet, Rfl: 1    fluticasone (FLONASE) 50 MCG/ACT nasal spray, Administer 2 sprays into the nostril(s) as directed by provider  Daily. 2 sprays each nostril daily, Disp: 48 g, Rfl: 1    FREESTYLE LITE test strip, 1 each by Other route 3 times a day., Disp: 300 each, Rfl: 2    furosemide (LASIX) 20 MG tablet, Take 1 tablet by mouth Daily., Disp: 90 tablet, Rfl: 1    Insulin Pen Needle (Pen Needles) 31G X 6 MM misc, Use 1 each 1 (One) Time Per Week., Disp: 50 each, Rfl: 1    Lancets (freestyle) lancets, 1 each by Other route 3 times a day., Disp: 300 each, Rfl: 2    metFORMIN ER (GLUCOPHAGE-XR) 500 MG 24 hr tablet, Take 2 tablets by mouth Daily With Dinner., Disp: 180 tablet, Rfl: 1    metoprolol succinate XL (TOPROL-XL) 25 MG 24 hr tablet, Take 1 tablet by mouth Daily As Needed (TAKES AS NEEDED FOR HTN)., Disp: 90 tablet, Rfl: 1    montelukast (SINGULAIR) 10 MG tablet, Take 1 tablet by mouth Every Night., Disp: 90 tablet, Rfl: 1    Narcan 4 MG/0.1ML nasal spray, , Disp: , Rfl:     nitroglycerin (NITROSTAT) 0.4 MG SL tablet, 1 under the tongue as needed for angina, may repeat q5mins for up three doses, Disp: 100 tablet, Rfl: 11    rosuvastatin (CRESTOR) 40 MG tablet, Take 1 tablet by mouth Every Night., Disp: 90 tablet, Rfl: 1    Savella 25 MG tablet tablet, Take 1 tablet by mouth Every Night. (Patient taking differently: Take 1 tablet by mouth Every 12 (Twelve) Hours.), Disp: , Rfl:     Semaglutide,0.25 or 0.5MG/DOS, (OZEMPIC) 2 MG/3ML solution pen-injector, Inject 0.25 mg under the skin into the appropriate area as directed 1 (One) Time Per Week., Disp: 3 mL, Rfl: 3    tiotropium bromide monohydrate (Spiriva Respimat) 2.5 MCG/ACT aerosol solution inhaler, Inhale 2 puffs Daily., Disp: 4 g, Rfl: 5    traZODone (DESYREL) 50 MG tablet, Take 1 tablet by mouth Every Night., Disp: 90 tablet, Rfl: 1    Alcohol Swabs (Easy Touch Alcohol Prep Medium) 70 % pads, 1 each by Other route Daily., Disp: 100 each, Rfl: 1    clobetasol propionate (TEMOVATE) 0.05 % cream, Apply 1 Application topically to the appropriate area as directed 2 (Two) Times a Day.,  "Disp: 60 g, Rfl: 1    Allergies:   Allergies   Allergen Reactions    Adhesive Tape Itching     NO TAPES     Molds & Smuts Cough    Nickel Rash    Pollen Extract Cough             Objective     Physical Exam:  Vital Signs:   Vitals:    01/07/25 1541   BP: 114/77   Pulse: 71   Temp: 98.1 °F (36.7 °C)   SpO2: 98%   Weight: 84.8 kg (187 lb)   Height: 172.7 cm (68\")     Body mass index is 28.43 kg/m².   BMI is >= 30 and <35. (Class 1 Obesity). The following options were offered after discussion;: exercise counseling/recommendations and nutrition counseling/recommendations       Physical Exam  HENT:      Nose: Nose normal.      Mouth/Throat:      Mouth: Mucous membranes are moist.   Eyes:      Conjunctiva/sclera: Conjunctivae normal.   Neck:      Thyroid: No thyroid tenderness.      Vascular: No carotid bruit.   Cardiovascular:      Rate and Rhythm: Normal rate and regular rhythm.      Heart sounds: Normal heart sounds. No murmur heard.  Pulmonary:      Effort: Pulmonary effort is normal.      Breath sounds: Normal breath sounds.   Abdominal:      General: Bowel sounds are normal.      Palpations: Abdomen is soft.   Musculoskeletal:      Right lower leg: No edema.      Left lower leg: No edema.   Skin:     General: Skin is warm and dry.      Comments: Dark flat lesion posterior right leg behind knee irregular borders nontender no surrounding erythema approximately 5 mm in diameter    Raised lesion dark irregular borders approximately 3 mm in diameter nontender no surrounding erythema    Erythematous scaly rash center of forehead no surrounding erythema no edema   Neurological:      Mental Status: She is alert.   Psychiatric:         Mood and Affect: Mood normal.         Behavior: Behavior normal.             Assessment / Plan      Assessment/Plan:   Diagnoses and all orders for this visit:    1. Type 2 diabetes mellitus with stage 3 chronic kidney disease, without long-term current use of insulin, unspecified whether " stage 3a or 3b CKD (Primary)  -     CBC Auto Differential; Future  -     Comprehensive Metabolic Panel; Future  -     Hemoglobin A1c; Future  -     Microalbumin / Creatinine Urine Ratio - Urine, Clean Catch; Future  -     TSH; Future  -     Urinalysis With Culture If Indicated -; Future    2. Essential hypertension    3. Hyperlipidemia LDL goal <70  -     Comprehensive Metabolic Panel; Future  -     Lipid Panel; Future    4. CAD S/P percutaneous coronary angioplasty  -     aspirin 81 MG EC tablet; Take 1 tablet by mouth Daily.    5. Stage 3 chronic kidney disease, unspecified whether stage 3a or 3b CKD    6. Gastroesophageal reflux disease without esophagitis    7. Insomnia, unspecified type    8. Pulmonary emphysema, unspecified emphysema type  -     albuterol sulfate  (90 Base) MCG/ACT inhaler; Take 1-2 Puffs every 4 hours prn  Dispense: 8 g; Refill: 1    9. History of smoking 25-50 pack years  -     CT Chest Low Dose Wo; Future    10. Vitamin D deficiency  -     cholecalciferol (VITAMIN D3) 25 MCG (1000 UT) tablet; Take 1 tablet by mouth Daily. Does not take daily only occasionally  Dispense: 90 tablet; Refill: 1    11. Allergic rhinitis, unspecified seasonality, unspecified trigger  -     fluticasone (FLONASE) 50 MCG/ACT nasal spray; Administer 2 sprays into the nostril(s) as directed by provider Daily. 2 sprays each nostril daily  Dispense: 48 g; Refill: 1  -     montelukast (SINGULAIR) 10 MG tablet; Take 1 tablet by mouth Every Night.  Dispense: 90 tablet; Refill: 1    12. Skin lesion of right leg  -     Ambulatory Referral to Dermatology    13. Dermatitis    14. Screening mammogram for breast cancer  -     Mammo Screening Digital Tomosynthesis Bilateral With CAD; Future    Other orders  -     acetaminophen (Tylenol) 325 MG tablet; Take 2 tablets by mouth Every 6 (Six) Hours As Needed for Mild Pain (alternate with motrin).  Dispense: 30 tablet; Refill: 1  -     cetirizine (zyrTEC) 10 MG tablet; Take 1  tablet by mouth Daily.  Dispense: 90 tablet; Refill: 0  -     Insulin Pen Needle (Pen Needles) 31G X 6 MM misc; Use 1 each 1 (One) Time Per Week.  Dispense: 50 each; Refill: 1  -     furosemide (LASIX) 20 MG tablet; Take 1 tablet by mouth Daily.  Dispense: 90 tablet; Refill: 1  -     FREESTYLE LITE test strip; 1 each by Other route 3 times a day.  Dispense: 300 each; Refill: 2  -     Lancets (freestyle) lancets; 1 each by Other route 3 times a day.  Dispense: 300 each; Refill: 2  -     metFORMIN ER (GLUCOPHAGE-XR) 500 MG 24 hr tablet; Take 2 tablets by mouth Daily With Dinner.  Dispense: 180 tablet; Refill: 1  -     metoprolol succinate XL (TOPROL-XL) 25 MG 24 hr tablet; Take 1 tablet by mouth Daily As Needed (TAKES AS NEEDED FOR HTN).  Dispense: 90 tablet; Refill: 1  -     rosuvastatin (CRESTOR) 40 MG tablet; Take 1 tablet by mouth Every Night.  Dispense: 90 tablet; Refill: 1  -     Semaglutide,0.25 or 0.5MG/DOS, (OZEMPIC) 2 MG/3ML solution pen-injector; Inject 0.25 mg under the skin into the appropriate area as directed 1 (One) Time Per Week.  Dispense: 3 mL; Refill: 3  -     tiotropium bromide monohydrate (Spiriva Respimat) 2.5 MCG/ACT aerosol solution inhaler; Inhale 2 puffs Daily.  Dispense: 4 g; Refill: 5  -     clobetasol propionate (TEMOVATE) 0.05 % cream; Apply 1 Application topically to the appropriate area as directed 2 (Two) Times a Day.  Dispense: 60 g; Refill: 1       DM2 currently controlled continue ozempic metformin recheck with a follow-up in 6 months  Hypertension currently controlled reviewed cardiology note with patient  Hyperlipidemia LDL below goal of 70 on Crestor 40 mg at nighttime liver enzymes normal  Coronary artery disease currently on aspirin and Plavix per cardiology recommendation  Chronic kidney disease stage III avoid all NSAIDs increase water intake blood pressure well-controlled diabetes well-controlled at this time  Reflux currently controlled with Pepcid  Insomnia patient  "reports trazodone not helping recommend patient to follow-up with sleep center for further recommendations  Pulmonary emphysema no wheezing or shortness of breath albuterol as needed Spiriva daily  History of greater than smoking 25 pack years will obtain updated CT low-dose of the chest  Skin lesion of right leg will refer to dermatology per patient request  Dermatitis will treat with clobetasol cream      Follow Up:   Return in about 6 months (around 7/7/2025).    Portia Bahena, APRN    \"Please note that portions of this note were completed with a voice recognition program.\"     "

## 2025-01-02 ENCOUNTER — LAB (OUTPATIENT)
Dept: LAB | Facility: HOSPITAL | Age: 60
End: 2025-01-02
Payer: OTHER GOVERNMENT

## 2025-01-02 DIAGNOSIS — E78.5 HYPERLIPIDEMIA LDL GOAL <70: ICD-10-CM

## 2025-01-02 DIAGNOSIS — E11.9 TYPE 2 DIABETES MELLITUS WITHOUT COMPLICATION, WITHOUT LONG-TERM CURRENT USE OF INSULIN: ICD-10-CM

## 2025-01-02 DIAGNOSIS — N18.30 STAGE 3 CHRONIC KIDNEY DISEASE, UNSPECIFIED WHETHER STAGE 3A OR 3B CKD: ICD-10-CM

## 2025-01-02 LAB
ALBUMIN SERPL-MCNC: 4.4 G/DL (ref 3.5–5.2)
ALBUMIN UR-MCNC: 3.1 MG/DL
ALBUMIN/GLOB SERPL: 1.8 G/DL
ALP SERPL-CCNC: 79 U/L (ref 39–117)
ALT SERPL W P-5'-P-CCNC: 24 U/L (ref 1–33)
ANION GAP SERPL CALCULATED.3IONS-SCNC: 10 MMOL/L (ref 5–15)
AST SERPL-CCNC: 22 U/L (ref 1–32)
BACTERIA UR QL AUTO: NORMAL /HPF
BASOPHILS # BLD AUTO: 0.06 10*3/MM3 (ref 0–0.2)
BASOPHILS NFR BLD AUTO: 0.8 % (ref 0–1.5)
BILIRUB SERPL-MCNC: 0.5 MG/DL (ref 0–1.2)
BILIRUB UR QL STRIP: NEGATIVE
BUN SERPL-MCNC: 21 MG/DL (ref 6–20)
BUN/CREAT SERPL: 20.6 (ref 7–25)
CALCIUM SPEC-SCNC: 9.8 MG/DL (ref 8.6–10.5)
CHLORIDE SERPL-SCNC: 105 MMOL/L (ref 98–107)
CHOLEST SERPL-MCNC: 117 MG/DL (ref 0–200)
CLARITY UR: CLEAR
CO2 SERPL-SCNC: 26 MMOL/L (ref 22–29)
COLOR UR: YELLOW
CREAT SERPL-MCNC: 1.02 MG/DL (ref 0.57–1)
CREAT UR-MCNC: 89.9 MG/DL
DEPRECATED RDW RBC AUTO: 42.6 FL (ref 37–54)
EGFRCR SERPLBLD CKD-EPI 2021: 63.5 ML/MIN/1.73
EOSINOPHIL # BLD AUTO: 0.39 10*3/MM3 (ref 0–0.4)
EOSINOPHIL NFR BLD AUTO: 5.2 % (ref 0.3–6.2)
ERYTHROCYTE [DISTWIDTH] IN BLOOD BY AUTOMATED COUNT: 12.6 % (ref 12.3–15.4)
GLOBULIN UR ELPH-MCNC: 2.4 GM/DL
GLUCOSE SERPL-MCNC: 123 MG/DL (ref 65–99)
GLUCOSE UR STRIP-MCNC: ABNORMAL MG/DL
HBA1C MFR BLD: 6.3 % (ref 4.8–5.6)
HCT VFR BLD AUTO: 40.8 % (ref 34–46.6)
HDLC SERPL-MCNC: 48 MG/DL (ref 40–60)
HGB BLD-MCNC: 13.5 G/DL (ref 12–15.9)
HGB UR QL STRIP.AUTO: NEGATIVE
HOLD SPECIMEN: NORMAL
HYALINE CASTS UR QL AUTO: NORMAL /LPF
IMM GRANULOCYTES # BLD AUTO: 0.03 10*3/MM3 (ref 0–0.05)
IMM GRANULOCYTES NFR BLD AUTO: 0.4 % (ref 0–0.5)
KETONES UR QL STRIP: NEGATIVE
LDLC SERPL CALC-MCNC: 52 MG/DL (ref 0–100)
LDLC/HDLC SERPL: 1.08 {RATIO}
LEUKOCYTE ESTERASE UR QL STRIP.AUTO: NEGATIVE
LYMPHOCYTES # BLD AUTO: 2.47 10*3/MM3 (ref 0.7–3.1)
LYMPHOCYTES NFR BLD AUTO: 33.2 % (ref 19.6–45.3)
MCH RBC QN AUTO: 30.5 PG (ref 26.6–33)
MCHC RBC AUTO-ENTMCNC: 33.1 G/DL (ref 31.5–35.7)
MCV RBC AUTO: 92.3 FL (ref 79–97)
MICROALBUMIN/CREAT UR: 34.5 MG/G (ref 0–29)
MONOCYTES # BLD AUTO: 0.67 10*3/MM3 (ref 0.1–0.9)
MONOCYTES NFR BLD AUTO: 9 % (ref 5–12)
NEUTROPHILS NFR BLD AUTO: 3.83 10*3/MM3 (ref 1.7–7)
NEUTROPHILS NFR BLD AUTO: 51.4 % (ref 42.7–76)
NITRITE UR QL STRIP: NEGATIVE
NRBC BLD AUTO-RTO: 0 /100 WBC (ref 0–0.2)
PH UR STRIP.AUTO: 6 [PH] (ref 5–8)
PLATELET # BLD AUTO: 241 10*3/MM3 (ref 140–450)
PMV BLD AUTO: 10.7 FL (ref 6–12)
POTASSIUM SERPL-SCNC: 4.2 MMOL/L (ref 3.5–5.2)
PROT SERPL-MCNC: 6.8 G/DL (ref 6–8.5)
PROT UR QL STRIP: ABNORMAL
RBC # BLD AUTO: 4.42 10*6/MM3 (ref 3.77–5.28)
RBC # UR STRIP: NORMAL /HPF
REF LAB TEST METHOD: NORMAL
SODIUM SERPL-SCNC: 141 MMOL/L (ref 136–145)
SP GR UR STRIP: >1.03 (ref 1–1.03)
SQUAMOUS #/AREA URNS HPF: NORMAL /HPF
TRIGL SERPL-MCNC: 85 MG/DL (ref 0–150)
TSH SERPL DL<=0.05 MIU/L-ACNC: 0.81 UIU/ML (ref 0.27–4.2)
UROBILINOGEN UR QL STRIP: ABNORMAL
VLDLC SERPL-MCNC: 17 MG/DL (ref 5–40)
WBC # UR STRIP: NORMAL /HPF
WBC NRBC COR # BLD AUTO: 7.45 10*3/MM3 (ref 3.4–10.8)

## 2025-01-02 PROCEDURE — 85025 COMPLETE CBC W/AUTO DIFF WBC: CPT

## 2025-01-02 PROCEDURE — 84443 ASSAY THYROID STIM HORMONE: CPT

## 2025-01-02 PROCEDURE — 82570 ASSAY OF URINE CREATININE: CPT

## 2025-01-02 PROCEDURE — 80061 LIPID PANEL: CPT

## 2025-01-02 PROCEDURE — 81001 URINALYSIS AUTO W/SCOPE: CPT

## 2025-01-02 PROCEDURE — 80053 COMPREHEN METABOLIC PANEL: CPT

## 2025-01-02 PROCEDURE — 82043 UR ALBUMIN QUANTITATIVE: CPT

## 2025-01-02 PROCEDURE — 83036 HEMOGLOBIN GLYCOSYLATED A1C: CPT

## 2025-01-07 ENCOUNTER — OFFICE VISIT (OUTPATIENT)
Dept: FAMILY MEDICINE CLINIC | Facility: CLINIC | Age: 60
End: 2025-01-07
Payer: OTHER GOVERNMENT

## 2025-01-07 VITALS
WEIGHT: 187 LBS | TEMPERATURE: 98.1 F | SYSTOLIC BLOOD PRESSURE: 114 MMHG | HEIGHT: 68 IN | BODY MASS INDEX: 28.34 KG/M2 | DIASTOLIC BLOOD PRESSURE: 77 MMHG | HEART RATE: 71 BPM | OXYGEN SATURATION: 98 %

## 2025-01-07 DIAGNOSIS — N18.30 STAGE 3 CHRONIC KIDNEY DISEASE, UNSPECIFIED WHETHER STAGE 3A OR 3B CKD: ICD-10-CM

## 2025-01-07 DIAGNOSIS — N18.30 TYPE 2 DIABETES MELLITUS WITH STAGE 3 CHRONIC KIDNEY DISEASE, WITHOUT LONG-TERM CURRENT USE OF INSULIN, UNSPECIFIED WHETHER STAGE 3A OR 3B CKD: Primary | ICD-10-CM

## 2025-01-07 DIAGNOSIS — E11.22 TYPE 2 DIABETES MELLITUS WITH STAGE 3 CHRONIC KIDNEY DISEASE, WITHOUT LONG-TERM CURRENT USE OF INSULIN, UNSPECIFIED WHETHER STAGE 3A OR 3B CKD: Primary | ICD-10-CM

## 2025-01-07 DIAGNOSIS — E78.5 HYPERLIPIDEMIA LDL GOAL <70: ICD-10-CM

## 2025-01-07 DIAGNOSIS — J30.9 ALLERGIC RHINITIS, UNSPECIFIED SEASONALITY, UNSPECIFIED TRIGGER: ICD-10-CM

## 2025-01-07 DIAGNOSIS — Z12.31 SCREENING MAMMOGRAM FOR BREAST CANCER: ICD-10-CM

## 2025-01-07 DIAGNOSIS — I25.10 CAD S/P PERCUTANEOUS CORONARY ANGIOPLASTY: ICD-10-CM

## 2025-01-07 DIAGNOSIS — Z87.891 HISTORY OF SMOKING 25-50 PACK YEARS: ICD-10-CM

## 2025-01-07 DIAGNOSIS — Z98.61 CAD S/P PERCUTANEOUS CORONARY ANGIOPLASTY: ICD-10-CM

## 2025-01-07 DIAGNOSIS — L30.9 DERMATITIS: ICD-10-CM

## 2025-01-07 DIAGNOSIS — J43.9 PULMONARY EMPHYSEMA, UNSPECIFIED EMPHYSEMA TYPE: ICD-10-CM

## 2025-01-07 DIAGNOSIS — I10 ESSENTIAL HYPERTENSION: ICD-10-CM

## 2025-01-07 DIAGNOSIS — L98.9 SKIN LESION OF RIGHT LEG: ICD-10-CM

## 2025-01-07 DIAGNOSIS — K21.9 GASTROESOPHAGEAL REFLUX DISEASE WITHOUT ESOPHAGITIS: ICD-10-CM

## 2025-01-07 DIAGNOSIS — G47.00 INSOMNIA, UNSPECIFIED TYPE: ICD-10-CM

## 2025-01-07 DIAGNOSIS — E55.9 VITAMIN D DEFICIENCY: ICD-10-CM

## 2025-01-07 PROCEDURE — 99214 OFFICE O/P EST MOD 30 MIN: CPT | Performed by: NURSE PRACTITIONER

## 2025-01-07 RX ORDER — FUROSEMIDE 20 MG/1
20 TABLET ORAL DAILY
Qty: 90 TABLET | Refills: 1 | Status: SHIPPED | OUTPATIENT
Start: 2025-01-07

## 2025-01-07 RX ORDER — ROSUVASTATIN CALCIUM 40 MG/1
40 TABLET, COATED ORAL NIGHTLY
Qty: 90 TABLET | Refills: 1 | Status: SHIPPED | OUTPATIENT
Start: 2025-01-07

## 2025-01-07 RX ORDER — CLOBETASOL PROPIONATE 0.5 MG/G
1 CREAM TOPICAL 2 TIMES DAILY
Qty: 60 G | Refills: 1 | Status: SHIPPED | OUTPATIENT
Start: 2025-01-07

## 2025-01-07 RX ORDER — CHOLECALCIFEROL (VITAMIN D3) 25 MCG
1000 TABLET ORAL DAILY
Qty: 90 TABLET | Refills: 1 | Status: SHIPPED | OUTPATIENT
Start: 2025-01-07

## 2025-01-07 RX ORDER — BLOOD-GLUCOSE METER
1 KIT MISCELLANEOUS 3 TIMES DAILY
Qty: 300 EACH | Refills: 2 | Status: SHIPPED | OUTPATIENT
Start: 2025-01-07

## 2025-01-07 RX ORDER — METFORMIN HYDROCHLORIDE 500 MG/1
1000 TABLET, EXTENDED RELEASE ORAL
Qty: 180 TABLET | Refills: 1 | Status: SHIPPED | OUTPATIENT
Start: 2025-01-07

## 2025-01-07 RX ORDER — NITROGLYCERIN 0.4 MG/1
TABLET SUBLINGUAL
Qty: 100 TABLET | Refills: 11 | Status: CANCELLED | OUTPATIENT
Start: 2025-01-07

## 2025-01-07 RX ORDER — TIOTROPIUM BROMIDE INHALATION SPRAY 3.12 UG/1
2 SPRAY, METERED RESPIRATORY (INHALATION)
Qty: 4 G | Refills: 5 | Status: SHIPPED | OUTPATIENT
Start: 2025-01-07

## 2025-01-07 RX ORDER — MONTELUKAST SODIUM 10 MG/1
10 TABLET ORAL NIGHTLY
Qty: 90 TABLET | Refills: 1 | Status: SHIPPED | OUTPATIENT
Start: 2025-01-07

## 2025-01-07 RX ORDER — ALBUTEROL SULFATE 90 UG/1
INHALANT RESPIRATORY (INHALATION)
Qty: 8 G | Refills: 1 | Status: SHIPPED | OUTPATIENT
Start: 2025-01-07

## 2025-01-07 RX ORDER — LANCETS 28 GAUGE
1 EACH MISCELLANEOUS 3 TIMES DAILY
Qty: 300 EACH | Refills: 2 | Status: SHIPPED | OUTPATIENT
Start: 2025-01-07

## 2025-01-07 RX ORDER — TRAZODONE HYDROCHLORIDE 50 MG/1
50 TABLET, FILM COATED ORAL NIGHTLY
Qty: 90 TABLET | Refills: 1 | Status: CANCELLED | OUTPATIENT
Start: 2025-01-07

## 2025-01-07 RX ORDER — ACETAMINOPHEN 325 MG/1
650 TABLET ORAL EVERY 6 HOURS PRN
Qty: 30 TABLET | Refills: 1 | Status: SHIPPED | OUTPATIENT
Start: 2025-01-07

## 2025-01-07 RX ORDER — CETIRIZINE HYDROCHLORIDE 10 MG/1
10 TABLET ORAL DAILY
Qty: 90 TABLET | Refills: 0 | Status: SHIPPED | OUTPATIENT
Start: 2025-01-07

## 2025-01-07 RX ORDER — BLOOD SUGAR DIAGNOSTIC
1 STRIP MISCELLANEOUS WEEKLY
Qty: 50 EACH | Refills: 1 | Status: SHIPPED | OUTPATIENT
Start: 2025-01-07

## 2025-01-07 RX ORDER — FLUTICASONE PROPIONATE 50 MCG
2 SPRAY, SUSPENSION (ML) NASAL DAILY
Qty: 48 G | Refills: 1 | Status: SHIPPED | OUTPATIENT
Start: 2025-01-07

## 2025-01-07 RX ORDER — ASPIRIN 81 MG/1
81 TABLET ORAL DAILY
Start: 2025-01-07

## 2025-01-07 RX ORDER — METOPROLOL SUCCINATE 25 MG/1
25 TABLET, EXTENDED RELEASE ORAL DAILY PRN
Qty: 90 TABLET | Refills: 1 | Status: SHIPPED | OUTPATIENT
Start: 2025-01-07

## 2025-01-15 ENCOUNTER — TELEPHONE (OUTPATIENT)
Dept: FAMILY MEDICINE CLINIC | Facility: CLINIC | Age: 60
End: 2025-01-15
Payer: OTHER GOVERNMENT

## 2025-01-15 DIAGNOSIS — E11.9 TYPE 2 DIABETES MELLITUS WITHOUT COMPLICATION, WITHOUT LONG-TERM CURRENT USE OF INSULIN: Primary | ICD-10-CM

## 2025-03-11 RX ORDER — TRAZODONE HYDROCHLORIDE 50 MG/1
50 TABLET ORAL NIGHTLY
Qty: 90 TABLET | Refills: 1 | Status: SHIPPED | OUTPATIENT
Start: 2025-03-11

## 2025-03-11 RX ORDER — FAMOTIDINE 40 MG/1
40 TABLET, FILM COATED ORAL 2 TIMES DAILY PRN
Qty: 90 TABLET | Refills: 1 | Status: SHIPPED | OUTPATIENT
Start: 2025-03-11

## 2025-04-09 ENCOUNTER — HOSPITAL ENCOUNTER (OUTPATIENT)
Dept: CT IMAGING | Facility: HOSPITAL | Age: 60
Discharge: HOME | End: 2025-04-09
Admitting: NURSE PRACTITIONER
Payer: OTHER GOVERNMENT

## 2025-04-09 DIAGNOSIS — Z87.891 HISTORY OF SMOKING 25-50 PACK YEARS: ICD-10-CM

## 2025-04-09 PROCEDURE — 71271 CT THORAX LUNG CANCER SCR C-: CPT

## 2025-04-13 DIAGNOSIS — J30.9 ALLERGIC RHINITIS, UNSPECIFIED SEASONALITY, UNSPECIFIED TRIGGER: ICD-10-CM

## 2025-04-14 ENCOUNTER — LAB (OUTPATIENT)
Facility: HOSPITAL | Age: 60
End: 2025-04-14
Payer: OTHER GOVERNMENT

## 2025-04-14 ENCOUNTER — OFFICE VISIT (OUTPATIENT)
Dept: CARDIOLOGY | Facility: CLINIC | Age: 60
End: 2025-04-14
Payer: OTHER GOVERNMENT

## 2025-04-14 VITALS
WEIGHT: 183.8 LBS | SYSTOLIC BLOOD PRESSURE: 111 MMHG | HEIGHT: 68 IN | DIASTOLIC BLOOD PRESSURE: 68 MMHG | HEART RATE: 72 BPM | OXYGEN SATURATION: 98 % | BODY MASS INDEX: 27.86 KG/M2

## 2025-04-14 DIAGNOSIS — E78.5 HYPERLIPIDEMIA LDL GOAL <70: ICD-10-CM

## 2025-04-14 DIAGNOSIS — I73.9 CLAUDICATION: ICD-10-CM

## 2025-04-14 DIAGNOSIS — R06.09 DOE (DYSPNEA ON EXERTION): Primary | ICD-10-CM

## 2025-04-14 DIAGNOSIS — Z98.61 CAD S/P PERCUTANEOUS CORONARY ANGIOPLASTY: ICD-10-CM

## 2025-04-14 DIAGNOSIS — R06.09 DOE (DYSPNEA ON EXERTION): ICD-10-CM

## 2025-04-14 DIAGNOSIS — I25.10 CAD S/P PERCUTANEOUS CORONARY ANGIOPLASTY: ICD-10-CM

## 2025-04-14 DIAGNOSIS — I10 ESSENTIAL HYPERTENSION: ICD-10-CM

## 2025-04-14 LAB — NT-PROBNP SERPL-MCNC: 64 PG/ML (ref 0–900)

## 2025-04-14 PROCEDURE — 99214 OFFICE O/P EST MOD 30 MIN: CPT | Performed by: INTERNAL MEDICINE

## 2025-04-14 PROCEDURE — 36415 COLL VENOUS BLD VENIPUNCTURE: CPT

## 2025-04-14 PROCEDURE — 83880 ASSAY OF NATRIURETIC PEPTIDE: CPT

## 2025-04-14 RX ORDER — ISOPROPYL ALCOHOL 70 ML/100ML
1 SWAB TOPICAL DAILY
Qty: 100 EACH | Refills: 1 | Status: SHIPPED | OUTPATIENT
Start: 2025-04-14

## 2025-04-14 RX ORDER — FLUTICASONE PROPIONATE 50 MCG
2 SPRAY, SUSPENSION (ML) NASAL DAILY
Qty: 48 G | Refills: 1 | Status: SHIPPED | OUTPATIENT
Start: 2025-04-14

## 2025-04-14 RX ORDER — OXYCODONE HYDROCHLORIDE 5 MG/1
TABLET ORAL
COMMUNITY
Start: 2025-03-20

## 2025-04-14 NOTE — ASSESSMENT & PLAN NOTE
Patient with her mid leg pain issues difficult to ascertain etiology does not appear to be clearly exertional though we will go ahead and get lower extremity vascular studies to see if any evidence of abnormal blood

## 2025-04-14 NOTE — ASSESSMENT & PLAN NOTE
Patient without anginal symptoms has had some easy bruisability in the lower extremities for that she can discontinue her aspirin to stay on Plavix and about once a day for preventative purposes

## 2025-04-14 NOTE — PROGRESS NOTES
Chief Complaint  CAD S/P percutaneous coronary angioplasty    Subjective    Patient is doing well denies any anginal chest pain.  She has had some issues with easy bruisability as well as some lower extremity edema and intermittent leg pain problems.  She also reports some dyspnea on exertion issues.  She has noted some lower blood pressures at times in the 90s  Past Medical History:   Diagnosis Date    Allergic 1998    Anesthesia complication     woke up during surgery    Arthritis     Arthritis of neck     Asthma     CAD (coronary artery disease)     FOLLOWS ENGLISH, LAST STENT 2014    Cellulitis and abscess of leg 03/28/2013    NO CURRENT ISSUES    Chronic purulent otitis media 07/01/2013    Clotting disorder 05/01/2013    From medication    Connective tissue anomaly     Constipation     CTS (carpal tunnel syndrome)     Diabetes mellitus     Dislocation, patella closed 05/30/2017    Elevated cholesterol     Family history of colon cancer 05/27/2022    Family history of esophageal cancer 05/27/2022    Fracture of wrist 1979    right hand    Hiatal hernia     Hip arthrosis     Hyperlipidemia LDL goal <70 08/31/2021    Hypertension     Knee swelling     Migraine     Myocardial infarct     2013    FARNAZ on CPAP 09/01/2021    Post-menopausal bleeding     RLS (restless legs syndrome)     Rotator cuff syndrome     tita    Sleep apnea     uses CPAP    Subluxation of patella     Ulcerative colitis 5/28/2024    NO CURRENT ISSUES         Current Outpatient Medications:     albuterol sulfate  (90 Base) MCG/ACT inhaler, Take 1-2 Puffs every 4 hours prn, Disp: 8 g, Rfl: 1    Alcohol Swabs (Easy Touch Alcohol Prep Medium) 70 % pads, 1 each by Other route Daily., Disp: 100 each, Rfl: 1    ammonium lactate (LAC-HYDRIN) 12 % lotion, Apply  topically to the appropriate area as directed 2 (Two) Times a Day., Disp: 225 g, Rfl: 11    cetirizine (zyrTEC) 10 MG tablet, Take 1 tablet by mouth Daily., Disp: 90 tablet, Rfl: 0     cholecalciferol (VITAMIN D3) 25 MCG (1000 UT) tablet, Take 1 tablet by mouth Daily. Does not take daily only occasionally, Disp: 90 tablet, Rfl: 1    clobetasol propionate (TEMOVATE) 0.05 % cream, Apply 1 Application topically to the appropriate area as directed 2 (Two) Times a Day., Disp: 60 g, Rfl: 1    clopidogrel (PLAVIX) 75 MG tablet, Take 1 tablet by mouth Daily., Disp: 90 tablet, Rfl: 1    empagliflozin (JARDIANCE) 25 MG tablet tablet, Take 1 tablet by mouth Daily., Disp: 90 tablet, Rfl: 1    ezetimibe (Zetia) 10 MG tablet, Take 1 tablet by mouth Daily., Disp: 90 tablet, Rfl: 1    famotidine (Pepcid) 40 MG tablet, Take 1 tablet by mouth 2 (Two) Times a Day As Needed for Heartburn., Disp: 90 tablet, Rfl: 1    fluticasone (FLONASE) 50 MCG/ACT nasal spray, Administer 2 sprays into the nostril(s) as directed by provider Daily. 2 sprays each nostril daily, Disp: 48 g, Rfl: 1    FREESTYLE LITE test strip, 1 each by Other route 3 times a day., Disp: 300 each, Rfl: 2    furosemide (LASIX) 20 MG tablet, Take 1 tablet by mouth Daily., Disp: 90 tablet, Rfl: 1    Insulin Pen Needle (Pen Needles) 31G X 6 MM misc, Use 1 each 1 (One) Time Per Week., Disp: 50 each, Rfl: 1    Lancets (freestyle) lancets, 1 each by Other route 3 times a day., Disp: 300 each, Rfl: 2    metFORMIN ER (GLUCOPHAGE-XR) 500 MG 24 hr tablet, Take 2 tablets by mouth Daily With Dinner., Disp: 180 tablet, Rfl: 1    metoprolol succinate XL (TOPROL-XL) 25 MG 24 hr tablet, Take 1 tablet by mouth Daily As Needed (TAKES AS NEEDED FOR HTN)., Disp: 90 tablet, Rfl: 1    montelukast (SINGULAIR) 10 MG tablet, Take 1 tablet by mouth Every Night., Disp: 90 tablet, Rfl: 1    Narcan 4 MG/0.1ML nasal spray, , Disp: , Rfl:     nitroglycerin (NITROSTAT) 0.4 MG SL tablet, 1 under the tongue as needed for angina, may repeat q5mins for up three doses, Disp: 100 tablet, Rfl: 11    oxyCODONE (ROXICODONE) 5 MG immediate release tablet, Take 1 tablet every 12 hours by oral  route as needed for 30 days., Disp: , Rfl:     rosuvastatin (CRESTOR) 40 MG tablet, Take 1 tablet by mouth Every Night., Disp: 90 tablet, Rfl: 1    tiotropium bromide monohydrate (Spiriva Respimat) 2.5 MCG/ACT aerosol solution inhaler, Inhale 2 puffs Daily., Disp: 4 g, Rfl: 5    traZODone (DESYREL) 50 MG tablet, Take 1 tablet by mouth Every Night., Disp: 90 tablet, Rfl: 1    Savella 25 MG tablet tablet, Take 1 tablet by mouth Every Night. (Patient not taking: Reported on 2025), Disp: , Rfl:     Semaglutide,0.25 or 0.5MG/DOS, (OZEMPIC) 2 MG/3ML solution pen-injector, Inject 0.25 mg under the skin into the appropriate area as directed 1 (One) Time Per Week. (Patient not taking: Reported on 2025), Disp: 3 mL, Rfl: 3    Medications Discontinued During This Encounter   Medication Reason    acetaminophen (Tylenol) 325 MG tablet     aspirin 81 MG EC tablet      Allergies   Allergen Reactions    Adhesive Tape Itching     NO TAPES     Molds & Smuts Cough    Nickel Rash    Pollen Extract Cough        Social History     Tobacco Use    Smoking status: Former     Current packs/day: 0.00     Average packs/day: 1 pack/day for 26.0 years (26.0 ttl pk-yrs)     Types: Cigarettes     Start date: 1986     Quit date: 2012     Years since quittin.2     Passive exposure: Past    Smokeless tobacco: Never    Tobacco comments:     caffeine use   Vaping Use    Vaping status: Never Used   Substance Use Topics    Alcohol use: No    Drug use: Never       Family History   Problem Relation Age of Onset    Heart attack Father     Heart disease Father     Cancer Father     Osteoporosis Father     Rheumatologic disease Father     Suicidality Mother     Osteoporosis Mother     Rheumatologic disease Mother     Scoliosis Mother     Heart disease Paternal Grandfather     Cancer Paternal Grandfather     Thyroid disease Paternal Grandfather     Pancreatic cancer Paternal Grandfather     Esophageal cancer Paternal Grandfather      "Stomach cancer Paternal Grandfather     Sleep apnea Paternal Grandfather     Heart disease Paternal Grandmother     Cancer Paternal Grandmother     No Known Problems Maternal Grandmother     No Known Problems Maternal Grandfather     Colon cancer Neg Hx     Malig Hyperthermia Neg Hx     Breast cancer Neg Hx     Ovarian cancer Neg Hx     Uterine cancer Neg Hx     Deep vein thrombosis Neg Hx     Pulmonary embolism Neg Hx         Objective     /68 (BP Location: Left arm, Patient Position: Sitting, Cuff Size: Adult)   Pulse 72   Ht 172.7 cm (68\")   Wt 83.4 kg (183 lb 12.8 oz)   SpO2 98%   BMI 27.95 kg/m²       Physical Exam    General Appearance:   no acute distress  Alert and oriented x3  HENT:   lips not cyanotic  Atraumatic  Neck:  No jvd   supple  Respiratory:  no respiratory distress  normal breath sounds  no rales  Cardiovascular:  Regular rate and rhythm  no S3, no S4   no murmur  no rub  Extremities  No cyanosis  lower extremity edema: none    Skin:   warm, dry  No rashes      Result Review : Regular rate and rhythm    proBNP   Date Value Ref Range Status   10/06/2023 92.0 0.0 - 900.0 pg/mL Final     CMP          6/25/2024    15:14 9/27/2024    08:32 1/2/2025    15:53   CMP   Glucose 147  131  123    BUN 18  22  21    Creatinine 0.73  1.09  1.02    EGFR 94.9  58.6  63.5    Sodium 140  137  141    Potassium 3.9  4.2  4.2    Chloride 106  99  105    Calcium 10.0  10.2  9.8    Total Protein 7.0  7.6  6.8    Albumin 4.6  4.9  4.4    Globulin 2.4  2.7  2.4    Total Bilirubin 0.5  0.4  0.5    Alkaline Phosphatase 66  85  79    AST (SGOT) 17  20  22    ALT (SGPT) 20  25  24    Albumin/Globulin Ratio 1.9  1.8  1.8    BUN/Creatinine Ratio 24.7  20.2  20.6    Anion Gap 13.3  15.5  10.0      CBC w/diff          8/30/2024    10:28 9/27/2024    08:32 1/2/2025    15:53   CBC w/Diff   WBC 8.12  9.04  7.45    RBC 4.58  4.91  4.42    Hemoglobin 13.5  14.6  13.5    Hematocrit 40.4  44.3  40.8    MCV 88.2  90.2  92.3  " "  MCH 29.5  29.7  30.5    MCHC 33.4  33.0  33.1    RDW 12.7  12.3  12.6    Platelets 244  293  241    Neutrophil Rel % 51.7  47.7  51.4    Immature Granulocyte Rel % 0.4  0.3  0.4    Lymphocyte Rel % 36.3  41.9  33.2    Monocyte Rel % 8.1  7.6  9.0    Eosinophil Rel % 2.8  2.1  5.2    Basophil Rel % 0.7  0.4  0.8       Lab Results   Component Value Date    TSH 0.809 01/02/2025      Lab Results   Component Value Date    FREET4 1.1 05/07/2021      No results found for: \"DDIMERQUANT\"  Magnesium   Date Value Ref Range Status   10/06/2023 2.2 1.6 - 2.6 mg/dL Final      No results found for: \"DIGOXIN\"   Lab Results   Component Value Date    TROPONINT <0.01 09/29/2014           Lipid Panel          6/25/2024    15:14 9/27/2024    08:32 1/2/2025    15:53   Lipid Panel   Total Cholesterol 105  124  117    Triglycerides 107  139  85    HDL Cholesterol 46  49  48    VLDL Cholesterol 20  24  17    LDL Cholesterol  39  51  52    LDL/HDL Ratio 0.82  0.96  1.08      No results found for: \"POCTROP\"    Results for orders placed in visit on 10/05/21    Adult Transthoracic Echo Complete W/ Cont if Necessary Per Protocol    Interpretation Summary  Normal left ventricular systolic function with an estimated ejection fraction of 60-65%.  No regional wall motion abnormalities were observed.  Left ventricular diastolic function is consistent with impaired relaxation (grade I diastolic dysfunction).  No significant valvular heart disease.                 Diagnoses and all orders for this visit:    1. DREW (dyspnea on exertion) (Primary)  Assessment & Plan:  Patient dyspnea exertion issues recommended go ahead and checking proBNP and echocardiogram to see if any evidence of diastolic or systolic CHF    Orders:  -     proBNP; Future  -     Adult Transthoracic Echo Complete W/ Cont if Necessary Per Protocol; Future    2. Claudication  Assessment & Plan:  Patient with her mid leg pain issues difficult to ascertain etiology does not appear to be " clearly exertional though we will go ahead and get lower extremity vascular studies to see if any evidence of abnormal blood    Orders:  -     Doppler Arterial Multi Level Lower Extremity - Bilateral CAR; Future    3. CAD S/P percutaneous coronary angioplasty  Assessment & Plan:  Patient without anginal symptoms has had some easy bruisability in the lower extremities for that she can discontinue her aspirin to stay on Plavix and about once a day for preventative purposes       4. Hyperlipidemia LDL goal <70  Assessment & Plan:  DL goal continue with Crestor 40 nightly       5. Essential hypertension  Assessment & Plan:  Patient has been experiencing low blood pressure at times and does the 90s she is not much and hypertensive this point told her that if the proBNP level was normal and her echo did not show any major issues she could try coming off of the Lasix and see how her swelling did to see if this improves her blood pressure               Follow Up     Return in about 6 months (around 10/14/2025) for Follow with Berna Harry.          Patient was given instructions and counseling regarding her condition or for health maintenance advice. Please see specific information pulled into the AVS if appropriate.

## 2025-04-14 NOTE — ASSESSMENT & PLAN NOTE
Patient has been experiencing low blood pressure at times and does the 90s she is not much and hypertensive this point told her that if the proBNP level was normal and her echo did not show any major issues she could try coming off of the Lasix and see how her swelling did to see if this improves her blood pressure

## 2025-04-14 NOTE — ASSESSMENT & PLAN NOTE
Patient dyspnea exertion issues recommended go ahead and checking proBNP and echocardiogram to see if any evidence of diastolic or systolic CHF

## 2025-04-15 ENCOUNTER — RESULTS FOLLOW-UP (OUTPATIENT)
Dept: CARDIOLOGY | Facility: CLINIC | Age: 60
End: 2025-04-15
Payer: OTHER GOVERNMENT

## 2025-04-15 RX ORDER — EZETIMIBE 10 MG/1
10 TABLET ORAL DAILY
Qty: 90 TABLET | Refills: 1 | Status: SHIPPED | OUTPATIENT
Start: 2025-04-15

## 2025-04-15 NOTE — TELEPHONE ENCOUNTER
Rx Refill Note  Requested Prescriptions     Pending Prescriptions Disp Refills    ezetimibe (Zetia) 10 MG tablet 90 tablet 1     Sig: Take 1 tablet by mouth Daily.        LAST OFFICE VISIT:  04/14/2025     NEXT OFFICE VISIT:  NO F/U SCHEDULED YET     Does the medication requests match the last office note:    [x] Yes   [] No    Does this refill request meet protocol details for MA to approve:     [x] Yes   [] No   [] No Protocols Provided

## 2025-04-24 ENCOUNTER — OFFICE VISIT (OUTPATIENT)
Dept: PODIATRY | Facility: CLINIC | Age: 60
End: 2025-04-24
Payer: OTHER GOVERNMENT

## 2025-04-24 VITALS
OXYGEN SATURATION: 97 % | BODY MASS INDEX: 28.04 KG/M2 | SYSTOLIC BLOOD PRESSURE: 92 MMHG | HEIGHT: 68 IN | TEMPERATURE: 97.7 F | DIASTOLIC BLOOD PRESSURE: 57 MMHG | WEIGHT: 185 LBS | HEART RATE: 69 BPM

## 2025-04-24 DIAGNOSIS — E11.9 DIABETES MELLITUS WITHOUT COMPLICATION: ICD-10-CM

## 2025-04-24 DIAGNOSIS — E11.8 DM FEET: Primary | ICD-10-CM

## 2025-04-24 NOTE — PROGRESS NOTES
[facility] - PODIATRY    Today's Date: 25    Patient Name: Claudette Gramajo  MRN: [id number]  CSN: [id number]  PCP: Angel Bahena APRN, Last PCP Visit: 2025  Referring Provider: Angel Bahena*    SUBJECTIVE     Chief Complaint   Patient presents with    Left Foot - Follow-up, Diabetes         Right Foot - Follow-up, Diabetes          HPI: Claudette Gramajo, a 59 y.o.female, presents to clinic for a diabetic foot evaluation.    New, Established, New Problem:  est    Onset:  insidious    Nature:  NIDDM    Stable, worsening, improving:  stable    Patient controlling diabetes via:  Oral meds    Patient states their most recent blood glucose readin    Patient denies any fevers, chills, nausea, vomiting, shortness of breath, nor any other constitutional signs nor symptoms.    I have reviewed/confirmed previously documented HPI with no changes.     Past Medical History:   Diagnosis Date    1998    Anesthesia complication     woke up during surgery    Arthritis     Arthritis of neck     Asthma     CAD (coronary artery disease)     FOLLOWS AMADOR, LAST STENT 2014    Cellulitis and abscess of leg 2013    NO CURRENT ISSUES    Chronic purulent otitis media 2013    Clotting disorder 2013    From medication    Connective tissue anomaly     Constipation     COPD (chronic obstructive pulmonary disease)     Mild    CTS (carpal tunnel syndrome)     Diabetes mellitus     Dislocation, patella closed 2017    Elevated cholesterol     Family history of colon cancer 2022    Family history of esophageal cancer 2022    Fracture of wrist 1979    right hand    Hiatal hernia     Hip arthrosis     Hyperlipidemia LDL goal <70 2021    Hypertension     Knee swelling     Migraine     Myocardial infarct         FARNAZ on CPAP 2021    Post-menopausal bleeding     RLS (restless legs syndrome)     Rotator cuff syndrome     tita    Sleep apnea      uses CPAP    Subluxation of patella     Ulcerative colitis 5/28/2024    NO CURRENT ISSUES     Past Surgical History:   Procedure Laterality Date    ADENOIDECTOMY      APPENDECTOMY      CARDIAC CATHETERIZATION      x2    CARDIAC CATHETERIZATION N/A 05/28/2024    Procedure: Left Heart Cath;  Surgeon: Davis Parker MD;  Location: [facility] CATH INVASIVE LOCATION;  Service: Cardiovascular;  Laterality: N/A;    CARPAL TUNNEL RELEASE Right 07/11/2024    Procedure: CARPAL TUNNEL RELEASE;  Surgeon: Alexis Hawkins MD;  [place] [facility] [place] [facility] [place]  Service: Orthopedics;  Laterality: Right;    COLONOSCOPY      COLONOSCOPY N/A 08/26/2022    Procedure: COLONOSCOPY WITH POLYPECTOMY;  Surgeon: Sloan Ortiz MD;  Location: [facility] ENDOSCOPY;  Service: Gastroenterology;  Laterality: N/A;  COLON POLYP    COLONOSCOPY N/A 07/09/2024    Procedure: COLONOSCOPY WITH COLD SNARE POLYPECTOMIES;  Surgeon: Sloan Ortiz MD;  Location: [facility] ENDOSCOPY;  Service: Gastroenterology;  Laterality: N/A;  COLON POLYPS, DIVERTICULOSIS    CORONARY ANGIOPLASTY      CORONARY STENT PLACEMENT      x2 2013 2014    CUBITAL TUNNEL RELEASE Right 07/11/2024    Procedure: CUBITAL TUNNEL RELEASE;  Surgeon: Alexis Hawkins MD;  [place] [facility] [place] [facility] [place]  Service: Orthopedics;  Laterality: Right;    D & C HYSTEROSCOPY N/A 08/30/2024    Procedure: DILATATION AND CURETTAGE,  HYSTEROSCOPY;  Surgeon: Filomena Lorenzo DO;  [place] [facility] [place] [facility] [place]  Service: Gynecology;  Laterality: N/A;    ENDOSCOPY N/A 08/26/2022    Procedure: ESOPHAGOGASTRODUODENOSCOPY WITH BX;  Surgeon: Sloan Ortiz MD;  Location: [facility] ENDOSCOPY;  Service: Gastroenterology;  Laterality: N/A;  HIATAL HERNIA    HAND SURGERY  July 2024    HERNIA REPAIR      INNER EAR SURGERY      KNEE SURGERY  1984, oct.    right knee    TONSILLECTOMY      UPPER GASTROINTESTINAL ENDOSCOPY      VENOUS THROMBECTOMY      patient unsure     Family History   Problem Relation Age of Onset    Heart  attack Father     Heart disease Father     Cancer Father     Osteoporosis Father     Rheumatologic disease Father     Suicidality Mother     Osteoporosis Mother     Rheumatologic disease Mother     Scoliosis Mother     Heart disease Paternal Grandfather     Cancer Paternal Grandfather     Thyroid disease Paternal Grandfather     Pancreatic cancer Paternal Grandfather     Esophageal cancer Paternal Grandfather     Stomach cancer Paternal Grandfather     Sleep apnea Paternal Grandfather     Heart disease Paternal Grandmother     Cancer Paternal Grandmother     No Known Problems Maternal Grandmother     No Known Problems Maternal Grandfather     Colon cancer Neg Hx     Malig Hyperthermia Neg Hx     Breast cancer Neg Hx     Ovarian cancer Neg Hx     Uterine cancer Neg Hx     Deep vein thrombosis Neg Hx     Pulmonary embolism Neg Hx      Social History     Socioeconomic History    Marital status:    Tobacco Use    Smoking status: Former     Current packs/day: 0.00     Average packs/day: 1 pack/day for 26.0 years (26.0 ttl pk-yrs)     Types: Cigarettes     Start date: 1986     Quit date: 2012     Years since quittin.3     Passive exposure: Past    Smokeless tobacco: Never    Tobacco comments:     caffeine use   Vaping Use    Vaping status: Never Used   Substance and Sexual Activity    Alcohol use: No    Drug use: Never    Sexual activity: Yes     Partners: Male     Allergies   Allergen Reactions    Adhesive Tape Itching     NO TAPES     Molds & Smuts Cough    Nickel Rash    Pollen Extract Cough     Current Outpatient Medications   Medication Sig Dispense Refill    albuterol sulfate  (90 Base) MCG/ACT inhaler Take 1-2 Puffs every 4 hours prn 8 g 1    Alcohol Swabs (Easy Touch Alcohol Prep Medium) 70 % pads 1 each by Other route Daily. 100 each 1    ammonium lactate (LAC-HYDRIN) 12 % lotion Apply  topically to the appropriate area as directed 2 (Two) Times a Day. 225 g 11    cetirizine (zyrTEC) 10  MG tablet Take 1 tablet by mouth Daily. 90 tablet 0    cholecalciferol (VITAMIN D3) 25 MCG (1000 UT) tablet Take 1 tablet by mouth Daily. Does not take daily only occasionally 90 tablet 1    clobetasol propionate (TEMOVATE) 0.05 % cream Apply 1 Application topically to the appropriate area as directed 2 (Two) Times a Day. 60 g 1    clopidogrel (PLAVIX) 75 MG tablet Take 1 tablet by mouth Daily. 90 tablet 1    empagliflozin (JARDIANCE) 25 MG tablet tablet Take 1 tablet by mouth Daily. 90 tablet 1    ezetimibe (Zetia) 10 MG tablet Take 1 tablet by mouth Daily. 90 tablet 1    famotidine (Pepcid) 40 MG tablet Take 1 tablet by mouth 2 (Two) Times a Day As Needed for Heartburn. 90 tablet 1    fluticasone (FLONASE) 50 MCG/ACT nasal spray Administer 2 sprays into the nostril(s) as directed by provider Daily. 2 sprays each nostril daily 48 g 1    FREESTYLE LITE test strip 1 each by Other route 3 times a day. 300 each 2    furosemide (LASIX) 20 MG tablet Take 1 tablet by mouth Daily. 90 tablet 1    Insulin Pen Needle (Pen Needles) 31G X 6 MM misc Use 1 each 1 (One) Time Per Week. 50 each 1    Lancets (freestyle) lancets 1 each by Other route 3 times a day. 300 each 2    metFORMIN ER (GLUCOPHAGE-XR) 500 MG 24 hr tablet Take 2 tablets by mouth Daily With Dinner. 180 tablet 1    metoprolol succinate XL (TOPROL-XL) 25 MG 24 hr tablet Take 1 tablet by mouth Daily As Needed (TAKES AS NEEDED FOR HTN). 90 tablet 1    montelukast (SINGULAIR) 10 MG tablet Take 1 tablet by mouth Every Night. 90 tablet 1    Narcan 4 MG/0.1ML nasal spray       nitroglycerin (NITROSTAT) 0.4 MG SL tablet 1 under the tongue as needed for angina, may repeat q5mins for up three doses 100 tablet 11    oxyCODONE (ROXICODONE) 5 MG immediate release tablet Take 1 tablet every 12 hours by oral route as needed for 30 days.      rosuvastatin (CRESTOR) 40 MG tablet Take 1 tablet by mouth Every Night. 90 tablet 1    Savella 25 MG tablet tablet Take 1 tablet by mouth  Every Night.      Semaglutide,0.25 or 0.5MG/DOS, (OZEMPIC) 2 MG/3ML solution pen-injector Inject 0.25 mg under the skin into the appropriate area as directed 1 (One) Time Per Week. 3 mL 3    tiotropium bromide monohydrate (Spiriva Respimat) 2.5 MCG/ACT aerosol solution inhaler Inhale 2 puffs Daily. 4 g 5    traZODone (DESYREL) 50 MG tablet Take 1 tablet by mouth Every Night. 90 tablet 1     No current facility-administered medications for this visit.     Review of Systems   Constitutional: Negative.    Skin:         Dry skin   All other systems reviewed and are negative.      OBJECTIVE     Vitals:    04/24/25 1518   BP: 92/57   Pulse: 69   Temp: 97.7 °F (36.5 °C)   SpO2: 97%       Body mass index is 28.13 kg/m².    Lab Results   Component Value Date    HGBA1C 6.30 (H) 01/02/2025       Lab Results   Component Value Date    GLUCOSE 123 (H) 01/02/2025    CALCIUM 9.8 01/02/2025     01/02/2025    K 4.2 01/02/2025    CO2 26.0 01/02/2025     01/02/2025    BUN 21 (H) 01/02/2025    CREATININE 1.02 (H) 01/02/2025    EGFRIFAFRI 69 10/22/2021    EGFRIFNONA 54 (L) 02/14/2022    BCR 20.6 01/02/2025    ANIONGAP 10.0 01/02/2025       Patient seen in no apparent distress.      PHYSICAL EXAM:     Foot/Ankle Exam    GENERAL  Diabetic foot exam performed    Appearance:  obese  Orientation:  AAOx3  Affect:  appropriate  Gait:  unimpaired  Assistance:  independent  Right shoe gear: casual shoe  Left shoe gear: casual shoe    VASCULAR     Right Foot Vascularity   Normal vascular exam    Dorsalis pedis:  2+  Posterior tibial:  2+  Skin temperature:  warm  Edema grading:  None  CFT:  < 3 seconds  Pedal hair growth:  Present  Varicosities:  none     Left Foot Vascularity   Normal vascular exam    Dorsalis pedis:  2+  Posterior tibial:  2+  Skin temperature:  warm  Edema grading:  None  CFT:  < 3 seconds  Pedal hair growth:  Present  Varicosities:  none     NEUROLOGIC     Right Foot Neurologic   Normal sensation    Light touch  sensation: normal  Vibratory sensation: normal  Hot/Cold sensation: normal  Protective Sensation using [place] Monofilament:   Sites intact: 10  Sites tested: 10     Left Foot Neurologic   Normal sensation    Light touch sensation: normal  Vibratory sensation: normal  Hot/Cold sensation:  normal  Protective Sensation using [place] Monofilament:   Sites intact: 10  Sites tested: 10    MUSCLE STRENGTH     Right Foot Muscle Strength   Foot dorsiflexion:  4  Foot plantar flexion:  4  Foot inversion:  4  Foot eversion:  4     Left Foot Muscle Strength   Foot dorsiflexion:  4  Foot plantar flexion:  4  Foot inversion:  4  Foot eversion:  4    RANGE OF MOTION     Right Foot Range of Motion   Foot and ankle ROM within normal limits       Left Foot Range of Motion   Foot and ankle ROM within normal limits      DERMATOLOGIC      Right Foot Dermatologic   Skin  Right foot skin is intact.      Left Foot Dermatologic   Skin  Left foot skin is intact.     Diabetic Foot Exam Performed and Monofilament Test Performed    I have reexamined the patient the results are consistent with the previously documented exam.    ASSESSMENT/PLAN     Diagnoses and all orders for this visit:    1. DM feet (Primary)    2. Diabetes mellitus without complication    Comprehensive lower extremity examination and evaluation was performed.    Discussed findings and treatment plan including risks, benefits, and treatment options with patient in detail. Patient agreed with treatment plan.    Medications and allergies reviewed.  Reviewed available blood glucose and HgB A1C lab values along with other pertinent labs.  These were discussed with the patient as to their importance of diabetic maintenance.    Diabetic foot exam performed and documented this date, compliant with CQM required standards. Detail of findings as noted in physical exam.  Lower extremity Neurologic exam for diabetic patient performed and documented this date, compliant  with PQRS required standards. Detail of findings as noted in physical exam.  Advised patient importance of good routine lower extremity hygiene. Advised patient importance of evaluating for intact skin and pain free nail borders.  Advised patient to use mirror to evaluate plantar/ soles of feet for better visualization. Advised patient monitor and phone office to be seen if any cracking to skin, open lesions, painful nail borders or if nails become elongated prior to next visit. Advised patient importance of daily cleansing of lower extremities, followed by good skin cream to maintain normal hydration of skin. Also advised patient importance of close daily monitoring of blood sugar. Advised to regulate diet and medications to maintain control of blood sugar in optimal range. Contact primary care provider if difficulties maintaining blood sugar levels.  Advised Patient of presence of Diabetes Mellitus condition.  Advised Patient risk of progression and worsening or improvement, then return of condition.  Will monitor condition for any change in future. Treat with most appropriate treatment pending status of condition.  Counseled and advised patient extensively on nature and ramifications of diabetes. Standard instructions given to patient for good diabetic foot care and maintenance. Advised importance of careful monitoring to avoid break down and complications secondary to diabetes. Advised patient importance of strict maintenance of blood sugar control. Advised patient of possible ominous results from neglect of condition, i.e.: amputation/ loss of digits, feet and legs, or even death.  Patient states understands counseling, will monitor closely, continue good hygiene and routine diabetic foot care. Patient will contact office is questions or problems.      An After Visit Summary was printed and given to the patient at discharge, including (if requested) any available informative/educational handouts regarding  diagnosis, treatment, or medications. All questions were answered to patient/family satisfaction. Should symptoms fail to improve or worsen they agree to call or return to clinic or to go to the Emergency Department. Discussed the importance of following up with any needed screening tests/labs/specialist appointments and any requested follow-up recommended by me today. Importance of maintaining follow-up discussed and patient accepts that missed appointments can delay diagnosis and potentially lead to worsening of conditions.    Return in about 1 year (around 4/24/2026) for DFE., or sooner if acute issues arise.    This document has been electronically signed by Topher Keene DPM on April 24, 2025 15:31 EDT

## 2025-04-28 ENCOUNTER — HOSPITAL ENCOUNTER (OUTPATIENT)
Facility: HOSPITAL | Age: 60
Discharge: HOME | End: 2025-04-28
Admitting: INTERNAL MEDICINE
Payer: OTHER GOVERNMENT

## 2025-04-28 DIAGNOSIS — R06.09 DOE (DYSPNEA ON EXERTION): ICD-10-CM

## 2025-04-28 PROCEDURE — 93306 TTE W/DOPPLER COMPLETE: CPT

## 2025-05-02 LAB
AORTIC DIMENSIONLESS INDEX: 0.91 (DI)
ASCENDING AORTA: 3 CM
AV MEAN PRESS GRAD SYS DOP V1V2: 2.7 MMHG
AV VMAX SYS DOP: 119.4 CM/SEC
BH CV ECHO MEAS - ACS: 1.78 CM
BH CV ECHO MEAS - AO MAX PG: 5.7 MMHG
BH CV ECHO MEAS - AO ROOT DIAM: 3 CM
BH CV ECHO MEAS - AO V2 VTI: 25.2 CM
BH CV ECHO MEAS - AVA(I,D): 2.9 CM2
BH CV ECHO MEAS - EDV(CUBED): 104.5 ML
BH CV ECHO MEAS - EDV(MOD-SP2): 102 ML
BH CV ECHO MEAS - EDV(MOD-SP4): 103 ML
BH CV ECHO MEAS - EF(MOD-SP2): 64.3 %
BH CV ECHO MEAS - EF(MOD-SP4): 60.4 %
BH CV ECHO MEAS - ESV(CUBED): 30.8 ML
BH CV ECHO MEAS - ESV(MOD-SP2): 36.4 ML
BH CV ECHO MEAS - ESV(MOD-SP4): 40.8 ML
BH CV ECHO MEAS - FS: 33.5 %
BH CV ECHO MEAS - IVS/LVPW: 0.96 CM
BH CV ECHO MEAS - IVSD: 0.94 CM
BH CV ECHO MEAS - LA DIMENSION: 3.7 CM
BH CV ECHO MEAS - LAT PEAK E' VEL: 8.7 CM/SEC
BH CV ECHO MEAS - LV DIASTOLIC VOL/BSA (35-75): 52.1 CM2
BH CV ECHO MEAS - LV MASS(C)D: 155.7 GRAMS
BH CV ECHO MEAS - LV MAX PG: 6.1 MMHG
BH CV ECHO MEAS - LV MEAN PG: 2.6 MMHG
BH CV ECHO MEAS - LV SYSTOLIC VOL/BSA (12-30): 20.6 CM2
BH CV ECHO MEAS - LV V1 MAX: 123.4 CM/SEC
BH CV ECHO MEAS - LV V1 VTI: 22.9 CM
BH CV ECHO MEAS - LVIDD: 4.7 CM
BH CV ECHO MEAS - LVIDS: 3.1 CM
BH CV ECHO MEAS - LVOT AREA: 3.2 CM2
BH CV ECHO MEAS - LVOT DIAM: 2.02 CM
BH CV ECHO MEAS - LVPWD: 0.98 CM
BH CV ECHO MEAS - MED PEAK E' VEL: 6.1 CM/SEC
BH CV ECHO MEAS - MV A MAX VEL: 59.6 CM/SEC
BH CV ECHO MEAS - MV DEC SLOPE: 386.2 CM/SEC2
BH CV ECHO MEAS - MV DEC TIME: 0.19 SEC
BH CV ECHO MEAS - MV E MAX VEL: 72 CM/SEC
BH CV ECHO MEAS - MV E/A: 1.21
BH CV ECHO MEAS - MV MAX PG: 2.8 MMHG
BH CV ECHO MEAS - MV MEAN PG: 1.1 MMHG
BH CV ECHO MEAS - MV V2 VTI: 25.9 CM
BH CV ECHO MEAS - MVA(VTI): 2.8 CM2
BH CV ECHO MEAS - RVDD: 3.5 CM
BH CV ECHO MEAS - SV(LVOT): 73.7 ML
BH CV ECHO MEAS - SV(MOD-SP2): 65.6 ML
BH CV ECHO MEAS - SV(MOD-SP4): 62.2 ML
BH CV ECHO MEAS - SVI(LVOT): 37.3 ML/M2
BH CV ECHO MEAS - SVI(MOD-SP2): 33.2 ML/M2
BH CV ECHO MEAS - SVI(MOD-SP4): 31.5 ML/M2
BH CV ECHO MEAS - TAPSE (>1.6): 2.13 CM
BH CV ECHO MEASUREMENTS AVERAGE E/E' RATIO: 9.73
BH CV XLRA - TDI S': 8.4 CM/SEC
IVRT: 63 MS
LEFT ATRIUM VOLUME INDEX: 24.5 ML/M2
LV EF BIPLANE MOD: 62.6 %

## 2025-05-05 NOTE — TELEPHONE ENCOUNTER
Per Berna Harry:  Echocardiogram shows normal heart muscle function and no significant valve disease noted. Follow up as scheduled. Notify office of any new/worsening cardiac symptoms     Spoke with patient, patient is aware and verbalized understanding.

## 2025-05-12 DIAGNOSIS — Z98.61 CAD S/P PERCUTANEOUS CORONARY ANGIOPLASTY: ICD-10-CM

## 2025-05-12 DIAGNOSIS — I25.10 CAD S/P PERCUTANEOUS CORONARY ANGIOPLASTY: ICD-10-CM

## 2025-05-12 RX ORDER — METOPROLOL SUCCINATE 25 MG/1
25 TABLET, EXTENDED RELEASE ORAL DAILY PRN
Qty: 90 TABLET | Refills: 1 | Status: SHIPPED | OUTPATIENT
Start: 2025-05-12 | End: 2025-05-12

## 2025-05-12 RX ORDER — METOPROLOL SUCCINATE 25 MG/1
25 TABLET, EXTENDED RELEASE ORAL DAILY PRN
Qty: 90 TABLET | Refills: 1 | Status: SHIPPED | OUTPATIENT
Start: 2025-05-12

## 2025-05-12 RX ORDER — CLOPIDOGREL BISULFATE 75 MG/1
75 TABLET ORAL DAILY
Qty: 90 TABLET | Refills: 1 | Status: SHIPPED | OUTPATIENT
Start: 2025-05-12

## 2025-05-15 ENCOUNTER — HOSPITAL ENCOUNTER (OUTPATIENT)
Dept: GENERAL RADIOLOGY | Facility: HOSPITAL | Age: 60
Discharge: HOME | End: 2025-05-15
Admitting: NURSE PRACTITIONER
Payer: OTHER GOVERNMENT

## 2025-05-15 ENCOUNTER — TRANSCRIBE ORDERS (OUTPATIENT)
Dept: GENERAL RADIOLOGY | Facility: HOSPITAL | Age: 60
End: 2025-05-15
Payer: OTHER GOVERNMENT

## 2025-05-15 DIAGNOSIS — M54.12 RADICULOPATHY, CERVICAL: ICD-10-CM

## 2025-05-15 DIAGNOSIS — M54.12 RADICULOPATHY, CERVICAL: Primary | ICD-10-CM

## 2025-05-15 PROCEDURE — 72040 X-RAY EXAM NECK SPINE 2-3 VW: CPT

## 2025-06-05 ENCOUNTER — HOSPITAL ENCOUNTER (OUTPATIENT)
Dept: MAMMOGRAPHY | Facility: HOSPITAL | Age: 60
Discharge: HOME | End: 2025-06-05
Admitting: NURSE PRACTITIONER
Payer: OTHER GOVERNMENT

## 2025-06-05 DIAGNOSIS — Z12.31 SCREENING MAMMOGRAM FOR BREAST CANCER: ICD-10-CM

## 2025-06-05 PROCEDURE — 77063 BREAST TOMOSYNTHESIS BI: CPT

## 2025-06-05 PROCEDURE — 77067 SCR MAMMO BI INCL CAD: CPT

## 2025-06-09 ENCOUNTER — HOSPITAL ENCOUNTER (OUTPATIENT)
Dept: CARDIOLOGY | Facility: HOSPITAL | Age: 60
Discharge: HOME | End: 2025-06-09
Admitting: INTERNAL MEDICINE
Payer: OTHER GOVERNMENT

## 2025-06-09 ENCOUNTER — RESULTS FOLLOW-UP (OUTPATIENT)
Dept: CARDIOLOGY | Facility: CLINIC | Age: 60
End: 2025-06-09
Payer: OTHER GOVERNMENT

## 2025-06-09 DIAGNOSIS — I73.9 CLAUDICATION: ICD-10-CM

## 2025-06-09 LAB
BH CV LOWER ARTERIAL LEFT ABI RATIO: 1.07
BH CV LOWER ARTERIAL LEFT DORSALIS PEDIS SYS MAX: 153
BH CV LOWER ARTERIAL LEFT GREAT TOE SYS MAX: 134
BH CV LOWER ARTERIAL LEFT POST TIBIAL SYS MAX: 165
BH CV LOWER ARTERIAL LEFT TBI RATIO: 0.87
BH CV LOWER ARTERIAL RIGHT ABI RATIO: 1.03
BH CV LOWER ARTERIAL RIGHT DORSALIS PEDIS SYS MAX: 158
BH CV LOWER ARTERIAL RIGHT GREAT TOE SYS MAX: 119
BH CV LOWER ARTERIAL RIGHT POST TIBIAL SYS MAX: 154
BH CV LOWER ARTERIAL RIGHT TBI RATIO: 0.77
UPPER ARTERIAL LEFT ARM BRACHIAL SYS MAX: 154
UPPER ARTERIAL RIGHT ARM BRACHIAL SYS MAX: 148

## 2025-06-09 PROCEDURE — 93922 UPR/L XTREMITY ART 2 LEVELS: CPT | Performed by: SURGERY

## 2025-06-09 PROCEDURE — 93922 UPR/L XTREMITY ART 2 LEVELS: CPT

## 2025-06-10 NOTE — TELEPHONE ENCOUNTER
Per Berna:  LISHA result:    Right Conclusion: The right LISHA is normal. Normal digital pressures.    Left Conclusion: The left LISHA is normal. Normal digital pressures.    Normal arterial evaluation of both lower extremities.   Follow up in six months. Notify office if cardiac symptoms persist/worsen    Spoke with patient, patient is aware and verbalized understanding. Patient stated she will call back to set up 6 month appt.

## 2025-07-02 ENCOUNTER — LAB (OUTPATIENT)
Dept: LAB | Facility: HOSPITAL | Age: 60
End: 2025-07-02
Payer: OTHER GOVERNMENT

## 2025-07-02 DIAGNOSIS — N18.30 TYPE 2 DIABETES MELLITUS WITH STAGE 3 CHRONIC KIDNEY DISEASE, WITHOUT LONG-TERM CURRENT USE OF INSULIN, UNSPECIFIED WHETHER STAGE 3A OR 3B CKD: ICD-10-CM

## 2025-07-02 DIAGNOSIS — E11.22 TYPE 2 DIABETES MELLITUS WITH STAGE 3 CHRONIC KIDNEY DISEASE, WITHOUT LONG-TERM CURRENT USE OF INSULIN, UNSPECIFIED WHETHER STAGE 3A OR 3B CKD: ICD-10-CM

## 2025-07-02 DIAGNOSIS — E78.5 HYPERLIPIDEMIA LDL GOAL <70: ICD-10-CM

## 2025-07-02 LAB
ALBUMIN SERPL-MCNC: 4.4 G/DL (ref 3.5–5.2)
ALBUMIN UR-MCNC: <1.2 MG/DL
ALBUMIN/GLOB SERPL: 1.8 G/DL
ALP SERPL-CCNC: 59 U/L (ref 39–117)
ALT SERPL W P-5'-P-CCNC: 26 U/L (ref 1–33)
ANION GAP SERPL CALCULATED.3IONS-SCNC: 13.2 MMOL/L (ref 5–15)
AST SERPL-CCNC: 23 U/L (ref 1–32)
BASOPHILS # BLD AUTO: 0.06 10*3/MM3 (ref 0–0.2)
BASOPHILS NFR BLD AUTO: 0.8 % (ref 0–1.5)
BILIRUB SERPL-MCNC: 0.4 MG/DL (ref 0–1.2)
BILIRUB UR QL STRIP: NEGATIVE
BUN SERPL-MCNC: 39 MG/DL (ref 8–23)
BUN/CREAT SERPL: 50.6 (ref 7–25)
CALCIUM SPEC-SCNC: 10 MG/DL (ref 8.6–10.5)
CHLORIDE SERPL-SCNC: 104 MMOL/L (ref 98–107)
CHOLEST SERPL-MCNC: 114 MG/DL (ref 0–200)
CLARITY UR: CLEAR
CO2 SERPL-SCNC: 22.8 MMOL/L (ref 22–29)
COLOR UR: YELLOW
CREAT SERPL-MCNC: 0.77 MG/DL (ref 0.57–1)
CREAT UR-MCNC: 55.2 MG/DL
DEPRECATED RDW RBC AUTO: 41.4 FL (ref 37–54)
EGFRCR SERPLBLD CKD-EPI 2021: 88.4 ML/MIN/1.73
EOSINOPHIL # BLD AUTO: 0.31 10*3/MM3 (ref 0–0.4)
EOSINOPHIL NFR BLD AUTO: 4.2 % (ref 0.3–6.2)
ERYTHROCYTE [DISTWIDTH] IN BLOOD BY AUTOMATED COUNT: 12.4 % (ref 12.3–15.4)
GLOBULIN UR ELPH-MCNC: 2.4 GM/DL
GLUCOSE SERPL-MCNC: 126 MG/DL (ref 65–99)
GLUCOSE UR STRIP-MCNC: ABNORMAL MG/DL
HBA1C MFR BLD: 6.6 % (ref 4.8–5.6)
HCT VFR BLD AUTO: 40.7 % (ref 34–46.6)
HDLC SERPL-MCNC: 49 MG/DL (ref 40–60)
HGB BLD-MCNC: 13.4 G/DL (ref 12–15.9)
HGB UR QL STRIP.AUTO: NEGATIVE
HOLD SPECIMEN: NORMAL
IMM GRANULOCYTES # BLD AUTO: 0.03 10*3/MM3 (ref 0–0.05)
IMM GRANULOCYTES NFR BLD AUTO: 0.4 % (ref 0–0.5)
KETONES UR QL STRIP: NEGATIVE
LDLC SERPL CALC-MCNC: 43 MG/DL (ref 0–100)
LDLC/HDLC SERPL: 0.8 {RATIO}
LEUKOCYTE ESTERASE UR QL STRIP.AUTO: NEGATIVE
LYMPHOCYTES # BLD AUTO: 2.96 10*3/MM3 (ref 0.7–3.1)
LYMPHOCYTES NFR BLD AUTO: 40.2 % (ref 19.6–45.3)
MCH RBC QN AUTO: 30 PG (ref 26.6–33)
MCHC RBC AUTO-ENTMCNC: 32.9 G/DL (ref 31.5–35.7)
MCV RBC AUTO: 91.3 FL (ref 79–97)
MICROALBUMIN/CREAT UR: NORMAL MG/G{CREAT}
MONOCYTES # BLD AUTO: 0.58 10*3/MM3 (ref 0.1–0.9)
MONOCYTES NFR BLD AUTO: 7.9 % (ref 5–12)
NEUTROPHILS NFR BLD AUTO: 3.43 10*3/MM3 (ref 1.7–7)
NEUTROPHILS NFR BLD AUTO: 46.5 % (ref 42.7–76)
NITRITE UR QL STRIP: NEGATIVE
NRBC BLD AUTO-RTO: 0 /100 WBC (ref 0–0.2)
PH UR STRIP.AUTO: 6 [PH] (ref 5–8)
PLATELET # BLD AUTO: 243 10*3/MM3 (ref 140–450)
PMV BLD AUTO: 9.8 FL (ref 6–12)
POTASSIUM SERPL-SCNC: 4 MMOL/L (ref 3.5–5.2)
PROT SERPL-MCNC: 6.8 G/DL (ref 6–8.5)
PROT UR QL STRIP: NEGATIVE
RBC # BLD AUTO: 4.46 10*6/MM3 (ref 3.77–5.28)
SODIUM SERPL-SCNC: 140 MMOL/L (ref 136–145)
SP GR UR STRIP: >1.03 (ref 1–1.03)
TRIGL SERPL-MCNC: 128 MG/DL (ref 0–150)
TSH SERPL DL<=0.05 MIU/L-ACNC: 1.41 UIU/ML (ref 0.27–4.2)
UROBILINOGEN UR QL STRIP: ABNORMAL
VLDLC SERPL-MCNC: 22 MG/DL (ref 5–40)
WBC NRBC COR # BLD AUTO: 7.37 10*3/MM3 (ref 3.4–10.8)

## 2025-07-02 PROCEDURE — 82043 UR ALBUMIN QUANTITATIVE: CPT

## 2025-07-02 PROCEDURE — 80053 COMPREHEN METABOLIC PANEL: CPT

## 2025-07-02 PROCEDURE — 84443 ASSAY THYROID STIM HORMONE: CPT

## 2025-07-02 PROCEDURE — 83036 HEMOGLOBIN GLYCOSYLATED A1C: CPT

## 2025-07-02 PROCEDURE — 82570 ASSAY OF URINE CREATININE: CPT

## 2025-07-02 PROCEDURE — 80061 LIPID PANEL: CPT

## 2025-07-02 PROCEDURE — 85025 COMPLETE CBC W/AUTO DIFF WBC: CPT

## 2025-07-02 PROCEDURE — 81003 URINALYSIS AUTO W/O SCOPE: CPT

## 2025-07-08 ENCOUNTER — OFFICE VISIT (OUTPATIENT)
Dept: FAMILY MEDICINE CLINIC | Facility: CLINIC | Age: 60
End: 2025-07-08
Payer: OTHER GOVERNMENT

## 2025-07-08 VITALS
OXYGEN SATURATION: 98 % | HEART RATE: 84 BPM | SYSTOLIC BLOOD PRESSURE: 121 MMHG | WEIGHT: 191.4 LBS | TEMPERATURE: 97.6 F | BODY MASS INDEX: 29.01 KG/M2 | DIASTOLIC BLOOD PRESSURE: 77 MMHG | HEIGHT: 68 IN

## 2025-07-08 DIAGNOSIS — I10 ESSENTIAL HYPERTENSION: ICD-10-CM

## 2025-07-08 DIAGNOSIS — F41.9 ANXIETY: ICD-10-CM

## 2025-07-08 DIAGNOSIS — E11.9 TYPE 2 DIABETES MELLITUS WITHOUT COMPLICATION, WITHOUT LONG-TERM CURRENT USE OF INSULIN: Primary | ICD-10-CM

## 2025-07-08 DIAGNOSIS — J43.9 PULMONARY EMPHYSEMA, UNSPECIFIED EMPHYSEMA TYPE: ICD-10-CM

## 2025-07-08 DIAGNOSIS — Z87.891 HISTORY OF SMOKING 25-50 PACK YEARS: ICD-10-CM

## 2025-07-08 DIAGNOSIS — G47.00 INSOMNIA, UNSPECIFIED TYPE: ICD-10-CM

## 2025-07-08 DIAGNOSIS — K21.9 GASTROESOPHAGEAL REFLUX DISEASE WITHOUT ESOPHAGITIS: ICD-10-CM

## 2025-07-08 DIAGNOSIS — Z98.61 CAD S/P PERCUTANEOUS CORONARY ANGIOPLASTY: ICD-10-CM

## 2025-07-08 DIAGNOSIS — J30.9 ALLERGIC RHINITIS, UNSPECIFIED SEASONALITY, UNSPECIFIED TRIGGER: ICD-10-CM

## 2025-07-08 DIAGNOSIS — N18.30 STAGE 3 CHRONIC KIDNEY DISEASE, UNSPECIFIED WHETHER STAGE 3A OR 3B CKD: ICD-10-CM
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
## 2025-07-08 DIAGNOSIS — Z78.0 POST-MENOPAUSAL: ICD-10-CM

## 2025-07-08 DIAGNOSIS — E55.9 VITAMIN D DEFICIENCY: ICD-10-CM

## 2025-07-08 DIAGNOSIS — I25.10 CAD S/P PERCUTANEOUS CORONARY ANGIOPLASTY: ICD-10-CM

## 2025-07-08 DIAGNOSIS — E78.5 HYPERLIPIDEMIA LDL GOAL <70: ICD-10-CM

## 2025-07-08 RX ORDER — FUROSEMIDE 20 MG/1
20 TABLET ORAL DAILY
Qty: 90 TABLET | Refills: 1 | Status: CANCELLED | OUTPATIENT
Start: 2025-07-08

## 2025-07-08 RX ORDER — EZETIMIBE 10 MG/1
10 TABLET ORAL DAILY
Qty: 90 TABLET | Refills: 1 | Status: SHIPPED | OUTPATIENT
Start: 2025-07-08

## 2025-07-08 RX ORDER — FLUTICASONE PROPIONATE 50 MCG
2 SPRAY, SUSPENSION (ML) NASAL DAILY
Qty: 48 G | Refills: 1 | Status: SHIPPED | OUTPATIENT
Start: 2025-07-08

## 2025-07-08 RX ORDER — ROSUVASTATIN CALCIUM 40 MG/1
40 TABLET, COATED ORAL NIGHTLY
Qty: 90 TABLET | Refills: 1 | Status: SHIPPED | OUTPATIENT
Start: 2025-07-08

## 2025-07-08 RX ORDER — PREGABALIN 75 MG/1
75 CAPSULE ORAL 2 TIMES DAILY
COMMUNITY

## 2025-07-08 RX ORDER — CLOBETASOL PROPIONATE 0.5 MG/G
1 CREAM TOPICAL 2 TIMES DAILY
Qty: 60 G | Refills: 1 | Status: SHIPPED | OUTPATIENT
Start: 2025-07-08

## 2025-07-08 RX ORDER — TIOTROPIUM BROMIDE INHALATION SPRAY 3.12 UG/1
2 SPRAY, METERED RESPIRATORY (INHALATION)
Qty: 4 G | Refills: 5 | Status: SHIPPED | OUTPATIENT
Start: 2025-07-08

## 2025-07-08 RX ORDER — CEPHALEXIN 500 MG/1
500 CAPSULE ORAL 4 TIMES DAILY
COMMUNITY

## 2025-07-08 RX ORDER — ASPIRIN 81 MG/1
81 TABLET ORAL DAILY
COMMUNITY

## 2025-07-08 RX ORDER — METRONIDAZOLE 7.5 MG/G
LOTION TOPICAL EVERY 12 HOURS SCHEDULED
COMMUNITY

## 2025-07-08 RX ORDER — METFORMIN HYDROCHLORIDE 500 MG/1
1000 TABLET, EXTENDED RELEASE ORAL
Qty: 180 TABLET | Refills: 1 | Status: SHIPPED | OUTPATIENT
Start: 2025-07-08

## 2025-07-08 RX ORDER — FUROSEMIDE 20 MG/1
20 TABLET ORAL DAILY
Qty: 90 TABLET | Refills: 1 | Status: SHIPPED | OUTPATIENT
Start: 2025-07-08

## 2025-07-08 RX ORDER — PSEUDOEPHEDRINE HCL 30 MG/1
30 TABLET, FILM COATED ORAL EVERY 4 HOURS PRN
COMMUNITY

## 2025-07-08 RX ORDER — SERTRALINE HYDROCHLORIDE 25 MG/1
25 TABLET, FILM COATED ORAL DAILY
Qty: 90 TABLET | Refills: 1 | Status: SHIPPED | OUTPATIENT
Start: 2025-07-08

## 2025-07-08 RX ORDER — METOPROLOL SUCCINATE 25 MG/1
25 TABLET, EXTENDED RELEASE ORAL DAILY PRN
Qty: 90 TABLET | Refills: 1 | Status: SHIPPED | OUTPATIENT
Start: 2025-07-08

## 2025-07-08 RX ORDER — CETIRIZINE HYDROCHLORIDE 10 MG/1
10 TABLET ORAL DAILY
Qty: 90 TABLET | Refills: 0 | Status: SHIPPED | OUTPATIENT
Start: 2025-07-08

## 2025-07-08 RX ORDER — FAMOTIDINE 40 MG/1
40 TABLET, FILM COATED ORAL 2 TIMES DAILY PRN
Qty: 90 TABLET | Refills: 1 | Status: SHIPPED | OUTPATIENT
Start: 2025-07-08

## 2025-07-08 NOTE — PROGRESS NOTES
Follow Up Office Visit      Patient Name: Claudette Gramajo  : 1965   MRN: [id number]     Chief Complaint:  DM2, CAD, HTN, hyperlipidemia, GERD, CKD    History of Present Illness: Claudette Gramajo is a 60 y.o. female who is here today to follow up for  DM2, CAD, HTN, hyperlipidemia, GERD, CKD  Review lab results    B/P running /60-70     Patient has started getting bite from toes to belly. This are bug bites  they are swelling and painful  Request to resume Zoloft feel this helped manage her mood and her anxiety  Also complained of difficulty sleeping canceled her last consult with sleep medicine    A1C- 2025  6.6  BS  100  it has got down to 53 at one time     [facility] exam-- [facility]   Foot exam- - Dr Rangel  mammogram-2025  Pap-2022  Colonoscopy-2024  Ct low dose chest 2025    Follow up from cardiologist 2025  1. DREW (dyspnea on exertion) (Primary)  Assessment & Plan:  Patient dyspnea exertion issues recommended go ahead and checking proBNP and echocardiogram to see if any evidence of diastolic or systolic CHF   Orders:  -     proBNP; Future  -     Adult Transthoracic Echo Complete W/ Cont if Necessary Per Protocol; Future   2. Claudication  Assessment & Plan:  Patient with her mid leg pain issues difficult to ascertain etiology does not appear to be clearly exertional though we will go ahead and get lower extremity vascular studies to see if any evidence of abnormal blood   Orders:  -     Doppler Arterial Multi Level Lower Extremity - Bilateral CAR; Future   3. CAD S/P percutaneous coronary angioplasty  Assessment & Plan:  Patient without anginal symptoms has had some easy bruisability in the lower extremities for that she can discontinue her aspirin to stay on Plavix and about once a day for preventative purposes    4. Hyperlipidemia LDL goal <70  Assessment & Plan:  LDL goal continue with Crestor 40 nightly    5. Essential hypertension  Assessment &  Plan:  Patient has been experiencing low blood pressure at times and does the 90s she is not much and hypertensive this point told her that if the proBNP level was normal and her echo did not show any major issues she could try coming off of the Lasix and see how her swelling did to see if this improves her blood pressure     Subjective      Review of Systems:   Review of Systems   Constitutional:  Negative for fatigue.   Respiratory:  Positive for shortness of breath.    Cardiovascular:  Negative for chest pain and palpitations.   Gastrointestinal:  Negative for nausea.   Genitourinary:  Negative for dysuria.   Neurological:  Positive for dizziness and headaches.   Psychiatric/Behavioral:  Negative for confusion.         Past Medical History:   Past Medical History:   Diagnosis Date    Allergic 1998    Anesthesia complication     woke up during surgery    Arthritis     Arthritis of neck     Asthma     CAD (coronary artery disease)     FOLLOWS AMADOR, LAST STENT 2014    Cellulitis and abscess of leg 03/28/2013    NO CURRENT ISSUES    Chronic purulent otitis media 07/01/2013    Clotting disorder 05/01/2013    From medication    Connective tissue anomaly     Constipation     COPD (chronic obstructive pulmonary disease)     Mild    CTS (carpal tunnel syndrome)     Diabetes mellitus     Dislocation, patella closed 05/30/2017    Elevated cholesterol     Family history of colon cancer 05/27/2022    Family history of esophageal cancer 05/27/2022    Fracture of wrist 1979    right hand    Hiatal hernia     Hip arthrosis     Hyperlipidemia LDL goal <70 08/31/2021    Hypertension     Knee swelling     Migraine     Myocardial infarct     2013    FARNAZ on CPAP 09/01/2021    Post-menopausal bleeding     RLS (restless legs syndrome)     Rotator cuff syndrome     tita    Sleep apnea     uses CPAP    Subluxation of patella     Ulcerative colitis 5/28/2024    NO CURRENT ISSUES       Past Surgical History:   Past Surgical History:    Procedure Laterality Date    ADENOIDECTOMY      APPENDECTOMY      CARDIAC CATHETERIZATION      x2    CARDIAC CATHETERIZATION N/A 05/28/2024    Procedure: Left Heart Cath;  Surgeon: Davis Parker MD;  Location: [facility] CATH INVASIVE LOCATION;  Service: Cardiovascular;  Laterality: N/A;    CARPAL TUNNEL RELEASE Right 07/11/2024    Procedure: CARPAL TUNNEL RELEASE;  Surgeon: Alexis Hawkins MD;  [place] [facility] [place] [facility] [place]  Service: [place]opedics;  Laterality: Right;    COLONOSCOPY      COLONOSCOPY N/A 08/26/2022    Procedure: COLONOSCOPY WITH POLYPECTOMY;  Surgeon: Sloan Ortiz MD;  Location: [facility] ENDOSCOPY;  Service: Gastroenterology;  Laterality: N/A;  COLON POLYP    COLONOSCOPY N/A 07/09/2024    Procedure: COLONOSCOPY WITH COLD SNARE POLYPECTOMIES;  Surgeon: Sloan Ortiz MD;  Location: [facility] ENDOSCOPY;  Service: Gastroenterology;  Laterality: N/A;  COLON POLYPS, DIVERTICULOSIS    CORONARY ANGIOPLASTY      CORONARY STENT PLACEMENT      x2 2013 2014    CUBITAL TUNNEL RELEASE Right 07/11/2024    Procedure: CUBITAL TUNNEL RELEASE;  Surgeon: Alexis Hawkins MD;  [place] [facility] [place] [facility] [place]  Service: Orthopedics;  Laterality: Right;    D & C HYSTEROSCOPY N/A 08/30/2024    Procedure: DILATATION AND CURETTAGE,  HYSTEROSCOPY;  Surgeon: Filomena Lorenzo DO;  [place] [facility] [place] [facility] [place]  Service: Gynecology;  Laterality: N/A;    ENDOSCOPY N/A 08/26/2022    Procedure: ESOPHAGOGASTRODUODENOSCOPY WITH BX;  Surgeon: Sloan Ortiz MD;  Location: [facility] ENDOSCOPY;  Service: Gastroenterology;  Laterality: N/A;  HIATAL HERNIA    HAND SURGERY  July 2024    HERNIA REPAIR      INNER EAR SURGERY      KNEE SURGERY  1984, oct.    right knee    TONSILLECTOMY      UPPER GASTROINTESTINAL ENDOSCOPY      VENOUS THROMBECTOMY      patient unsure       Family History:   Family History   Problem Relation Age of Onset    Heart attack Father     Heart disease Father     Cancer Father     Osteoporosis Father     Rheumatologic  disease Father     Suicidality Mother     Osteoporosis Mother     Rheumatologic disease Mother     Scoliosis Mother     Heart disease Paternal Grandfather     Cancer Paternal Grandfather     Thyroid disease Paternal Grandfather     Pancreatic cancer Paternal Grandfather     Esophageal cancer Paternal Grandfather     Stomach cancer Paternal Grandfather     Sleep apnea Paternal Grandfather     Heart disease Paternal Grandmother     Cancer Paternal Grandmother     No Known Problems Maternal Grandmother     No Known Problems Maternal Grandfather     Colon cancer Neg Hx     Malig Hyperthermia Neg Hx     Breast cancer Neg Hx     Ovarian cancer Neg Hx     Uterine cancer Neg Hx     Deep vein thrombosis Neg Hx     Pulmonary embolism Neg Hx        Social History:   Social History     Socioeconomic History    Marital status:    Tobacco Use    Smoking status: Former     Current packs/day: 0.00     Average packs/day: 1 pack/day for 26.0 years (26.0 ttl pk-yrs)     Types: Cigarettes     Start date: 1986     Quit date: 2012     Years since quittin.5     Passive exposure: Past    Smokeless tobacco: Never    Tobacco comments:     caffeine use   Vaping Use    Vaping status: Never Used   Substance and Sexual Activity    Alcohol use: No    Drug use: Never    Sexual activity: Yes     Partners: Male       Medications:     Current Outpatient Medications:     albuterol sulfate  (90 Base) MCG/ACT inhaler, Take 1-2 Puffs every 4 hours prn, Disp: 8 g, Rfl: 1    Alcohol Swabs (Easy Touch Alcohol Prep Medium) 70 % pads, 1 each by Other route Daily., Disp: 100 each, Rfl: 1    aspirin 81 MG EC tablet, Take 1 tablet by mouth Daily., Disp: , Rfl:     cephalexin (KEFLEX) 500 MG capsule, Take 1 capsule by mouth 4 (Four) Times a Day., Disp: , Rfl:     cetirizine (zyrTEC) 10 MG tablet, Take 1 tablet by mouth Daily., Disp: 90 tablet, Rfl: 0    clopidogrel (PLAVIX) 75 MG tablet, Take 1 tablet by mouth Daily., Disp: 90 tablet,  Rfl: 1    empagliflozin (JARDIANCE) 25 MG tablet tablet, Take 1 tablet by mouth Daily., Disp: 90 tablet, Rfl: 1    ezetimibe (Zetia) 10 MG tablet, Take 1 tablet by mouth Daily., Disp: 90 tablet, Rfl: 1    famotidine (Pepcid) 40 MG tablet, Take 1 tablet by mouth 2 (Two) Times a Day As Needed for Heartburn., Disp: 90 tablet, Rfl: 1    fluticasone (FLONASE) 50 MCG/ACT nasal spray, Administer 2 sprays into the nostril(s) as directed by provider Daily. 2 sprays each nostril daily, Disp: 48 g, Rfl: 1    FREESTYLE LITE test strip, 1 each by Other route 3 times a day., Disp: 300 each, Rfl: 2    furosemide (LASIX) 20 MG tablet, Take 1 tablet by mouth Daily., Disp: 90 tablet, Rfl: 1    Lancets (freestyle) lancets, 1 each by Other route 3 times a day., Disp: 300 each, Rfl: 2    metFORMIN ER (GLUCOPHAGE-XR) 500 MG 24 hr tablet, Take 2 tablets by mouth Daily With Dinner., Disp: 180 tablet, Rfl: 1    metoprolol succinate XL (TOPROL-XL) 25 MG 24 hr tablet, Take 1 tablet by mouth Daily As Needed (TAKES AS NEEDED FOR HTN)., Disp: 90 tablet, Rfl: 1    metroNIDAZOLE (FLAGYL) 0.75 % lotion lotion, Apply  topically to the appropriate area as directed Every 12 (Twelve) Hours., Disp: , Rfl:     Narcan 4 MG/0.1ML nasal spray, , Disp: , Rfl:     nitroglycerin (NITROSTAT) 0.4 MG SL tablet, 1 under the tongue as needed for angina, may repeat q5mins for up three doses, Disp: 100 tablet, Rfl: 11    oxyCODONE (ROXICODONE) 5 MG immediate release tablet, Take 1 tablet every 12 hours by oral route as needed for 30 days., Disp: , Rfl:     pregabalin (LYRICA) 75 MG capsule, Take 1 capsule by mouth 2 (Two) Times a Day., Disp: , Rfl:     pseudoephedrine (SUDAFED) 30 MG tablet, Take 1 tablet by mouth Every 4 (Four) Hours As Needed for Congestion., Disp: , Rfl:     rosuvastatin (CRESTOR) 40 MG tablet, Take 1 tablet by mouth Every Night., Disp: 90 tablet, Rfl: 1    Semaglutide,0.25 or 0.5MG/DOS, (OZEMPIC) 2 MG/3ML solution pen-injector, Inject 0.25 mg  "under the skin into the appropriate area as directed 1 (One) Time Per Week., Disp: 3 mL, Rfl: 3    tiotropium bromide monohydrate (Spiriva Respimat) 2.5 MCG/ACT aerosol solution inhaler, Inhale 2 puffs Daily., Disp: 4 g, Rfl: 5    clobetasol propionate (TEMOVATE) 0.05 % cream, Apply 1 Application topically to the appropriate area as directed 2 (Two) Times a Day., Disp: 60 g, Rfl: 1    sertraline (Zoloft) 25 MG tablet, Take 1 tablet by mouth Daily., Disp: 90 tablet, Rfl: 1    Allergies:   Allergies   Allergen Reactions    Adhesive Tape Itching     NO TAPES     Molds & Smuts Cough    Nickel Rash    Pollen Extract Cough           Objective     Physical Exam:  Vital Signs:   Vitals:    07/08/25 1550   BP: 121/77   Pulse: 84   Temp: 97.6 °F (36.4 °C)   SpO2: 98%   Weight: 86.8 kg (191 lb 6.4 oz)   Height: 172.7 cm (68\")   PainSc: 4      Body mass index is 29.1 kg/m².           Physical Exam  HENT:      Nose: Nose normal.      Mouth/Throat:      Mouth: Mucous membranes are moist.   Eyes:      Conjunctiva/sclera: Conjunctivae normal.   Neck:      Vascular: No carotid bruit.   Cardiovascular:      Rate and Rhythm: Normal rate and regular rhythm.      Heart sounds: Normal heart sounds. No murmur heard.  Pulmonary:      Effort: Pulmonary effort is normal.      Breath sounds: Normal breath sounds.   Abdominal:      General: Bowel sounds are normal.      Palpations: Abdomen is soft.   Musculoskeletal:      Right lower leg: Edema present.      Left lower leg: Edema present.      Comments: Trace bilateral pedal edema   Lymphadenopathy:      Cervical: Cervical adenopathy present.   Skin:     General: Skin is warm and dry.      Comments: Multiple scabs erythematous bites bilateral legs no surrounding erythema or edema   Neurological:      Mental Status: She is alert and oriented to person, place, and time.   Psychiatric:         Mood and Affect: Mood normal.         Behavior: Behavior normal.      Comments: Tearful at times has " been present at visit             Assessment / Plan      Assessment/Plan:   Diagnoses and all orders for this visit:    1. Type 2 diabetes mellitus without complication, without long-term current use of insulin (Primary)    2. Essential hypertension    3. Hyperlipidemia LDL goal <70    4. CAD S/P percutaneous coronary angioplasty    5. Vitamin D deficiency    6. Gastroesophageal reflux disease without esophagitis    7. Stage 3 chronic kidney disease, unspecified whether stage 3a or 3b CKD    8. Pulmonary emphysema, unspecified emphysema type    9. Insomnia, unspecified type  -     Ambulatory Referral to Sleep Medicine    10. History of smoking 25-50 pack years    11. Allergic rhinitis, unspecified seasonality, unspecified trigger  -     fluticasone (FLONASE) 50 MCG/ACT nasal spray; Administer 2 sprays into the nostril(s) as directed by provider Daily. 2 sprays each nostril daily  Dispense: 48 g; Refill: 1    12. Post-menopausal  -     DEXA Bone Density Axial; Future    13. Anxiety    14. Bug bite, initial encounter    Other orders  -     rosuvastatin (CRESTOR) 40 MG tablet; Take 1 tablet by mouth Every Night.  Dispense: 90 tablet; Refill: 1  -     tiotropium bromide monohydrate (Spiriva Respimat) 2.5 MCG/ACT aerosol solution inhaler; Inhale 2 puffs Daily.  Dispense: 4 g; Refill: 5  -     metFORMIN ER (GLUCOPHAGE-XR) 500 MG 24 hr tablet; Take 2 tablets by mouth Daily With Dinner.  Dispense: 180 tablet; Refill: 1  -     metoprolol succinate XL (TOPROL-XL) 25 MG 24 hr tablet; Take 1 tablet by mouth Daily As Needed (TAKES AS NEEDED FOR HTN).  Dispense: 90 tablet; Refill: 1  -     famotidine (Pepcid) 40 MG tablet; Take 1 tablet by mouth 2 (Two) Times a Day As Needed for Heartburn.  Dispense: 90 tablet; Refill: 1  -     ezetimibe (Zetia) 10 MG tablet; Take 1 tablet by mouth Daily.  Dispense: 90 tablet; Refill: 1  -     empagliflozin (JARDIANCE) 25 MG tablet tablet; Take 1 tablet by mouth Daily.  Dispense: 90 tablet;  "Refill: 1  -     cetirizine (zyrTEC) 10 MG tablet; Take 1 tablet by mouth Daily.  Dispense: 90 tablet; Refill: 0  -     clobetasol propionate (TEMOVATE) 0.05 % cream; Apply 1 Application topically to the appropriate area as directed 2 (Two) Times a Day.  Dispense: 60 g; Refill: 1  -     furosemide (LASIX) 20 MG tablet; Take 1 tablet by mouth Daily.  Dispense: 90 tablet; Refill: 1  -     Semaglutide,0.25 or 0.5MG/DOS, (OZEMPIC) 2 MG/3ML solution pen-injector; Inject 0.25 mg under the skin into the appropriate area as directed 1 (One) Time Per Week.  Dispense: 3 mL; Refill: 3  -     sertraline (Zoloft) 25 MG tablet; Take 1 tablet by mouth Daily.  Dispense: 90 tablet; Refill: 1       Diabetes mellitus type 2 currently controlled hemoglobin A1c below 7  Hypertension currently controlled  Hyperlipidemia LDL below goal on current statin dose Crestor 40 mg at nighttime  Coronary artery disease status post angioplasty reviewed cardiology note with patient  Reflux currently controlled  Chronic kidney stage III avoid all NSAIDs  Pulmonary emphysema no wheezing or shortness of breath stable on Spiriva inhaler at this time  Insomnia will refer to sleep center  History of greater than smoking 25 pack years CT low-dose chest up-to-date  Allergic rhinitis will refill nose spray  Postmenopausal obtain bone density  Anxiety will start Zoloft at 25 mg once weekly with a follow-up in approximately 4 to 6 weeks with Pap smear  Bug bite will treat with clobetasol topical cream recommend cleaning with soap and water daily      Follow Up:   Return in about 6 months (around 1/8/2026).    SARWAT Dietz    \"Please note that portions of this note were completed with a voice recognition program.\"     Answers submitted by the patient for this visit:  Chronic Condition Follow-up (Submitted on 7/7/2025)  Chief Complaint: PCP follow-up  diabetes: Yes  dry mouth: No  weight loss: No  weight gain: No  blurred vision: No  foot ulcerations: " No  Medication compliance: all of the time  Exercise: intermittently  Home blood tests: 3-4 x day  Highest range: 110-130  Below 70: occasionally  Meal planning: carbohydrate counting, avoidance of concentrated sweets, low carbohydrate diet  Eye exam current: Yes  Sees podiatrist: Yes

## 2025-08-14 ENCOUNTER — TELEPHONE (OUTPATIENT)
Dept: CARDIOLOGY | Facility: CLINIC | Age: 60
End: 2025-08-14
Payer: OTHER GOVERNMENT

## 2025-08-19 ENCOUNTER — HOSPITAL ENCOUNTER (OUTPATIENT)
Dept: BONE DENSITY | Facility: HOSPITAL | Age: 60
Discharge: HOME | End: 2025-08-19
Admitting: NURSE PRACTITIONER
Payer: OTHER GOVERNMENT

## 2025-08-19 DIAGNOSIS — Z78.0 POST-MENOPAUSAL: ICD-10-CM

## 2025-08-19 PROCEDURE — 77080 DXA BONE DENSITY AXIAL: CPT

## 2025-08-27 ENCOUNTER — OFFICE VISIT (OUTPATIENT)
Dept: SLEEP MEDICINE | Facility: HOSPITAL | Age: 60
End: 2025-08-27
Payer: OTHER GOVERNMENT

## 2025-08-27 VITALS
HEIGHT: 68 IN | WEIGHT: 192.3 LBS | DIASTOLIC BLOOD PRESSURE: 69 MMHG | HEART RATE: 68 BPM | SYSTOLIC BLOOD PRESSURE: 127 MMHG | BODY MASS INDEX: 29.15 KG/M2 | OXYGEN SATURATION: 98 %

## 2025-08-27 DIAGNOSIS — G47.33 OSA ON CPAP: Primary | ICD-10-CM

## 2025-08-27 DIAGNOSIS — G47.00 INSOMNIA, UNSPECIFIED TYPE: ICD-10-CM

## 2025-08-27 PROCEDURE — 99214 OFFICE O/P EST MOD 30 MIN: CPT | Performed by: INTERNAL MEDICINE

## 2025-08-27 PROCEDURE — G0463 HOSPITAL OUTPT CLINIC VISIT: HCPCS

## 2025-08-27 RX ORDER — TEMAZEPAM 15 MG/1
15 CAPSULE ORAL NIGHTLY
Qty: 30 CAPSULE | Refills: 5 | Status: SHIPPED | OUTPATIENT
Start: 2025-08-27

## 2025-08-27 RX ORDER — METRONIDAZOLE TOPICAL 7.5 MG/G
GEL TOPICAL
COMMUNITY
Start: 2025-08-14

## (undated) DEVICE — DRESSING,GAUZE,XEROFORM,CURAD,1"X8",ST: Brand: CURAD

## (undated) DEVICE — COMFORT ARM SLING: Brand: DEROYAL

## (undated) DEVICE — DISPOSABLE TOURNIQUET CUFF SINGLE BLADDER, SINGLE PORT AND QUICK CONNECT CONNECTOR: Brand: COLOR CUFF

## (undated) DEVICE — Device: Brand: DEFENDO AIR/WATER/SUCTION AND BIOPSY VALVE

## (undated) DEVICE — DRAPE,TOWEL,LARGE,INVISISHIELD: Brand: MEDLINE

## (undated) DEVICE — SINGLE-USE BIOPSY FORCEPS: Brand: RADIAL JAW 4

## (undated) DEVICE — UNDERCAST PADDING: Brand: DEROYAL

## (undated) DEVICE — EGD OR ERCP KIT: Brand: MEDLINE INDUSTRIES, INC.

## (undated) DEVICE — LINER SURG CANSTR SXN S/RIGD 1500CC

## (undated) DEVICE — PREP TRAY WITH CHG: Brand: MEDLINE INDUSTRIES, INC.

## (undated) DEVICE — Device

## (undated) DEVICE — STRIP,CLOSURE,WOUND,MEDI-STRIP,1/2X4: Brand: MEDLINE

## (undated) DEVICE — THE SINGLE USE ETRAP – POLYP TRAP IS USED FOR SUCTION RETRIEVAL OF ENDOSCOPICALLY REMOVED POLYPS.: Brand: ETRAP

## (undated) DEVICE — LITHOTOMY-YELLOW FINS: Brand: MEDLINE INDUSTRIES, INC.

## (undated) DEVICE — STERILE POLYISOPRENE POWDER-FREE SURGICAL GLOVES: Brand: PROTEXIS

## (undated) DEVICE — SNAR POLYP CAPTIFLEX XS/OVL 11X2.4MM 240CM 1P/U

## (undated) DEVICE — CONN JET HYDRA H20 AUXILIARY DISP

## (undated) DEVICE — COLON KIT: Brand: MEDLINE INDUSTRIES, INC.

## (undated) DEVICE — SUT PROLN 4/0 SH D/A 36IN 8521H

## (undated) DEVICE — TOWEL,OR,DSP,ST,BLUE,STD,4/PK,20PK/CS: Brand: MEDLINE

## (undated) DEVICE — UNDYED BRAIDED (POLYGLACTIN 910), SYNTHETIC ABSORBABLE SUTURE: Brand: COATED VICRYL

## (undated) DEVICE — GLV SURG SENSICARE PI PF LF 7 GRN STRL

## (undated) DEVICE — PAD GRND REM POLYHESIVE A/ DISP

## (undated) DEVICE — 1000ML,PRESSURE INFUSER W/STOPCOCK: Brand: MEDLINE

## (undated) DEVICE — GLV SURG SENSICARE PI ORTHO SZ8 LF STRL

## (undated) DEVICE — DRP SURG U/DRP INVISISHIELD PA 48X52IN

## (undated) DEVICE — CATH URETH INTRMIT ALLPURP LTX 16F RED

## (undated) DEVICE — BLD OPTH BEAVER/MINIBLADE STR 180DEG DBL/BVL SS

## (undated) DEVICE — SEAL HYSTERSCOPE/OUTFLOW CHANNEL MYOSURE

## (undated) DEVICE — RADIFOCUS OPTITORQUE ANGIOGRAPHIC CATHETER: Brand: OPTITORQUE

## (undated) DEVICE — GLIDESHEATH SLENDER STAINLESS STEEL KIT: Brand: GLIDESHEATH SLENDER

## (undated) DEVICE — GLOVE,SURG,SENSICARE SLT,LF,PF,7: Brand: MEDLINE

## (undated) DEVICE — CATH 4F INF PIG 145Â° 110 CM: Brand: INFINITI

## (undated) DEVICE — BNDG ELAS ECON W/CLIP 4IN 5YD LF STRL

## (undated) DEVICE — HYPODERMIC SAFETY NEEDLE: Brand: MONOJECT

## (undated) DEVICE — SOL IRRG H2O PL/BG 1000ML STRL

## (undated) DEVICE — GAUZE,SPONGE,4"X4",16PLY,STRL,LF,10/TRAY: Brand: MEDLINE

## (undated) DEVICE — BNDG ESMARK 4IN 12FT LF STRL BLU

## (undated) DEVICE — EXTREMITY-LF: Brand: MEDLINE INDUSTRIES, INC.

## (undated) DEVICE — SUT ETHLN 4/0 FS2 18IN 662H

## (undated) DEVICE — GLOVE,SURG,SENSICARE SLT,LF,PF,6.5: Brand: MEDLINE

## (undated) DEVICE — INTENDED FOR TISSUE SEPARATION, AND OTHER PROCEDURES THAT REQUIRE A SHARP SURGICAL BLADE TO PUNCTURE OR CUT.: Brand: BARD-PARKER ® CARBON RIB-BACK BLADES

## (undated) DEVICE — SLV SCD KN/LEN ADJ EXPRSS BLENDED MD 1P/U

## (undated) DEVICE — SOL NACL 0.9PCT 1000ML

## (undated) DEVICE — GW FC FLOP/TP .035 260CM 3MM

## (undated) DEVICE — GLOVE,SURG,SENSICARE,ALOE,LF,PF,7: Brand: MEDLINE

## (undated) DEVICE — APPL CHLORAPREP HI/LITE 26ML ORNG

## (undated) DEVICE — SOLIDIFIER LIQLOC PLS 1500CC BT

## (undated) DEVICE — PENCL ES MEGADINE EZ/CLEAN BUTN W/HOLSTR 10FT

## (undated) DEVICE — INTENDED TO BE USED TO OCCLUDE, RETRACT AND IDENTIFY ARTERIES, VEINS, TENDONS AND NERVES IN SURGICAL PROCEDURES: Brand: STERION®  VESSEL LOOP

## (undated) DEVICE — RADIFOCUS GLIDEWIRE: Brand: GLIDEWIRE

## (undated) DEVICE — ENCORE® LATEX ORTHO SIZE 8, STERILE LATEX POWDER-FREE SURGICAL GLOVE: Brand: ENCORE